# Patient Record
Sex: MALE | Race: WHITE | NOT HISPANIC OR LATINO | Employment: OTHER | URBAN - METROPOLITAN AREA
[De-identification: names, ages, dates, MRNs, and addresses within clinical notes are randomized per-mention and may not be internally consistent; named-entity substitution may affect disease eponyms.]

---

## 2017-01-15 ENCOUNTER — HOSPITAL ENCOUNTER (EMERGENCY)
Facility: MEDICAL CENTER | Age: 52
End: 2017-01-15
Payer: MEDICARE

## 2017-01-15 VITALS
OXYGEN SATURATION: 93 % | RESPIRATION RATE: 18 BRPM | TEMPERATURE: 98.8 F | DIASTOLIC BLOOD PRESSURE: 70 MMHG | HEIGHT: 73 IN | SYSTOLIC BLOOD PRESSURE: 101 MMHG | HEART RATE: 114 BPM

## 2017-01-15 PROCEDURE — 302449 STATCHG TRIAGE ONLY (STATISTIC)

## 2017-01-16 NOTE — ED NOTES
Pt amb to [facility] and states he tripped and fell landing on his R lateral chest wall and now has rib pain. Pt states he can't wait in the lobby so he will check out and see his regular doctor tomorrow. Pt signs AMA paperwork.

## 2017-03-03 ENCOUNTER — HOSPITAL ENCOUNTER (EMERGENCY)
Facility: MEDICAL CENTER | Age: 52
End: 2017-03-03
Attending: EMERGENCY MEDICINE
Payer: MEDICARE

## 2017-03-03 VITALS
RESPIRATION RATE: 16 BRPM | TEMPERATURE: 98.8 F | SYSTOLIC BLOOD PRESSURE: 145 MMHG | WEIGHT: 227 LBS | HEART RATE: 113 BPM | DIASTOLIC BLOOD PRESSURE: 97 MMHG | OXYGEN SATURATION: 97 % | HEIGHT: 74 IN | BODY MASS INDEX: 29.13 KG/M2

## 2017-03-03 DIAGNOSIS — F10.10 ALCOHOL ABUSE: ICD-10-CM

## 2017-03-03 LAB
ALBUMIN SERPL BCP-MCNC: 4.1 G/DL (ref 3.2–4.9)
ALBUMIN/GLOB SERPL: 1.1 G/DL
ALP SERPL-CCNC: 85 U/L (ref 30–99)
ALT SERPL-CCNC: 115 U/L (ref 2–50)
AMPHET UR QL SCN: NEGATIVE
ANION GAP SERPL CALC-SCNC: 11 MMOL/L (ref 0–11.9)
AST SERPL-CCNC: 81 U/L (ref 12–45)
BARBITURATES UR QL SCN: NEGATIVE
BASOPHILS # BLD AUTO: 0.4 % (ref 0–1.8)
BASOPHILS # BLD: 0.03 K/UL (ref 0–0.12)
BENZODIAZ UR QL SCN: NEGATIVE
BILIRUB SERPL-MCNC: 0.5 MG/DL (ref 0.1–1.5)
BUN SERPL-MCNC: 15 MG/DL (ref 8–22)
BZE UR QL SCN: NEGATIVE
CALCIUM SERPL-MCNC: 8.7 MG/DL (ref 8.5–10.5)
CANNABINOIDS UR QL SCN: POSITIVE
CHLORIDE SERPL-SCNC: 107 MMOL/L (ref 96–112)
CO2 SERPL-SCNC: 23 MMOL/L (ref 20–33)
CREAT SERPL-MCNC: 0.87 MG/DL (ref 0.5–1.4)
EKG IMPRESSION: NORMAL
EOSINOPHIL # BLD AUTO: 0.16 K/UL (ref 0–0.51)
EOSINOPHIL NFR BLD: 2.4 % (ref 0–6.9)
ERYTHROCYTE [DISTWIDTH] IN BLOOD BY AUTOMATED COUNT: 50.3 FL (ref 35.9–50)
GFR SERPL CREATININE-BSD FRML MDRD: >60 ML/MIN/1.73 M 2
GLOBULIN SER CALC-MCNC: 3.7 G/DL (ref 1.9–3.5)
GLUCOSE SERPL-MCNC: 95 MG/DL (ref 65–99)
HCT VFR BLD AUTO: 45.3 % (ref 42–52)
HGB BLD-MCNC: 15.7 G/DL (ref 14–18)
IMM GRANULOCYTES # BLD AUTO: 0.01 K/UL (ref 0–0.11)
IMM GRANULOCYTES NFR BLD AUTO: 0.1 % (ref 0–0.9)
LYMPHOCYTES # BLD AUTO: 3.27 K/UL (ref 1–4.8)
LYMPHOCYTES NFR BLD: 48.7 % (ref 22–41)
MCH RBC QN AUTO: 34.2 PG (ref 27–33)
MCHC RBC AUTO-ENTMCNC: 34.7 G/DL (ref 33.7–35.3)
MCV RBC AUTO: 98.7 FL (ref 81.4–97.8)
MDMA UR QL SCN: NEGATIVE
METHADONE UR QL SCN: NEGATIVE
MONOCYTES # BLD AUTO: 0.52 K/UL (ref 0–0.85)
MONOCYTES NFR BLD AUTO: 7.7 % (ref 0–13.4)
NEUTROPHILS # BLD AUTO: 2.72 K/UL (ref 1.82–7.42)
NEUTROPHILS NFR BLD: 40.7 % (ref 44–72)
NRBC # BLD AUTO: 0 K/UL
NRBC BLD AUTO-RTO: 0 /100 WBC
OPIATES UR QL SCN: NEGATIVE
OXYCODONE UR QL SCN: NEGATIVE
PCP UR QL SCN: NEGATIVE
PLATELET # BLD AUTO: 159 K/UL (ref 164–446)
PMV BLD AUTO: 9.4 FL (ref 9–12.9)
POC BREATHALIZER: 0.1 PERCENT (ref 0–0.01)
POTASSIUM SERPL-SCNC: 4.1 MMOL/L (ref 3.6–5.5)
PROPOXYPH UR QL SCN: NEGATIVE
PROT SERPL-MCNC: 7.8 G/DL (ref 6–8.2)
RBC # BLD AUTO: 4.59 M/UL (ref 4.7–6.1)
SODIUM SERPL-SCNC: 141 MMOL/L (ref 135–145)
WBC # BLD AUTO: 6.7 K/UL (ref 4.8–10.8)

## 2017-03-03 PROCEDURE — 93005 ELECTROCARDIOGRAM TRACING: CPT

## 2017-03-03 PROCEDURE — 80053 COMPREHEN METABOLIC PANEL: CPT

## 2017-03-03 PROCEDURE — 93005 ELECTROCARDIOGRAM TRACING: CPT | Performed by: EMERGENCY MEDICINE

## 2017-03-03 PROCEDURE — 36415 COLL VENOUS BLD VENIPUNCTURE: CPT

## 2017-03-03 PROCEDURE — 96365 THER/PROPH/DIAG IV INF INIT: CPT

## 2017-03-03 PROCEDURE — 700101 HCHG RX REV CODE 250: Performed by: EMERGENCY MEDICINE

## 2017-03-03 PROCEDURE — 99285 EMERGENCY DEPT VISIT HI MDM: CPT

## 2017-03-03 PROCEDURE — 80307 DRUG TEST PRSMV CHEM ANLYZR: CPT

## 2017-03-03 PROCEDURE — 302970 POC BREATHALIZER: Performed by: EMERGENCY MEDICINE

## 2017-03-03 PROCEDURE — 85025 COMPLETE CBC W/AUTO DIFF WBC: CPT

## 2017-03-03 RX ORDER — LORAZEPAM 2 MG/ML
1 INJECTION INTRAMUSCULAR ONCE
Status: DISCONTINUED | OUTPATIENT
Start: 2017-03-03 | End: 2017-03-03 | Stop reason: HOSPADM

## 2017-03-03 RX ORDER — DIPHENHYDRAMINE HYDROCHLORIDE 50 MG/ML
12.5 INJECTION INTRAMUSCULAR; INTRAVENOUS ONCE
Status: DISCONTINUED | OUTPATIENT
Start: 2017-03-03 | End: 2017-03-03 | Stop reason: HOSPADM

## 2017-03-03 RX ORDER — HALOPERIDOL 5 MG/ML
5 INJECTION INTRAMUSCULAR ONCE
Status: DISCONTINUED | OUTPATIENT
Start: 2017-03-03 | End: 2017-03-03 | Stop reason: HOSPADM

## 2017-03-03 RX ADMIN — THIAMINE HYDROCHLORIDE 1000 ML: 100 INJECTION, SOLUTION INTRAMUSCULAR; INTRAVENOUS at 13:41

## 2017-03-03 ASSESSMENT — LIFESTYLE VARIABLES
ON A TYPICAL DAY WHEN YOU DRINK ALCOHOL HOW MANY DRINKS DO YOU HAVE: 0
TOTAL SCORE: 4
HOW MANY TIMES IN THE PAST YEAR HAVE YOU HAD 5 OR MORE DRINKS IN A DAY: 0
SUBSTANCE_ABUSE: 1
EVER HAD A DRINK FIRST THING IN THE MORNING TO STEADY YOUR NERVES TO GET RID OF A HANGOVER: YES
CONSUMPTION TOTAL: POSITIVE
TOTAL SCORE: 4
DOES PATIENT WANT TO STOP DRINKING: CANNOT ASSESS
HAVE YOU EVER FELT YOU SHOULD CUT DOWN ON YOUR DRINKING: YES
EVER FELT BAD OR GUILTY ABOUT YOUR DRINKING: YES
TOTAL SCORE: 4
HAVE PEOPLE ANNOYED YOU BY CRITICIZING YOUR DRINKING: YES
DO YOU DRINK ALCOHOL: YES
AVERAGE NUMBER OF DAYS PER WEEK YOU HAVE A DRINK CONTAINING ALCOHOL: 0

## 2017-03-03 ASSESSMENT — ENCOUNTER SYMPTOMS
NAUSEA: 1
VOMITING: 0
COUGH: 0
BLURRED VISION: 0
LOSS OF CONSCIOUSNESS: 0
NERVOUS/ANXIOUS: 1
HEADACHES: 0
NECK PAIN: 0
DOUBLE VISION: 0
FALLS: 1

## 2017-03-03 ASSESSMENT — PAIN SCALES - WONG BAKER: WONGBAKER_NUMERICALRESPONSE: DOESN'T HURT AT ALL

## 2017-03-03 ASSESSMENT — PAIN SCALES - GENERAL
PAINLEVEL_OUTOF10: 10
PAINLEVEL_OUTOF10: 0

## 2017-03-03 NOTE — DISCHARGE INSTRUCTIONS
Alcohol Abuse and Nutrition  Alcohol abuse is any pattern of alcohol consumption that harms your health, relationships, or work. Alcohol abuse can affect how your body breaks down and absorbs nutrients from food by causing your liver to work abnormally. Additionally, many people who abuse alcohol do not eat enough carbohydrates, protein, fat, vitamins, and minerals. This can cause poor nutrition (malnutrition) and a lack of nutrients (nutrient deficiencies), which can lead to further complications.  Nutrients that are commonly lacking (deficient) among people who abuse alcohol include:  · Vitamins.  ¨ Vitamin A. This is stored in your liver. It is important for your vision, metabolism, and ability to fight off infections (immunity).  ¨ B vitamins. These include vitamins such as folate, thiamin, and niacin. These are important in new cell growth and maintenance.  ¨ Vitamin C. This plays an important role in iron absorption, wound healing, and immunity.  ¨ Vitamin D. This is produced by your liver, but you can also get vitamin D from food. Vitamin D is necessary for your body to absorb and use calcium.  · Minerals.  ¨ Calcium. This is important for your bones and your heart and blood vessel (cardiovascular) function.  ¨ Iron. This is important for blood, muscle, and nervous system functioning.  ¨ Magnesium. This plays an important role in muscle and nerve function, and it helps to control blood sugar and blood pressure.  ¨ Zinc. This is important for the normal function of your nervous system and digestive system (gastrointestinal tract).  Nutrition is an essential component of therapy for alcohol abuse. Your health care provider or dietitian will work with you to design a plan that can help restore nutrients to your body and prevent potential complications.  WHAT IS MY PLAN?  Your dietitian may develop a specific diet plan that is based on your condition and any other complications you may have. A diet plan will  commonly include:  · A balanced diet.  ¨ Grains: 6-8 oz per day.  ¨ Vegetables: 2-3 cups per day.  ¨ Fruits: 1-2 cups per day.  ¨ Meat and other protein: 5-6 oz per day.  ¨ Dairy: 2-3 cups per day.  · Vitamin and mineral supplements.  WHAT DO I NEED TO KNOW ABOUT ALCOHOL AND NUTRITION?  · Consume foods that are high in antioxidants, such as grapes, berries, nuts, green tea, and dark green and orange vegetables. This can help to counteract some of the stress that is placed on your liver by consuming alcohol.  · Avoid food and drinks that are high in fat and sugar. Foods such as sugared soft drinks, salty snack foods, and candy contain empty calories. This means that they lack important nutrients such as protein, fiber, and vitamins.  · Eat frequent meals and snacks. Try to eat 5-6 small meals each day.  · Eat a variety of fresh fruits and vegetables each day. This will help you get plenty of water, fiber, and vitamins in your diet.  · Drink plenty of water and other clear fluids. Try to drink at least 48-64 oz (1.5-2 L) of water per day.  · If you are a vegetarian, eat a variety of protein-rich foods. Pair whole grains with plant-based proteins at meals and snacks to obtain the greatest nutrient benefit from your food. For example, eat rice with beans, put peanut butter on whole-grain toast, or eat oatmeal with sunflower seeds.  · Soak beans and whole grains overnight before cooking. This can help your body to absorb the nutrients more easily.  · Include foods fortified with vitamins and minerals in your diet. Commonly fortified foods include milk, orange juice, cereal, and bread.  · If you are malnourished, your dietitian may recommend a high-protein, high-calorie diet. This may include:  ¨ 2,000-3,000 calories (kilocalories) per day.  ¨  grams of protein per day.  · Your health care provider may recommend a complete nutritional supplement beverage. This can help to restore calories, protein, and vitamins to  your body. Depending on your condition, you may be advised to consume this instead of or in addition to meals.  · Limit your intake of caffeine. Replace drinks like coffee and black tea with decaffeinated coffee and herbal tea.  · Eat a variety of foods that are high in omega fatty acids. These include fish, nuts and seeds, and soybeans. These foods may help your liver to recover and may also stabilize your mood.  · Certain medicines may cause changes in your appetite, taste, and weight. Work with your health care provider and dietitian to make any adjustments to your medicines and diet plan.  · Include other healthy lifestyle choices in your daily routine.  ¨ Be physically active.  ¨ Get enough sleep.  ¨ Spend time doing activities that you enjoy.  · If you are unable to take in enough food and calories by mouth, your health care provider may recommend a feeding tube. This is a tube that passes through your nose and throat, directly into your stomach. Nutritional supplement beverages can be given to you through the feeding tube to help you get the nutrients you need.  · Take vitamin or mineral supplements as recommended by your health care provider.  WHAT FOODS CAN I EAT?  Grains  Enriched pasta. Enriched rice. Fortified whole-grain bread. Fortified whole-grain cereal. Barley. Brown rice. Quinoa. Millet.  Vegetables  All fresh, frozen, and canned vegetables. Spinach. Kale. Artichoke. Carrots. Winter squash and pumpkin. Sweet potatoes. Broccoli. Cabbage. Cucumbers. Tomatoes. Sweet peppers. Green beans. Peas. Corn.  Fruits  All fresh and frozen fruits. Berries. Grapes. [place]. Papaya. Guava. Cherries. Apples. Bananas. Peaches. Plums. Pineapple. Watermelon. Cantaloupe. Oranges. Avocado.  Meats and Other Protein Sources  Beef liver. Lean beef. Pork. Fresh and canned chicken. Fresh fish. Oysters. Sardines. Canned tuna. Shrimp. Eggs with yolks. Nuts and seeds. Peanut butter. Beans and lentils. Soybeans.  Tofu.  Dairy  Whole, low-fat, and nonfat milk. Whole, low-fat, and nonfat yogurt. Cottage cheese. Sour cream. Hard and soft cheeses.  Beverages  Water. Herbal tea. Decaffeinated coffee. Decaffeinated green tea. 100% fruit juice. 100% vegetable juice. Instant breakfast shakes.  Condiments  Ketchup. Mayonnaise. Mustard. Salad dressing. Barbecue sauce.  Sweets and Desserts  Sugar-free ice cream. Sugar-free pudding. Sugar-free gelatin.  Fats and Oils  Butter. Vegetable oil, flaxseed oil, olive oil, and walnut oil.  Other  Complete nutrition shakes. Protein bars. Sugar-free gum.  The items listed above may not be a complete list of recommended foods or beverages. Contact your dietitian for more options.  WHAT FOODS ARE NOT RECOMMENDED?  Grains  Sugar-sweetened breakfast cereals. Flavored instant oatmeal. Fried breads.  Vegetables  Breaded or deep-fried vegetables.  Fruits  Dried fruit with added sugar. Candied fruit. Canned fruit in syrup.  Meats and Other Protein Sources  Breaded or deep-fried meats.  Dairy  Flavored milks. Fried cheese curds or fried cheese sticks.  Beverages  Alcohol. Sugar-sweetened soft drinks. Sugar-sweetened tea. Caffeinated coffee and tea.  Condiments  Sugar. Honey. Agave nectar. Molasses.  Sweets and Desserts  Chocolate. Cake. Cookies. Candy.  Other  Potato chips. Pretzels. Salted nuts. Candied nuts.  The items listed above may not be a complete list of foods and beverages to avoid. Contact your dietitian for more information.     This information is not intended to replace advice given to you by your health care provider. Make sure you discuss any questions you have with your health care provider.     Document Released: 10/12/2006 Document Revised: 01/08/2016 Document Reviewed: 07/21/2015  [employer] Interactive Patient Education ©2016 [email or web address]

## 2017-03-03 NOTE — ED NOTES
Pt is ambulatory, leaving with [facility]. Pt vital signs WNL. Discharge instructions explained. Patient verbalized understanding.

## 2017-03-03 NOTE — ED AVS SNAPSHOT
[employer] Access Code: [email or web address]  Expires: 3/13/2017  9:46 AM    [employer]  A secure, online tool to manage your health information     [email or web address]’s [employer]® is a secure, online tool that connects you to your personalized health information from the privacy of your home -- day or night - making it very easy for you to manage your healthcare. Once the activation process is completed, you can even access your medical information using the [employer] avinash, which is available for free in the Apple Avinash store or Google Play store.     [employer] provides the following levels of access (as shown below):   My Chart Features   [facility] Primary Care Doctor [facility]  Specialists [facility]  Urgent  Care Non-[facility]  Primary Care  Doctor   Email your healthcare team securely and privately 24/7 X X X X   Manage appointments: schedule your next appointment; view details of past/upcoming appointments X      Request prescription refills. X      View recent personal medical records, including lab and immunizations X X X X   View health record, including health history, allergies, medications X X X X   Read reports about your outpatient visits, procedures, consult and ER notes X X X X   See your discharge summary, which is a recap of your hospital and/or ER visit that includes your diagnosis, lab results, and care plan. X X       How to register for [employer]:  1. Go to  [email or web address].  2. Click on the Sign Up Now box, which takes you to the New Member Sign Up page. You will need to provide the following information:  a. Enter your [employer] Access Code exactly as it appears at the top of this page. (You will not need to use this code after you’ve completed the sign-up process. If you do not sign up before the expiration date, you must request a new code.)   b. Enter your date of birth.   c. Enter your home email address.   d. Click Submit, and follow the next screen’s instructions.  3. Create a [employer] ID. This will be your [employer]  login ID and cannot be changed, so think of one that is secure and easy to remember.  4. Create a [employer] password. You can change your password at any time.  5. Enter your Password Reset Question and Answer. This can be used at a later time if you forget your password.   6. Enter your e-mail address. This allows you to receive e-mail notifications when new information is available in [employer].  7. Click Sign Up. You can now view your health information.    For assistance activating your [employer] account, call [phone]

## 2017-03-03 NOTE — ED PROVIDER NOTES
ED Provider Note    Scribed for Michael Pierce M.D. by Mark Dela Cruz. 3/3/2017, 12:38 PM.    Primary care provider: Pcp Pt States None  Means of arrival: [facility]  History obtained from: Patient  History limited by: None    CHIEF COMPLAINT  Chief Complaint   Patient presents with   • Chest Pain   • Fall     HPI  Chace Gong is a 52 y.o. male who presents to the Emergency Department complaining of sudden onset and severe left sided chest pain, onset prior to arrival. Patient reports his pain began and this caused him to fall and hit his head. He states that his pain lasted for a minute but has resolved. His pain is described as a burning sensation The patient admits to drinking excessively for 7 days straight for the first time in 15 years. His last drink was 22 hours prior to arrival in the ED. He notes that he began drinking because his daughter and grand children passed away. Patient notes that he would like help and resources to stop drinking.     REVIEW OF SYSTEMS  Review of Systems   HENT: Negative for congestion.         Positive head trauma   Eyes: Negative for blurred vision and double vision.   Respiratory: Negative for cough.    Cardiovascular: Positive for chest pain.   Gastrointestinal: Positive for nausea. Negative for vomiting.   Musculoskeletal: Positive for falls. Negative for neck pain.   Neurological: Negative for loss of consciousness and headaches.   Psychiatric/Behavioral: Positive for substance abuse (alcohol). The patient is nervous/anxious.    All other systems reviewed and are negative.  C.     PAST MEDICAL HISTORY   has a past medical history of Low back pain; Arthralgia (6/24/2009); Dyslipidemia (6/24/2009); GERD (gastroesophageal reflux disease) (6/24/2009); Bronchitis (6/24/2009); Achilles tendinitis (6/24/2009); Hypertension; HTN (hypertension) (6/24/2009); Psychiatric disorder; and Hepatitis C.    SURGICAL HISTORY   has past surgical history that includes neurolysis  "(1/21/2009); other orthopedic surgery (2001); carpal tunnel release (1/21/2009); hardware removal ortho (1/21/2009); synovectomy (1/21/2009); arthrotomy (2011); arthroscopy, knee (2013); elbow arthrotomy (5/17/2013); and loose body removal (5/17/2013).    SOCIAL HISTORY  Social History   Substance Use Topics   • Smoking status: Former Smoker -- 0.25 packs/day for 30 years     Types: Cigarettes     Start date: 08/22/2015     Quit date: 11/14/2015   • Smokeless tobacco: Never Used      Comment: initiated cessation 12 Aug 2015, occasional cigar   • Alcohol Use: 0.0 oz/week     0 Standard drinks or equivalent per week      Comment: vodka       History   Drug Use   • Yes   • Special: Inhaled, Marijuana     Comment: has med marijuana card; smokes 1 joint every morning, speed     FAMILY HISTORY  Family History   Problem Relation Age of Onset   • Cancer Mother      lung / throat / breast   • Hypertension Mother    • Hypertension Father    • Hyperlipidemia Father    • Stroke Father    • Alcohol/Drug Father    • Diabetes Sister    • Hypertension Sister    • Alcohol/Drug Sister    • Cancer Brother    • Hypertension Brother    • Alcohol/Drug Brother    • Heart Disease Neg Hx    • Stroke Brother    • Alcohol/Drug Brother    • Cancer Sister      lung     CURRENT MEDICATIONS  Home Medications     Reviewed by Rehana Childers R.N. (Registered Nurse) on 03/03/17 at 1229  Med List Status: Unable to Obtain    Medication Last Dose Status    oxycodone-acetaminophen (PERCOCET) 5-325 MG Tab  Active              ALLERGIES  Allergies   Allergen Reactions   • Penicillins Unspecified     Pt states that his mom told him he is allergic to penicillins.   • Fentanyl Palpitations     Pt stated he gets an arrhythmia from this.   • Hydrocodone Unspecified     Pt states \"I'm not sure\".   • Other Misc Rash     Tape  Paper tape ok   • Sulfa Drugs      \"my mom told me I was allergic to it when i was a kid\"     PHYSICAL EXAM  VITAL SIGNS: /97 mmHg " " Pulse 104  Temp(Src) 37.1 °C (98.8 °F)  Resp 15  Ht 1.88 m (6' 2\")  Wt 102.967 kg (227 lb)  BMI 29.13 kg/m2  Constitutional:  Mild acute distress, slightly agitated but cooperative.   HENT: Slightly dry mucous membranes  Eyes: Slight conjunctivitis, EOMI, pupils are equal and reactive.   Neck:  trachea is midline, no palpable thyroid  Lymphatic:  No cervical lymphadenopathy  Cardiovascular:  Regular rate and rhythm, no murmurs  Thorax & Lungs:  Normal breath sounds, no rhonchi  Abdomen:  Soft, Non-tender  Skin:. Warm, dry, no erythema, no rash  Back:  Non-tender, no CVA tenderness  Extremities:   no edema  Vascular:  symmetric radial pulse  Neurologic: Hyperactive, strength intact, sensation intact. Alert and oriented.   Psychiatric: Mad affect, slightly agitated but cooperative.     LABS  Labs Reviewed   CBC WITH DIFFERENTIAL - Abnormal; Notable for the following:     RBC 4.59 (*)     MCV 98.7 (*)     MCH 34.2 (*)     RDW 50.3 (*)     Platelet Count 159 (*)     Neutrophils-Polys 40.70 (*)     Lymphocytes 48.70 (*)     All other components within normal limits   COMP METABOLIC PANEL - Abnormal; Notable for the following:     AST(SGOT) 81 (*)     ALT(SGPT) 115 (*)     Globulin 3.7 (*)     All other components within normal limits   POC BREATHALIZER - Abnormal; Notable for the following:     POC Breathalizer 0.097 (*)     All other components within normal limits   URINE DRUG SCREEN   ESTIMATED GFR     All labs reviewed by me.    EKG Interpretation  Interpreted by me  Normal Sinus Rhythm. Rate 87  Normal QTc  Normal QRS  Normal Axis  No old EKG for comparison.     COURSE & MEDICAL DECISION MAKING  Pertinent Labs & Imaging studies reviewed. (See chart for details)    12:38 PM - Patient seen and examined at bedside. Patient will be treated with Ativan, Haldol, Benadryl, and ER detox IV. Ordered CBC, CMP, and EKG to evaluate his symptoms. The differential diagnoses include but are not limited to: alcohol " withdrawal, acute chelsea. I discussed with the patient my plan to get him to [facility]. Initially he declines but later happily agrees.     12:59 PM - I discussed the case with Joel from [facility]. He is aware of the patient and agrees with my plan to send him to [facility]. Prior to transfer, the patient will be interviewed by [facility].     2:04 PM Recheck with the patient. The patient is still agreeable to go to [facility]. [facility] presents to the bedside to interview the patient.     2:15 PM Patient will be discharged to [facility].       The patient will return for new or worsening symptoms and is stable at the time of discharge.    DISPOSITION:  Patient will be discharged to [facility] in stable condition.    FINAL IMPRESSION  1. Alcohol abuse       Mark DA SILVA (Scribe), am scribing for, and in the presence of, Michael Pierce M.D..    Electronically signed by: Mark Dela Cruz (Jonas), 3/3/2017    Michael DA SILVA M.D. personally performed the services described in this documentation, as scribed by Mark Dela Cruz in my presence, and it is both accurate and complete.    The note accurately reflects work and decisions made by me.  Michael Pierce  3/3/2017  7:02 PM

## 2017-03-03 NOTE — ED AVS SNAPSHOT
3/3/2017          Chace Gong  [address] [place] [address]    Dear Chace:    [facility] wants to ensure your discharge home is safe and you or your loved ones have had all your questions answered regarding your care after you leave the hospital.    You may receive a telephone call within two days of your discharge.  This call is to make certain you understand your discharge instructions as well as ensure we provided you with the best care possible during your stay with us.     The call will only last approximately 3-5 minutes and will be done by a nurse.    Once again, we want to ensure your discharge home is safe and that you have a clear understanding of any next steps in your care.  If you have any questions or concerns, please do not hesitate to contact us, we are here for you.  Thank you for choosing [facility] for your healthcare needs.    Sincerely,    Fahad Dyson    [facility]

## 2017-03-03 NOTE — ED NOTES
"Patient brought in by [facility]. Pt states he experienced chest pain radiating down L arm that caused a ground level fall. Patient's recall of events inconsistent with EMS and RN. Per EMS patient fell and then stated he experienced chest pain afterward. Patient nauseous and stated \"I have been drinking\".  "

## 2017-05-02 ENCOUNTER — APPOINTMENT (OUTPATIENT)
Dept: RADIOLOGY | Facility: MEDICAL CENTER | Age: 52
End: 2017-05-02
Attending: EMERGENCY MEDICINE
Payer: COMMERCIAL

## 2017-05-02 ENCOUNTER — HOSPITAL ENCOUNTER (EMERGENCY)
Facility: MEDICAL CENTER | Age: 52
End: 2017-05-02
Attending: EMERGENCY MEDICINE
Payer: COMMERCIAL

## 2017-05-02 VITALS
DIASTOLIC BLOOD PRESSURE: 74 MMHG | BODY MASS INDEX: 29.71 KG/M2 | HEART RATE: 89 BPM | OXYGEN SATURATION: 94 % | RESPIRATION RATE: 19 BRPM | SYSTOLIC BLOOD PRESSURE: 122 MMHG | HEIGHT: 74 IN | WEIGHT: 231.48 LBS | TEMPERATURE: 96.8 F

## 2017-05-02 DIAGNOSIS — S61.412A STAB WOUND OF LEFT HAND, INITIAL ENCOUNTER: ICD-10-CM

## 2017-05-02 PROCEDURE — 700102 HCHG RX REV CODE 250 W/ 637 OVERRIDE(OP): Performed by: EMERGENCY MEDICINE

## 2017-05-02 PROCEDURE — A9270 NON-COVERED ITEM OR SERVICE: HCPCS | Performed by: EMERGENCY MEDICINE

## 2017-05-02 PROCEDURE — 96374 THER/PROPH/DIAG INJ IV PUSH: CPT

## 2017-05-02 PROCEDURE — 700111 HCHG RX REV CODE 636 W/ 250 OVERRIDE (IP): Performed by: EMERGENCY MEDICINE

## 2017-05-02 PROCEDURE — 99285 EMERGENCY DEPT VISIT HI MDM: CPT

## 2017-05-02 PROCEDURE — 96375 TX/PRO/DX INJ NEW DRUG ADDON: CPT

## 2017-05-02 PROCEDURE — 303485 HCHG DRESSING MEDIUM

## 2017-05-02 PROCEDURE — 73130 X-RAY EXAM OF HAND: CPT | Mod: LT

## 2017-05-02 RX ORDER — ONDANSETRON 2 MG/ML
4 INJECTION INTRAMUSCULAR; INTRAVENOUS EVERY 4 HOURS PRN
Status: DISCONTINUED | OUTPATIENT
Start: 2017-05-02 | End: 2017-05-02 | Stop reason: HOSPADM

## 2017-05-02 RX ORDER — OXYCODONE AND ACETAMINOPHEN 10; 325 MG/1; MG/1
1 TABLET ORAL ONCE
Status: COMPLETED | OUTPATIENT
Start: 2017-05-02 | End: 2017-05-02

## 2017-05-02 RX ORDER — CEPHALEXIN 500 MG/1
500 CAPSULE ORAL 4 TIMES DAILY
Qty: 28 CAP | Refills: 0 | Status: SHIPPED | OUTPATIENT
Start: 2017-05-02 | End: 2017-05-09

## 2017-05-02 RX ORDER — OXYCODONE HYDROCHLORIDE AND ACETAMINOPHEN 5; 325 MG/1; MG/1
1-2 TABLET ORAL EVERY 4 HOURS PRN
Qty: 20 TAB | Refills: 0 | Status: SHIPPED | OUTPATIENT
Start: 2017-05-02 | End: 2017-11-21

## 2017-05-02 RX ADMIN — OXYCODONE HYDROCHLORIDE AND ACETAMINOPHEN 1 TABLET: 10; 325 TABLET ORAL at 11:22

## 2017-05-02 RX ADMIN — ONDANSETRON 4 MG: 2 INJECTION INTRAMUSCULAR; INTRAVENOUS at 08:30

## 2017-05-02 RX ADMIN — HYDROMORPHONE HYDROCHLORIDE 1 MG: 1 INJECTION, SOLUTION INTRAMUSCULAR; INTRAVENOUS; SUBCUTANEOUS at 08:30

## 2017-05-02 ASSESSMENT — PAIN SCALES - GENERAL: PAINLEVEL_OUTOF10: 10

## 2017-05-02 ASSESSMENT — ENCOUNTER SYMPTOMS
SHORTNESS OF BREATH: 0
FEVER: 0
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
## 2017-05-02 NOTE — DISCHARGE INSTRUCTIONS
You need to follow-up with the hand surgeon, Dr. Kemp.   Call today to schedule a follow-up appointment.    Puncture Wound  A puncture wound is an injury that extends through all layers of the skin and into the tissue beneath the skin (subcutaneous tissue). Puncture wounds become infected easily because germs often enter the body and go beneath the skin during the injury. Having a deep wound with a small entrance point makes it difficult for your caregiver to adequately clean the wound. This is especially true if you have stepped on a nail and it has passed through a dirty shoe or other situations where the wound is obviously contaminated.  CAUSES   Many puncture wounds involve glass, nails, splinters, fish hooks, or other objects that enter the skin (foreign bodies). A puncture wound may also be caused by a human bite or animal bite.  DIAGNOSIS   A puncture wound is usually diagnosed by your history and a physical exam. You may need to have an X-ray or an ultrasound to check for any foreign bodies still in the wound.  TREATMENT   · Your caregiver will clean the wound as thoroughly as possible. Depending on the location of the wound, a bandage (dressing) may be applied.  · Your caregiver might prescribe antibiotic medicines.  · You may need a follow-up visit to check on your wound. Follow all instructions as directed by your caregiver.  HOME CARE INSTRUCTIONS   · Change your dressing once per day, or as directed by your caregiver. If the dressing sticks, it may be removed by soaking the area in water.  · If your caregiver has given you follow-up instructions, it is very important that you return for a follow-up appointment. Not following up as directed could result in a chronic or permanent injury, pain, and disability.  · Only take over-the-counter or prescription medicines for pain, discomfort, or fever as directed by your caregiver.  · If you are given antibiotics, take them as directed. Finish them even if  you start to feel better.  You may need a tetanus shot if:  · You cannot remember when you had your last tetanus shot.  · You have never had a tetanus shot.  If you got a tetanus shot, your arm may swell, get red, and feel warm to the touch. This is common and not a problem. If you need a tetanus shot and you choose not to have one, there is a rare chance of getting tetanus. Sickness from tetanus can be serious.  You may need a rabies shot if an animal bite caused your puncture wound.  SEEK MEDICAL CARE IF:   · You have redness, swelling, or increasing pain in the wound.  · You have red streaks going away from the wound.  · You notice a bad smell coming from the wound or dressing.  · You have yellowish-white fluid (pus) coming from the wound.  · You are treated with an antibiotic for infection, but the infection is not getting better.  · You notice something in the wound, such as rubber from your shoe, cloth, or another object.  · You have a fever.  · You have severe pain.  · You have difficulty breathing.  · You feel dizzy or faint.  · You cannot stop vomiting.  · You lose feeling, develop numbness, or cannot move a limb below the wound.  · Your symptoms worsen.  MAKE SURE YOU:  · Understand these instructions.  · Will watch your condition.  · Will get help right away if you are not doing well or get worse.     This information is not intended to replace advice given to you by your health care provider. Make sure you discuss any questions you have with your health care provider.     Document Released: 09/27/2006 Document Revised: 03/11/2013 Document Reviewed: 02/10/2016  [employer] Interactive Patient Education ©2016 [employer]
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
## 2017-05-02 NOTE — ED AVS SNAPSHOT
5/2/2017    Chace Gong  [address] [place] [address]    Dear Chace:    [facility] wants to ensure your discharge home is safe and you or your loved ones have had all of your questions answered regarding your care after you leave the hospital.    Below is a list of resources and contact information should you have any questions regarding your hospital stay, follow-up instructions, or active medical symptoms.    Questions or Concerns Regarding… Contact   Medical Questions Related to Your Discharge  (7 days a week, 8am-5pm) Contact a Nurse Care Coordinator   [phone]   Medical Questions Not Related to Your Discharge  (24 hours a day / 7 days a week)  Contact the Nurse Health Line   [phone]    Medications or Discharge Instructions Refer to your discharge packet   or contact your [facility] Primary Care Provider   [phone]   Follow-up Appointment(s) Schedule your appointment via [employer]   or contact Scheduling [phone]   Billing Review your statement via [employer]  or contact Billing [phone]   Medical Records Review your records via [employer]   or contact Medical Records [phone]     You may receive a telephone call within two days of discharge. This call is to make certain you understand your discharge instructions and have the opportunity to have any questions answered. You can also easily access your medical information, test results and upcoming appointments via the [employer] free online health management tool. You can learn more and sign up at [employer]. For assistance setting up your [employer] account, please call [phone].    Once again, we want to ensure your discharge home is safe and that you have a clear understanding of any next steps in your care. If you have any questions or concerns, please do not hesitate to contact us, we are here for you. Thank you for choosing [facility] for your healthcare needs.    Sincerely,    Your [facility] Healthcare Team

## 2017-05-02 NOTE — ED AVS SNAPSHOT
T[employer] Access Code: [email or web address]  Expires: 5/10/2017  1:03 PM    Your email address is not on file at [facility].  Email Addresses are required for you to sign up for [employer], please contact [phone] to verify your personal information and to provide your email address prior to attempting to register for [employer].    Chace Gong  [address] [place] [address]    [employer]  A secure, online tool to manage your health information     [facility]’s [employer]® is a secure, online tool that connects you to your personalized health information from the privacy of your home -- day or night - making it very easy for you to manage your healthcare. Once the activation process is completed, you can even access your medical information using the [employer] avinash, which is available for free in the Apple Avinash store or Google Play store.     To learn more about [employer], visit [email or web address]/[email or web address]    There are two levels of access available (as shown below):   My Chart Features  [facility] Primary Care Doctor [facility]  Specialists [facility]  Urgent  Care Non-[facility] Primary Care Doctor   Email your healthcare team securely and privately 24/7 X X X    Manage appointments: schedule your next appointment; view details of past/upcoming appointments X      Request prescription refills. X      View recent personal medical records, including lab and immunizations X X X X   View health record, including health history, allergies, medications X X X X   Read reports about your outpatient visits, procedures, consult and ER notes X X X X   See your discharge summary, which is a recap of your hospital and/or ER visit that includes your diagnosis, lab results, and care plan X X  X     How to register for [employer]:  Once your e-mail address has been verified, follow the following steps to sign up for [employer].     1. Go to  [email or web address]  2. Click on the Sign Up Now box, which takes you to the New Member Sign Up page. You will  need to provide the following information:  a. Enter your [employer] Access Code exactly as it appears at the top of this page. (You will not need to use this code after you’ve completed the sign-up process. If you do not sign up before the expiration date, you must request a new code.)   b. Enter your date of birth.   c. Enter your home email address.   d. Click Submit, and follow the next screen’s instructions.  3. Create a [employer] ID. This will be your [employer] login ID and cannot be changed, so think of one that is secure and easy to remember.  4. Create a [employer] password. You can change your password at any time.  5. Enter your Password Reset Question and Answer. This can be used at a later time if you forget your password.   6. Enter your e-mail address. This allows you to receive e-mail notifications when new information is available in [employer].  7. Click Sign Up. You can now view your health information.    For assistance activating your [employer] account, call [phone]

## 2017-05-02 NOTE — ED NOTES
Wound cleaned and wrapped, wound care discussed  Pt calling niece for a ride  Reviewed all discharge instructions with patient, Reviewed all prescriptions, Pt denies questions. Pt escorted to lobby.  Pt instructed to take all abx as prescribed and not to drink ETOH or drive while taking narcotics
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
## 2017-06-09 ENCOUNTER — APPOINTMENT (OUTPATIENT)
Dept: RADIOLOGY | Facility: IMAGING CENTER | Age: 52
End: 2017-06-09
Attending: FAMILY MEDICINE
Payer: MEDICARE

## 2017-06-09 ENCOUNTER — OFFICE VISIT (OUTPATIENT)
Dept: URGENT CARE | Facility: CLINIC | Age: 52
End: 2017-06-09
Payer: MEDICARE

## 2017-06-09 VITALS
WEIGHT: 231 LBS | TEMPERATURE: 98.7 F | HEART RATE: 89 BPM | DIASTOLIC BLOOD PRESSURE: 76 MMHG | RESPIRATION RATE: 16 BRPM | HEIGHT: 74 IN | BODY MASS INDEX: 29.65 KG/M2 | OXYGEN SATURATION: 99 % | SYSTOLIC BLOOD PRESSURE: 168 MMHG

## 2017-06-09 DIAGNOSIS — L03.119 CELLULITIS OF LOWER EXTREMITY, UNSPECIFIED LATERALITY: ICD-10-CM

## 2017-06-09 DIAGNOSIS — M79.671 BILATERAL FOOT PAIN: ICD-10-CM

## 2017-06-09 DIAGNOSIS — F19.10 POLYSUBSTANCE ABUSE (HCC): ICD-10-CM

## 2017-06-09 DIAGNOSIS — M79.672 BILATERAL FOOT PAIN: ICD-10-CM

## 2017-06-09 LAB
AMP AMPHETAMINE: NORMAL
BAR BARBITURATES: NORMAL
BZO BENZODIAZEPINES: NORMAL
COC COCAINE: NORMAL
INT CON NEG: NORMAL
INT CON POS: NORMAL
MET METHAMPHETAMINES: NORMAL
MTD METHADONE: NORMAL
OPI OPIATES: NORMAL
PCP PHENCYCLIDINE: NORMAL
POC URINE DRUG SCREEN OCDRS: NORMAL
TCA TRICYCLIC ANTIDEPRESSANT: NORMAL
THC: NORMAL

## 2017-06-09 PROCEDURE — 99214 OFFICE O/P EST MOD 30 MIN: CPT | Performed by: FAMILY MEDICINE

## 2017-06-09 PROCEDURE — 73630 X-RAY EXAM OF FOOT: CPT | Mod: TC,RT | Performed by: FAMILY MEDICINE

## 2017-06-09 PROCEDURE — 80305 DRUG TEST PRSMV DIR OPT OBS: CPT | Mod: GZ | Performed by: FAMILY MEDICINE

## 2017-06-09 PROCEDURE — 73630 X-RAY EXAM OF FOOT: CPT | Mod: TC,LT | Performed by: FAMILY MEDICINE

## 2017-06-09 RX ORDER — CLINDAMYCIN HYDROCHLORIDE 300 MG/1
300 CAPSULE ORAL 3 TIMES DAILY
Qty: 15 CAP | Refills: 0 | Status: SHIPPED | OUTPATIENT
Start: 2017-06-09 | End: 2017-06-14
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
## 2017-06-09 NOTE — MR AVS SNAPSHOT
"        Chace Gong   2017 12:15 PM   Office Visit   MRN: [id number]    Department:  [facility]   Dept Phone:  [phone]    Description:  Male : 1965   Provider:  Ramesh Stanton M.D.           Reason for Visit     Foot Pain bi lateral foot pain [place]      Allergies as of 2017     Allergen Noted Reactions    Penicillins 2015   Unspecified    Pt states that his mom told him he is allergic to penicillins.    Fentanyl 2017   Palpitations    Pt stated he gets an arrhythmia from this.    Hydrocodone 2014   Unspecified    Pt states \"I'm not sure\".    Other Misc 2014   Rash    Tape  Paper tape ok    Sulfa Drugs 2009       \"my mom told me I was allergic to it when i was a kid\"      You were diagnosed with     Bilateral foot pain   [597236]       Cellulitis of lower extremity, unspecified laterality   [5993571]         Vital Signs     Blood Pressure Pulse Temperature Respirations Height Weight    168/76 mmHg 89 37.1 °C (98.7 °F) 16 1.88 m (6' 2.02\") 104.781 kg (231 lb)    Body Mass Index Oxygen Saturation Smoking Status             29.65 kg/m2 99% Former Smoker         Basic Information     Date Of Birth Sex Race Ethnicity Preferred Language    1965 Male White Non- English      Problem List              ICD-10-CM Priority Class Noted - Resolved    Essential hypertension I10   2009 - Present    Arthralgia M25.50   2009 - Present    Dyslipidemia E78.5   2009 - Present    GERD (gastroesophageal reflux disease) K21.9   2009 - Present    Achilles tendinitis M76.60   2009 - Present    BPH (benign prostatic hypertrophy) N40.0   2009 - Present    Alcohol abuse F10.10   10/16/2009 - Present    Psychiatric disorder F99   2015 - Present    Chronic left shoulder pain M25.512, G89.29   2015 - Present    History of smoking 30 or more pack years Z87.891   2015 - Present    Primary " osteoarthritis of right knee M17.11   9/22/2015 - Present    Hepatitis, viral B19.9   9/22/2015 - Present    Opiate dependence, continuous (CMS-HCC) F11.20   1/28/2016 - Present    Long term prescription opiate use Z79.891   1/28/2016 - Present    Alcohol withdrawal (CMS-HCC) F10.239 High  9/7/2016 - Present    Elevated LFTs R94.5   9/7/2016 - Present    Knee pain M25.569   9/10/2016 - Present    Hep C w/o coma, chronic (CMS-HCC) B18.2   9/10/2016 - Present      Health Maintenance        Date Due Completion Dates    COLONOSCOPY 1/14/2016 ---    IMM DTaP/Tdap/Td Vaccine (2 - Td) 5/24/2024 5/24/2014            Results     POCT URINE INSTANT 10 PANEL      Component    AMPHETAMINE    pos    BARBITURATES    BENZODIAZIPINES    COCAINE    METHAMPHETAMINES    pos    OPIATES    pos    TRICYCLIC ANTIDEPRESSANT    PHENCYCLIDINE    POC THC    pos    METHADONE    Urine Drug Screen    Internal Control Positive    Valid    Internal Control Negative    Valid                        Current Immunizations     Hep A/HEP B Combined Vaccine (TwinRix) 2/20/2009    Influenza Vaccine Pediatric 12/9/2008    Tdap Vaccine 5/24/2014      Below and/or attached are the medications your provider expects you to take. Review all of your home medications and newly ordered medications with your provider and/or pharmacist. Follow medication instructions as directed by your provider and/or pharmacist. Please keep your medication list with you and share with your provider. Update the information when medications are discontinued, doses are changed, or new medications (including over-the-counter products) are added; and carry medication information at all times in the event of emergency situations     Allergies:  PENICILLINS - Unspecified     FENTANYL - Palpitations     HYDROCODONE - Unspecified     OTHER MISC - Rash     SULFA DRUGS - (reactions not documented)               Medications  Valid as of: June 09, 2017 -  1:58 PM    Generic Name Brand Name  Tablet Size Instructions for use    Clindamycin HCl (Cap) CLEOCIN 300 MG Take 1 Cap by mouth 3 times a day for 5 days.        Oxycodone-Acetaminophen (Tab) PERCOCET 5-325 MG Take 1-2 Tabs by mouth every 6 hours as needed.        Oxycodone-Acetaminophen (Tab) PERCOCET 5-325 MG Take 1-2 Tabs by mouth every four hours as needed.        .                 Medicines prescribed today were sent to:     [facility] [address] [place] [address] [address] [facility] [id number] [facility] [place] [address]    Phone: [phone] Fax: [phone]    Open 24 Hours?: No      Medication refill instructions:       If your prescription bottle indicates you have medication refills left, it is not necessary to call your provider’s office. Please contact your pharmacy and they will refill your medication.    If your prescription bottle indicates you do not have any refills left, you may request refills at any time through one of the following ways: The online WHOOP system (except Urgent Care), by calling your provider’s office, or by asking your pharmacy to contact your provider’s office with a refill request. Medication refills are processed only during regular business hours and may not be available until the next business day. Your provider may request additional information or to have a follow-up visit with you prior to refilling your medication.   *Please Note: Medication refills are assigned a new Rx number when refilled electronically. Your pharmacy may indicate that no refills were authorized even though a new prescription for the same medication is available at the pharmacy. Please request the medicine by name with the pharmacy before contacting your provider for a refill.        Your To Do List     Future Labs/Procedures Complete By Expires    DX-FOOT-COMPLETE 3+ LEFT  As directed 6/9/2018    DX-FOOT-COMPLETE 3+ RIGHT  As directed 6/9/2018         WHOOP Status: Patient Declined

## 2017-06-10 ASSESSMENT — ENCOUNTER SYMPTOMS
HEADACHES: 0
SHORTNESS OF BREATH: 0
FEVER: 0

## 2017-11-21 ENCOUNTER — HOSPITAL ENCOUNTER (OUTPATIENT)
Facility: MEDICAL CENTER | Age: 52
End: 2017-11-23
Attending: EMERGENCY MEDICINE | Admitting: INTERNAL MEDICINE
Payer: MEDICARE

## 2017-11-21 ENCOUNTER — APPOINTMENT (OUTPATIENT)
Dept: RADIOLOGY | Facility: MEDICAL CENTER | Age: 52
End: 2017-11-21
Attending: EMERGENCY MEDICINE
Payer: MEDICARE

## 2017-11-21 ENCOUNTER — RESOLUTE PROFESSIONAL BILLING HOSPITAL PROF FEE (OUTPATIENT)
Dept: HOSPITALIST | Facility: MEDICAL CENTER | Age: 52
End: 2017-11-21
Payer: MEDICARE

## 2017-11-21 DIAGNOSIS — R55 SYNCOPE, UNSPECIFIED SYNCOPE TYPE: ICD-10-CM

## 2017-11-21 DIAGNOSIS — S00.03XA CONTUSION OF SCALP, INITIAL ENCOUNTER: ICD-10-CM

## 2017-11-21 DIAGNOSIS — M25.521 ELBOW PAIN, RIGHT: ICD-10-CM

## 2017-11-21 PROBLEM — R74.01 TRANSAMINITIS: Status: ACTIVE | Noted: 2017-11-21

## 2017-11-21 PROBLEM — I10 HTN (HYPERTENSION): Status: ACTIVE | Noted: 2017-11-21

## 2017-11-21 PROBLEM — G89.29 CHRONIC PAIN: Status: ACTIVE | Noted: 2017-11-21

## 2017-11-21 PROBLEM — S00.93XA HEAD CONTUSION: Status: ACTIVE | Noted: 2017-11-21

## 2017-11-21 LAB
ALBUMIN SERPL BCP-MCNC: 3.7 G/DL (ref 3.2–4.9)
ALBUMIN/GLOB SERPL: 1.1 G/DL
ALP SERPL-CCNC: 62 U/L (ref 30–99)
ALT SERPL-CCNC: 220 U/L (ref 2–50)
ANION GAP SERPL CALC-SCNC: 11 MMOL/L (ref 0–11.9)
APPEARANCE UR: CLEAR
APTT PPP: 29 SEC (ref 24.7–36)
AST SERPL-CCNC: 90 U/L (ref 12–45)
BASOPHILS # BLD AUTO: 0.6 % (ref 0–1.8)
BASOPHILS # BLD: 0.03 K/UL (ref 0–0.12)
BILIRUB SERPL-MCNC: 0.3 MG/DL (ref 0.1–1.5)
BILIRUB UR QL STRIP.AUTO: NEGATIVE
BNP SERPL-MCNC: 8 PG/ML (ref 0–100)
BUN SERPL-MCNC: 21 MG/DL (ref 8–22)
CALCIUM SERPL-MCNC: 9.4 MG/DL (ref 8.5–10.5)
CHLORIDE SERPL-SCNC: 105 MMOL/L (ref 96–112)
CK SERPL-CCNC: 159 U/L (ref 0–154)
CO2 SERPL-SCNC: 22 MMOL/L (ref 20–33)
COLOR UR: YELLOW
CORTIS SERPL-MCNC: 2.6 UG/DL (ref 0–23)
CREAT SERPL-MCNC: 1.07 MG/DL (ref 0.5–1.4)
DEPRECATED D DIMER PPP IA-ACNC: 454 NG/ML(D-DU)
EKG IMPRESSION: NORMAL
EOSINOPHIL # BLD AUTO: 0.17 K/UL (ref 0–0.51)
EOSINOPHIL NFR BLD: 3.1 % (ref 0–6.9)
ERYTHROCYTE [DISTWIDTH] IN BLOOD BY AUTOMATED COUNT: 47.7 FL (ref 35.9–50)
FERRITIN SERPL-MCNC: 373.1 NG/ML (ref 22–322)
GFR SERPL CREATININE-BSD FRML MDRD: >60 ML/MIN/1.73 M 2
GLOBULIN SER CALC-MCNC: 3.3 G/DL (ref 1.9–3.5)
GLUCOSE SERPL-MCNC: 91 MG/DL (ref 65–99)
GLUCOSE UR STRIP.AUTO-MCNC: NEGATIVE MG/DL
HAV IGM SERPL QL IA: NEGATIVE
HBV CORE IGM SER QL: NEGATIVE
HBV SURFACE AG SER QL: NEGATIVE
HCT VFR BLD AUTO: 42.8 % (ref 42–52)
HCV AB SER QL: REACTIVE
HGB BLD-MCNC: 14.4 G/DL (ref 14–18)
IMM GRANULOCYTES # BLD AUTO: 0.02 K/UL (ref 0–0.11)
IMM GRANULOCYTES NFR BLD AUTO: 0.4 % (ref 0–0.9)
INR PPP: 1.12 (ref 0.87–1.13)
KETONES UR STRIP.AUTO-MCNC: NEGATIVE MG/DL
LEUKOCYTE ESTERASE UR QL STRIP.AUTO: NEGATIVE
LIPASE SERPL-CCNC: 29 U/L (ref 11–82)
LYMPHOCYTES # BLD AUTO: 2.39 K/UL (ref 1–4.8)
LYMPHOCYTES NFR BLD: 44.3 % (ref 22–41)
MCH RBC QN AUTO: 33.1 PG (ref 27–33)
MCHC RBC AUTO-ENTMCNC: 33.6 G/DL (ref 33.7–35.3)
MCV RBC AUTO: 98.4 FL (ref 81.4–97.8)
MICRO URNS: NORMAL
MONOCYTES # BLD AUTO: 0.45 K/UL (ref 0–0.85)
MONOCYTES NFR BLD AUTO: 8.3 % (ref 0–13.4)
NEUTROPHILS # BLD AUTO: 2.34 K/UL (ref 1.82–7.42)
NEUTROPHILS NFR BLD: 43.3 % (ref 44–72)
NITRITE UR QL STRIP.AUTO: NEGATIVE
NRBC # BLD AUTO: 0 K/UL
NRBC BLD AUTO-RTO: 0 /100 WBC
PH UR STRIP.AUTO: 7 [PH]
PLATELET # BLD AUTO: 129 K/UL (ref 164–446)
PMV BLD AUTO: 9.8 FL (ref 9–12.9)
POTASSIUM SERPL-SCNC: 4.2 MMOL/L (ref 3.6–5.5)
PROT SERPL-MCNC: 7 G/DL (ref 6–8.2)
PROT UR QL STRIP: NEGATIVE MG/DL
PROTHROMBIN TIME: 14.1 SEC (ref 12–14.6)
RBC # BLD AUTO: 4.35 M/UL (ref 4.7–6.1)
RBC UR QL AUTO: NEGATIVE
SODIUM SERPL-SCNC: 138 MMOL/L (ref 135–145)
SP GR UR STRIP.AUTO: 1.02
T4 FREE SERPL-MCNC: 0.55 NG/DL (ref 0.53–1.43)
TROPONIN I SERPL-MCNC: <0.01 NG/ML (ref 0–0.04)
TROPONIN I SERPL-MCNC: <0.01 NG/ML (ref 0–0.04)
TSH SERPL DL<=0.005 MIU/L-ACNC: 3.71 UIU/ML (ref 0.3–3.7)
UROBILINOGEN UR STRIP.AUTO-MCNC: 0.2 MG/DL
WBC # BLD AUTO: 5.4 K/UL (ref 4.8–10.8)

## 2017-11-21 PROCEDURE — 87522 HEPATITIS C REVRS TRNSCRPJ: CPT

## 2017-11-21 PROCEDURE — 99285 EMERGENCY DEPT VISIT HI MDM: CPT

## 2017-11-21 PROCEDURE — 84439 ASSAY OF FREE THYROXINE: CPT

## 2017-11-21 PROCEDURE — 72125 CT NECK SPINE W/O DYE: CPT

## 2017-11-21 PROCEDURE — 85730 THROMBOPLASTIN TIME PARTIAL: CPT

## 2017-11-21 PROCEDURE — 82550 ASSAY OF CK (CPK): CPT

## 2017-11-21 PROCEDURE — 80074 ACUTE HEPATITIS PANEL: CPT

## 2017-11-21 PROCEDURE — 82533 TOTAL CORTISOL: CPT

## 2017-11-21 PROCEDURE — 99220 PR INITIAL OBSERVATION CARE,LEVL III: CPT | Performed by: INTERNAL MEDICINE

## 2017-11-21 PROCEDURE — 82728 ASSAY OF FERRITIN: CPT

## 2017-11-21 PROCEDURE — 81003 URINALYSIS AUTO W/O SCOPE: CPT

## 2017-11-21 PROCEDURE — 700102 HCHG RX REV CODE 250 W/ 637 OVERRIDE(OP): Performed by: EMERGENCY MEDICINE

## 2017-11-21 PROCEDURE — 700105 HCHG RX REV CODE 258: Performed by: INTERNAL MEDICINE

## 2017-11-21 PROCEDURE — 85379 FIBRIN DEGRADATION QUANT: CPT

## 2017-11-21 PROCEDURE — A9270 NON-COVERED ITEM OR SERVICE: HCPCS | Performed by: EMERGENCY MEDICINE

## 2017-11-21 PROCEDURE — 85610 PROTHROMBIN TIME: CPT

## 2017-11-21 PROCEDURE — 85025 COMPLETE CBC W/AUTO DIFF WBC: CPT

## 2017-11-21 PROCEDURE — A9270 NON-COVERED ITEM OR SERVICE: HCPCS | Performed by: INTERNAL MEDICINE

## 2017-11-21 PROCEDURE — C1729 CATH, DRAINAGE: HCPCS | Performed by: SURGERY

## 2017-11-21 PROCEDURE — 83690 ASSAY OF LIPASE: CPT

## 2017-11-21 PROCEDURE — 83880 ASSAY OF NATRIURETIC PEPTIDE: CPT

## 2017-11-21 PROCEDURE — 73080 X-RAY EXAM OF ELBOW: CPT | Mod: RT

## 2017-11-21 PROCEDURE — 700102 HCHG RX REV CODE 250 W/ 637 OVERRIDE(OP): Performed by: INTERNAL MEDICINE

## 2017-11-21 PROCEDURE — 84443 ASSAY THYROID STIM HORMONE: CPT

## 2017-11-21 PROCEDURE — G0378 HOSPITAL OBSERVATION PER HR: HCPCS

## 2017-11-21 PROCEDURE — 700111 HCHG RX REV CODE 636 W/ 250 OVERRIDE (IP): Performed by: INTERNAL MEDICINE

## 2017-11-21 PROCEDURE — 70450 CT HEAD/BRAIN W/O DYE: CPT

## 2017-11-21 PROCEDURE — 93005 ELECTROCARDIOGRAM TRACING: CPT | Performed by: EMERGENCY MEDICINE

## 2017-11-21 PROCEDURE — 84484 ASSAY OF TROPONIN QUANT: CPT

## 2017-11-21 PROCEDURE — 71010 DX-CHEST-PORTABLE (1 VIEW): CPT

## 2017-11-21 PROCEDURE — 80053 COMPREHEN METABOLIC PANEL: CPT

## 2017-11-21 RX ORDER — POLYETHYLENE GLYCOL 3350 17 G/17G
1 POWDER, FOR SOLUTION ORAL
Status: DISCONTINUED | OUTPATIENT
Start: 2017-11-21 | End: 2017-11-23 | Stop reason: HOSPADM

## 2017-11-21 RX ORDER — LISINOPRIL 20 MG/1
20 TABLET ORAL EVERY EVENING
COMMUNITY

## 2017-11-21 RX ORDER — ACETAMINOPHEN 325 MG/1
650 TABLET ORAL EVERY 6 HOURS PRN
Status: DISCONTINUED | OUTPATIENT
Start: 2017-11-21 | End: 2017-11-23 | Stop reason: HOSPADM

## 2017-11-21 RX ORDER — BENZOCAINE/MENTHOL 6 MG-10 MG
LOZENGE MUCOUS MEMBRANE 2 TIMES DAILY
Status: DISCONTINUED | OUTPATIENT
Start: 2017-11-21 | End: 2017-11-23 | Stop reason: HOSPADM

## 2017-11-21 RX ORDER — TRAZODONE HYDROCHLORIDE 100 MG/1
200 TABLET ORAL NIGHTLY
COMMUNITY
End: 2018-11-17

## 2017-11-21 RX ORDER — BISACODYL 10 MG
10 SUPPOSITORY, RECTAL RECTAL
Status: DISCONTINUED | OUTPATIENT
Start: 2017-11-21 | End: 2017-11-23 | Stop reason: HOSPADM

## 2017-11-21 RX ORDER — LISINOPRIL 20 MG/1
20 TABLET ORAL EVERY EVENING
Status: DISCONTINUED | OUTPATIENT
Start: 2017-11-21 | End: 2017-11-23 | Stop reason: HOSPADM

## 2017-11-21 RX ORDER — LABETALOL HYDROCHLORIDE 5 MG/ML
10 INJECTION, SOLUTION INTRAVENOUS EVERY 4 HOURS PRN
Status: DISCONTINUED | OUTPATIENT
Start: 2017-11-21 | End: 2017-11-23 | Stop reason: HOSPADM

## 2017-11-21 RX ORDER — SODIUM CHLORIDE 9 MG/ML
INJECTION, SOLUTION INTRAVENOUS CONTINUOUS
Status: DISCONTINUED | OUTPATIENT
Start: 2017-11-21 | End: 2017-11-23 | Stop reason: HOSPADM

## 2017-11-21 RX ORDER — PALIPERIDONE 6 MG/1
6 TABLET, EXTENDED RELEASE ORAL EVERY MORNING
COMMUNITY
End: 2018-10-16

## 2017-11-21 RX ORDER — IBUPROFEN 200 MG
400 TABLET ORAL EVERY 6 HOURS PRN
Status: DISCONTINUED | OUTPATIENT
Start: 2017-11-21 | End: 2017-11-21

## 2017-11-21 RX ORDER — AMOXICILLIN 250 MG
2 CAPSULE ORAL 2 TIMES DAILY
Status: DISCONTINUED | OUTPATIENT
Start: 2017-11-21 | End: 2017-11-23 | Stop reason: HOSPADM

## 2017-11-21 RX ORDER — ONDANSETRON 4 MG/1
4 TABLET, ORALLY DISINTEGRATING ORAL EVERY 4 HOURS PRN
Status: DISCONTINUED | OUTPATIENT
Start: 2017-11-21 | End: 2017-11-23 | Stop reason: HOSPADM

## 2017-11-21 RX ORDER — BENZOCAINE/MENTHOL 6 MG-10 MG
LOZENGE MUCOUS MEMBRANE 2 TIMES DAILY
COMMUNITY
End: 2018-10-16

## 2017-11-21 RX ORDER — HEPARIN SODIUM 5000 [USP'U]/ML
5000 INJECTION, SOLUTION INTRAVENOUS; SUBCUTANEOUS EVERY 8 HOURS
Status: DISCONTINUED | OUTPATIENT
Start: 2017-11-21 | End: 2017-11-23 | Stop reason: HOSPADM

## 2017-11-21 RX ORDER — TRAZODONE HYDROCHLORIDE 50 MG/1
250 TABLET ORAL NIGHTLY
Status: DISCONTINUED | OUTPATIENT
Start: 2017-11-21 | End: 2017-11-23 | Stop reason: HOSPADM

## 2017-11-21 RX ORDER — KETOROLAC TROMETHAMINE 30 MG/ML
30 INJECTION, SOLUTION INTRAMUSCULAR; INTRAVENOUS EVERY 6 HOURS PRN
Status: DISCONTINUED | OUTPATIENT
Start: 2017-11-21 | End: 2017-11-23 | Stop reason: HOSPADM

## 2017-11-21 RX ORDER — PROMETHAZINE HYDROCHLORIDE 25 MG/1
12.5-25 TABLET ORAL EVERY 4 HOURS PRN
Status: DISCONTINUED | OUTPATIENT
Start: 2017-11-21 | End: 2017-11-23 | Stop reason: HOSPADM

## 2017-11-21 RX ORDER — PROMETHAZINE HYDROCHLORIDE 25 MG/1
12.5-25 SUPPOSITORY RECTAL EVERY 4 HOURS PRN
Status: DISCONTINUED | OUTPATIENT
Start: 2017-11-21 | End: 2017-11-23 | Stop reason: HOSPADM

## 2017-11-21 RX ORDER — PALIPERIDONE 6 MG/1
6 TABLET, EXTENDED RELEASE ORAL EVERY MORNING
Status: DISCONTINUED | OUTPATIENT
Start: 2017-11-21 | End: 2017-11-21

## 2017-11-21 RX ORDER — IBUPROFEN 600 MG/1
600 TABLET ORAL ONCE
Status: COMPLETED | OUTPATIENT
Start: 2017-11-21 | End: 2017-11-21

## 2017-11-21 RX ORDER — ONDANSETRON 2 MG/ML
4 INJECTION INTRAMUSCULAR; INTRAVENOUS EVERY 4 HOURS PRN
Status: DISCONTINUED | OUTPATIENT
Start: 2017-11-21 | End: 2017-11-23 | Stop reason: HOSPADM

## 2017-11-21 RX ADMIN — LISINOPRIL 20 MG: 20 TABLET ORAL at 21:04

## 2017-11-21 RX ADMIN — IBUPROFEN 600 MG: 600 TABLET, FILM COATED ORAL at 17:54

## 2017-11-21 RX ADMIN — TRAZODONE HYDROCHLORIDE 250 MG: 50 TABLET ORAL at 21:04

## 2017-11-21 RX ADMIN — SODIUM CHLORIDE: 9 INJECTION, SOLUTION INTRAVENOUS at 21:05

## 2017-11-21 RX ADMIN — HEPARIN SODIUM 5000 UNITS: 5000 INJECTION, SOLUTION INTRAVENOUS; SUBCUTANEOUS at 21:04

## 2017-11-21 ASSESSMENT — PATIENT HEALTH QUESTIONNAIRE - PHQ9
SUM OF ALL RESPONSES TO PHQ QUESTIONS 1-9: 0
2. FEELING DOWN, DEPRESSED, IRRITABLE, OR HOPELESS: NOT AT ALL
SUM OF ALL RESPONSES TO PHQ9 QUESTIONS 1 AND 2: 0
1. LITTLE INTEREST OR PLEASURE IN DOING THINGS: NOT AT ALL

## 2017-11-21 ASSESSMENT — COPD QUESTIONNAIRES
HAVE YOU SMOKED AT LEAST 100 CIGARETTES IN YOUR ENTIRE LIFE: YES
COPD SCREENING SCORE: 3
DURING THE PAST 4 WEEKS HOW MUCH DID YOU FEEL SHORT OF BREATH: NONE/LITTLE OF THE TIME
DO YOU EVER COUGH UP ANY MUCUS OR PHLEGM?: NO/ONLY WITH OCCASIONAL COLDS OR INFECTIONS

## 2017-11-21 ASSESSMENT — COGNITIVE AND FUNCTIONAL STATUS - GENERAL
SUGGESTED CMS G CODE MODIFIER MOBILITY: CH
MOBILITY SCORE: 24
SUGGESTED CMS G CODE MODIFIER DAILY ACTIVITY: CH
DAILY ACTIVITIY SCORE: 24

## 2017-11-21 ASSESSMENT — PAIN SCALES - GENERAL
PAINLEVEL_OUTOF10: 10
PAINLEVEL_OUTOF10: 4
PAINLEVEL_OUTOF10: 9

## 2017-11-21 ASSESSMENT — LIFESTYLE VARIABLES
EVER_SMOKED: YES
ALCOHOL_USE: NO
EVER_SMOKED: YES

## 2017-11-21 NOTE — ED NOTES
Presents to the ED with PD for medical eval. Endorses falling in the shower, +loc. 2 hematomas to the back of the head. Denies Cp, SOB. A&OX4.

## 2017-11-21 NOTE — ED PROVIDER NOTES
ED Provider Note    Scribed for Wade Lundberg M.D. by Natividad Brown. 11/21/2017  3:44 PM    Primary Care Provider: Pcp Pt States None  Means of arrival: Police  History limited by: None    CHIEF COMPLAINT  Chief Complaint   Patient presents with   • Fall       HPI  Chace Gong is a 52 y.o. male who presents to the ED from [facility] accompanied by Police complaining of severe head pain that began after he passed out in the shower just piror to arrival in the ED. His headache is located to the back of his head that radiates behind his eyes. His headache is exacerbated with laying down or bending over.  Patient also complains of right elbow pain. He remembers washing in the shower and when he bent over and lost consciousness. Patient states he felt normal before the event occurred. He denies any shortness of breath, chest pain, or vomiting.  Patient is up to date on his tetanus shot.     REVIEW OF SYSTEMS    CONSTITUTIONAL:  Denies fever, chills, weight gain/loss, or weakness.  EYES:  Denies photophobia or discharge.   ENT:  Denies sore throat, nose, or ear pain.  CARDIOVASCULAR:  Denies chest pain, palpitations, or swelling.  RESPIRATORY:  Denies cough, shortness of breath, difficulty breathing.  GI:  Denies abdominal pain, nausea, vomiting, or diarrhea.  MUSCULOSKELETAL: Positive for right elbow pain Denies weakness, joint swelling, or back pain.  SKIN:  No rash or bruising.At the exception of a contusion and abrasion ×2 on the top of his head.  NEUROLOGIC:  Positive for headache and loss of consciousness, Denies focal weakness, or numbness.  C.  .    PAST MEDICAL HISTORY  Past Medical History:   Diagnosis Date   • Achilles tendinitis 6/24/2009   • Arthralgia 6/24/2009   • Bronchitis 6/24/2009   • Dyslipidemia 6/24/2009   • GERD (gastroesophageal reflux disease) 6/24/2009   • Hepatitis C    • HTN (hypertension) 6/24/2009   • Hypertension    • Low back pain    • Psychiatric disorder     anxiety        FAMILY HISTORY  Family History   Problem Relation Age of Onset   • Cancer Mother      lung / throat / breast   • Hypertension Mother    • Hypertension Father    • Hyperlipidemia Father    • Stroke Father    • Alcohol/Drug Father    • Diabetes Sister    • Hypertension Sister    • Alcohol/Drug Sister    • Cancer Brother    • Hypertension Brother    • Alcohol/Drug Brother    • Heart Disease Neg Hx    • Stroke Brother    • Alcohol/Drug Brother    • Cancer Sister      lung       SOCIAL HISTORY   reports that he quit smoking about 2 years ago. His smoking use included Cigarettes. He started smoking about 2 years ago. He has a 7.50 pack-year smoking history. He has never used smokeless tobacco. He reports that he drinks alcohol. He reports that he uses drugs, including Inhaled and Marijuana.    SURGICAL HISTORY  Past Surgical History:   Procedure Laterality Date   • ELBOW ARTHROTOMY  5/17/2013    Performed by Jonathan Maurice M.D. at [facility]   • LOOSE BODY REMOVAL  5/17/2013    Performed by Jonathan Maurice M.D. at [facility]   • ARTHROSCOPY, KNEE  2013    right    • ARTHROTOMY  2011    right shoulder   • NEUROLYSIS  1/21/2009    Performed by JONATHAN MAURICE at [facility]   • CARPAL TUNNEL RELEASE  1/21/2009    Performed by JONATHAN MAURICE at [facility]   • HARDWARE REMOVAL ORTHO  1/21/2009    Performed by JONATHAN MAURICE at [facility]   • SYNOVECTOMY  1/21/2009    Performed by JONAHTAN MAURICE at [facility]   • OTHER ORTHOPEDIC SURGERY  2001    lt wrist surg       CURRENT MEDICATIONS  No current facility-administered medications on file prior to encounter.      Current Outpatient Prescriptions on File Prior to Encounter   Medication Sig Dispense Refill   • oxycodone-acetaminophen (PERCOCET) 5-325 MG Tab Take 1-2 Tabs by mouth every four hours as needed. 20 Tab 0   • oxycodone-acetaminophen (PERCOCET) 5-325 MG  "Tab Take 1-2 Tabs by mouth every 6 hours as needed. 16 Tab 0       ALLERGIES  Allergies   Allergen Reactions   • Penicillins Unspecified     Pt states that his mom told him he is allergic to penicillins.   • Fentanyl Palpitations     Pt stated he gets an arrhythmia from this.   • Hydrocodone Unspecified     Pt states \"I'm not sure\".   • Other Misc Rash     Tape  Paper tape ok   • Sulfa Drugs      \"my mom told me I was allergic to it when i was a kid\"       PHYSICAL EXAM  VITAL SIGNS: /87   Pulse 80   Resp 18   Ht 1.88 m (6' 2\")   Wt 112.5 kg (248 lb)   BMI 31.84 kg/m²      Constitutional: Patient is awake and alert. No acute respiratory distress. Well developed, Well nourished, Non-toxic appearance.  HENT: Normocephalic, Tenderness and abrasion to occipital region, knot and abrasion to right occipital area. Bilateral external ears normal, Oropharynx pink moist with no exudates, Nose patent.  Eyes: PERRLA, EOMI, Sclera and conjunctiva clear, No discharge.   Neck:  Supple no nuchal rigidity, no thyromegaly or mass. Tender posteriorly. No step offs.    Lymphatic: No supraclavicular  lymph nodes.   Cardiovascular: Heart is regular rate and rhythm no murmur, rub or thrill.   Thorax & Lungs: Chest is symmetrical, with good breath sounds. No wheezing or crackles. No respiratory distress, No chest tenderness.   Abdomen: Soft, No tenderness no hepatosplenomegaly there is no guarding or rebound, No masses, No pulsatile masses. Bowel sounds are present.  Skin: Warm, Dry, no petechia, purpura, or rash.   Back: Non tender with palpation, No CVA tenderness.   Extremities: No edema. Non tender.   Musculoskeletal: Good range of motion to wrists, elbows, shoulders, hips, knees, and ankles. Pulses 2+ radially and femorally. No gross deformities noted.   Neurologic: Alert & oriented to person, time, and place.  Strength is 5 over 5 and symmetric in bilateral upper and lower extremities.  Sensory is intact to light touch to " face, arms, and legs.  DTRs are symmetrical in biceps brachioradialis, patella and Achilles.   Psychiatric: Normal affect    EKG  16:07- 13 Lead EKG interpreted by me shows normal sinus rhythm at 73.  . Axis QRS 52, No ST elevations. No ischemia. No change from EKG on 03/03/2017.    LABS  Results for orders placed or performed during the hospital encounter of 11/21/17   CBC WITH DIFFERENTIAL   Result Value Ref Range    WBC 5.4 4.8 - 10.8 K/uL    RBC 4.35 (L) 4.70 - 6.10 M/uL    Hemoglobin 14.4 14.0 - 18.0 g/dL    Hematocrit 42.8 42.0 - 52.0 %    MCV 98.4 (H) 81.4 - 97.8 fL    MCH 33.1 (H) 27.0 - 33.0 pg    MCHC 33.6 (L) 33.7 - 35.3 g/dL    RDW 47.7 35.9 - 50.0 fL    Platelet Count 129 (L) 164 - 446 K/uL    MPV 9.8 9.0 - 12.9 fL    Neutrophils-Polys 43.30 (L) 44.00 - 72.00 %    Lymphocytes 44.30 (H) 22.00 - 41.00 %    Monocytes 8.30 0.00 - 13.40 %    Eosinophils 3.10 0.00 - 6.90 %    Basophils 0.60 0.00 - 1.80 %    Immature Granulocytes 0.40 0.00 - 0.90 %    Nucleated RBC 0.00 /100 WBC    Neutrophils (Absolute) 2.34 1.82 - 7.42 K/uL    Lymphs (Absolute) 2.39 1.00 - 4.80 K/uL    Monos (Absolute) 0.45 0.00 - 0.85 K/uL    Eos (Absolute) 0.17 0.00 - 0.51 K/uL    Baso (Absolute) 0.03 0.00 - 0.12 K/uL    Immature Granulocytes (abs) 0.02 0.00 - 0.11 K/uL    NRBC (Absolute) 0.00 K/uL   COMP METABOLIC PANEL   Result Value Ref Range    Sodium 138 135 - 145 mmol/L    Potassium 4.2 3.6 - 5.5 mmol/L    Chloride 105 96 - 112 mmol/L    Co2 22 20 - 33 mmol/L    Anion Gap 11.0 0.0 - 11.9    Glucose 91 65 - 99 mg/dL    Bun 21 8 - 22 mg/dL    Creatinine 1.07 0.50 - 1.40 mg/dL    Calcium 9.4 8.5 - 10.5 mg/dL    AST(SGOT) 90 (H) 12 - 45 U/L    ALT(SGPT) 220 (H) 2 - 50 U/L    Alkaline Phosphatase 62 30 - 99 U/L    Total Bilirubin 0.3 0.1 - 1.5 mg/dL    Albumin 3.7 3.2 - 4.9 g/dL    Total Protein 7.0 6.0 - 8.2 g/dL    Globulin 3.3 1.9 - 3.5 g/dL    A-G Ratio 1.1 g/dL   LIPASE   Result Value Ref Range    Lipase 29 11 - 82 U/L    PROTHROMBIN TIME   Result Value Ref Range    PT 14.1 12.0 - 14.6 sec    INR 1.12 0.87 - 1.13   APTT   Result Value Ref Range    APTT 29.0 24.7 - 36.0 sec   TROPONIN   Result Value Ref Range    Troponin I <0.01 0.00 - 0.04 ng/mL   BTYPE NATRIURETIC PEPTIDE   Result Value Ref Range    B Natriuretic Peptide 8 0 - 100 pg/mL   URINALYSIS   Result Value Ref Range    Color Yellow     Character Clear     Specific Gravity 1.017 <1.035    Ph 7.0 5.0 - 8.0    Glucose Negative Negative mg/dL    Ketones Negative Negative mg/dL    Protein Negative Negative mg/dL    Bilirubin Negative Negative    Urobilinogen, Urine 0.2 Negative    Nitrite Negative Negative    Leukocyte Esterase Negative Negative    Occult Blood Negative Negative    Micro Urine Req see below    ESTIMATED GFR   Result Value Ref Range    GFR If African American >60 >60 mL/min/1.73 m 2    GFR If Non African American >60 >60 mL/min/1.73 m 2                        All labs reviewed by me.    RADIOLOGY/PROCEDURES  DX-CHEST-PORTABLE (1 VIEW)    (Results Pending)   CT-HEAD W/O    (Results Pending)   CT-CSPINE WITHOUT PLUS RECONS    (Results Pending)     CT-CSPINE WITHOUT PLUS RECONS   Final Result      1.  No acute fracture is identified.      2.  Multilevel degenerative disc disease and facet arthropathy      CT-HEAD W/O   Final Result      No acute intracranial findings.         DX-ELBOW-COMPLETE 3+ RIGHT   Final Result      Small joint effusion. Occult fracture not excluded.      Advanced right elbow osteoarthritis.      Soft tissue swelling posteriorly and medially.      DX-CHEST-PORTABLE (1 VIEW)   Final Result      No acute cardiopulmonary process is seen.      CAROTID DUPLEX ([facility] Only)    (Results Pending)   ECHOCARDIOGRAM COMP W/O CONT    (Results Pending)   US-LIVER AND BILIARY TREE    (Results Pending)       The radiologist's interpretations of all radiological studies have been reviewed by me.     COURSE & MEDICAL DECISION  MAKING  Pertinent Labs & Imaging studies reviewed. (See chart for details)    Differential diagnoses include but are not limited to cardiac dysrhythmia, stroke, orthostatic hypotension, slip and fall with concussion, amnesia.    3:44 PM - Patient seen and examined at bedside. Ordered EKG, CBC with differential, CMP, Lipase, Prothombin Time, APTT, Troponin, BNP, Urinalysis, DX-chest, CT-Head W/O, and CT-Cspine without.      4:59 PM Paged Hospitalist.     5:00 PM Paged Orthopedics.     5:22 PM - I discussed the patient's case and the above findings with [facility] Hospitalist who agrees to admit the patient.    5:31 PM Recheck: Patient re-evaluated at beside. Discussed patient's condition and treatment plan. Patient's lab and radiology results discussed. The patient understood and is in agreement for admission.     5:55 PM - I discussed the patient's case and the above findings with Dr. Teixeira (Orthopedics) who agrees to consult.     6:19 PM Dr. Teixeira (Orthopedics) is at bedside.         Decision Making  Patient who was found after he had a syncopal episode in the shower at [facility]. He does not have any memory of any chest pains or shortness of breath prior to taking a shower and then simply finding himself on the ground in the shower. Complains of headache and neck pain and right elbow pain now. X-rays showed no obvious fractures of his neck. He has some arthritic changes and an effusion to his right elbow. I've had the orthopedic surgeons come to see him for consultation. Also discussed the case with the [facility]'s and they would be in the hospital for observation overnight. Again the cause of his syncopal episode is unclear but certainly I believe with his current living conditions that it would be best to watch him as an observation overnight.    DISPOSITION:  Patient will be admitted to [facility] in guarded condition.      FINAL IMPRESSION  1. Syncope, unspecified syncope type    2. Contusion of  scalp, initial encounter        PLAN  1.Admission [facility] telemetry  2. Continue workup and evaluation of his syncopal episode       INatividad (Scribe), am scribing for, and in the presence of, Wade Lundberg M.D..    Electronically signed by: Natividad Brown (Scribe), 11/21/2017    Wade DA SILVA M.D. personally performed the services described in this documentation, as scribed by Natividad Brown in my presence, and it is both accurate and complete.    The note accurately reflects work and decisions made by me.  Wade Lundberg  11/21/2017  7:50 PM

## 2017-11-22 ENCOUNTER — APPOINTMENT (OUTPATIENT)
Dept: RADIOLOGY | Facility: MEDICAL CENTER | Age: 52
End: 2017-11-22
Attending: INTERNAL MEDICINE
Payer: MEDICARE

## 2017-11-22 PROBLEM — M25.521 RIGHT ELBOW PAIN: Status: ACTIVE | Noted: 2017-11-22

## 2017-11-22 LAB
ALBUMIN SERPL BCP-MCNC: 3.4 G/DL (ref 3.2–4.9)
ALBUMIN/GLOB SERPL: 1.1 G/DL
ALP SERPL-CCNC: 55 U/L (ref 30–99)
ALT SERPL-CCNC: 196 U/L (ref 2–50)
ANION GAP SERPL CALC-SCNC: 6 MMOL/L (ref 0–11.9)
AST SERPL-CCNC: 78 U/L (ref 12–45)
BASOPHILS # BLD AUTO: 0.4 % (ref 0–1.8)
BASOPHILS # BLD: 0.02 K/UL (ref 0–0.12)
BILIRUB SERPL-MCNC: 0.6 MG/DL (ref 0.1–1.5)
BUN SERPL-MCNC: 20 MG/DL (ref 8–22)
CALCIUM SERPL-MCNC: 9.3 MG/DL (ref 8.5–10.5)
CHLORIDE SERPL-SCNC: 104 MMOL/L (ref 96–112)
CO2 SERPL-SCNC: 27 MMOL/L (ref 20–33)
CREAT SERPL-MCNC: 1.02 MG/DL (ref 0.5–1.4)
EOSINOPHIL # BLD AUTO: 0.2 K/UL (ref 0–0.51)
EOSINOPHIL NFR BLD: 3.9 % (ref 0–6.9)
ERYTHROCYTE [DISTWIDTH] IN BLOOD BY AUTOMATED COUNT: 47.8 FL (ref 35.9–50)
GFR SERPL CREATININE-BSD FRML MDRD: >60 ML/MIN/1.73 M 2
GLOBULIN SER CALC-MCNC: 3 G/DL (ref 1.9–3.5)
GLUCOSE SERPL-MCNC: 126 MG/DL (ref 65–99)
HCT VFR BLD AUTO: 41.7 % (ref 42–52)
HGB BLD-MCNC: 14.1 G/DL (ref 14–18)
IMM GRANULOCYTES # BLD AUTO: 0.01 K/UL (ref 0–0.11)
IMM GRANULOCYTES NFR BLD AUTO: 0.2 % (ref 0–0.9)
LV EJECT FRACT  99904: 60
LV EJECT FRACT MOD 2C 99903: 62.64
LV EJECT FRACT MOD 4C 99902: 59.6
LV EJECT FRACT MOD BP 99901: 59.92
LYMPHOCYTES # BLD AUTO: 2.7 K/UL (ref 1–4.8)
LYMPHOCYTES NFR BLD: 52.2 % (ref 22–41)
MCH RBC QN AUTO: 33.3 PG (ref 27–33)
MCHC RBC AUTO-ENTMCNC: 33.8 G/DL (ref 33.7–35.3)
MCV RBC AUTO: 98.3 FL (ref 81.4–97.8)
MONOCYTES # BLD AUTO: 0.33 K/UL (ref 0–0.85)
MONOCYTES NFR BLD AUTO: 6.4 % (ref 0–13.4)
NEUTROPHILS # BLD AUTO: 1.91 K/UL (ref 1.82–7.42)
NEUTROPHILS NFR BLD: 36.9 % (ref 44–72)
NRBC # BLD AUTO: 0 K/UL
NRBC BLD AUTO-RTO: 0 /100 WBC
PLATELET # BLD AUTO: 108 K/UL (ref 164–446)
PMV BLD AUTO: 9.9 FL (ref 9–12.9)
POTASSIUM SERPL-SCNC: 3.7 MMOL/L (ref 3.6–5.5)
PROT SERPL-MCNC: 6.4 G/DL (ref 6–8.2)
RBC # BLD AUTO: 4.24 M/UL (ref 4.7–6.1)
SODIUM SERPL-SCNC: 137 MMOL/L (ref 135–145)
TROPONIN I SERPL-MCNC: <0.01 NG/ML (ref 0–0.04)
TROPONIN I SERPL-MCNC: <0.01 NG/ML (ref 0–0.04)
WBC # BLD AUTO: 5.2 K/UL (ref 4.8–10.8)

## 2017-11-22 PROCEDURE — 96375 TX/PRO/DX INJ NEW DRUG ADDON: CPT

## 2017-11-22 PROCEDURE — 99225 PR SUBSEQUENT OBSERVATION CARE,LEVEL II: CPT | Performed by: INTERNAL MEDICINE

## 2017-11-22 PROCEDURE — 700102 HCHG RX REV CODE 250 W/ 637 OVERRIDE(OP): Performed by: INTERNAL MEDICINE

## 2017-11-22 PROCEDURE — G0378 HOSPITAL OBSERVATION PER HR: HCPCS

## 2017-11-22 PROCEDURE — 76705 ECHO EXAM OF ABDOMEN: CPT

## 2017-11-22 PROCEDURE — 93306 TTE W/DOPPLER COMPLETE: CPT

## 2017-11-22 PROCEDURE — 700111 HCHG RX REV CODE 636 W/ 250 OVERRIDE (IP): Performed by: INTERNAL MEDICINE

## 2017-11-22 PROCEDURE — 80053 COMPREHEN METABOLIC PANEL: CPT

## 2017-11-22 PROCEDURE — 700117 HCHG RX CONTRAST REV CODE 255: Performed by: INTERNAL MEDICINE

## 2017-11-22 PROCEDURE — 71275 CT ANGIOGRAPHY CHEST: CPT

## 2017-11-22 PROCEDURE — 84484 ASSAY OF TROPONIN QUANT: CPT | Mod: 91

## 2017-11-22 PROCEDURE — A9270 NON-COVERED ITEM OR SERVICE: HCPCS | Performed by: INTERNAL MEDICINE

## 2017-11-22 PROCEDURE — 700105 HCHG RX REV CODE 258: Performed by: INTERNAL MEDICINE

## 2017-11-22 PROCEDURE — 96376 TX/PRO/DX INJ SAME DRUG ADON: CPT

## 2017-11-22 PROCEDURE — 93306 TTE W/DOPPLER COMPLETE: CPT | Mod: 26 | Performed by: INTERNAL MEDICINE

## 2017-11-22 PROCEDURE — 93880 EXTRACRANIAL BILAT STUDY: CPT

## 2017-11-22 PROCEDURE — 36415 COLL VENOUS BLD VENIPUNCTURE: CPT

## 2017-11-22 PROCEDURE — 85025 COMPLETE CBC W/AUTO DIFF WBC: CPT

## 2017-11-22 PROCEDURE — 96374 THER/PROPH/DIAG INJ IV PUSH: CPT

## 2017-11-22 RX ORDER — OXYCODONE HYDROCHLORIDE 5 MG/1
5 TABLET ORAL ONCE
Status: COMPLETED | OUTPATIENT
Start: 2017-11-22 | End: 2017-11-22

## 2017-11-22 RX ORDER — BUTALBITAL, ACETAMINOPHEN AND CAFFEINE 50; 325; 40 MG/1; MG/1; MG/1
1 TABLET ORAL EVERY 6 HOURS PRN
Status: DISCONTINUED | OUTPATIENT
Start: 2017-11-22 | End: 2017-11-23 | Stop reason: HOSPADM

## 2017-11-22 RX ADMIN — SODIUM CHLORIDE: 9 INJECTION, SOLUTION INTRAVENOUS at 07:34

## 2017-11-22 RX ADMIN — BUTALBITAL, ACETAMINOPHEN, AND CAFFEINE 1 TABLET: 50; 325; 40 TABLET ORAL at 19:14

## 2017-11-22 RX ADMIN — KETOROLAC TROMETHAMINE 30 MG: 30 INJECTION, SOLUTION INTRAMUSCULAR at 10:15

## 2017-11-22 RX ADMIN — KETOROLAC TROMETHAMINE 30 MG: 30 INJECTION, SOLUTION INTRAMUSCULAR at 19:14

## 2017-11-22 RX ADMIN — LISINOPRIL 20 MG: 20 TABLET ORAL at 20:24

## 2017-11-22 RX ADMIN — BUTALBITAL, ACETAMINOPHEN, AND CAFFEINE 1 TABLET: 50; 325; 40 TABLET ORAL at 10:15

## 2017-11-22 RX ADMIN — HEPARIN SODIUM 5000 UNITS: 5000 INJECTION, SOLUTION INTRAVENOUS; SUBCUTANEOUS at 20:24

## 2017-11-22 RX ADMIN — OXYCODONE HYDROCHLORIDE 5 MG: 5 TABLET ORAL at 15:32

## 2017-11-22 RX ADMIN — HEPARIN SODIUM 5000 UNITS: 5000 INJECTION, SOLUTION INTRAVENOUS; SUBCUTANEOUS at 15:31

## 2017-11-22 RX ADMIN — KETOROLAC TROMETHAMINE 30 MG: 30 INJECTION, SOLUTION INTRAMUSCULAR at 04:15

## 2017-11-22 RX ADMIN — HEPARIN SODIUM 5000 UNITS: 5000 INJECTION, SOLUTION INTRAVENOUS; SUBCUTANEOUS at 05:35

## 2017-11-22 RX ADMIN — IOHEXOL 140 ML: 350 INJECTION, SOLUTION INTRAVENOUS at 17:17

## 2017-11-22 RX ADMIN — TRAZODONE HYDROCHLORIDE 250 MG: 50 TABLET ORAL at 20:24

## 2017-11-22 RX ADMIN — SODIUM CHLORIDE: 9 INJECTION, SOLUTION INTRAVENOUS at 21:14

## 2017-11-22 ASSESSMENT — PAIN SCALES - GENERAL
PAINLEVEL_OUTOF10: 2
PAINLEVEL_OUTOF10: 5
PAINLEVEL_OUTOF10: 5
PAINLEVEL_OUTOF10: 10
PAINLEVEL_OUTOF10: 6
PAINLEVEL_OUTOF10: 0
PAINLEVEL_OUTOF10: 9
PAINLEVEL_OUTOF10: 5
PAINLEVEL_OUTOF10: 10

## 2017-11-22 ASSESSMENT — ENCOUNTER SYMPTOMS
CHILLS: 0
FEVER: 0
PALPITATIONS: 0
BLURRED VISION: 0
DOUBLE VISION: 0
HEADACHES: 1

## 2017-11-22 NOTE — CONSULTS
11/21/2017    Leyla Short is a 52 y.o. male who was brought in from [facility] after being found down in the shower.  He remembers washing, then bend over and lost conciousness.  I've been consulted for right elbow pain.  He states he shattered it many years ago and had surgery, and has had off and on pain since.  It's a bit worse currently, but he can move it with minimal discomfort.  He also has a longstanding history of numbness and tingling off and on in the 4th and 5th digits of that hand.    Past Medical History:   Diagnosis Date   • Achilles tendinitis 6/24/2009   • Arthralgia 6/24/2009   • Bronchitis 6/24/2009   • Dyslipidemia 6/24/2009   • GERD (gastroesophageal reflux disease) 6/24/2009   • Hepatitis C    • HTN (hypertension) 6/24/2009   • Hypertension    • Low back pain    • Psychiatric disorder     anxiety       Past Surgical History:   Procedure Laterality Date   • ELBOW ARTHROTOMY  5/17/2013    Performed by Jonathan Maurice M.D. at [facility]   • LOOSE BODY REMOVAL  5/17/2013    Performed by Jonathan Maurice M.D. at [facility]   • ARTHROSCOPY, KNEE  2013    right    • ARTHROTOMY  2011    right shoulder   • NEUROLYSIS  1/21/2009    Performed by JONATHAN MAURICE at [facility]   • CARPAL TUNNEL RELEASE  1/21/2009    Performed by JONATHAN MAURICE at [facility]   • HARDWARE REMOVAL ORTHO  1/21/2009    Performed by JONATHAN MAURICE at [facility]   • SYNOVECTOMY  1/21/2009    Performed by JONATHAN MAURICE at [facility]   • OTHER ORTHOPEDIC SURGERY  2001    lt wrist surg       Medications  No current facility-administered medications on file prior to encounter.      No current outpatient prescriptions on file prior to encounter.       Allergies  Penicillins; Fentanyl; Hydrocodone; Other misc; and Sulfa drugs    ROS  + headache, otherwise wnl    Family History   Problem Relation Age of Onset   •  "Cancer Mother      lung / throat / breast   • Hypertension Mother    • Hypertension Father    • Hyperlipidemia Father    • Stroke Father    • Alcohol/Drug Father    • Diabetes Sister    • Hypertension Sister    • Alcohol/Drug Sister    • Cancer Brother    • Hypertension Brother    • Alcohol/Drug Brother    • Stroke Brother    • Alcohol/Drug Brother    • Cancer Sister      lung   • Heart Disease Neg Hx        Social History     Social History   • Marital status:      Spouse name: N/A   • Number of children: N/A   • Years of education: N/A     Social History Main Topics   • Smoking status: Former Smoker     Packs/day: 0.25     Years: 30.00     Types: Cigarettes     Start date: 8/22/2015     Quit date: 11/14/2015   • Smokeless tobacco: Never Used      Comment: initiated cessation 12 Aug 2015, occasional cigar   • Alcohol use 0.0 oz/week      Comment: vodka    • Drug use:      Types: Inhaled, Marijuana      Comment: has med marijuana card; smokes 1 joint every morning, speed   • Sexual activity: Yes     Partners: Male     Other Topics Concern   • Not on file     Social History Narrative   • No narrative on file       Physical Exam  Vitals  Blood pressure 117/87, pulse 78, resp. rate (!) 28, height 1.88 m (6' 2\"), weight 112.5 kg (248 lb), SpO2 96 %.  General: Well Developed, Well Nourished, laying supine   HEENT: Normocephalic, bump on the back of the head  Eyes: Anicteric   Neck: Supple, nontender, no masses  Lungs: Non labored breathing  Heart: No cyanosis.  Extremities are warm and well perfused.   Abdomen: Soft, NT, ND  Pelvis: Stable to AP and Lateral Compression  Skin: Intact,  no open wounds other than a scrape over the olecranon  Extremities: Generalized tenderness about the right elbow, no palpable crepitance.  ROM 7-110 and intact pro/sup with minimal discomfort  Neuro: SILT throughout rad/uln/median.  Fires hand intrinsics, WF/WE/Bi/Tri  Vascular: 2+ radial pulse, Capillary refill <2 " seconds    Radiographs:  CT-CSPINE WITHOUT PLUS RECONS   Final Result      1.  No acute fracture is identified.      2.  Multilevel degenerative disc disease and facet arthropathy      CT-HEAD W/O   Final Result      No acute intracranial findings.         DX-ELBOW-COMPLETE 3+ RIGHT   Final Result      Small joint effusion. Occult fracture not excluded.      Advanced right elbow osteoarthritis.      Soft tissue swelling posteriorly and medially.      DX-CHEST-PORTABLE (1 VIEW)   Final Result      No acute cardiopulmonary process is seen.      CAROTID DUPLEX ([facility] Only)    (Results Pending)   ECHOCARDIOGRAM COMP W/O CONT    (Results Pending)   US-LIVER AND BILIARY TREE    (Results Pending)       Laboratory Values  Recent Labs      11/21/17   1600   WBC  5.4   RBC  4.35*   HEMOGLOBIN  14.4   HEMATOCRIT  42.8   MCV  98.4*   MCH  33.1*   MCHC  33.6*   RDW  47.7   PLATELETCT  129*   MPV  9.8     Recent Labs      11/21/17   1600  11/21/17   1755   SODIUM  138   --    POTASSIUM  4.2   --    CHLORIDE  105   --    CO2  22   --    GLUCOSE  91   --    BUN  21   --    CPKTOTAL   --   159*     Recent Labs      11/21/17   1600   APTT  29.0   INR  1.12         Impression:    R elbow pain following fall  Severe post traumatic right elbow pain  Cubital tunnel symptoms, chronic      -Activity and mobility as tolerated as I do not see any fractures and think this is just a symptomatic arthritic elbow following a fall.  Can f/u with an elbow surgeon at [facility] if symptoms persist for more than 2 weeks or worsen.

## 2017-11-22 NOTE — PROGRESS NOTES
Assumed care at 2030, bedside report received from day shift RN. Pt. Is SR on the monitor. Initial assessment completed, orders reviewed, call light within reach, and hourly rounding in place. Officer at beside. POC addressed with patient, no additional questions at this time.

## 2017-11-22 NOTE — H&P
CHIEF COMPLAINT:  Fall in the shower, loss of consciousness.    HISTORY OF PRESENT ILLNESS:  This is a 52-year-old male with hypertension and   chronic pain, who is an inmate at [facility].    He was doing well until this morning at around 8:00 a.m.  The patient was   taking a warm shower (using an auto shower at the facility, which runs 1.5   minutes at a time per push), and washing his head when he suddenly passed out   and fell.  The patient could not recall the circumstances of the fall or   syncope, but denied any preceding chest pain, shortness of breath, nausea,   vomiting, abdominal pain, or palpitations.  He denied any fevers, chills,   diarrhea or dysuria, or any focal weakness or numbness surrounding the fall.    He woke up on the floor with the shower still running, but regained full   consciousness shortly after.  He was then brought to the ED.    EMERGENCY DEPARTMENT COURSE:  The patient was initially evaluated in the   emergency department, was maintaining good hemodynamics, saturating well on   room air, and was afebrile.  Initial blood workup showed unimpressive CBC and   BMP.  AST was elevated at 19, ALT of 220 with normal alkaline phosphatase and   total bilirubin.  Lipase was normal.  Initial troponin was negative with   initial BNP of 8.  Urinalysis was clean.  Head CT was obtained, which did not   show any acute intracranial pathology or hemorrhage.  CT of the C-spine did   not show any fracture.  Right elbow x-ray was obtained, which showed soft   tissue swelling in the posterior and medial aspect, with small joint effusion.    Chest x-ray (my read) did not show any infiltrates or consolidation.    Patient was given ibuprofen for pain.  He was subsequently admitted to the   hospitalist service.    REVIEW OF SYSTEMS  A complete review of system was done. All other systems were negative.    PMH/PSH/FMH: I personally reviewed all ancillary histories as noted.    PAST  MEDICAL HISTORY:  Past Medical History:   Diagnosis Date   • Arthralgia 6/24/2009   • Dyslipidemia 6/24/2009   • GERD (gastroesophageal reflux disease) 6/24/2009   • Bronchitis 6/24/2009   • Achilles tendinitis 6/24/2009   • HTN (hypertension) 6/24/2009   • Hepatitis C    • Hypertension    • Low back pain    • Psychiatric disorder     anxiety       PAST SURGICAL HISTORY:  Past Surgical History:   Procedure Laterality Date   • ELBOW ARTHROTOMY  5/17/2013    Performed by Jonathan Maurice M.D. at [facility]   • LOOSE BODY REMOVAL  5/17/2013    Performed by Jonathan Maurice M.D. at [facility]   • ARTHROSCOPY, KNEE  2013    right    • ARTHROTOMY  2011    right shoulder   • NEUROLYSIS  1/21/2009    Performed by JONATHAN MAURICE at [facility]   • CARPAL TUNNEL RELEASE  1/21/2009    Performed by JONATHAN MAURICE at [facility]   • HARDWARE REMOVAL ORTHO  1/21/2009    Performed by JONATHAN MAURICE at [facility]   • SYNOVECTOMY  1/21/2009    Performed by JONATHAN MAURICE at [facility]   • OTHER ORTHOPEDIC SURGERY  2001    lt wrist surg       PERSONAL/SOCIAL HISTORY:  Social History   Substance Use Topics   • Smoking status: Former Smoker     Packs/day: 0.25     Years: 30.00     Types: Cigarettes     Start date: 8/22/2015     Quit date: 11/14/2015   • Smokeless tobacco: Never Used      Comment: initiated cessation 12 Aug 2015, occasional cigar   • Alcohol use 0.0 oz/week      Comment: vodka        FAMILY MEDICAL HISTORY:  Family History   Problem Relation Age of Onset   • Cancer Mother      lung / throat / breast   • Hypertension Mother    • Hypertension Father    • Hyperlipidemia Father    • Stroke Father    • Alcohol/Drug Father    • Diabetes Sister    • Hypertension Sister    • Alcohol/Drug Sister    • Cancer Brother    • Hypertension Brother    • Alcohol/Drug Brother    • Stroke Brother    • Alcohol/Drug Brother    • Cancer  Sister      lung   • Heart Disease Neg Hx        ALLERGIES:  Penicillins; Fentanyl; Hydrocodone; Other misc; and Sulfa drugs    HOME MEDICATIONS:  Prior to Admission medications    Medication Sig Start Date End Date Taking? Authorizing Provider   lisinopril (PRINIVIL) 20 MG Tab Take 20 mg by mouth every evening.   Yes Physician Outpatient   trazodone (DESYREL) 100 MG Tab Take 250 mg by mouth every evening. Pt takes a 150 mg and a 100 mg tab   Yes Physician Outpatient   paliperidone (INVEGA) 6 MG ER tablet Take 6 mg by mouth every morning.   Yes Physician Outpatient   hydrocortisone 1 % Cream Apply  to affected area(s) 2 times a day. Face, Chest   Yes Physician Outpatient           PHYSICAL EXAMINATION:  VITAL SIGNS:  Blood pressure 109/82, heart rate 75, respiratory rate 16, and   oxygen saturation 93% on room air.  CONSTITUTIONAL: (-) diaphoresis, (-) distress  HENT:  Head, hematoma on the posterior aspect of occipital area, with minimal   tenderness.  No open wounds or lacerations.  EYES: PERRLA, pink conjuctivae, (-) icteric sclerae  NECK: (-) cervical lymphadenopathy, (-) neck rigidity  CARDIOVASCULAR: Distinct heart sounds, RRR, (-) murmurs, rubs, gallops, (-) LE edema  RESPIRATORY: Equal chest expansion, clear breath sounds, (-) crackles/wheezes/rales/rhonchi  GASTROINTESTINAL: normoactive bowel sounds, soft, (-) tenderness, (-) masses, (-) guarding/rebound  MUSCULOSKELETAL:  Minimal tenderness on the right elbow, with small laceration   with a Band-Aid in place.  No limitation of range of motion on all four extremities.  SKIN: (-) erythema, warmth, rashes, ulcers, open wounds  PSYCHIATRIC: mood, affect, and thought content WNL, behavior age appropriate  NEUROLOGIC: Non-focal, moves all 4 extremities, sensory grossly intact      PERTINENT DIAGNOSTIC RESULTS:  Reviewed, and as mentioned above. Please refer to ED course.      ASSESSMENT:  1.  Syncope versus mechanical fall.  2.  Head contusion.  3.   Transaminitis.  4.  Chronic hypertension.  5.  Chronic pain syndrome.    PLAN:  --- I will admit him to the CDU/telemetry unit.  He is appropriate for   observation level of care.  --- Unclear mechanism of the syncope versus a mechanical fall while in the   shower.  I will work him up for syncope with echocardiogram and carotid   ultrasound.  I will send for D-dimer and if high, we will obtain a CT   angiogram of the chest to rule out PE.  I will also send for serial troponins.    We will monitor him on telemetry to rule out any arrhythmias.  We will do   neuro checks every 4 hours.  I will start him on gentle IV fluids with normal   saline at 100 mL per hour.  I will ask the nursing staff to check an   orthostatic blood pressure, and repeat that in the morning after adequate   rehydration.  --- I will continue him on p.r.n. Tylenol and ibuprofen, and IV Toradol for pain.  --- I will workup his elevated liver function test with viral hepatitis panel,   CPK, TSH, ferritin, and cortisol levels.  I will check a liver ultrasound to   evaluate the parenchyma.  We will trend his liver function tests.  --- I will resume his home-dose lisinopril.  I will put him on p.r.n. IV   labetalol for significant hypertension.  --- I will resume his home dose trazodone and Invega.    DVT prophylaxis-heparin subcutaneously.  GI-not indicated.  Code status-full code.       ____________________________________     Daniel Corea M.D.    KATIUSKA / JAKI    DD:  11/21/2017 17:52:12  DT:  11/21/2017 18:35:52    D#:  [id number]  Job#:  [id number]

## 2017-11-22 NOTE — CARE PLAN
Problem: Safety  Goal: Will remain free from injury  Patient educated on importance of using the call light if in need of assistance. Patient verbalizes understanding. Bed locked in lowest position, non skid socks on. 1:1 sitter from facility at bedside.    Problem: Knowledge Deficit  Goal: Knowledge of disease process/condition, treatment plan, diagnostic tests, and medications will improve  Patient updated on POC. All questions answered at this time.

## 2017-11-22 NOTE — RESPIRATORY CARE
COPD EDUCATION by COPD CLINICAL EDUCATOR  11/22/2017 at 6:32 AM by Phyllis Vogel     Patient reviewed by COPD education team. Patient does not qualify for COPD program.

## 2017-11-22 NOTE — CARE PLAN
Problem: Safety  Goal: Will remain free from falls    Intervention: Assess risk factors for falls  Fall precautions in place. Bed in lowest position. Non-skid socks in place. Personal possessions within reach. Mobility sign on door. Call light within reach. Pt educated regarding fall prevention and states understanding.  at bedside        Problem: Knowledge Deficit  Goal: Knowledge of disease process/condition, treatment plan, diagnostic tests, and medications will improve  Pt educated regarding plan of care and medications. All questions answered.

## 2017-11-22 NOTE — ASSESSMENT & PLAN NOTE
Seen by ortho   No fracture Activity and mobility as tolerated  Can follow as outpt if pain continues

## 2017-11-22 NOTE — ED NOTES
Presents to the ED with c/o frontal headache s/p fall in the shower. +loc. 2 hematomas to the back of the head. Abrasion and pain to the right elbow. C.O. at the bedside. A&OX4.

## 2017-11-22 NOTE — ED NOTES
The Medication Reconciliation process has been completed by interviewing the patient and med list at bedside from [facility]    Allergies have been reviewed

## 2017-11-22 NOTE — PROGRESS NOTES
[facility] Hospitalist Progress Note    Date of Service: 2017    Chief Complaint  52 y.o. Male who is an inmate at [facility] admitted 2017 with syncopal episode and wrist pain after fall     Interval Problem Update  Pt seen and examined no acute events overnight, having some HA, D-dimer slightly elevated, will check a CTPE     Consultants/Specialty  none    Disposition  tbd        Review of Systems   Constitutional: Negative for chills and fever.   Eyes: Negative for blurred vision and double vision.   Cardiovascular: Negative for chest pain and palpitations.   Genitourinary: Negative for dysuria and urgency.   Neurological: Positive for headaches.      Physical Exam  Laboratory/Imaging   Hemodynamics  Temp (24hrs), Av.7 °C (98.1 °F), Min:36.3 °C (97.4 °F), Max:37.1 °C (98.7 °F)   Temperature: 37.1 °C (98.7 °F)  Pulse  Av.8  Min: 65  Max: 89    Blood Pressure: 119/79, NIBP: 114/84      Respiratory      Respiration: (!) 24, Pulse Oximetry: 95 %, O2 Daily Delivery Respiratory : Room Air with O2 Available     Work Of Breathing / Effort: Mild  RUL Breath Sounds: Clear, RML Breath Sounds: Clear, RLL Breath Sounds: Diminished, CASEY Breath Sounds: Clear, LLL Breath Sounds: Diminished    Fluids  No intake or output data in the 24 hours ending 17 1502    Nutrition  Orders Placed This Encounter   Procedures   • Diet Order     Standing Status:   Standing     Number of Occurrences:   1     Order Specific Question:   Diet:     Answer:   Regular [1]     Physical Exam   Constitutional: He is oriented to person, place, and time. He appears well-developed and well-nourished.   HENT:   Head: Normocephalic and atraumatic.   Eyes: Conjunctivae are normal. No scleral icterus.   Neck: Neck supple. No JVD present.   Cardiovascular: Normal heart sounds.  Exam reveals no gallop.    No murmur heard.  Pulmonary/Chest: He has no wheezes. He has no rales.   Abdominal: Soft. Bowel sounds are  normal. He exhibits no distension. There is no tenderness.   Musculoskeletal: Normal range of motion. He exhibits no edema.   Neurological: He is alert and oriented to person, place, and time. No cranial nerve deficit.   Skin: Skin is warm and dry. No erythema.   Nursing note and vitals reviewed.      Recent Labs      11/21/17   1600  11/22/17   0003   WBC  5.4  5.2   RBC  4.35*  4.24*   HEMOGLOBIN  14.4  14.1   HEMATOCRIT  42.8  41.7*   MCV  98.4*  98.3*   MCH  33.1*  33.3*   MCHC  33.6*  33.8   RDW  47.7  47.8   PLATELETCT  129*  108*   MPV  9.8  9.9     Recent Labs      11/21/17   1600  11/22/17   0003   SODIUM  138  137   POTASSIUM  4.2  3.7   CHLORIDE  105  104   CO2  22  27   GLUCOSE  91  126*   BUN  21  20   CREATININE  1.07  1.02   CALCIUM  9.4  9.3     Recent Labs      11/21/17   1600   APTT  29.0   INR  1.12     Recent Labs      11/21/17   1600   BNPBTYPENAT  8              Assessment/Plan     Right elbow pain   Assessment & Plan    Seen by ortho   No fracture Activity and mobility as tolerated  Can follow as outpt if pain continues         Head contusion   Assessment & Plan    CT head negative  Monitor          Chronic pain   Assessment & Plan    Cont on toradol and Fioricet         Transaminitis   Assessment & Plan    Trending down  US liver showing steatosis         HTN (hypertension)   Assessment & Plan    Cont lisinopril   BP at goal monitor         Syncope   Assessment & Plan    Echo negative,Ct head neg  US carotid pending,  Also had elevated D-dimer so will check a CTPE   Monitor on tele            Reviewed items::  Labs reviewed, Medications reviewed and Radiology images reviewed  Hutchinson catheter::  No Hutchinson  DVT prophylaxis - mechanical:  SCDs

## 2017-11-22 NOTE — PROGRESS NOTES
Shilpa Perez Fall Risk Assessment:     Last Known Fall: Within the last month  Mobility: No limitations  Medications: Cardiovascular or central nervous system meds  Mental Status/LOC/Awareness: Awake, alert, and oriented to date, place, and person  Toileting Needs: Use of assistive device (Bedside commode, bedpan, urinal)  Volume/Electrolyte Status: No problems  Communication/Sensory: Visual (Glasses)/hearing deficit  Behavior: Appropriate behavior  Shilpa Perez Fall Risk Total: 9  Fall Risk Level: LOW RISK    Universal Fall Precautions:  bed in low position and locked, wheelchairs and assistive devices out of sight, siderails up x 2, use non-slip footwear, adequate lighting, clutter free and spill free environment, educate on level of risk, educate to call for assistance    Fall Risk Level Interventions:   TRIAL (TELE 8, NEURO, MED POPEYE 5) Low Fall Risk Interventions  Place yellow fall risk ID band on patient: completed  Provide patient/family education based on risk assessment: completed  Educate patient/family to call staff for assistance when getting out of bed: completed  Place fall precaution signage outside patient door: completed      Patient Specific Interventions:     Medication: review medications with patient and family and limit combination of prn medications  Mental Status/LOC/Awareness: reinforce falls education, check on patient hourly, provide activity and consider safety sitter  Toileting: monitor intake and output/use of appropriate interventions  Volume/Electrolyte Status: ensure patient remains hydrated, monitor abnormal lab values and ensure IV fluids are appropriate  Communication/Sensory: update plan of care on whiteboard  Behavioral: administer medication as ordered and instruct/reinforce fall program rationale  Mobility: dangle prior to standing, ensure bed is locked and in lowest position and instruct patient to exit bed on their strongest side

## 2017-11-22 NOTE — ASSESSMENT & PLAN NOTE
Echo negative,Ct head neg  US carotid pending,  Also had elevated D-dimer so will check a CTPE   Monitor on tele

## 2017-11-22 NOTE — PROGRESS NOTES
Received report from nightshift RN, assumed care of patient. Patient is A&O x 4, no bed alarm indicated. Patient educated on importance of calling if in need of assistance. Verbalizes understanding. Patient declines pain at this time. Patient with shackles in place, 1:1 sitter from facility at bedside. Patient updated on plan of care, voices no concerns at this time. Will continue to monitor for safety and comfort.

## 2017-11-22 NOTE — PROGRESS NOTES
Pt belongings are as follows:    Pants and shirt in bag and in care coordination. Glasses with pt.

## 2017-11-23 VITALS
WEIGHT: 256.84 LBS | SYSTOLIC BLOOD PRESSURE: 132 MMHG | HEART RATE: 82 BPM | BODY MASS INDEX: 32.96 KG/M2 | RESPIRATION RATE: 16 BRPM | OXYGEN SATURATION: 91 % | HEIGHT: 74 IN | DIASTOLIC BLOOD PRESSURE: 89 MMHG | TEMPERATURE: 98.6 F

## 2017-11-23 LAB
ANION GAP SERPL CALC-SCNC: 7 MMOL/L (ref 0–11.9)
BUN SERPL-MCNC: 13 MG/DL (ref 8–22)
CALCIUM SERPL-MCNC: 8.9 MG/DL (ref 8.5–10.5)
CHLORIDE SERPL-SCNC: 106 MMOL/L (ref 96–112)
CO2 SERPL-SCNC: 25 MMOL/L (ref 20–33)
CREAT SERPL-MCNC: 1.06 MG/DL (ref 0.5–1.4)
ERYTHROCYTE [DISTWIDTH] IN BLOOD BY AUTOMATED COUNT: 47.6 FL (ref 35.9–50)
GFR SERPL CREATININE-BSD FRML MDRD: >60 ML/MIN/1.73 M 2
GLUCOSE SERPL-MCNC: 78 MG/DL (ref 65–99)
HCT VFR BLD AUTO: 40.1 % (ref 42–52)
HGB BLD-MCNC: 13.7 G/DL (ref 14–18)
MCH RBC QN AUTO: 33.3 PG (ref 27–33)
MCHC RBC AUTO-ENTMCNC: 34.2 G/DL (ref 33.7–35.3)
MCV RBC AUTO: 97.6 FL (ref 81.4–97.8)
PLATELET # BLD AUTO: 106 K/UL (ref 164–446)
PMV BLD AUTO: 9.8 FL (ref 9–12.9)
POTASSIUM SERPL-SCNC: 4.4 MMOL/L (ref 3.6–5.5)
RBC # BLD AUTO: 4.11 M/UL (ref 4.7–6.1)
SODIUM SERPL-SCNC: 138 MMOL/L (ref 135–145)
WBC # BLD AUTO: 5.3 K/UL (ref 4.8–10.8)

## 2017-11-23 PROCEDURE — 700111 HCHG RX REV CODE 636 W/ 250 OVERRIDE (IP): Performed by: INTERNAL MEDICINE

## 2017-11-23 PROCEDURE — 99217 PR OBSERVATION CARE DISCHARGE: CPT | Performed by: INTERNAL MEDICINE

## 2017-11-23 PROCEDURE — 96376 TX/PRO/DX INJ SAME DRUG ADON: CPT

## 2017-11-23 PROCEDURE — 80048 BASIC METABOLIC PNL TOTAL CA: CPT

## 2017-11-23 PROCEDURE — 700105 HCHG RX REV CODE 258: Performed by: INTERNAL MEDICINE

## 2017-11-23 PROCEDURE — G0378 HOSPITAL OBSERVATION PER HR: HCPCS

## 2017-11-23 PROCEDURE — 36415 COLL VENOUS BLD VENIPUNCTURE: CPT

## 2017-11-23 PROCEDURE — 85027 COMPLETE CBC AUTOMATED: CPT

## 2017-11-23 RX ADMIN — SODIUM CHLORIDE: 9 INJECTION, SOLUTION INTRAVENOUS at 08:43

## 2017-11-23 RX ADMIN — KETOROLAC TROMETHAMINE 30 MG: 30 INJECTION, SOLUTION INTRAMUSCULAR at 08:43

## 2017-11-23 RX ADMIN — HEPARIN SODIUM 5000 UNITS: 5000 INJECTION, SOLUTION INTRAVENOUS; SUBCUTANEOUS at 05:14

## 2017-11-23 ASSESSMENT — PAIN SCALES - GENERAL
PAINLEVEL_OUTOF10: 2
PAINLEVEL_OUTOF10: 5
PAINLEVEL_OUTOF10: 0
PAINLEVEL_OUTOF10: 0

## 2017-11-23 NOTE — CARE PLAN
Problem: Safety  Goal: Will remain free from injury  Outcome: PROGRESSING AS EXPECTED  Fall precautions in place. Bed in lowest position. Non-skid socks in place.  Mobility sign on door. Bed-alarm on. Call light within reach. Pt educated regarding fall prevention and states understanding. Guard at bedside, shackles on legs in place

## 2017-11-23 NOTE — PROGRESS NOTES
Assumed care of patient, bedside report received from [facility]. Updated on POC, call light within reach and fall precautions in place. Bed locked and in lowest position. Patient instructed to call for assistance before getting out of bed. All questions answered, no other needs at this time.

## 2017-11-23 NOTE — DISCHARGE INSTRUCTIONS
Discharge Instructions    Discharged to home by medical transportation with escort. Discharged via wheelchair, hospital escort: Yes.  Special equipment needed: Not Applicable    Be sure to schedule a follow-up appointment with your primary care doctor or any specialists as instructed.     Discharge Plan:   Diet Plan: Discussed  Activity Level: Discussed  Confirmed Follow up Appointment: Patient to Call and Schedule Appointment  Confirmed Symptoms Management: Discussed  Medication Reconciliation Updated: Yes  Influenza Vaccine Indication: Not indicated: Previously immunized this influenza season and > 8 years of age    I understand that a diet low in cholesterol, fat, and sodium is recommended for good health. Unless I have been given specific instructions below for another diet, I accept this instruction as my diet prescription.   Other diet: low sodium, low cholesterol    Special Instructions: None    · Is patient discharged on Warfarin / Coumadin?   No     · Is patient Post Blood Transfusion?  No    Depression / Suicide Risk    As you are discharged from this [facility] facility, it is important to learn how to keep safe from harming yourself.    Recognize the warning signs:  · Abrupt changes in personality, positive or negative- including increase in energy   · Giving away possessions  · Change in eating patterns- significant weight changes-  positive or negative  · Change in sleeping patterns- unable to sleep or sleeping all the time   · Unwillingness or inability to communicate  · Depression  · Unusual sadness, discouragement and loneliness  · Talk of wanting to die  · Neglect of personal appearance   · Rebelliousness- reckless behavior  · Withdrawal from people/activities they love  · Confusion- inability to concentrate     If you or a loved one observes any of these behaviors or has concerns about self-harm, here's what you can do:  · Talk about it- your feelings and reasons for harming yourself  · Remove  any means that you might use to hurt yourself (examples: pills, rope, extension cords, firearm)  · Get professional help from the community (Mental Health, Substance Abuse, psychological counseling)  · Do not be alone:Call your Safe Contact- someone whom you trust who will be there for you.  · Call your local CRISIS HOTLINE 261-0494 or 968-118-6912  · Call your local Children's Mobile Crisis Response Team [place] (871) 061-7474 or wwwEfficient Frontier  · Call the toll free National Suicide Prevention Hotlines   · National Suicide Prevention Lifeline 426-265-CBNN (2288)  · National Hope Line Network 800-SUICIDE (505-0710)        Syncope  Syncope means a person passes out (faints). The person usually wakes up in less than 5 minutes. It is important to seek medical care for syncope.  HOME CARE  · Have someone stay with you until you feel normal.  · Do not drive, use machines, or play sports until your doctor says it is okay.  · Keep all doctor visits as told.  · Lie down when you feel like you might pass out. Take deep breaths. Wait until you feel normal before standing up.  · Drink enough fluids to keep your pee (urine) clear or pale yellow.  · If you take blood pressure or heart medicine, get up slowly. Take several minutes to sit and then stand.  GET HELP RIGHT AWAY IF:   · You have a severe headache.  · You have pain in the chest, belly (abdomen), or back.  · You are bleeding from the mouth or butt (rectum).  · You have black or tarry poop (stool).  · You have an irregular or very fast heartbeat.  · You have pain with breathing.  · You keep passing out, or you have shaking (seizures) when you pass out.  · You pass out when sitting or lying down.  · You feel confused.  · You have trouble walking.  · You have severe weakness.  · You have vision problems.  If you fainted, call for help (826 in [place] Do not drive yourself to the hospital.     This information is not intended to replace advice given to you by  your health care provider. Make sure you discuss any questions you have with your health care provider.     Document Released: 06/05/2009 Document Revised: 05/03/2016 Document Reviewed: 02/15/2013  Elsevier Interactive Patient Education ©2016 [employer]

## 2017-11-23 NOTE — DISCHARGE SUMMARY
CHIEF COMPLAINT ON ADMISSION  Chief Complaint   Patient presents with   • Fall       CODE STATUS  Full Code    HPI & HOSPITAL COURSE  This is a 52 y.o. male who was transferred here from [facility] after he  Had a syncopal episode while taking a shower. Pt was admitted for further work. A ct head was done and was negative for any acute issue. He also had had an echo and US carotid done which were also negative, since his d-dimer was slightly elevated an CTPE was done and was negative for PE. Pt has been hemodynamically stable, he has not had any syncopal episode here in the hospital, and no events on tele monitor. It might have been a vasovagal episode causing his syncope.  Regarding his elevated LFT US liver showed steatosis of the liver. Trended down.   Regarding his right elbow pain, was seen by ortho but there is no fracture, and recommended to follow up as outpt.   Pt will be discharged back to [facility] today.         DISCHARGE PROBLEM LIST  Active Problems:    Syncope POA: Unknown    HTN (hypertension) POA: Unknown    Transaminitis POA: Unknown    Chronic pain POA: Unknown    Head contusion POA: Unknown    Right elbow pain POA: Unknown  Resolved Problems:    * No resolved hospital problems. *      FOLLOW UP  No future appointments.  No follow-up provider specified.    MEDICATIONS ON DISCHARGE   Han, Leyla   Home Medication Instructions FARHAD:[id number]    Printed on:11/23/17 1153   Medication Information                      hydrocortisone 1 % Cream  Apply  to affected area(s) 2 times a day. Face, Chest             lisinopril (PRINIVIL) 20 MG Tab  Take 20 mg by mouth every evening.             paliperidone (INVEGA) 6 MG ER tablet  Take 6 mg by mouth every morning.             trazodone (DESYREL) 100 MG Tab  Take 250 mg by mouth every evening. Pt takes a 150 mg and a 100 mg tab                 DIET  Orders Placed This Encounter   Procedures   • Diet  Order     Standing Status:   Standing     Number of Occurrences:   1     Order Specific Question:   Diet:     Answer:   Regular [1]       ACTIVITY  As tolerated.        CONSULTATIONS  Ortho: Dr. Teixeira      PROCEDURES  none    LABORATORY  Lab Results   Component Value Date/Time    SODIUM 138 11/23/2017 02:54 AM    POTASSIUM 4.4 11/23/2017 02:54 AM    CHLORIDE 106 11/23/2017 02:54 AM    CO2 25 11/23/2017 02:54 AM    GLUCOSE 78 11/23/2017 02:54 AM    BUN 13 11/23/2017 02:54 AM    CREATININE 1.06 11/23/2017 02:54 AM    CREATININE 1.0 01/21/2009 06:41 AM        Lab Results   Component Value Date/Time    WBC 5.3 11/23/2017 02:54 AM    HEMOGLOBIN 13.7 (L) 11/23/2017 02:54 AM    HEMATOCRIT 40.1 (L) 11/23/2017 02:54 AM    PLATELETCT 106 (L) 11/23/2017 02:54 AM        Total time of the discharge process exceeds 38 minutes

## 2017-11-23 NOTE — PROGRESS NOTES
Patient discharged to [facility]. Patient AOX 4.  IV and tele box removed, discharge instructions provided to patient and reviewed with them. All questions answered, patient provided copy of discharge instructions and instructed on when to F/U with MD. Patient wheeled off unit. Patient discharged into the care of guards from [facility].

## 2017-11-23 NOTE — CARE PLAN
Problem: Safety  Goal: Will remain free from injury  Outcome: PROGRESSING AS EXPECTED      Problem: Bowel/Gastric:  Goal: Normal bowel function is maintained or improved  Outcome: PROGRESSING AS EXPECTED      Problem: Knowledge Deficit  Goal: Knowledge of disease process/condition, treatment plan, diagnostic tests, and medications will improve  Outcome: PROGRESSING AS EXPECTED      Problem: Pain Management  Goal: Pain level will decrease to patient's comfort goal  Outcome: PROGRESSING AS EXPECTED

## 2017-11-23 NOTE — DISCHARGE PLANNING
COBRA completed.  PT to DC back to [facility]. Chart copied. [facility] staff to provide safe transport.

## 2017-11-23 NOTE — PROGRESS NOTES
Bedside report received from Elvie CONLEY. Patient's chart and MAR reviewed. 12 hour chart check complete. Pt is awake in bed. Pt is AOx4. Patient was updated on plan of care for the day. Questions answered and concerns addressed.  Pt denies any additional needs at this time. White board updated. Shackles in place and guard at bedside. Personal belongings in care coordination

## 2017-11-23 NOTE — PROGRESS NOTES
Shilpa Perez Fall Risk Assessment:     Last Known Fall: Within the last month  Mobility: No limitations  Medications: Cardiovascular or central nervous system meds  Mental Status/LOC/Awareness: Awake, alert, and oriented to date, place, and person  Toileting Needs: Use of assistive device (Bedside commode, bedpan, urinal)  Volume/Electrolyte Status: Use of IV fluids/tube feeds  Communication/Sensory: Visual (Glasses)/hearing deficit  Behavior: Appropriate behavior  Shilpa Perez Fall Risk Total: 11  Fall Risk Level: MODERATE RISK    Universal Fall Precautions:  call light/belongings in reach, bed in low position and locked, siderails up x 2, use non-slip footwear, adequate lighting, clutter free and spill free environment, educate on level of risk, educate to call for assistance    Fall Risk Level Interventions:   TRIAL (TELE 8, NEURO, MED POPEYE 5) Low Fall Risk Interventions  Place yellow fall risk ID band on patient: verified  Provide patient/family education based on risk assessment: completed  Educate patient/family to call staff for assistance when getting out of bed: completed  Place fall precaution signage outside patient door: verified      Patient Specific Interventions:     Medication: review medications with patient and family and limit combination of prn medications  Mental Status/LOC/Awareness: check on patient hourly and reinforce the use of call light  Toileting: provide frquent toileting  Volume/Electrolyte Status: ensure patient remains hydrated, monitor abnormal lab values and ensure IV fluids are appropriate  Communication/Sensory: update plan of care on whiteboard  Behavioral: not applicable  Mobility: ensure bed is locked and in lowest position

## 2017-11-24 LAB
HCV RNA SERPL NAA+PROBE-ACNC: ABNORMAL IU/ML
HCV RNA SERPL NAA+PROBE-LOG IU: 6.5 LOG IU
HCV RNA SERPL QL NAA+PROBE: DETECTED
PATHOLOGY STUDY: ABNORMAL

## 2018-09-21 ENCOUNTER — OFFICE VISIT (OUTPATIENT)
Dept: URGENT CARE | Facility: CLINIC | Age: 53
End: 2018-09-21
Payer: MEDICARE

## 2018-09-21 VITALS
TEMPERATURE: 98.1 F | DIASTOLIC BLOOD PRESSURE: 90 MMHG | RESPIRATION RATE: 18 BRPM | OXYGEN SATURATION: 93 % | SYSTOLIC BLOOD PRESSURE: 148 MMHG | HEART RATE: 90 BPM

## 2018-09-21 DIAGNOSIS — L02.91 ABSCESS OF MULTIPLE SITES: ICD-10-CM

## 2018-09-21 PROCEDURE — 99214 OFFICE O/P EST MOD 30 MIN: CPT | Performed by: PHYSICIAN ASSISTANT

## 2018-09-21 RX ORDER — CLINDAMYCIN HYDROCHLORIDE 300 MG/1
300 CAPSULE ORAL 3 TIMES DAILY
Qty: 21 CAP | Refills: 0 | Status: SHIPPED | OUTPATIENT
Start: 2018-09-21 | End: 2018-09-28

## 2018-09-21 ASSESSMENT — ENCOUNTER SYMPTOMS
MYALGIAS: 0
HEADACHES: 0
CHILLS: 0
NAUSEA: 0
FEVER: 0
COUGH: 0
SHORTNESS OF BREATH: 0
VOMITING: 0
PALPITATIONS: 0

## 2018-09-21 NOTE — PROGRESS NOTES
Subjective:      Chace Gong is a 53 y.o. male who presents with Wound Infection (pt states he had a bite on hin (L)  elbow and states it got infected and states he got the same thing on his penis)            Patient is here with complaints of multiple infections. 1 abscess on his left elbow and one on his penis. He states he noticed both bumps several days ago and symptoms have gradually worsened. He has no pain around his penis area but complains of pain and swelling in the left elbow. He denies fever or chills. He tried to forde both abscesses with a needle. He states he had difficulty sleeping last night because of his elbow pain.      Past Medical History:   Diagnosis Date   • Achilles tendinitis 6/24/2009   • Arthralgia 6/24/2009   • Bronchitis 6/24/2009   • Dyslipidemia 6/24/2009   • GERD (gastroesophageal reflux disease) 6/24/2009   • Hepatitis C    • HTN (hypertension) 6/24/2009   • Hypertension    • Low back pain    • Psychiatric disorder     anxiety       Past Surgical History:   Procedure Laterality Date   • ELBOW ARTHROTOMY  5/17/2013    Performed by Jonathan Michael M.D. at [facility]   • LOOSE BODY REMOVAL  5/17/2013    Performed by Jonathan Michael M.D. at [facility]   • ARTHROSCOPY, KNEE  2013    right    • ARTHROTOMY  2011    right shoulder   • NEUROLYSIS  1/21/2009    Performed by JONATHAN MICHAEL at [facility]   • CARPAL TUNNEL RELEASE  1/21/2009    Performed by JONATHAN MICHAEL at [facility]   • HARDWARE REMOVAL ORTHO  1/21/2009    Performed by JONATHAN MICHAEL at [facility]   • SYNOVECTOMY  1/21/2009    Performed by JONATHAN MICHAEL at [facility]   • OTHER ORTHOPEDIC SURGERY  2001    lt wrist surg       Family History   Problem Relation Age of Onset   • Cancer Mother         lung / throat / breast   • Hypertension Mother    • Hypertension Father    • Hyperlipidemia Father    • Stroke Father   "  • Alcohol/Drug Father    • Diabetes Sister    • Hypertension Sister    • Alcohol/Drug Sister    • Cancer Brother    • Hypertension Brother    • Alcohol/Drug Brother    • Stroke Brother    • Alcohol/Drug Brother    • Cancer Sister         lung   • Heart Disease Neg Hx        Allergies   Allergen Reactions   • Penicillins Unspecified     Pt states that his mom told him he is allergic to penicillins.   • Fentanyl Palpitations     Pt stated he gets an arrhythmia from this.   • Hydrocodone Unspecified     Pt states \"I'm not sure\".   • Other Misc Rash     Tape  Paper tape ok   • Sulfa Drugs      \"my mom told me I was allergic to it when i was a kid\"       Medications, Allergies, and current problem list reviewed today in Epic    Review of Systems   Constitutional: Negative for chills, fever and malaise/fatigue.   Respiratory: Negative for cough and shortness of breath.    Cardiovascular: Negative for chest pain, palpitations and leg swelling.   Gastrointestinal: Negative for nausea and vomiting.   Musculoskeletal: Negative for joint pain and myalgias.   Skin:        Abscess on elbow, abscess on penis   Neurological: Negative for headaches.     All other systems reviewed and are negative.        Objective:     /90 (BP Location: Right arm, Patient Position: Sitting, BP Cuff Size: Large adult)   Pulse 90   Temp 36.7 °C (98.1 °F) (Temporal)   Resp 18   SpO2 93%      Physical Exam   Constitutional: He is oriented to person, place, and time. He appears well-developed and well-nourished. No distress.   HENT:   Head: Normocephalic and atraumatic.   Eyes: Conjunctivae are normal.   Pulmonary/Chest: Effort normal. No respiratory distress.   Neurological: He is alert and oriented to person, place, and time. No cranial nerve deficit.   Skin:        Psychiatric: He has a normal mood and affect. His behavior is normal. Judgment and thought content normal.               Assessment/Plan:     1. Abscess of multiple sites    - " clindamycin (CLEOCIN) 300 MG Cap; Take 1 Cap by mouth 3 times a day for 7 days.  Dispense: 21 Cap; Refill: 0  - encouraged probiotic use with medication   - wound care instructions discussed.     Differential diagnoses, Supportive care, and indications for immediate follow-up discussed with patient.   Instructed to return to clinic or nearest emergency department for any change in condition, further concerns, or worsening of symptoms.    The patient demonstrated a good understanding and agreed with the treatment plan.    Vanessa Escobar P.A.-C.

## 2018-10-16 ENCOUNTER — HOSPITAL ENCOUNTER (INPATIENT)
Facility: MEDICAL CENTER | Age: 53
LOS: 1 days | DRG: 894 | End: 2018-10-17
Attending: EMERGENCY MEDICINE | Admitting: HOSPITALIST
Payer: MEDICARE

## 2018-10-16 DIAGNOSIS — K70.10 ALCOHOLIC HEPATITIS, UNSPECIFIED WHETHER ASCITES PRESENT: ICD-10-CM

## 2018-10-16 DIAGNOSIS — F10.939 ALCOHOL WITHDRAWAL SYNDROME WITH COMPLICATION (HCC): ICD-10-CM

## 2018-10-16 PROBLEM — D69.6 THROMBOCYTOPENIA (HCC): Status: ACTIVE | Noted: 2018-10-16

## 2018-10-16 PROBLEM — Z72.0 TOBACCO ABUSE: Status: ACTIVE | Noted: 2018-10-16

## 2018-10-16 LAB
ALBUMIN SERPL BCP-MCNC: 4.2 G/DL (ref 3.2–4.9)
ALBUMIN/GLOB SERPL: 1.2 G/DL
ALP SERPL-CCNC: 75 U/L (ref 30–99)
ALT SERPL-CCNC: 185 U/L (ref 2–50)
AMMONIA PLAS-SCNC: 33 UMOL/L (ref 11–45)
ANION GAP SERPL CALC-SCNC: 8 MMOL/L (ref 0–11.9)
APTT PPP: 28.2 SEC (ref 24.7–36)
AST SERPL-CCNC: 102 U/L (ref 12–45)
BASOPHILS # BLD AUTO: 0.4 % (ref 0–1.8)
BASOPHILS # BLD: 0.02 K/UL (ref 0–0.12)
BILIRUB SERPL-MCNC: 0.4 MG/DL (ref 0.1–1.5)
BUN SERPL-MCNC: 10 MG/DL (ref 8–22)
CALCIUM SERPL-MCNC: 9.2 MG/DL (ref 8.5–10.5)
CHLORIDE SERPL-SCNC: 105 MMOL/L (ref 96–112)
CO2 SERPL-SCNC: 24 MMOL/L (ref 20–33)
CREAT SERPL-MCNC: 0.85 MG/DL (ref 0.5–1.4)
EOSINOPHIL # BLD AUTO: 0.18 K/UL (ref 0–0.51)
EOSINOPHIL NFR BLD: 3.3 % (ref 0–6.9)
ERYTHROCYTE [DISTWIDTH] IN BLOOD BY AUTOMATED COUNT: 50.5 FL (ref 35.9–50)
ETHANOL BLD-MCNC: 0.03 G/DL
GLOBULIN SER CALC-MCNC: 3.4 G/DL (ref 1.9–3.5)
GLUCOSE SERPL-MCNC: 102 MG/DL (ref 65–99)
HCT VFR BLD AUTO: 45.6 % (ref 42–52)
HGB BLD-MCNC: 15.9 G/DL (ref 14–18)
IMM GRANULOCYTES # BLD AUTO: 0.03 K/UL (ref 0–0.11)
IMM GRANULOCYTES NFR BLD AUTO: 0.6 % (ref 0–0.9)
INR PPP: 1.1 (ref 0.87–1.13)
LIPASE SERPL-CCNC: 66 U/L (ref 11–82)
LYMPHOCYTES # BLD AUTO: 2.32 K/UL (ref 1–4.8)
LYMPHOCYTES NFR BLD: 42.6 % (ref 22–41)
MCH RBC QN AUTO: 34.8 PG (ref 27–33)
MCHC RBC AUTO-ENTMCNC: 34.9 G/DL (ref 33.7–35.3)
MCV RBC AUTO: 99.8 FL (ref 81.4–97.8)
MONOCYTES # BLD AUTO: 0.5 K/UL (ref 0–0.85)
MONOCYTES NFR BLD AUTO: 9.2 % (ref 0–13.4)
NEUTROPHILS # BLD AUTO: 2.4 K/UL (ref 1.82–7.42)
NEUTROPHILS NFR BLD: 43.9 % (ref 44–72)
NRBC # BLD AUTO: 0 K/UL
NRBC BLD-RTO: 0 /100 WBC
PHOSPHATE SERPL-MCNC: 4 MG/DL (ref 2.5–4.5)
PLATELET # BLD AUTO: 154 K/UL (ref 164–446)
PMV BLD AUTO: 9.7 FL (ref 9–12.9)
POTASSIUM SERPL-SCNC: 4.6 MMOL/L (ref 3.6–5.5)
PROT SERPL-MCNC: 7.6 G/DL (ref 6–8.2)
PROTHROMBIN TIME: 14.4 SEC (ref 12–14.6)
RBC # BLD AUTO: 4.57 M/UL (ref 4.7–6.1)
SODIUM SERPL-SCNC: 137 MMOL/L (ref 135–145)
WBC # BLD AUTO: 5.5 K/UL (ref 4.8–10.8)

## 2018-10-16 PROCEDURE — 84100 ASSAY OF PHOSPHORUS: CPT

## 2018-10-16 PROCEDURE — 82140 ASSAY OF AMMONIA: CPT

## 2018-10-16 PROCEDURE — 83690 ASSAY OF LIPASE: CPT

## 2018-10-16 PROCEDURE — 99285 EMERGENCY DEPT VISIT HI MDM: CPT

## 2018-10-16 PROCEDURE — HZ2ZZZZ DETOXIFICATION SERVICES FOR SUBSTANCE ABUSE TREATMENT: ICD-10-PCS | Performed by: EMERGENCY MEDICINE

## 2018-10-16 PROCEDURE — A9270 NON-COVERED ITEM OR SERVICE: HCPCS | Performed by: EMERGENCY MEDICINE

## 2018-10-16 PROCEDURE — 80307 DRUG TEST PRSMV CHEM ANLYZR: CPT

## 2018-10-16 PROCEDURE — 700102 HCHG RX REV CODE 250 W/ 637 OVERRIDE(OP): Performed by: EMERGENCY MEDICINE

## 2018-10-16 PROCEDURE — 80053 COMPREHEN METABOLIC PANEL: CPT

## 2018-10-16 PROCEDURE — 700111 HCHG RX REV CODE 636 W/ 250 OVERRIDE (IP): Performed by: EMERGENCY MEDICINE

## 2018-10-16 PROCEDURE — 700102 HCHG RX REV CODE 250 W/ 637 OVERRIDE(OP): Performed by: HOSPITALIST

## 2018-10-16 PROCEDURE — 96375 TX/PRO/DX INJ NEW DRUG ADDON: CPT

## 2018-10-16 PROCEDURE — 770020 HCHG ROOM/CARE - TELE (206)

## 2018-10-16 PROCEDURE — 700101 HCHG RX REV CODE 250: Performed by: EMERGENCY MEDICINE

## 2018-10-16 PROCEDURE — 85730 THROMBOPLASTIN TIME PARTIAL: CPT

## 2018-10-16 PROCEDURE — A9270 NON-COVERED ITEM OR SERVICE: HCPCS | Performed by: HOSPITALIST

## 2018-10-16 PROCEDURE — 85610 PROTHROMBIN TIME: CPT

## 2018-10-16 PROCEDURE — 96374 THER/PROPH/DIAG INJ IV PUSH: CPT

## 2018-10-16 PROCEDURE — 99406 BEHAV CHNG SMOKING 3-10 MIN: CPT | Performed by: HOSPITALIST

## 2018-10-16 PROCEDURE — 36415 COLL VENOUS BLD VENIPUNCTURE: CPT

## 2018-10-16 PROCEDURE — 99223 1ST HOSP IP/OBS HIGH 75: CPT | Mod: 25 | Performed by: HOSPITALIST

## 2018-10-16 PROCEDURE — 85025 COMPLETE CBC W/AUTO DIFF WBC: CPT

## 2018-10-16 RX ORDER — LORAZEPAM 2 MG/ML
1 INJECTION INTRAMUSCULAR ONCE
Status: COMPLETED | OUTPATIENT
Start: 2018-10-16 | End: 2018-10-16

## 2018-10-16 RX ORDER — LORAZEPAM 2 MG/ML
1 INJECTION INTRAMUSCULAR
Status: DISCONTINUED | OUTPATIENT
Start: 2018-10-16 | End: 2018-10-17 | Stop reason: HOSPADM

## 2018-10-16 RX ORDER — NICOTINE 21 MG/24HR
14 PATCH, TRANSDERMAL 24 HOURS TRANSDERMAL
Status: DISCONTINUED | OUTPATIENT
Start: 2018-10-17 | End: 2018-10-17 | Stop reason: HOSPADM

## 2018-10-16 RX ORDER — LORAZEPAM 1 MG/1
3 TABLET ORAL
Status: DISCONTINUED | OUTPATIENT
Start: 2018-10-16 | End: 2018-10-17 | Stop reason: HOSPADM

## 2018-10-16 RX ORDER — POLYETHYLENE GLYCOL 3350 17 G/17G
1 POWDER, FOR SOLUTION ORAL
Status: DISCONTINUED | OUTPATIENT
Start: 2018-10-16 | End: 2018-10-17 | Stop reason: HOSPADM

## 2018-10-16 RX ORDER — LORAZEPAM 1 MG/1
2 TABLET ORAL
Status: DISCONTINUED | OUTPATIENT
Start: 2018-10-16 | End: 2018-10-17 | Stop reason: HOSPADM

## 2018-10-16 RX ORDER — LORAZEPAM 1 MG/1
0.5 TABLET ORAL EVERY 4 HOURS PRN
Status: DISCONTINUED | OUTPATIENT
Start: 2018-10-16 | End: 2018-10-17 | Stop reason: HOSPADM

## 2018-10-16 RX ORDER — LORAZEPAM 2 MG/ML
1 INJECTION INTRAMUSCULAR ONCE
Status: DISCONTINUED | OUTPATIENT
Start: 2018-10-16 | End: 2018-10-17 | Stop reason: HOSPADM

## 2018-10-16 RX ORDER — FOLIC ACID 1 MG/1
1 TABLET ORAL DAILY
Status: DISCONTINUED | OUTPATIENT
Start: 2018-10-17 | End: 2018-10-17 | Stop reason: HOSPADM

## 2018-10-16 RX ORDER — ONDANSETRON 2 MG/ML
4 INJECTION INTRAMUSCULAR; INTRAVENOUS EVERY 4 HOURS PRN
Status: DISCONTINUED | OUTPATIENT
Start: 2018-10-16 | End: 2018-10-17 | Stop reason: HOSPADM

## 2018-10-16 RX ORDER — LORAZEPAM 2 MG/ML
0.5 INJECTION INTRAMUSCULAR EVERY 4 HOURS PRN
Status: DISCONTINUED | OUTPATIENT
Start: 2018-10-16 | End: 2018-10-17 | Stop reason: HOSPADM

## 2018-10-16 RX ORDER — LORAZEPAM 1 MG/1
1 TABLET ORAL EVERY 4 HOURS PRN
Status: DISCONTINUED | OUTPATIENT
Start: 2018-10-16 | End: 2018-10-17 | Stop reason: HOSPADM

## 2018-10-16 RX ORDER — LORAZEPAM 2 MG/ML
2 INJECTION INTRAMUSCULAR
Status: DISCONTINUED | OUTPATIENT
Start: 2018-10-16 | End: 2018-10-17 | Stop reason: HOSPADM

## 2018-10-16 RX ORDER — BISACODYL 10 MG
10 SUPPOSITORY, RECTAL RECTAL
Status: DISCONTINUED | OUTPATIENT
Start: 2018-10-16 | End: 2018-10-17 | Stop reason: HOSPADM

## 2018-10-16 RX ORDER — AMOXICILLIN 250 MG
2 CAPSULE ORAL 2 TIMES DAILY
Status: DISCONTINUED | OUTPATIENT
Start: 2018-10-16 | End: 2018-10-17 | Stop reason: HOSPADM

## 2018-10-16 RX ORDER — PROMETHAZINE HYDROCHLORIDE 25 MG/1
12.5-25 TABLET ORAL EVERY 4 HOURS PRN
Status: DISCONTINUED | OUTPATIENT
Start: 2018-10-16 | End: 2018-10-17 | Stop reason: HOSPADM

## 2018-10-16 RX ORDER — ONDANSETRON 2 MG/ML
4 INJECTION INTRAMUSCULAR; INTRAVENOUS ONCE
Status: COMPLETED | OUTPATIENT
Start: 2018-10-16 | End: 2018-10-16

## 2018-10-16 RX ORDER — LORAZEPAM 2 MG/ML
1.5 INJECTION INTRAMUSCULAR
Status: DISCONTINUED | OUTPATIENT
Start: 2018-10-16 | End: 2018-10-17 | Stop reason: HOSPADM

## 2018-10-16 RX ORDER — CHLORDIAZEPOXIDE HYDROCHLORIDE 25 MG/1
25 CAPSULE, GELATIN COATED ORAL ONCE
Status: COMPLETED | OUTPATIENT
Start: 2018-10-16 | End: 2018-10-16

## 2018-10-16 RX ORDER — THIAMINE MONONITRATE (VIT B1) 100 MG
100 TABLET ORAL DAILY
Status: DISCONTINUED | OUTPATIENT
Start: 2018-10-17 | End: 2018-10-17 | Stop reason: HOSPADM

## 2018-10-16 RX ORDER — LORAZEPAM 1 MG/1
4 TABLET ORAL
Status: DISCONTINUED | OUTPATIENT
Start: 2018-10-16 | End: 2018-10-17 | Stop reason: HOSPADM

## 2018-10-16 RX ORDER — PROMETHAZINE HYDROCHLORIDE 25 MG/1
12.5-25 SUPPOSITORY RECTAL EVERY 4 HOURS PRN
Status: DISCONTINUED | OUTPATIENT
Start: 2018-10-16 | End: 2018-10-17 | Stop reason: HOSPADM

## 2018-10-16 RX ORDER — ONDANSETRON 4 MG/1
4 TABLET, ORALLY DISINTEGRATING ORAL EVERY 4 HOURS PRN
Status: DISCONTINUED | OUTPATIENT
Start: 2018-10-16 | End: 2018-10-17 | Stop reason: HOSPADM

## 2018-10-16 RX ORDER — CLONIDINE HYDROCHLORIDE 0.1 MG/1
0.1 TABLET ORAL EVERY 6 HOURS PRN
Status: DISCONTINUED | OUTPATIENT
Start: 2018-10-16 | End: 2018-10-17 | Stop reason: HOSPADM

## 2018-10-16 RX ORDER — ACETAMINOPHEN 325 MG/1
650 TABLET ORAL EVERY 6 HOURS PRN
Status: DISCONTINUED | OUTPATIENT
Start: 2018-10-16 | End: 2018-10-17 | Stop reason: HOSPADM

## 2018-10-16 RX ADMIN — CHLORDIAZEPOXIDE HYDROCHLORIDE 25 MG: 25 CAPSULE ORAL at 16:42

## 2018-10-16 RX ADMIN — LORAZEPAM 1 MG: 1 TABLET ORAL at 20:18

## 2018-10-16 RX ADMIN — THIAMINE HYDROCHLORIDE: 100 INJECTION, SOLUTION INTRAMUSCULAR; INTRAVENOUS at 20:40

## 2018-10-16 RX ADMIN — ONDANSETRON 4 MG: 2 INJECTION INTRAMUSCULAR; INTRAVENOUS at 16:44

## 2018-10-16 RX ADMIN — LORAZEPAM 1 MG: 2 INJECTION INTRAMUSCULAR; INTRAVENOUS at 16:44

## 2018-10-16 ASSESSMENT — COGNITIVE AND FUNCTIONAL STATUS - GENERAL
WALKING IN HOSPITAL ROOM: A LITTLE
STANDING UP FROM CHAIR USING ARMS: A LITTLE
DRESSING REGULAR LOWER BODY CLOTHING: A LITTLE
MOBILITY SCORE: 21
PERSONAL GROOMING: A LITTLE
DAILY ACTIVITIY SCORE: 21
SUGGESTED CMS G CODE MODIFIER MOBILITY: CJ
CLIMB 3 TO 5 STEPS WITH RAILING: A LITTLE
SUGGESTED CMS G CODE MODIFIER DAILY ACTIVITY: CJ
EATING MEALS: A LITTLE

## 2018-10-16 ASSESSMENT — LIFESTYLE VARIABLES
HAVE PEOPLE ANNOYED YOU BY CRITICIZING YOUR DRINKING: YES
NAUSEA AND VOMITING: *
PAROXYSMAL SWEATS: NO SWEAT VISIBLE
ON A TYPICAL DAY WHEN YOU DRINK ALCOHOL HOW MANY DRINKS DO YOU HAVE: 8
AGITATION: NORMAL ACTIVITY
ANXIETY: MILDLY ANXIOUS
TOTAL SCORE: 4
ORIENTATION AND CLOUDING OF SENSORIUM: ORIENTED AND CAN DO SERIAL ADDITIONS
ORIENTATION AND CLOUDING OF SENSORIUM: ORIENTED AND CAN DO SERIAL ADDITIONS
ANXIETY: *
HEADACHE, FULLNESS IN HEAD: NOT PRESENT
CONSUMPTION TOTAL: POSITIVE
HOW MANY TIMES IN THE PAST YEAR HAVE YOU HAD 5 OR MORE DRINKS IN A DAY: 365
HAVE PEOPLE ANNOYED YOU BY CRITICIZING YOUR DRINKING: YES
TREMOR: TREMOR NOT VISIBLE BUT CAN BE FELT, FINGERTIP TO FINGERTIP
TREMOR: NO TREMOR
EVER HAD A DRINK FIRST THING IN THE MORNING TO STEADY YOUR NERVES TO GET RID OF A HANGOVER: YES
TOTAL SCORE: 0
TOTAL SCORE: 3
DOES PATIENT WANT TO STOP DRINKING: YES
TOTAL SCORE: 10
HAVE YOU EVER FELT YOU SHOULD CUT DOWN ON YOUR DRINKING: YES
TOTAL SCORE: 4
AGITATION: NORMAL ACTIVITY
AUDITORY DISTURBANCES: NOT PRESENT
VISUAL DISTURBANCES: NOT PRESENT
ALCOHOL_USE: YES
HEADACHE, FULLNESS IN HEAD: MODERATE
EVER FELT BAD OR GUILTY ABOUT YOUR DRINKING: YES
CONSUMPTION TOTAL: INCOMPLETE
HAVE YOU EVER FELT YOU SHOULD CUT DOWN ON YOUR DRINKING: YES
AUDITORY DISTURBANCES: NOT PRESENT
ALCOHOL_USE: YES
AUDITORY DISTURBANCES: NOT PRESENT
ANXIETY: NO ANXIETY (AT EASE)
TOTAL SCORE: 4
PAROXYSMAL SWEATS: NO SWEAT VISIBLE
EVER FELT BAD OR GUILTY ABOUT YOUR DRINKING: YES
VISUAL DISTURBANCES: NOT PRESENT
AVERAGE NUMBER OF DAYS PER WEEK YOU HAVE A DRINK CONTAINING ALCOHOL: 7
TOTAL SCORE: 4
SUBSTANCE_ABUSE: 1
EVER HAD A DRINK FIRST THING IN THE MORNING TO STEADY YOUR NERVES TO GET RID OF A HANGOVER: YES
PAROXYSMAL SWEATS: NO SWEAT VISIBLE
TOTAL SCORE: 4
TOTAL SCORE: 4
VISUAL DISTURBANCES: NOT PRESENT
DOES PATIENT WANT TO TALK TO SOMEONE ABOUT QUITTING: NO
TREMOR: TREMOR NOT VISIBLE BUT CAN BE FELT, FINGERTIP TO FINGERTIP
DOES PATIENT WANT TO STOP DRINKING: YES
NAUSEA AND VOMITING: NO NAUSEA AND NO VOMITING
HEADACHE, FULLNESS IN HEAD: NOT PRESENT
NAUSEA AND VOMITING: MILD NAUSEA WITH NO VOMITING
AGITATION: NORMAL ACTIVITY
ORIENTATION AND CLOUDING OF SENSORIUM: ORIENTED AND CAN DO SERIAL ADDITIONS

## 2018-10-16 ASSESSMENT — ENCOUNTER SYMPTOMS
HEARTBURN: 0
BACK PAIN: 0
COUGH: 1
FALLS: 0
BLOOD IN STOOL: 0
HALLUCINATIONS: 1
SPUTUM PRODUCTION: 1
SHORTNESS OF BREATH: 0
DIARRHEA: 0
SORE THROAT: 0
LOSS OF CONSCIOUSNESS: 0
DEPRESSION: 0
ABDOMINAL PAIN: 1
PALPITATIONS: 1
DOUBLE VISION: 0
CHILLS: 1
DIZZINESS: 1
FEVER: 0
BLURRED VISION: 0
HEADACHES: 0
NAUSEA: 0
VOMITING: 0

## 2018-10-16 ASSESSMENT — PATIENT HEALTH QUESTIONNAIRE - PHQ9
1. LITTLE INTEREST OR PLEASURE IN DOING THINGS: NOT AT ALL
SUM OF ALL RESPONSES TO PHQ9 QUESTIONS 1 AND 2: 0
2. FEELING DOWN, DEPRESSED, IRRITABLE, OR HOPELESS: NOT AT ALL

## 2018-10-16 ASSESSMENT — PAIN SCALES - GENERAL
PAINLEVEL_OUTOF10: 6
PAINLEVEL_OUTOF10: 10

## 2018-10-16 NOTE — ED PROVIDER NOTES
ED Provider Note    Scribed for Karyna Mendez M.D. by Vanessa Mulligan. 10/16/2018  3:49 PM    Primary care provider: Pcp Pt States None  Means of arrival: ambulance  History obtained from: patient  History limited by: none    CHIEF COMPLAINT  Chief Complaint   Patient presents with   • Detox   • Chills   • ETOH Withdrawal     Binge drinking x4 days   • N/V     multiple episodes x2 days       HPI  Chace Gong is a 53 y.o. male who presents to the Emergency Department for evaluation of alcohol withdrawal symptoms including nausea, vomiting, abdominal pain from the vomiting, chills, tremors and diaphoresis that have been present for the last few hours. Patient reports that he began binge drinking 4 days ago, reporting an initial day of heavy vodka consumption, and subsequent days of beer consumption to help treat hangover symptoms. He states that he used to be a chronic drinker, however, has been sober for the last several years up until this current binge began. Last withdrawal ended up with comatose state in the hospital for several days. Patient smokes marijuana daily, and 1-2 cigarettes daily as well. Patient has no abdominal surgery history. No complaints of chest pain, shortness of breath or suicidal ideation.  Patient's last drink was one beer this morning around 7AM.     REVIEW OF SYSTEMS  CARDIAC: no chest pain  PULMONARY: no dyspnea  GI: positive vomiting, abdominal pain   Neuro: positive tremors  Musculoskeletal: no swelling, deformity, pain, or joint swelling  Endocrine: positive chills  SKIN: positive diaphoresis    See history of present illness. All other systems are negative. C.    PAST MEDICAL HISTORY   has a past medical history of Achilles tendinitis (6/24/2009); Arthralgia (6/24/2009); Bronchitis (6/24/2009); Dyslipidemia (6/24/2009); GERD (gastroesophageal reflux disease) (6/24/2009); Hepatitis C; HTN (hypertension) (6/24/2009); Hypertension; Low back pain; and Psychiatric disorder.    SURGICAL  "HISTORY   has a past surgical history that includes neurolysis (1/21/2009); other orthopedic surgery (2001); carpal tunnel release (1/21/2009); hardware removal ortho (1/21/2009); synovectomy (1/21/2009); arthrotomy (2011); arthroscopy, knee (2013); elbow arthrotomy (5/17/2013); and loose body removal (5/17/2013).    SOCIAL HISTORY  Social History   Substance Use Topics   • Smoking status: Current Some Day Smoker     Packs/day: 0.25     Years: 30.00     Types: Cigarettes     Start date: 8/22/2015     Last attempt to quit: 11/14/2015   • Smokeless tobacco: Never Used      Comment: initiated cessation 12 Aug 2015, occasional cigar   • Alcohol use 0.0 oz/week      Comment: vodka /beer      History   Drug Use   • Types: Inhaled, Marijuana     Comment: has med marijuana card; smokes 1 joint every morning, speed       FAMILY HISTORY  Family History   Problem Relation Age of Onset   • Cancer Mother         lung / throat / breast   • Hypertension Mother    • Hypertension Father    • Hyperlipidemia Father    • Stroke Father    • Alcohol/Drug Father    • Diabetes Sister    • Hypertension Sister    • Alcohol/Drug Sister    • Cancer Brother    • Hypertension Brother    • Alcohol/Drug Brother    • Stroke Brother    • Alcohol/Drug Brother    • Cancer Sister         lung   • Heart Disease Neg Hx        CURRENT MEDICATIONS  Home Medications     Reviewed by Maria A Nassar (Pharmacy Tech) on 10/16/18 at 1919  Med List Status: Complete   Medication Last Dose Status   lisinopril (PRINIVIL) 20 MG Tab 10/10/2018 Active   trazodone (DESYREL) 100 MG Tab 10/10/2018 Active                ALLERGIES  Allergies   Allergen Reactions   • Penicillins Unspecified     Pt states that his mom told him he is allergic to penicillins.   • Hydrocodone Unspecified     Pt states \"I'm not sure\".   • Sulfa Drugs      \"my mom told me I was allergic to it when i was a kid\"       PHYSICAL EXAM  VITAL SIGNS: /100   Pulse (!) 105   Temp 37.3 °C (99.1 " "°F) (Temporal)   Resp 20   Ht 1.88 m (6' 2\")   Wt 109.3 kg (241 lb)   SpO2 93%   BMI 30.94 kg/m²     Constitutional: Well developed, Well nourished, mild distress, tearful  HEENT: Normocephalic, Atraumatic,  external ears normal, pharynx pink,  Mucous  Membranes dry, No rhinorrhea or mucosal edema  Eyes: PERRL, EOMI, Conjunctiva normal, No discharge.   Neck: Normal range of motion, No tenderness, Supple, No stridor.   Lymphatic: No lymphadenopathy    Cardiovascular: Regular Rate and Rhythm, No murmurs,  rubs, or gallops.   Thorax & Lungs: Lungs clear to auscultation bilaterally, No respiratory distress, No wheezes, rhales or rhonchi, No chest wall tenderness.   Abdomen: Bowel sounds normal, Soft, non tender to palpation, non distended,  No pulsatile masses., no rebound guarding or peritoneal signs.   Skin: Warm, Dry, No erythema, No rash,    Neurologic: Alert & oriented x 3, tremulous, Normal sensory function, No focal deficits noted.  Normal Cranial Nerves.  Extremities: Equal, intact distal pulses, No cyanosis, clubbing or edema,  No tenderness.   Musculoskeletal: Good range of motion in all major joints. No tenderness to palpation or major deformities noted.   Psychiatric: Negative SI    DIAGNOSTIC STUDIES / PROCEDURES    LABS  Results for orders placed or performed during the hospital encounter of 10/16/18   CBC WITH DIFFERENTIAL   Result Value Ref Range    WBC 5.5 4.8 - 10.8 K/uL    RBC 4.57 (L) 4.70 - 6.10 M/uL    Hemoglobin 15.9 14.0 - 18.0 g/dL    Hematocrit 45.6 42.0 - 52.0 %    MCV 99.8 (H) 81.4 - 97.8 fL    MCH 34.8 (H) 27.0 - 33.0 pg    MCHC 34.9 33.7 - 35.3 g/dL    RDW 50.5 (H) 35.9 - 50.0 fL    Platelet Count 154 (L) 164 - 446 K/uL    MPV 9.7 9.0 - 12.9 fL    Neutrophils-Polys 43.90 (L) 44.00 - 72.00 %    Lymphocytes 42.60 (H) 22.00 - 41.00 %    Monocytes 9.20 0.00 - 13.40 %    Eosinophils 3.30 0.00 - 6.90 %    Basophils 0.40 0.00 - 1.80 %    Immature Granulocytes 0.60 0.00 - 0.90 %    Nucleated " RBC 0.00 /100 WBC    Neutrophils (Absolute) 2.40 1.82 - 7.42 K/uL    Lymphs (Absolute) 2.32 1.00 - 4.80 K/uL    Monos (Absolute) 0.50 0.00 - 0.85 K/uL    Eos (Absolute) 0.18 0.00 - 0.51 K/uL    Baso (Absolute) 0.02 0.00 - 0.12 K/uL    Immature Granulocytes (abs) 0.03 0.00 - 0.11 K/uL    NRBC (Absolute) 0.00 K/uL   COMP METABOLIC PANEL   Result Value Ref Range    Sodium 137 135 - 145 mmol/L    Potassium 4.6 3.6 - 5.5 mmol/L    Chloride 105 96 - 112 mmol/L    Co2 24 20 - 33 mmol/L    Anion Gap 8.0 0.0 - 11.9    Glucose 102 (H) 65 - 99 mg/dL    Bun 10 8 - 22 mg/dL    Creatinine 0.85 0.50 - 1.40 mg/dL    Calcium 9.2 8.5 - 10.5 mg/dL    AST(SGOT) 102 (H) 12 - 45 U/L    ALT(SGPT) 185 (H) 2 - 50 U/L    Alkaline Phosphatase 75 30 - 99 U/L    Total Bilirubin 0.4 0.1 - 1.5 mg/dL    Albumin 4.2 3.2 - 4.9 g/dL    Total Protein 7.6 6.0 - 8.2 g/dL    Globulin 3.4 1.9 - 3.5 g/dL    A-G Ratio 1.2 g/dL   LIPASE   Result Value Ref Range    Lipase 66 11 - 82 U/L   PROTHROMBIN TIME (INR)   Result Value Ref Range    PT 14.4 12.0 - 14.6 sec    INR 1.10 0.87 - 1.13   APTT   Result Value Ref Range    APTT 28.2 24.7 - 36.0 sec   DIAGNOSTIC ALCOHOL   Result Value Ref Range    Diagnostic Alcohol 0.03 (H) 0.00 g/dL   AMMONIA   Result Value Ref Range    Ammonia 33 11 - 45 umol/L   ESTIMATED GFR   Result Value Ref Range    GFR If African American >60 >60 mL/min/1.73 m 2    GFR If Non African American >60 >60 mL/min/1.73 m 2       All labs reviewed by me.    COURSE & MEDICAL DECISION MAKING  Nursing notes, VS, PMSFHx reviewed in chart.    3:49 PM Patient seen and examined at bedside. He presents in mild distress, somewhat tachycardic for evaluation of alcohol withdrawal symptoms including nausea, vomiting, chills, diaphoresis, and tremors. Patient will be treated with 1mg Ativan IV, 25mg Librium capsule, 4mg IV Zofran and ER detox bag. Intravenous fluids administered for acute vomiting and dehydration as well as detox. Ordered APTT, diagnostic  alcohol, CBC, CMP, ammonia, PTT INR,  to evaluate his symptoms. I informed the patient that I would treat his symptoms here in the ED and rehydrate him, evaluating for acute processes with lab work as well. He understands and agrees with treatment plan.    Lab work shows no evidence of infection, pancreatitis. Liver enzymes are elevated, indicating inflammation.     5:33 PM I re-evaluated patient at bedside who reports no improvement to symptoms at this time. He is still hypertensive and tachycardic. I explained that his work up does show some liver inflammation consistent with heavy alcohol use, explaining that if he was feeling unimproved, we would admit him for further evaluation and treatment. Patient understands and agrees with treatment plan.    5:50 PM Spoke with Dr. Zeng, Hospitalist, about the patient's condition. Agrees to admit the patient.    HYDRATION: Based on the patient's presentation of Acute Vomiting, Dehydration and Inability to take oral fluids the patient was given IV fluids. IV Hydration was used becasue oral hydration failed due to vomiting and risk of aspiration. Upon recheck following hydration, the patient was unchanged.    DISPOSITION:  Patient will be admitted to Dr. Zeng, Hospitalist in guarded condition.    FINAL IMPRESSION  1. Alcohol withdrawal syndrome with complication (HCC)    2. Alcoholic hepatitis, unspecified whether ascites present          I, Vanessa Mulligan (Scribe), am scribing for, and in the presence of, Karyna Mendez M.D..    Electronically signed by: Vanessa Mulligan (Scribe), 10/16/2018    IKaryna M.D. personally performed the services described in this documentation, as scribed by Vanessa Mulligan in my presence, and it is both accurate and complete.    The note accurately reflects work and decisions made by me.  Karyna Mendez  10/16/2018  9:16 PM

## 2018-10-16 NOTE — ED TRIAGE NOTES
"Chace Gong  53 y.o.  Chief Complaint   Patient presents with   • Detox   • Chills   • ETOH Withdrawal     Binge drinking x4 days   • N/V     multiple episodes x2 days     Patient bib EMS; PTA zofran 4 mg SL administered with minimal relief.    Patient reports recent diagnosis of Hep C, and plans to get treatment soon; reports binge drinking x4 days, including vodka and beer and \"need to get sober for treatment.  I feel like I'm dying.\"  Patient reprots a history of ETOH withdrawal seizures.      Explained wait time and triage process to pt. Pt placed back out in lobby, told to notify ED tech or triage RN of any changes, verbalized understanding.        "

## 2018-10-17 VITALS
SYSTOLIC BLOOD PRESSURE: 137 MMHG | DIASTOLIC BLOOD PRESSURE: 93 MMHG | HEIGHT: 74 IN | HEART RATE: 89 BPM | TEMPERATURE: 100.4 F | WEIGHT: 250.66 LBS | OXYGEN SATURATION: 93 % | BODY MASS INDEX: 32.17 KG/M2 | RESPIRATION RATE: 20 BRPM

## 2018-10-17 LAB
ANION GAP SERPL CALC-SCNC: 5 MMOL/L (ref 0–11.9)
BUN SERPL-MCNC: 9 MG/DL (ref 8–22)
CALCIUM SERPL-MCNC: 8.4 MG/DL (ref 8.5–10.5)
CHLORIDE SERPL-SCNC: 107 MMOL/L (ref 96–112)
CO2 SERPL-SCNC: 25 MMOL/L (ref 20–33)
CREAT SERPL-MCNC: 0.79 MG/DL (ref 0.5–1.4)
EKG IMPRESSION: NORMAL
ERYTHROCYTE [DISTWIDTH] IN BLOOD BY AUTOMATED COUNT: 50 FL (ref 35.9–50)
GLUCOSE SERPL-MCNC: 79 MG/DL (ref 65–99)
HCT VFR BLD AUTO: 46.2 % (ref 42–52)
HGB BLD-MCNC: 15.8 G/DL (ref 14–18)
MAGNESIUM SERPL-MCNC: 2.4 MG/DL (ref 1.5–2.5)
MCH RBC QN AUTO: 34.2 PG (ref 27–33)
MCHC RBC AUTO-ENTMCNC: 34.2 G/DL (ref 33.7–35.3)
MCV RBC AUTO: 100 FL (ref 81.4–97.8)
PHOSPHATE SERPL-MCNC: 3.4 MG/DL (ref 2.5–4.5)
PLATELET # BLD AUTO: 133 K/UL (ref 164–446)
PMV BLD AUTO: 9.6 FL (ref 9–12.9)
POTASSIUM SERPL-SCNC: 3.8 MMOL/L (ref 3.6–5.5)
RBC # BLD AUTO: 4.62 M/UL (ref 4.7–6.1)
SODIUM SERPL-SCNC: 137 MMOL/L (ref 135–145)
WBC # BLD AUTO: 6 K/UL (ref 4.8–10.8)

## 2018-10-17 PROCEDURE — 700102 HCHG RX REV CODE 250 W/ 637 OVERRIDE(OP): Performed by: HOSPITALIST

## 2018-10-17 PROCEDURE — 700111 HCHG RX REV CODE 636 W/ 250 OVERRIDE (IP): Performed by: HOSPITALIST

## 2018-10-17 PROCEDURE — A9270 NON-COVERED ITEM OR SERVICE: HCPCS | Performed by: HOSPITALIST

## 2018-10-17 PROCEDURE — 93010 ELECTROCARDIOGRAM REPORT: CPT | Performed by: INTERNAL MEDICINE

## 2018-10-17 PROCEDURE — 85027 COMPLETE CBC AUTOMATED: CPT

## 2018-10-17 PROCEDURE — 99239 HOSP IP/OBS DSCHRG MGMT >30: CPT | Mod: 25 | Performed by: INTERNAL MEDICINE

## 2018-10-17 PROCEDURE — 36415 COLL VENOUS BLD VENIPUNCTURE: CPT

## 2018-10-17 PROCEDURE — 84100 ASSAY OF PHOSPHORUS: CPT

## 2018-10-17 PROCEDURE — 83735 ASSAY OF MAGNESIUM: CPT

## 2018-10-17 PROCEDURE — 93005 ELECTROCARDIOGRAM TRACING: CPT | Performed by: HOSPITALIST

## 2018-10-17 PROCEDURE — 80048 BASIC METABOLIC PNL TOTAL CA: CPT

## 2018-10-17 PROCEDURE — 99407 BEHAV CHNG SMOKING > 10 MIN: CPT | Performed by: INTERNAL MEDICINE

## 2018-10-17 RX ORDER — LISINOPRIL 20 MG/1
20 TABLET ORAL EVERY EVENING
Status: DISCONTINUED | OUTPATIENT
Start: 2018-10-17 | End: 2018-10-17 | Stop reason: HOSPADM

## 2018-10-17 RX ADMIN — LORAZEPAM 1 MG: 1 TABLET ORAL at 04:45

## 2018-10-17 RX ADMIN — THERA TABS 1 TABLET: TAB at 04:44

## 2018-10-17 RX ADMIN — ENOXAPARIN SODIUM 40 MG: 40 INJECTION, SOLUTION INTRAVENOUS; SUBCUTANEOUS at 04:46

## 2018-10-17 RX ADMIN — LORAZEPAM 0.5 MG: 1 TABLET ORAL at 12:34

## 2018-10-17 RX ADMIN — FOLIC ACID 1 MG: 1 TABLET ORAL at 04:45

## 2018-10-17 RX ADMIN — THIAMINE HCL TAB 100 MG 100 MG: 100 TAB at 04:44

## 2018-10-17 RX ADMIN — NICOTINE 14 MG: 14 PATCH, EXTENDED RELEASE TRANSDERMAL at 04:44

## 2018-10-17 ASSESSMENT — LIFESTYLE VARIABLES
NAUSEA AND VOMITING: NO NAUSEA AND NO VOMITING
VISUAL DISTURBANCES: NOT PRESENT
HEADACHE, FULLNESS IN HEAD: VERY MILD
ANXIETY: MILDLY ANXIOUS
PAROXYSMAL SWEATS: BARELY PERCEPTIBLE SWEATING. PALMS MOIST
PAROXYSMAL SWEATS: BARELY PERCEPTIBLE SWEATING. PALMS MOIST
VISUAL DISTURBANCES: NOT PRESENT
AUDITORY DISTURBANCES: NOT PRESENT
TOTAL SCORE: 5
AGITATION: NORMAL ACTIVITY
ORIENTATION AND CLOUDING OF SENSORIUM: ORIENTED AND CAN DO SERIAL ADDITIONS
ANXIETY: MILDLY ANXIOUS
TREMOR: NO TREMOR
HEADACHE, FULLNESS IN HEAD: MODERATELY SEVERE
AUDITORY DISTURBANCES: NOT PRESENT
AGITATION: NORMAL ACTIVITY
ORIENTATION AND CLOUDING OF SENSORIUM: ORIENTED AND CAN DO SERIAL ADDITIONS
VISUAL DISTURBANCES: NOT PRESENT
ORIENTATION AND CLOUDING OF SENSORIUM: ORIENTED AND CAN DO SERIAL ADDITIONS
NAUSEA AND VOMITING: MILD NAUSEA WITH NO VOMITING
AUDITORY DISTURBANCES: NOT PRESENT
NAUSEA AND VOMITING: NO NAUSEA AND NO VOMITING
TOTAL SCORE: 3
AGITATION: SOMEWHAT MORE THAN NORMAL ACTIVITY
TREMOR: TREMOR NOT VISIBLE BUT CAN BE FELT, FINGERTIP TO FINGERTIP
TOTAL SCORE: 4
ORIENTATION AND CLOUDING OF SENSORIUM: ORIENTED AND CAN DO SERIAL ADDITIONS
TREMOR: *
ANXIETY: NO ANXIETY (AT EASE)
PAROXYSMAL SWEATS: BARELY PERCEPTIBLE SWEATING. PALMS MOIST
TOTAL SCORE: 9
NAUSEA AND VOMITING: *
HEADACHE, FULLNESS IN HEAD: MILD
AUDITORY DISTURBANCES: NOT PRESENT
AGITATION: NORMAL ACTIVITY
PAROXYSMAL SWEATS: NO SWEAT VISIBLE
ANXIETY: MILDLY ANXIOUS
VISUAL DISTURBANCES: NOT PRESENT
TREMOR: NO TREMOR
HEADACHE, FULLNESS IN HEAD: NOT PRESENT

## 2018-10-17 ASSESSMENT — PAIN SCALES - GENERAL
PAINLEVEL_OUTOF10: 5
PAINLEVEL_OUTOF10: 0
PAINLEVEL_OUTOF10: 2

## 2018-10-17 NOTE — PROGRESS NOTES
Chace Mayeshomer patient has chosen to leave the hospital against medical advice. The attending physician has not discharged the patient. Patient is not a risk to himself or others. I have discussed with the patient the following:  Physician has not determined patient is ready for discharge, Risks and consequences of leaving the hospital too soon and Benefit of continued hospitalization.  Attending physician has been notified.

## 2018-10-17 NOTE — DISCHARGE SUMMARY
Discharge Summary    CHIEF COMPLAINT ON ADMISSION  Chief Complaint   Patient presents with   • Detox   • Chills   • ETOH Withdrawal     Binge drinking x4 days   • N/V     multiple episodes x2 days       Reason for Admission  Detox     Admission Date  10/16/2018    CODE STATUS  Full Code    HPI & HOSPITAL COURSE  This is a 53 y.o. male here with complaint of symptoms of alcohol withdrawal, trauma nausea vomiting chills.  Patient was then treated with alcohol withdrawal protocol.  Patient was also treated with multivitamin and IV fluid rehydration.  Patient still has some symptoms including dizziness and tremors however patient wants to go home.  Educated patient with concern of alcohol withdrawal symptoms including seizure activity if patient returned home, patient insists on leaving the hospital despite prolonged education by me.  Patient is competent to make medical decision, and at this moment patient leave the hospital does not put patient on intermittently major risk for life.  Patient signed and leave the hospital AGAINST MEDICAL ADVICE.       Therefore, he is discharged in fair and stable condition against medcial advice.    Patient stayed in the hospital less than 2 midnights because patient left the hospital AGAINST MEDICAL ADVICE.    Discharge Date  10/17/2018    FOLLOW UP ITEMS POST DISCHARGE  none    DISCHARGE DIAGNOSES      Alcohol withdrawal (HCC) POA: Yes    Hep C w/o coma, chronic (HCC) POA: Yes    Tobacco abuse POA: Unknown    Thrombocytopenia (HCC) POA: Unknown    FOLLOW UP  No future appointments.  No follow-up provider specified.    MEDICATIONS ON DISCHARGE     Medication List      ASK your doctor about these medications      Instructions   lisinopril 20 MG Tabs  Commonly known as:  PRINIVIL   Take 20 mg by mouth every evening.  Dose:  20 mg     traZODone 100 MG Tabs  Commonly known as:  DESYREL   Take 200 mg by mouth every evening.  Dose:  200 mg            Allergies  Allergies   Allergen  "Reactions   • Penicillins Unspecified     Pt states that his mom told him he is allergic to penicillins.   • Hydrocodone Unspecified     Pt states \"I'm not sure\".   • Sulfa Drugs      \"my mom told me I was allergic to it when i was a kid\"       DIET  Orders Placed This Encounter   Procedures   • Diet Order Regular     Standing Status:   Standing     Number of Occurrences:   1     Order Specific Question:   Diet:     Answer:   Regular [1]       ACTIVITY  As tolerated.  Weight bearing as tolerated    CONSULTATIONS  none    PROCEDURES  None    No orders to display       PE:  Gen: AAOx3, NAD  Eyes: PELLA  Neck: no JVD, no lymphadenopathy  Cardia: RRR, no mrg  Lungs: CTAB, no rales, rhonci or wheezing  Abd: NABS, soft, non extended, no mass  EXT: No C/C/E, peripheral pulse 2+ b/l  Neuro: CN II-XII intact, non focal, reflex 2+ symmetrical patient still have trauma and dizziness  Skin: Intact, no lesion, warm  Psych: Appropriate.      LABORATORY  Lab Results   Component Value Date    SODIUM 137 10/17/2018    POTASSIUM 3.8 10/17/2018    CHLORIDE 107 10/17/2018    CO2 25 10/17/2018    GLUCOSE 79 10/17/2018    BUN 9 10/17/2018    CREATININE 0.79 10/17/2018    CREATININE 1.0 01/21/2009        Lab Results   Component Value Date    WBC 6.0 10/17/2018    HEMOGLOBIN 15.8 10/17/2018    HEMATOCRIT 46.2 10/17/2018    PLATELETCT 133 (L) 10/17/2018        Total time of the discharge process exceeds 36 minutes excluding smoking consultation.    Spent 11 minutes on tobacco cessation counseling including nicotine patches, gum, and dangers of smoking. Also discussed options of nicotine patch, medical treatment with wellbutrin and chantix. Discussed the risks of smoking including increased risk of heart disease, stroke, cancer and COPD. Discussed the benefits of quitting smoking. Nicotine replacement protocol provided to the patient.    The pt indicated that will quit.     94773 (smoking and tobacco cessation counseling visit; intensive, " greater than 10 minutes).

## 2018-10-17 NOTE — H&P
Hospital Medicine History & Physical Note    Date of Service  10/16/2018    Primary Care Physician  Mohsen Tamasaby, M.D.    Consultants  none    Code Status  Full code    Chief Complaint  hands are shaking    History of Presenting Illness  53 y.o. male who presented 10/16/2018 with alcohol withdrawal. He has been drinking for the last 4 days. Binged 4 days ago and following 3 days only a couple beers. He has been trembling since this morning. He is to be treated for his hep c soon and wanted to get drunk one last time.  He is also noted having some hallucinations today.    Review of Systems  Review of Systems   Constitutional: Positive for chills. Negative for fever.   HENT: Negative for congestion, hearing loss and sore throat.    Eyes: Negative for blurred vision and double vision.   Respiratory: Positive for cough and sputum production. Negative for shortness of breath.    Cardiovascular: Positive for palpitations. Negative for chest pain.   Gastrointestinal: Positive for abdominal pain. Negative for blood in stool, diarrhea, heartburn, melena, nausea and vomiting.   Genitourinary: Negative for dysuria and urgency.   Musculoskeletal: Negative for back pain and falls.   Skin: Negative for itching and rash.   Neurological: Positive for dizziness. Negative for loss of consciousness and headaches.   Psychiatric/Behavioral: Positive for hallucinations and substance abuse. Negative for depression and suicidal ideas.       Past Medical History   has a past medical history of Achilles tendinitis (6/24/2009); Arthralgia (6/24/2009); Bronchitis (6/24/2009); Dyslipidemia (6/24/2009); GERD (gastroesophageal reflux disease) (6/24/2009); Hepatitis C; HTN (hypertension) (6/24/2009); Hypertension; Low back pain; and Psychiatric disorder.    Surgical History   has a past surgical history that includes neurolysis (1/21/2009); other orthopedic surgery (2001); carpal tunnel release (1/21/2009); hardware removal ortho (1/21/2009);  "synovectomy (1/21/2009); arthrotomy (2011); arthroscopy, knee (2013); elbow arthrotomy (5/17/2013); and loose body removal (5/17/2013).     Family History  family history includes Alcohol/Drug in his brother, brother, father, and sister; Cancer in his brother, mother, and sister; Diabetes in his sister; Hyperlipidemia in his father; Hypertension in his brother, father, mother, and sister; Stroke in his brother and father.     Social History   reports that he has been smoking Cigarettes.  He started smoking about 3 years ago. He has a 7.50 pack-year smoking history. He has never used smokeless tobacco. He reports that he drinks alcohol. He reports that he uses drugs, including Inhaled and Marijuana.    Allergies  Allergies   Allergen Reactions   • Penicillins Unspecified     Pt states that his mom told him he is allergic to penicillins.   • Hydrocodone Unspecified     Pt states \"I'm not sure\".   • Sulfa Drugs      \"my mom told me I was allergic to it when i was a kid\"       Medications  Prior to Admission Medications   Prescriptions Last Dose Informant Patient Reported? Taking?   lisinopril (PRINIVIL) 20 MG Tab 11/13/2017 at 2000 Patient Yes No   Sig: Take 20 mg by mouth every evening.   trazodone (DESYREL) 100 MG Tab 10/30/2017 at 2000 Patient Yes No   Sig: Take 250 mg by mouth every evening. Pt takes a 150 mg and a 100 mg tab      Facility-Administered Medications: None       Physical Exam  Temp:  [36.1 °C (97 °F)-37.3 °C (99.1 °F)] 36.1 °C (97 °F)  Pulse:  [] 99  Resp:  [18-20] 18  BP: (132-150)/(100-101) 132/101    Physical Exam   Constitutional: He is oriented to person, place, and time. He appears well-developed and well-nourished.   HENT:   Head: Normocephalic and atraumatic.   Eyes: Pupils are equal, round, and reactive to light. EOM are normal.   Neck: Normal range of motion. Neck supple.   Cardiovascular: Normal rate, regular rhythm and normal heart sounds.    No murmur heard.  Pulmonary/Chest: Effort " normal and breath sounds normal. No respiratory distress. He has no wheezes.   Abdominal: Soft. Bowel sounds are normal. He exhibits no distension. There is no tenderness.   Musculoskeletal: Normal range of motion. He exhibits no edema.   Neurological: He is alert and oriented to person, place, and time.   Skin: Skin is warm and dry.   Nursing note and vitals reviewed.      Laboratory:  Recent Labs      10/16/18   1600   WBC  5.5   RBC  4.57*   HEMOGLOBIN  15.9   HEMATOCRIT  45.6   MCV  99.8*   MCH  34.8*   MCHC  34.9   RDW  50.5*   PLATELETCT  154*   MPV  9.7     Recent Labs      10/16/18   1600   SODIUM  137   POTASSIUM  4.6   CHLORIDE  105   CO2  24   GLUCOSE  102*   BUN  10   CREATININE  0.85   CALCIUM  9.2     Recent Labs      10/16/18   1600   ALTSGPT  185*   ASTSGOT  102*   ALKPHOSPHAT  75   TBILIRUBIN  0.4   LIPASE  66   GLUCOSE  102*     Recent Labs      10/16/18   1600   APTT  28.2   INR  1.10             No results for input(s): TROPONINI in the last 72 hours.    Urinalysis:    No results found     Imaging:  No orders to display         Assessment/Plan:  I anticipate this patient will require at least two midnights for appropriate medical management, necessitating inpatient admission.    * Alcohol withdrawal (HCC)- (present on admission)   Assessment & Plan    Started on CIWA protocol  Multivitamins, thiamine, folate  Encouraged importance of alcohol cessation        Thrombocytopenia (HCC)   Assessment & Plan    Likely due to alcohol abuse  Continue to monitor        Tobacco abuse   Assessment & Plan    Spent approx 5 mins on Tobacco cessation education . Discussed options of nicotine patch, medical treatment with wellbutrin and chantix. Discussed the benefits of quitting smoking and risks of continued smoking including cardiovascular disease, cancer and COPD.           Hep C w/o coma, chronic (HCC)- (present on admission)   Assessment & Plan    Is supposed to be started on treatment in the near future             VTE prophylaxis: lovenox

## 2018-10-17 NOTE — PROGRESS NOTES
Bedside report received from previous nurse regarding prior 12 hours.  Pt resting comfortably.  No signs of pain or distress.  Bed locked in lowest position.

## 2018-10-17 NOTE — RESPIRATORY CARE
COPD EDUCATION by COPD CLINICAL EDUCATOR  10/17/2018 at 7:07 AM by Priya Jones     Patient reviewed by COPD education team. Patient does not qualify for COPD program.

## 2018-10-17 NOTE — PROGRESS NOTES
Patient brought up to floor from ED, patient placed on monitor, monitor room notified. Complaints of headache, will medicate per CIWA. Call light within reach, no other needs at this time.

## 2018-10-17 NOTE — PROGRESS NOTES
2 RN skin check with Jaclyn RN:    Patient's sacrum, elbows, and behind ears are all non-red and blanching. Patient's heels and feet are heavily calloused, but non-red and blanching. Patient declined to take off pants for skin check. No further need for skin breakdown interventions at this time.

## 2018-10-17 NOTE — ASSESSMENT & PLAN NOTE
Spent approx 5 mins on Tobacco cessation education . Discussed options of nicotine patch, medical treatment with wellbutrin and chantix. Discussed the benefits of quitting smoking and risks of continued smoking including cardiovascular disease, cancer and COPD.

## 2018-10-17 NOTE — ASSESSMENT & PLAN NOTE
Started on CIWA protocol  Multivitamins, thiamine, folate  Encouraged importance of alcohol cessation

## 2018-10-17 NOTE — ED NOTES
Med rec updated and complete  Allergies reviewed.  Pt reports he has not taken his medications in almost a  Week.  No antibiotic use in last 30 days.

## 2018-11-06 ENCOUNTER — APPOINTMENT (OUTPATIENT)
Dept: RADIOLOGY | Facility: MEDICAL CENTER | Age: 53
End: 2018-11-06
Attending: EMERGENCY MEDICINE
Payer: MEDICARE

## 2018-11-06 ENCOUNTER — HOSPITAL ENCOUNTER (EMERGENCY)
Facility: MEDICAL CENTER | Age: 53
End: 2018-11-06
Attending: EMERGENCY MEDICINE
Payer: MEDICARE

## 2018-11-06 VITALS
RESPIRATION RATE: 22 BRPM | SYSTOLIC BLOOD PRESSURE: 148 MMHG | HEART RATE: 86 BPM | HEIGHT: 74 IN | DIASTOLIC BLOOD PRESSURE: 95 MMHG | OXYGEN SATURATION: 92 % | WEIGHT: 222.66 LBS | BODY MASS INDEX: 28.58 KG/M2 | TEMPERATURE: 96.8 F

## 2018-11-06 DIAGNOSIS — R31.9 HEMATURIA, UNSPECIFIED TYPE: ICD-10-CM

## 2018-11-06 DIAGNOSIS — N23 RENAL COLIC: ICD-10-CM

## 2018-11-06 DIAGNOSIS — N13.30 HYDRONEPHROSIS, UNSPECIFIED HYDRONEPHROSIS TYPE: ICD-10-CM

## 2018-11-06 LAB
AMPHET UR QL SCN: POSITIVE
ANION GAP SERPL CALC-SCNC: 9 MMOL/L (ref 0–11.9)
APPEARANCE UR: CLEAR
BACTERIA #/AREA URNS HPF: NEGATIVE /HPF
BARBITURATES UR QL SCN: NEGATIVE
BASOPHILS # BLD AUTO: 0.3 % (ref 0–1.8)
BASOPHILS # BLD: 0.02 K/UL (ref 0–0.12)
BENZODIAZ UR QL SCN: NEGATIVE
BILIRUB UR QL STRIP.AUTO: NEGATIVE
BUN SERPL-MCNC: 11 MG/DL (ref 8–22)
BZE UR QL SCN: NEGATIVE
CALCIUM SERPL-MCNC: 9.4 MG/DL (ref 8.5–10.5)
CANNABINOIDS UR QL SCN: POSITIVE
CHLORIDE SERPL-SCNC: 102 MMOL/L (ref 96–112)
CO2 SERPL-SCNC: 24 MMOL/L (ref 20–33)
COLOR UR: YELLOW
CREAT SERPL-MCNC: 0.92 MG/DL (ref 0.5–1.4)
EOSINOPHIL # BLD AUTO: 0.2 K/UL (ref 0–0.51)
EOSINOPHIL NFR BLD: 2.6 % (ref 0–6.9)
EPI CELLS #/AREA URNS HPF: NEGATIVE /HPF
ERYTHROCYTE [DISTWIDTH] IN BLOOD BY AUTOMATED COUNT: 48.9 FL (ref 35.9–50)
ETHANOL BLD-MCNC: 0 G/DL
GLUCOSE SERPL-MCNC: 102 MG/DL (ref 65–99)
GLUCOSE UR STRIP.AUTO-MCNC: NEGATIVE MG/DL
HCT VFR BLD AUTO: 46 % (ref 42–52)
HGB BLD-MCNC: 16 G/DL (ref 14–18)
HYALINE CASTS #/AREA URNS LPF: ABNORMAL /LPF
IMM GRANULOCYTES # BLD AUTO: 0.03 K/UL (ref 0–0.11)
IMM GRANULOCYTES NFR BLD AUTO: 0.4 % (ref 0–0.9)
KETONES UR STRIP.AUTO-MCNC: ABNORMAL MG/DL
LEUKOCYTE ESTERASE UR QL STRIP.AUTO: ABNORMAL
LYMPHOCYTES # BLD AUTO: 2.47 K/UL (ref 1–4.8)
LYMPHOCYTES NFR BLD: 32.2 % (ref 22–41)
MCH RBC QN AUTO: 34.9 PG (ref 27–33)
MCHC RBC AUTO-ENTMCNC: 34.8 G/DL (ref 33.7–35.3)
MCV RBC AUTO: 100.2 FL (ref 81.4–97.8)
METHADONE UR QL SCN: NEGATIVE
MICRO URNS: ABNORMAL
MONOCYTES # BLD AUTO: 0.72 K/UL (ref 0–0.85)
MONOCYTES NFR BLD AUTO: 9.4 % (ref 0–13.4)
NEUTROPHILS # BLD AUTO: 4.23 K/UL (ref 1.82–7.42)
NEUTROPHILS NFR BLD: 55.1 % (ref 44–72)
NITRITE UR QL STRIP.AUTO: NEGATIVE
NRBC # BLD AUTO: 0 K/UL
NRBC BLD-RTO: 0 /100 WBC
OPIATES UR QL SCN: NEGATIVE
OXYCODONE UR QL SCN: NEGATIVE
PCP UR QL SCN: NEGATIVE
PH UR STRIP.AUTO: 5.5 [PH]
PLATELET # BLD AUTO: 172 K/UL (ref 164–446)
PMV BLD AUTO: 10.1 FL (ref 9–12.9)
POTASSIUM SERPL-SCNC: 4 MMOL/L (ref 3.6–5.5)
PROPOXYPH UR QL SCN: NEGATIVE
PROT UR QL STRIP: NEGATIVE MG/DL
RBC # BLD AUTO: 4.59 M/UL (ref 4.7–6.1)
RBC # URNS HPF: >150 /HPF
RBC UR QL AUTO: ABNORMAL
SODIUM SERPL-SCNC: 135 MMOL/L (ref 135–145)
SP GR UR STRIP.AUTO: 1.02
UROBILINOGEN UR STRIP.AUTO-MCNC: 1 MG/DL
WBC # BLD AUTO: 7.7 K/UL (ref 4.8–10.8)
WBC #/AREA URNS HPF: ABNORMAL /HPF

## 2018-11-06 PROCEDURE — 76775 US EXAM ABDO BACK WALL LIM: CPT

## 2018-11-06 PROCEDURE — 80048 BASIC METABOLIC PNL TOTAL CA: CPT

## 2018-11-06 PROCEDURE — 99284 EMERGENCY DEPT VISIT MOD MDM: CPT

## 2018-11-06 PROCEDURE — 81001 URINALYSIS AUTO W/SCOPE: CPT | Mod: XU

## 2018-11-06 PROCEDURE — 80307 DRUG TEST PRSMV CHEM ANLYZR: CPT

## 2018-11-06 PROCEDURE — 85025 COMPLETE CBC W/AUTO DIFF WBC: CPT

## 2018-11-06 PROCEDURE — 36415 COLL VENOUS BLD VENIPUNCTURE: CPT

## 2018-11-06 RX ORDER — TAMSULOSIN HYDROCHLORIDE 0.4 MG/1
0.4 CAPSULE ORAL DAILY
Qty: 7 CAP | Refills: 0 | Status: SHIPPED | OUTPATIENT
Start: 2018-11-06

## 2018-11-06 RX ORDER — NAPROXEN 500 MG/1
500 TABLET ORAL 2 TIMES DAILY WITH MEALS
Qty: 14 TAB | Refills: 0 | Status: SHIPPED | OUTPATIENT
Start: 2018-11-06 | End: 2018-11-13

## 2018-11-06 RX ORDER — KETOROLAC TROMETHAMINE 30 MG/ML
10 INJECTION, SOLUTION INTRAMUSCULAR; INTRAVENOUS ONCE
Status: DISCONTINUED | OUTPATIENT
Start: 2018-11-06 | End: 2018-11-06 | Stop reason: HOSPADM

## 2018-11-06 ASSESSMENT — PAIN SCALES - GENERAL: PAINLEVEL_OUTOF10: 0

## 2018-11-06 NOTE — ED TRIAGE NOTES
"Pt in by EMS c/o lower abdominal pain radiating into lower back. Pt states that he awoke at 0300 with the pain. Pt states when he peed he saw blood in urine. Hx of kidney stones and states \"feels like that again\". Pt denies current pain on arrival. EMS gave 100mcg of Fentanyl IN prior to arrival. Pt states prior to being medicated it felt like a \"softball size pressure in my back\". Denies n/v. Pt placed on cardiac monitor and continuous spO2 call light in reach.  "

## 2018-11-06 NOTE — ED PROVIDER NOTES
CHIEF COMPLAINT  Chief Complaint   Patient presents with   • Abdominal Pain       HPI (1,4)  Chace Gong is a 53 y.o. male with a history of recurrent nephrolithiasis, alcohol abuse, opiate dependence, who presents with pain and abdominal pain since 3 am today.     Location: bilateral flank, middle lower back, middle lower abdomen.  Quality: sharp.  Severity: 10/10.  Duration: 4 hours.  Timing: constant. Context: at home, history of kidney stones.  Modifying factor: fentanyl improved pain.  Associated symptoms: dark urine, dribbling stream without history of BPH.    Patient states he has had 27 kidney stones, never collected for analysis despite filter use, never seen Urology or had cystoscopy or stenting. Has previously received his care in [place].      REVIEW OF SYSTEMS(2/10)  Pertinent positives include: non-bloody emesis, abdominal fullness.  Pertinent negatives include: fever, chills, diarrhea, hematuria, groin pain.   All other systems are negative.     PAST MEDICAL HISTORY(PFS1,2)  Past Medical History:   Diagnosis Date   • Achilles tendinitis 6/24/2009   • Arthralgia 6/24/2009   • Bronchitis 6/24/2009   • Dyslipidemia 6/24/2009   • GERD (gastroesophageal reflux disease) 6/24/2009   • Hepatitis C    • HTN (hypertension) 6/24/2009   • Hypertension    • Low back pain    • Psychiatric disorder     anxiety       FAMILY HISTORY  Family History   Problem Relation Age of Onset   • Cancer Mother         lung / throat / breast   • Hypertension Mother    • Hypertension Father    • Hyperlipidemia Father    • Stroke Father    • Alcohol/Drug Father    • Diabetes Sister    • Hypertension Sister    • Alcohol/Drug Sister    • Cancer Brother    • Hypertension Brother    • Alcohol/Drug Brother    • Stroke Brother    • Alcohol/Drug Brother    • Cancer Sister         lung   • Heart Disease Neg Hx        SOCIAL HISTORY  Social History   Substance Use Topics   • Smoking status: Current Some Day Smoker     Packs/day: 0.25      "Years: 30.00     Types: Cigarettes     Start date: 8/22/2015     Last attempt to quit: 11/14/2015   • Smokeless tobacco: Never Used      Comment: initiated cessation 12 Aug 2015, occasional cigar   • Alcohol use 0.0 oz/week      Comment: vodka /beer     History   Drug Use   • Types: Inhaled, Marijuana     Comment: has med marijuana card; smokes 1 joint every morning, speed       SURGICAL HISTORY  Past Surgical History:   Procedure Laterality Date   • ELBOW ARTHROTOMY  5/17/2013    Performed by Jonathan Maurice M.D. at [facility]   • LOOSE BODY REMOVAL  5/17/2013    Performed by Jonathan Maurice M.D. at [facility]   • ARTHROSCOPY, KNEE  2013    right    • ARTHROTOMY  2011    right shoulder   • NEUROLYSIS  1/21/2009    Performed by JONATHAN MAURICE at [facility]   • CARPAL TUNNEL RELEASE  1/21/2009    Performed by JONATHAN MAURICE at [facility]   • HARDWARE REMOVAL ORTHO  1/21/2009    Performed by JONATHAN MAURICE at [facility]   • SYNOVECTOMY  1/21/2009    Performed by JONATHAN MAURICE at [facility]   • OTHER ORTHOPEDIC SURGERY  2001    lt wrist surg       CURRENT MEDICATIONS  Home Medications    **Home medications have not yet been reviewed for this encounter**         ALLERGIES  Allergies   Allergen Reactions   • Penicillins Unspecified     Pt states that his mom told him he is allergic to penicillins.   • Hydrocodone Unspecified     Pt states \"I'm not sure\".   • Sulfa Drugs      \"my mom told me I was allergic to it when i was a kid\"       PHYSICAL EXAM (2,8)  VITAL SIGNS: /95   Pulse 86   Temp 36 °C (96.8 °F)   Resp (!) 22   Ht 1.88 m (6' 2\")   Wt 101 kg (222 lb 10.6 oz)   SpO2 92%   BMI 28.59 kg/m²  Reviewed and normotensive, tachycardic, afebrile, normal respiratory rate.  Constitutional: no acute distress, laying in bed, appears comfortable.  HENT: atraumatic, normal speech.  Eyes: EOMI, vision " grossly intact.  Cardiovascular: tachycardic, regular rhythm, no M/R/G.  Respiratory: CTAB.  Gastrointestinal: S/NT/ND, +BS.  Skin: No rashes/lesions on exposed skin    Genitourinary:  Deferred.  Neurologic: AOx4, CN grossly intact.  Psychiatric: Normal mood and affect.    DIFFERENTIAL DIAGNOSIS:  Renal stone, ureteral stone, UTI, pyelonephritis, obstructive uropathy, aortic dissection, AAA, musculoskeletal pain.    EKG  NA    RADIOLOGY/PROCEDURES  US-RENAL   Final Result         1. Mild left hydronephrosis. No right hydronephrosis.   2. Nonobstructing right renal stone.      US-ABDOMINAL AORTA W/O DOPPLER   Final Result      No abdominal aortic aneurysm.             LABORATORY: Reviewed as below.  Results for orders placed or performed during the hospital encounter of 11/06/18   CBC WITH DIFFERENTIAL   Result Value Ref Range    WBC 7.7 4.8 - 10.8 K/uL    RBC 4.59 (L) 4.70 - 6.10 M/uL    Hemoglobin 16.0 14.0 - 18.0 g/dL    Hematocrit 46.0 42.0 - 52.0 %    .2 (H) 81.4 - 97.8 fL    MCH 34.9 (H) 27.0 - 33.0 pg    MCHC 34.8 33.7 - 35.3 g/dL    RDW 48.9 35.9 - 50.0 fL    Platelet Count 172 164 - 446 K/uL    MPV 10.1 9.0 - 12.9 fL    Neutrophils-Polys 55.10 44.00 - 72.00 %    Lymphocytes 32.20 22.00 - 41.00 %    Monocytes 9.40 0.00 - 13.40 %    Eosinophils 2.60 0.00 - 6.90 %    Basophils 0.30 0.00 - 1.80 %    Immature Granulocytes 0.40 0.00 - 0.90 %    Nucleated RBC 0.00 /100 WBC    Neutrophils (Absolute) 4.23 1.82 - 7.42 K/uL    Lymphs (Absolute) 2.47 1.00 - 4.80 K/uL    Monos (Absolute) 0.72 0.00 - 0.85 K/uL    Eos (Absolute) 0.20 0.00 - 0.51 K/uL    Baso (Absolute) 0.02 0.00 - 0.12 K/uL    Immature Granulocytes (abs) 0.03 0.00 - 0.11 K/uL    NRBC (Absolute) 0.00 K/uL   BASIC METABOLIC PANEL   Result Value Ref Range    Sodium 135 135 - 145 mmol/L    Potassium 4.0 3.6 - 5.5 mmol/L    Chloride 102 96 - 112 mmol/L    Co2 24 20 - 33 mmol/L    Glucose 102 (H) 65 - 99 mg/dL    Bun 11 8 - 22 mg/dL    Creatinine 0.92  0.50 - 1.40 mg/dL    Calcium 9.4 8.5 - 10.5 mg/dL    Anion Gap 9.0 0.0 - 11.9   URINALYSIS,CULTURE IF INDICATED   Result Value Ref Range    Color Yellow     Character Clear     Specific Gravity 1.016 <1.035    Ph 5.5 5.0 - 8.0    Glucose Negative Negative mg/dL    Ketones Trace (A) Negative mg/dL    Protein Negative Negative mg/dL    Bilirubin Negative Negative    Urobilinogen, Urine 1.0 Negative    Nitrite Negative Negative    Leukocyte Esterase Small (A) Negative    Occult Blood Large (A) Negative    Micro Urine Req Microscopic    URINE DRUG SCREEN   Result Value Ref Range    Amphetamines Urine Positive (A) Negative    Barbiturates Negative Negative    Benzodiazepines Negative Negative    Cocaine Metabolite Negative Negative    Methadone Negative Negative    Opiates Negative Negative    Oxycodone Negative Negative    Phencyclidine -Pcp Negative Negative    Propoxyphene Negative Negative    Cannabinoid Metab Positive (A) Negative   DIAGNOSTIC ALCOHOL   Result Value Ref Range    Diagnostic Alcohol 0.00 0.00 g/dL   ESTIMATED GFR   Result Value Ref Range    GFR If African American >60 >60 mL/min/1.73 m 2    GFR If Non African American >60 >60 mL/min/1.73 m 2   URINE MICROSCOPIC (W/UA)   Result Value Ref Range    WBC 0-2 (A) /hpf    RBC >150 (A) /hpf    Bacteria Negative None /hpf    Epithelial Cells Negative /hpf    Hyaline Cast 0-2 /lpf       INTERVENTIONS:  Medications - No data to display.  Response: pain significantly improved.    COURSE & MEDICAL DECISION MAKING  Patient examined after receiving fentanyl and Toradol, in minimal pain. Patient is not a good historian, states he thinks he has recurrent kidney stone(s) with bilateral flank pain which would be atypical. Non-toxic appearing, no obvious systemic signs/symptoms of infection. Tachycardic but normotensive.      7:15 AM  CBC showing no leukocytosis, normal H/H with macrocytosis, normal platelets.    7:30 AM  Ordered US renal/AAA, UA, UDS.     8:14 AM  BMP  with mild glucose elevation otherwise all values WNL. BAL 0. US AAA negative. mild left hydronephrosis, 4mm nonobstructing right renal stone.    8:26 AM  Urine studies still pending, advised to straight cath if patient unable to provide adequate sample, patient agreed.    8:51 AM  UA showing large amount of blood and >150 RBC with negative nitrite, small LE, 0-2 WBC, no bacteria.      Patient likely passed left-sided nephrolith based on mild left hydronephrosis without current stone and blood on UA without signs of infection. Small right sided stone can be managed conservatively/medically, needs to establish with Urology outpatient.     PLAN:  Discharge with Flomax and strainer to collect stone, schedule Urology follow up.    CONDITION: Stable.    FINAL IMPRESSION  1. Hydronephrosis, unspecified hydronephrosis type    2. Hematuria, unspecified type    3. Renal colic          Electronically signed by: Sahil Bryant, 11/6/2018 7:30 AM    I independently evaluated the patient and repeated the important components of the history and physical. I discussed the management with the resident. I have reviewed and agree with the pertinent clinical information above including history, exam, study findings, and recommendations.  I discussed the patient's findings with him at the bedside, and he reported feeling much better.  We discussed pain management at home, and the importance of straining his urine, and the fact that urology follow-up can be helpful with preventing future stones.  He was not aware of this, which is part of why he has not seen urology in the past, he says.  He is now more optimistic about the utility of urology follow-up, and agrees to call to schedule an appointment.  We discussed return precautions as well.    Electronically signed by: Mialn Sanchez, 11/6/2018 2:05 PM

## 2018-11-07 ENCOUNTER — PATIENT OUTREACH (OUTPATIENT)
Dept: HEALTH INFORMATION MANAGEMENT | Facility: OTHER | Age: 53
End: 2018-11-07

## 2018-11-07 NOTE — LETTER
Chace Gong  [address] [place] [address]    November 7, 2018      Dear Chace Gong,    [facility] wants to ensure your discharge home is safe and you or your loved ones have had all of your questions answered regarding your care after you leave the hospital.    Our discharge team was unsuccessful in our attempts to contact you telephonically and we wanted to be sure that you had a list of resources and contact information should you have any questions regarding your hospital stay, follow-up instructions, or active medical symptoms.    Questions or Concerns Regarding… Contact   Medical Questions Related to Your Discharge  (7 days a week, 8am-5pm) Contact a Nurse Care Coordinator   [phone]   Medical Questions Not Related to Your Discharge  (24 hours a day / 7 days a week)  Contact the Nurse Health Line   [phone]    Medications or Discharge Instructions Refer to your discharge packet   or contact your -[phone]   Follow-up Appointment(s) Schedule your appointment via [facility]   or contact Scheduling [phone]   Billing Review your statement via [facility]  or contact Billing [phone]   Medical Records Review your records via [facility]   or contact Medical Records [phone]     You can also easily access your medical information, test results and upcoming appointments via the [facility] free online health management tool. You can learn more and sign up at [place]. For assistance setting up your [facility] account, please call [phone].    Once again, we want to ensure your discharge home is safe and that you have a clear understanding of any next steps in your care. If you have any questions or concerns, please do not hesitate to contact us, we are here for you. Thank you for choosing [facility] for your healthcare needs.    Sincerely,      Your [facility] Healthcare Team

## 2018-11-17 ENCOUNTER — HOSPITAL ENCOUNTER (EMERGENCY)
Facility: MEDICAL CENTER | Age: 53
End: 2018-11-17
Attending: EMERGENCY MEDICINE
Payer: MEDICARE

## 2018-11-17 ENCOUNTER — APPOINTMENT (OUTPATIENT)
Dept: RADIOLOGY | Facility: MEDICAL CENTER | Age: 53
End: 2018-11-17
Attending: EMERGENCY MEDICINE
Payer: MEDICARE

## 2018-11-17 VITALS
TEMPERATURE: 97.3 F | DIASTOLIC BLOOD PRESSURE: 101 MMHG | RESPIRATION RATE: 20 BRPM | HEIGHT: 74 IN | WEIGHT: 236.55 LBS | OXYGEN SATURATION: 93 % | BODY MASS INDEX: 30.36 KG/M2 | HEART RATE: 95 BPM | SYSTOLIC BLOOD PRESSURE: 139 MMHG

## 2018-11-17 DIAGNOSIS — T14.8XXA BITE: ICD-10-CM

## 2018-11-17 DIAGNOSIS — S43.402A SPRAIN OF LEFT SHOULDER, UNSPECIFIED SHOULDER SPRAIN TYPE, INITIAL ENCOUNTER: ICD-10-CM

## 2018-11-17 DIAGNOSIS — S62.91XA CLOSED FRACTURE OF RIGHT HAND, INITIAL ENCOUNTER: ICD-10-CM

## 2018-11-17 PROCEDURE — A9270 NON-COVERED ITEM OR SERVICE: HCPCS | Performed by: EMERGENCY MEDICINE

## 2018-11-17 PROCEDURE — 700102 HCHG RX REV CODE 250 W/ 637 OVERRIDE(OP): Performed by: EMERGENCY MEDICINE

## 2018-11-17 PROCEDURE — 73130 X-RAY EXAM OF HAND: CPT | Mod: RT

## 2018-11-17 PROCEDURE — 29125 APPL SHORT ARM SPLINT STATIC: CPT

## 2018-11-17 PROCEDURE — 73030 X-RAY EXAM OF SHOULDER: CPT | Mod: LT

## 2018-11-17 PROCEDURE — 99284 EMERGENCY DEPT VISIT MOD MDM: CPT

## 2018-11-17 PROCEDURE — 302874 HCHG BANDAGE ACE 2 OR 3""

## 2018-11-17 RX ORDER — OXYCODONE HYDROCHLORIDE AND ACETAMINOPHEN 5; 325 MG/1; MG/1
1-2 TABLET ORAL EVERY 4 HOURS PRN
Qty: 20 TAB | Refills: 0 | Status: SHIPPED | OUTPATIENT
Start: 2018-11-17 | End: 2018-11-20

## 2018-11-17 RX ORDER — OXYCODONE AND ACETAMINOPHEN 10; 325 MG/1; MG/1
1 TABLET ORAL ONCE
Status: COMPLETED | OUTPATIENT
Start: 2018-11-17 | End: 2018-11-17

## 2018-11-17 RX ORDER — IBUPROFEN 600 MG/1
600 TABLET ORAL ONCE
Status: COMPLETED | OUTPATIENT
Start: 2018-11-17 | End: 2018-11-17

## 2018-11-17 RX ORDER — DOXYCYCLINE 100 MG/1
100 CAPSULE ORAL 2 TIMES DAILY
Qty: 10 CAP | Refills: 0 | Status: SHIPPED | OUTPATIENT
Start: 2018-11-17 | End: 2018-11-22

## 2018-11-17 RX ORDER — METRONIDAZOLE 500 MG/1
500 TABLET ORAL EVERY 8 HOURS
Qty: 15 TAB | Refills: 0 | Status: SHIPPED | OUTPATIENT
Start: 2018-11-17 | End: 2018-11-22

## 2018-11-17 RX ADMIN — IBUPROFEN 600 MG: 600 TABLET, FILM COATED ORAL at 08:45

## 2018-11-17 RX ADMIN — OXYCODONE HYDROCHLORIDE AND ACETAMINOPHEN 1 TABLET: 10; 325 TABLET ORAL at 08:45

## 2018-11-17 ASSESSMENT — LIFESTYLE VARIABLES
CONSUMPTION TOTAL: INCOMPLETE
DO YOU DRINK ALCOHOL: YES
EVER HAD A DRINK FIRST THING IN THE MORNING TO STEADY YOUR NERVES TO GET RID OF A HANGOVER: YES
TOTAL SCORE: 4
HAVE YOU EVER FELT YOU SHOULD CUT DOWN ON YOUR DRINKING: YES
EVER FELT BAD OR GUILTY ABOUT YOUR DRINKING: YES
TOTAL SCORE: 4
TOTAL SCORE: 4
HAVE PEOPLE ANNOYED YOU BY CRITICIZING YOUR DRINKING: YES

## 2018-11-17 ASSESSMENT — PAIN SCALES - GENERAL: PAINLEVEL_OUTOF10: 10
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
## 2018-11-17 NOTE — ED NOTES
Discharge instructions reviewed, no questions at this time.    Discussed home medications, prescriptions reviewed and given to patient. Narcotics use discussed and consent signed.    Family present to provide transportation.     Belongings returned to patient.    Patient ambulatory off unit.

## 2018-11-17 NOTE — DISCHARGE INSTRUCTIONS
Hand Fracture  Your caregiver has diagnosed you with a fractured (broken) bone in your hand. If the bones are in good position and the hand is properly immobilized and rested, these injuries will usually heal in 3 to 6 weeks. A cast, splint, or bulky bandage is usually applied to keep the fracture site from moving. Do not remove the splint or cast until your caregiver approves. If the fracture is unstable or the bones are not aligned properly, surgery may be needed.  Keep your hand raised (elevated) above the level of your heart as much as possible for the next 2 to 3 days until the swelling and pain are better. Apply ice packs for 15-20 minutes every 3 to 4 hours to help control the pain and swelling.  See your caregiver or an orthopedic specialist as directed for follow-up care to make sure the fracture is beginning to heal properly.  SEEK IMMEDIATE MEDICAL CARE IF:   · You notice your fingers are cold, numb, crooked, or the pain of your injury is severe.  · You are not improving or seem to be getting worse.  · You have questions or concerns.  Document Released: 01/25/2006 Document Revised: 03/11/2013 Document Reviewed: 06/15/2010  [employer]® Patient Information ©2014 [employer].

## 2018-11-18 ENCOUNTER — PATIENT OUTREACH (OUTPATIENT)
Dept: HEALTH INFORMATION MANAGEMENT | Facility: OTHER | Age: 53
End: 2018-11-18

## 2018-11-18 NOTE — LETTER
Chace Gnog  [address] [place] [address]    November 18, 2018      Dear Chace Gong,    [facility] wants to ensure your discharge home is safe and you or your loved ones have had all of your questions answered regarding your care after you leave the hospital.    Our discharge team was unsuccessful in our attempts to contact you telephonically and we wanted to be sure that you had a list of resources and contact information should you have any questions regarding your hospital stay, follow-up instructions, or active medical symptoms.    Questions or Concerns Regarding… Contact   Medical Questions Related to Your Discharge  (7 days a week, 8am-5pm) Contact a Nurse Care Coordinator   [phone]   Medical Questions Not Related to Your Discharge  (24 hours a day / 7 days a week)  Contact the Nurse Health Line   [phone]    Medications or Discharge Instructions Refer to your discharge packet   or contact your -[phone]   Follow-up Appointment(s) Schedule your appointment via [employer]   or contact Scheduling [phone]   Billing Review your statement via [employer]  or contact Billing [phone]   Medical Records Review your records via [employer]   or contact Medical Records [phone]     You can also easily access your medical information, test results and upcoming appointments via the [employer] free online health management tool. You can learn more and sign up at [employer]. For assistance setting up your [employer] account, please call [phone].    Once again, we want to ensure your discharge home is safe and that you have a clear understanding of any next steps in your care. If you have any questions or concerns, please do not hesitate to contact us, we are here for you. Thank you for choosing [facility] for your healthcare needs.    Sincerely,      Your [facility] Healthcare Team

## 2018-11-19 NOTE — PROGRESS NOTES
"Placed discharge outreach phone call to pt s/p ER discharge 11/17/18.  Male answered and stated \"you have the wrong number, this is Colten.\"  Phone number removed from demographics, discharge outreach letter mailed to address listed in Epic record.  "

## 2018-12-12 ENCOUNTER — APPOINTMENT (OUTPATIENT)
Dept: RADIOLOGY | Facility: MEDICAL CENTER | Age: 53
DRG: 894 | End: 2018-12-12
Attending: EMERGENCY MEDICINE
Payer: MEDICARE

## 2018-12-12 ENCOUNTER — HOSPITAL ENCOUNTER (INPATIENT)
Facility: MEDICAL CENTER | Age: 53
LOS: 1 days | DRG: 894 | End: 2018-12-13
Attending: EMERGENCY MEDICINE | Admitting: FAMILY MEDICINE
Payer: MEDICARE

## 2018-12-12 ENCOUNTER — APPOINTMENT (OUTPATIENT)
Dept: RADIOLOGY | Facility: MEDICAL CENTER | Age: 53
DRG: 894 | End: 2018-12-12
Attending: FAMILY MEDICINE
Payer: MEDICARE

## 2018-12-12 DIAGNOSIS — K70.10 ALCOHOLIC HEPATITIS WITHOUT ASCITES: ICD-10-CM

## 2018-12-12 DIAGNOSIS — F10.939 ALCOHOL WITHDRAWAL SYNDROME WITH COMPLICATION (HCC): ICD-10-CM

## 2018-12-12 DIAGNOSIS — F10.10 ALCOHOL ABUSE: ICD-10-CM

## 2018-12-12 PROBLEM — S62.356A CLOSED NONDISPLACED FRACTURE OF SHAFT OF FIFTH METACARPAL BONE OF RIGHT HAND: Status: ACTIVE | Noted: 2018-12-12

## 2018-12-12 PROBLEM — E87.20 LACTIC ACIDOSIS: Status: ACTIVE | Noted: 2018-12-12

## 2018-12-12 LAB
ALBUMIN SERPL BCP-MCNC: 4.2 G/DL (ref 3.2–4.9)
ALBUMIN/GLOB SERPL: 1.2 G/DL
ALP SERPL-CCNC: 72 U/L (ref 30–99)
ALT SERPL-CCNC: 141 U/L (ref 2–50)
ANION GAP SERPL CALC-SCNC: 14 MMOL/L (ref 0–11.9)
APPEARANCE UR: CLEAR
AST SERPL-CCNC: 105 U/L (ref 12–45)
BASOPHILS # BLD AUTO: 0.8 % (ref 0–1.8)
BASOPHILS # BLD: 0.05 K/UL (ref 0–0.12)
BILIRUB SERPL-MCNC: 0.5 MG/DL (ref 0.1–1.5)
BILIRUB UR QL STRIP.AUTO: NEGATIVE
BUN SERPL-MCNC: 7 MG/DL (ref 8–22)
CALCIUM SERPL-MCNC: 9 MG/DL (ref 8.5–10.5)
CHLORIDE SERPL-SCNC: 105 MMOL/L (ref 96–112)
CO2 SERPL-SCNC: 23 MMOL/L (ref 20–33)
COLOR UR: YELLOW
CREAT SERPL-MCNC: 0.79 MG/DL (ref 0.5–1.4)
EKG IMPRESSION: NORMAL
EOSINOPHIL # BLD AUTO: 0.11 K/UL (ref 0–0.51)
EOSINOPHIL NFR BLD: 1.8 % (ref 0–6.9)
ERYTHROCYTE [DISTWIDTH] IN BLOOD BY AUTOMATED COUNT: 48.1 FL (ref 35.9–50)
ETHANOL BLD-MCNC: 0.05 G/DL
FLUAV RNA SPEC QL NAA+PROBE: NEGATIVE
FLUBV RNA SPEC QL NAA+PROBE: NEGATIVE
GLOBULIN SER CALC-MCNC: 3.5 G/DL (ref 1.9–3.5)
GLUCOSE SERPL-MCNC: 101 MG/DL (ref 65–99)
GLUCOSE UR STRIP.AUTO-MCNC: 100 MG/DL
HCT VFR BLD AUTO: 49.8 % (ref 42–52)
HGB BLD-MCNC: 17 G/DL (ref 14–18)
IMM GRANULOCYTES # BLD AUTO: 0.02 K/UL (ref 0–0.11)
IMM GRANULOCYTES NFR BLD AUTO: 0.3 % (ref 0–0.9)
KETONES UR STRIP.AUTO-MCNC: NEGATIVE MG/DL
LACTATE BLD-SCNC: 3.1 MMOL/L (ref 0.5–2)
LACTATE BLD-SCNC: 3.6 MMOL/L (ref 0.5–2)
LEUKOCYTE ESTERASE UR QL STRIP.AUTO: NEGATIVE
LIPASE SERPL-CCNC: 62 U/L (ref 11–82)
LYMPHOCYTES # BLD AUTO: 1.98 K/UL (ref 1–4.8)
LYMPHOCYTES NFR BLD: 32.2 % (ref 22–41)
MCH RBC QN AUTO: 33.9 PG (ref 27–33)
MCHC RBC AUTO-ENTMCNC: 34.1 G/DL (ref 33.7–35.3)
MCV RBC AUTO: 99.4 FL (ref 81.4–97.8)
MICRO URNS: ABNORMAL
MONOCYTES # BLD AUTO: 0.41 K/UL (ref 0–0.85)
MONOCYTES NFR BLD AUTO: 6.7 % (ref 0–13.4)
NEUTROPHILS # BLD AUTO: 3.58 K/UL (ref 1.82–7.42)
NEUTROPHILS NFR BLD: 58.2 % (ref 44–72)
NITRITE UR QL STRIP.AUTO: NEGATIVE
NRBC # BLD AUTO: 0 K/UL
NRBC BLD-RTO: 0 /100 WBC
PH UR STRIP.AUTO: 6.5 [PH]
PLATELET # BLD AUTO: 168 K/UL (ref 164–446)
PMV BLD AUTO: 9.2 FL (ref 9–12.9)
POTASSIUM SERPL-SCNC: 3.6 MMOL/L (ref 3.6–5.5)
PROT SERPL-MCNC: 7.7 G/DL (ref 6–8.2)
PROT UR QL STRIP: NEGATIVE MG/DL
RBC # BLD AUTO: 5.01 M/UL (ref 4.7–6.1)
RBC UR QL AUTO: NEGATIVE
SODIUM SERPL-SCNC: 142 MMOL/L (ref 135–145)
SP GR UR STRIP.AUTO: 1.02
TSH SERPL DL<=0.005 MIU/L-ACNC: 2.75 UIU/ML (ref 0.38–5.33)
UROBILINOGEN UR STRIP.AUTO-MCNC: 0.2 MG/DL
WBC # BLD AUTO: 6.2 K/UL (ref 4.8–10.8)

## 2018-12-12 PROCEDURE — 80053 COMPREHEN METABOLIC PANEL: CPT

## 2018-12-12 PROCEDURE — 700102 HCHG RX REV CODE 250 W/ 637 OVERRIDE(OP): Performed by: FAMILY MEDICINE

## 2018-12-12 PROCEDURE — 93005 ELECTROCARDIOGRAM TRACING: CPT | Performed by: EMERGENCY MEDICINE

## 2018-12-12 PROCEDURE — 99223 1ST HOSP IP/OBS HIGH 75: CPT | Mod: AI | Performed by: FAMILY MEDICINE

## 2018-12-12 PROCEDURE — 71045 X-RAY EXAM CHEST 1 VIEW: CPT

## 2018-12-12 PROCEDURE — 700111 HCHG RX REV CODE 636 W/ 250 OVERRIDE (IP): Performed by: FAMILY MEDICINE

## 2018-12-12 PROCEDURE — 96366 THER/PROPH/DIAG IV INF ADDON: CPT

## 2018-12-12 PROCEDURE — 700101 HCHG RX REV CODE 250: Performed by: EMERGENCY MEDICINE

## 2018-12-12 PROCEDURE — 87086 URINE CULTURE/COLONY COUNT: CPT

## 2018-12-12 PROCEDURE — 87502 INFLUENZA DNA AMP PROBE: CPT

## 2018-12-12 PROCEDURE — A9270 NON-COVERED ITEM OR SERVICE: HCPCS | Performed by: FAMILY MEDICINE

## 2018-12-12 PROCEDURE — 83690 ASSAY OF LIPASE: CPT

## 2018-12-12 PROCEDURE — 96375 TX/PRO/DX INJ NEW DRUG ADDON: CPT

## 2018-12-12 PROCEDURE — 80307 DRUG TEST PRSMV CHEM ANLYZR: CPT

## 2018-12-12 PROCEDURE — 99285 EMERGENCY DEPT VISIT HI MDM: CPT

## 2018-12-12 PROCEDURE — 770020 HCHG ROOM/CARE - TELE (206)

## 2018-12-12 PROCEDURE — 700111 HCHG RX REV CODE 636 W/ 250 OVERRIDE (IP): Performed by: EMERGENCY MEDICINE

## 2018-12-12 PROCEDURE — 700105 HCHG RX REV CODE 258: Performed by: FAMILY MEDICINE

## 2018-12-12 PROCEDURE — 81003 URINALYSIS AUTO W/O SCOPE: CPT

## 2018-12-12 PROCEDURE — HZ2ZZZZ DETOXIFICATION SERVICES FOR SUBSTANCE ABUSE TREATMENT: ICD-10-PCS | Performed by: FAMILY MEDICINE

## 2018-12-12 PROCEDURE — 85025 COMPLETE CBC W/AUTO DIFF WBC: CPT

## 2018-12-12 PROCEDURE — 3E0234Z INTRODUCTION OF SERUM, TOXOID AND VACCINE INTO MUSCLE, PERCUTANEOUS APPROACH: ICD-10-PCS | Performed by: FAMILY MEDICINE

## 2018-12-12 PROCEDURE — 83605 ASSAY OF LACTIC ACID: CPT

## 2018-12-12 PROCEDURE — 90471 IMMUNIZATION ADMIN: CPT

## 2018-12-12 PROCEDURE — 87040 BLOOD CULTURE FOR BACTERIA: CPT

## 2018-12-12 PROCEDURE — A9270 NON-COVERED ITEM OR SERVICE: HCPCS | Performed by: EMERGENCY MEDICINE

## 2018-12-12 PROCEDURE — 73130 X-RAY EXAM OF HAND: CPT | Mod: RT

## 2018-12-12 PROCEDURE — 96365 THER/PROPH/DIAG IV INF INIT: CPT

## 2018-12-12 PROCEDURE — 84443 ASSAY THYROID STIM HORMONE: CPT

## 2018-12-12 PROCEDURE — 700102 HCHG RX REV CODE 250 W/ 637 OVERRIDE(OP): Performed by: EMERGENCY MEDICINE

## 2018-12-12 PROCEDURE — 96367 TX/PROPH/DG ADDL SEQ IV INF: CPT

## 2018-12-12 PROCEDURE — 304561 HCHG STAT O2

## 2018-12-12 PROCEDURE — 90732 PPSV23 VACC 2 YRS+ SUBQ/IM: CPT | Performed by: FAMILY MEDICINE

## 2018-12-12 PROCEDURE — 700105 HCHG RX REV CODE 258: Performed by: EMERGENCY MEDICINE

## 2018-12-12 RX ORDER — OXYCODONE HYDROCHLORIDE 5 MG/1
2.5 TABLET ORAL
Status: DISCONTINUED | OUTPATIENT
Start: 2018-12-12 | End: 2018-12-13 | Stop reason: HOSPADM

## 2018-12-12 RX ORDER — CLONIDINE HYDROCHLORIDE 0.1 MG/1
0.1 TABLET ORAL EVERY 6 HOURS PRN
Status: DISCONTINUED | OUTPATIENT
Start: 2018-12-12 | End: 2018-12-13 | Stop reason: HOSPADM

## 2018-12-12 RX ORDER — LORAZEPAM 1 MG/1
2 TABLET ORAL
Status: DISCONTINUED | OUTPATIENT
Start: 2018-12-12 | End: 2018-12-13 | Stop reason: HOSPADM

## 2018-12-12 RX ORDER — TAMSULOSIN HYDROCHLORIDE 0.4 MG/1
0.4 CAPSULE ORAL DAILY
Status: DISCONTINUED | OUTPATIENT
Start: 2018-12-12 | End: 2018-12-13 | Stop reason: HOSPADM

## 2018-12-12 RX ORDER — THIAMINE MONONITRATE (VIT B1) 100 MG
100 TABLET ORAL DAILY
Status: DISCONTINUED | OUTPATIENT
Start: 2018-12-13 | End: 2018-12-13 | Stop reason: HOSPADM

## 2018-12-12 RX ORDER — PROMETHAZINE HYDROCHLORIDE 25 MG/1
12.5-25 TABLET ORAL EVERY 4 HOURS PRN
Status: DISCONTINUED | OUTPATIENT
Start: 2018-12-12 | End: 2018-12-13 | Stop reason: HOSPADM

## 2018-12-12 RX ORDER — LISINOPRIL 20 MG/1
20 TABLET ORAL EVERY EVENING
Status: DISCONTINUED | OUTPATIENT
Start: 2018-12-12 | End: 2018-12-13 | Stop reason: HOSPADM

## 2018-12-12 RX ORDER — LORAZEPAM 2 MG/ML
0.5 INJECTION INTRAMUSCULAR EVERY 4 HOURS PRN
Status: DISCONTINUED | OUTPATIENT
Start: 2018-12-12 | End: 2018-12-13 | Stop reason: HOSPADM

## 2018-12-12 RX ORDER — SODIUM CHLORIDE 9 MG/ML
INJECTION, SOLUTION INTRAVENOUS CONTINUOUS
Status: DISCONTINUED | OUTPATIENT
Start: 2018-12-12 | End: 2018-12-13 | Stop reason: HOSPADM

## 2018-12-12 RX ORDER — LORAZEPAM 1 MG/1
1 TABLET ORAL EVERY 4 HOURS PRN
Status: DISCONTINUED | OUTPATIENT
Start: 2018-12-12 | End: 2018-12-13 | Stop reason: HOSPADM

## 2018-12-12 RX ORDER — OXYCODONE HYDROCHLORIDE 5 MG/1
5 TABLET ORAL
Status: DISCONTINUED | OUTPATIENT
Start: 2018-12-12 | End: 2018-12-13 | Stop reason: HOSPADM

## 2018-12-12 RX ORDER — AZITHROMYCIN 500 MG/1
500 INJECTION, POWDER, LYOPHILIZED, FOR SOLUTION INTRAVENOUS ONCE
Status: DISCONTINUED | OUTPATIENT
Start: 2018-12-12 | End: 2018-12-12

## 2018-12-12 RX ORDER — LORAZEPAM 1 MG/1
4 TABLET ORAL
Status: DISCONTINUED | OUTPATIENT
Start: 2018-12-12 | End: 2018-12-13 | Stop reason: HOSPADM

## 2018-12-12 RX ORDER — ONDANSETRON 2 MG/ML
4 INJECTION INTRAMUSCULAR; INTRAVENOUS EVERY 4 HOURS PRN
Status: DISCONTINUED | OUTPATIENT
Start: 2018-12-12 | End: 2018-12-13 | Stop reason: HOSPADM

## 2018-12-12 RX ORDER — NICOTINE 21 MG/24HR
21 PATCH, TRANSDERMAL 24 HOURS TRANSDERMAL
Status: DISCONTINUED | OUTPATIENT
Start: 2018-12-12 | End: 2018-12-13 | Stop reason: HOSPADM

## 2018-12-12 RX ORDER — LORAZEPAM 2 MG/ML
1.5 INJECTION INTRAMUSCULAR
Status: DISCONTINUED | OUTPATIENT
Start: 2018-12-12 | End: 2018-12-13 | Stop reason: HOSPADM

## 2018-12-12 RX ORDER — ACETAMINOPHEN 325 MG/1
650 TABLET ORAL EVERY 6 HOURS PRN
Status: DISCONTINUED | OUTPATIENT
Start: 2018-12-12 | End: 2018-12-13 | Stop reason: HOSPADM

## 2018-12-12 RX ORDER — BISACODYL 10 MG
10 SUPPOSITORY, RECTAL RECTAL
Status: DISCONTINUED | OUTPATIENT
Start: 2018-12-12 | End: 2018-12-13 | Stop reason: HOSPADM

## 2018-12-12 RX ORDER — ONDANSETRON 2 MG/ML
4 INJECTION INTRAMUSCULAR; INTRAVENOUS ONCE
Status: COMPLETED | OUTPATIENT
Start: 2018-12-12 | End: 2018-12-12

## 2018-12-12 RX ORDER — AZITHROMYCIN 250 MG/1
250-500 TABLET, FILM COATED ORAL DAILY
Status: ON HOLD | COMMUNITY
Start: 2018-12-07 | End: 2020-04-01

## 2018-12-12 RX ORDER — LORAZEPAM 2 MG/ML
1 INJECTION INTRAMUSCULAR
Status: DISCONTINUED | OUTPATIENT
Start: 2018-12-12 | End: 2018-12-13 | Stop reason: HOSPADM

## 2018-12-12 RX ORDER — LORAZEPAM 0.5 MG/1
0.5 TABLET ORAL EVERY 4 HOURS PRN
Status: DISCONTINUED | OUTPATIENT
Start: 2018-12-12 | End: 2018-12-13 | Stop reason: HOSPADM

## 2018-12-12 RX ORDER — SODIUM CHLORIDE 9 MG/ML
1000 INJECTION, SOLUTION INTRAVENOUS ONCE
Status: COMPLETED | OUTPATIENT
Start: 2018-12-12 | End: 2018-12-12

## 2018-12-12 RX ORDER — LORAZEPAM 2 MG/ML
2 INJECTION INTRAMUSCULAR
Status: DISCONTINUED | OUTPATIENT
Start: 2018-12-12 | End: 2018-12-13 | Stop reason: HOSPADM

## 2018-12-12 RX ORDER — PROMETHAZINE HYDROCHLORIDE 25 MG/1
12.5-25 SUPPOSITORY RECTAL EVERY 4 HOURS PRN
Status: DISCONTINUED | OUTPATIENT
Start: 2018-12-12 | End: 2018-12-13 | Stop reason: HOSPADM

## 2018-12-12 RX ORDER — MORPHINE SULFATE 10 MG/ML
2 INJECTION, SOLUTION INTRAMUSCULAR; INTRAVENOUS
Status: DISCONTINUED | OUTPATIENT
Start: 2018-12-12 | End: 2018-12-13 | Stop reason: HOSPADM

## 2018-12-12 RX ORDER — AMOXICILLIN 250 MG
2 CAPSULE ORAL 2 TIMES DAILY
Status: DISCONTINUED | OUTPATIENT
Start: 2018-12-12 | End: 2018-12-13 | Stop reason: HOSPADM

## 2018-12-12 RX ORDER — ONDANSETRON 4 MG/1
4 TABLET, ORALLY DISINTEGRATING ORAL EVERY 4 HOURS PRN
Status: DISCONTINUED | OUTPATIENT
Start: 2018-12-12 | End: 2018-12-13 | Stop reason: HOSPADM

## 2018-12-12 RX ORDER — FOLIC ACID 1 MG/1
1 TABLET ORAL DAILY
Status: DISCONTINUED | OUTPATIENT
Start: 2018-12-13 | End: 2018-12-13 | Stop reason: HOSPADM

## 2018-12-12 RX ORDER — LORAZEPAM 1 MG/1
3 TABLET ORAL
Status: DISCONTINUED | OUTPATIENT
Start: 2018-12-12 | End: 2018-12-13 | Stop reason: HOSPADM

## 2018-12-12 RX ORDER — ENALAPRILAT 1.25 MG/ML
1.25 INJECTION INTRAVENOUS EVERY 6 HOURS PRN
Status: DISCONTINUED | OUTPATIENT
Start: 2018-12-12 | End: 2018-12-13 | Stop reason: HOSPADM

## 2018-12-12 RX ORDER — POLYETHYLENE GLYCOL 3350 17 G/17G
1 POWDER, FOR SOLUTION ORAL
Status: DISCONTINUED | OUTPATIENT
Start: 2018-12-12 | End: 2018-12-13 | Stop reason: HOSPADM

## 2018-12-12 RX ADMIN — SODIUM CHLORIDE: 9 INJECTION, SOLUTION INTRAVENOUS at 12:55

## 2018-12-12 RX ADMIN — PNEUMOCOCCAL VACCINE POLYVALENT 25 MCG
25; 25; 25; 25; 25; 25; 25; 25; 25; 25; 25; 25; 25; 25; 25; 25; 25; 25; 25; 25; 25; 25; 25 INJECTION, SOLUTION INTRAMUSCULAR; SUBCUTANEOUS at 21:19

## 2018-12-12 RX ADMIN — SODIUM CHLORIDE: 9 INJECTION, SOLUTION INTRAVENOUS at 21:13

## 2018-12-12 RX ADMIN — OXYCODONE HYDROCHLORIDE 5 MG: 5 TABLET ORAL at 21:12

## 2018-12-12 RX ADMIN — AMPICILLIN SODIUM AND SULBACTAM SODIUM 3 G: 2; 1 INJECTION, POWDER, FOR SOLUTION INTRAMUSCULAR; INTRAVENOUS at 07:22

## 2018-12-12 RX ADMIN — NICOTINE 21 MG: 21 PATCH, EXTENDED RELEASE TRANSDERMAL at 12:55

## 2018-12-12 RX ADMIN — LORAZEPAM 2 MG: 2 INJECTION INTRAMUSCULAR; INTRAVENOUS at 06:14

## 2018-12-12 RX ADMIN — THIAMINE HYDROCHLORIDE: 100 INJECTION, SOLUTION INTRAMUSCULAR; INTRAVENOUS at 08:23

## 2018-12-12 RX ADMIN — ONDANSETRON HYDROCHLORIDE 4 MG: 2 INJECTION, SOLUTION INTRAMUSCULAR; INTRAVENOUS at 06:30

## 2018-12-12 RX ADMIN — SODIUM CHLORIDE 1000 ML: 9 INJECTION, SOLUTION INTRAVENOUS at 05:45

## 2018-12-12 RX ADMIN — LORAZEPAM 3 MG: 1 TABLET ORAL at 08:41

## 2018-12-12 RX ADMIN — OXYCODONE HYDROCHLORIDE 2.5 MG: 5 TABLET ORAL at 17:11

## 2018-12-12 RX ADMIN — LORAZEPAM 1.5 MG: 2 INJECTION INTRAMUSCULAR at 22:10

## 2018-12-12 RX ADMIN — LORAZEPAM 1 MG: 1 TABLET ORAL at 17:11

## 2018-12-12 RX ADMIN — TAMSULOSIN HYDROCHLORIDE 0.4 MG: 0.4 CAPSULE ORAL at 12:54

## 2018-12-12 RX ADMIN — LISINOPRIL 20 MG: 20 TABLET ORAL at 17:11

## 2018-12-12 RX ADMIN — LORAZEPAM 1.5 MG: 2 INJECTION INTRAMUSCULAR at 21:13

## 2018-12-12 RX ADMIN — LORAZEPAM 2 MG: 1 TABLET ORAL at 05:22

## 2018-12-12 RX ADMIN — LORAZEPAM 1 MG: 1 TABLET ORAL at 23:10

## 2018-12-12 RX ADMIN — LORAZEPAM 1 MG: 1 TABLET ORAL at 13:10

## 2018-12-12 ASSESSMENT — LIFESTYLE VARIABLES
AGITATION: SOMEWHAT MORE THAN NORMAL ACTIVITY
AUDITORY DISTURBANCES: NOT PRESENT
NAUSEA AND VOMITING: NO NAUSEA AND NO VOMITING
TOTAL SCORE: MILD ITCHING, PINS AND NEEDLES SENSATION, BURNING OR NUMBNESS
ORIENTATION AND CLOUDING OF SENSORIUM: ORIENTED AND CAN DO SERIAL ADDITIONS
TREMOR: MODERATE TREMOR WITH ARMS EXTENDED
TOTAL SCORE: MILD ITCHING, PINS AND NEEDLES SENSATION, BURNING OR NUMBNESS
ANXIETY: NO ANXIETY (AT EASE)
PAROXYSMAL SWEATS: BEADS OF SWEAT OBVIOUS ON FOREHEAD
ANXIETY: NO ANXIETY (AT EASE)
AUDITORY DISTURBANCES: NOT PRESENT
TREMOR: MODERATE TREMOR WITH ARMS EXTENDED
AUDITORY DISTURBANCES: NOT PRESENT
PAROXYSMAL SWEATS: *
TOTAL SCORE: VERY MILD ITCHING, PINS AND NEEDLES SENSATION, BURNING OR NUMBNESS
AGITATION: NORMAL ACTIVITY
VISUAL DISTURBANCES: NOT PRESENT
TOTAL SCORE: 9
VISUAL DISTURBANCES: NOT PRESENT
HEADACHE, FULLNESS IN HEAD: MILD
PAROXYSMAL SWEATS: BEADS OF SWEAT OBVIOUS ON FOREHEAD
ORIENTATION AND CLOUDING OF SENSORIUM: ORIENTED AND CAN DO SERIAL ADDITIONS
ANXIETY: MILDLY ANXIOUS
HEADACHE, FULLNESS IN HEAD: MILD
NAUSEA AND VOMITING: NO NAUSEA AND NO VOMITING
AUDITORY DISTURBANCES: NOT PRESENT
TOTAL SCORE: VERY MILD ITCHING, PINS AND NEEDLES SENSATION, BURNING OR NUMBNESS
ORIENTATION AND CLOUDING OF SENSORIUM: ORIENTED AND CAN DO SERIAL ADDITIONS
ANXIETY: *
AUDITORY DISTURBANCES: NOT PRESENT
HEADACHE, FULLNESS IN HEAD: MODERATE
HEADACHE, FULLNESS IN HEAD: MILD
AUDITORY DISTURBANCES: NOT PRESENT
VISUAL DISTURBANCES: VERY MILD SENSITIVITY
AGITATION: NORMAL ACTIVITY
ORIENTATION AND CLOUDING OF SENSORIUM: ORIENTED AND CAN DO SERIAL ADDITIONS
TOTAL SCORE: 20
AGITATION: NORMAL ACTIVITY
AGITATION: *
AGITATION: *
VISUAL DISTURBANCES: NOT PRESENT
ORIENTATION AND CLOUDING OF SENSORIUM: ORIENTED AND CAN DO SERIAL ADDITIONS
PAROXYSMAL SWEATS: BEADS OF SWEAT OBVIOUS ON FOREHEAD
AUDITORY DISTURBANCES: NOT PRESENT
AGITATION: NORMAL ACTIVITY
TREMOR: TREMOR NOT VISIBLE BUT CAN BE FELT, FINGERTIP TO FINGERTIP
PAROXYSMAL SWEATS: *
DO YOU DRINK ALCOHOL: YES
PAROXYSMAL SWEATS: NO SWEAT VISIBLE
HEADACHE, FULLNESS IN HEAD: MILD
TREMOR: *
TOTAL SCORE: 11
TOTAL SCORE: 16
HEADACHE, FULLNESS IN HEAD: VERY MILD
TREMOR: TREMOR NOT VISIBLE BUT CAN BE FELT, FINGERTIP TO FINGERTIP
ANXIETY: MODERATELY ANXIOUS OR GUARDED, SO ANXIETY IS INFERRED
TREMOR: *
NAUSEA AND VOMITING: NO NAUSEA AND NO VOMITING
AUDITORY DISTURBANCES: NOT PRESENT
TOTAL SCORE: 1
VISUAL DISTURBANCES: MILD SENSITIVITY
NAUSEA AND VOMITING: MILD NAUSEA WITH NO VOMITING
TOTAL SCORE: 17
HEADACHE, FULLNESS IN HEAD: MODERATELY SEVERE
ORIENTATION AND CLOUDING OF SENSORIUM: ORIENTED AND CAN DO SERIAL ADDITIONS
NAUSEA AND VOMITING: NO NAUSEA AND NO VOMITING
AGITATION: SOMEWHAT MORE THAN NORMAL ACTIVITY
EVER_SMOKED: YES
NAUSEA AND VOMITING: NO NAUSEA AND NO VOMITING
ORIENTATION AND CLOUDING OF SENSORIUM: ORIENTED AND CAN DO SERIAL ADDITIONS
TOTAL SCORE: 9
HEADACHE, FULLNESS IN HEAD: NOT PRESENT
NAUSEA AND VOMITING: MILD NAUSEA WITH NO VOMITING
HEADACHE, FULLNESS IN HEAD: MODERATELY SEVERE
PAROXYSMAL SWEATS: *
PAROXYSMAL SWEATS: *
AGITATION: SOMEWHAT MORE THAN NORMAL ACTIVITY
PAROXYSMAL SWEATS: BEADS OF SWEAT OBVIOUS ON FOREHEAD
NAUSEA AND VOMITING: INTERMITTENT NAUSEA WITH DRY HEAVES
TOTAL SCORE: 10
ANXIETY: NO ANXIETY (AT EASE)
ANXIETY: MILDLY ANXIOUS
VISUAL DISTURBANCES: MILD SENSITIVITY
TOTAL SCORE: 18
TACTILE DISTURBANCES: MILD ITCHING, PINS AND NEEDLES SENSATION, BURNING OR NUMBNESS
TREMOR: TREMOR NOT VISIBLE BUT CAN BE FELT, FINGERTIP TO FINGERTIP
AUDITORY DISTURBANCES: NOT PRESENT
DO YOU DRINK ALCOHOL: YES
ANXIETY: MODERATELY ANXIOUS OR GUARDED, SO ANXIETY IS INFERRED
VISUAL DISTURBANCES: NOT PRESENT
ANXIETY: *
ORIENTATION AND CLOUDING OF SENSORIUM: ORIENTED AND CAN DO SERIAL ADDITIONS
ORIENTATION AND CLOUDING OF SENSORIUM: ORIENTED AND CAN DO SERIAL ADDITIONS
VISUAL DISTURBANCES: VERY MILD SENSITIVITY
TACTILE DISTURBANCES: VERY MILD ITCHING, PINS AND NEEDLES SENSATION, BURNING OR NUMBNESS
VISUAL DISTURBANCES: NOT PRESENT
TREMOR: *
TREMOR: TREMOR NOT VISIBLE BUT CAN BE FELT, FINGERTIP TO FINGERTIP
NAUSEA AND VOMITING: INTERMITTENT NAUSEA WITH DRY HEAVES

## 2018-12-12 ASSESSMENT — PAIN SCALES - GENERAL
PAINLEVEL_OUTOF10: 3
PAINLEVEL_OUTOF10: 10
PAINLEVEL_OUTOF10: 0
PAINLEVEL_OUTOF10: 6
PAINLEVEL_OUTOF10: 10
PAINLEVEL_OUTOF10: 10

## 2018-12-12 ASSESSMENT — COGNITIVE AND FUNCTIONAL STATUS - GENERAL
DRESSING REGULAR LOWER BODY CLOTHING: A LITTLE
STANDING UP FROM CHAIR USING ARMS: A LITTLE
CLIMB 3 TO 5 STEPS WITH RAILING: A LITTLE
SUGGESTED CMS G CODE MODIFIER MOBILITY: CK
WALKING IN HOSPITAL ROOM: A LITTLE
DAILY ACTIVITIY SCORE: 22
TOILETING: A LITTLE
MOBILITY SCORE: 19
MOVING TO AND FROM BED TO CHAIR: A LITTLE
MOVING FROM LYING ON BACK TO SITTING ON SIDE OF FLAT BED: A LITTLE
SUGGESTED CMS G CODE MODIFIER DAILY ACTIVITY: CJ

## 2018-12-12 ASSESSMENT — ENCOUNTER SYMPTOMS
DIARRHEA: 0
NAUSEA: 1
SHORTNESS OF BREATH: 0
NECK PAIN: 0
NERVOUS/ANXIOUS: 1
BLURRED VISION: 0
ABDOMINAL PAIN: 0
VOMITING: 1
CHILLS: 0
SEIZURES: 0
BACK PAIN: 0
DIZZINESS: 0
HEADACHES: 1
WEAKNESS: 1
SORE THROAT: 0
HEARTBURN: 0
FEVER: 0
COUGH: 0
WHEEZING: 0
TREMORS: 1

## 2018-12-12 ASSESSMENT — PAIN SCALES - WONG BAKER: WONGBAKER_NUMERICALRESPONSE: DOESN'T HURT AT ALL

## 2018-12-12 NOTE — ED NOTES
Pt has increased anxiety, c/o visual hallucinations, remains diaphoretic..    2 bouts of vomit, dry heaving.   erp aware verbal order given for Zofran.     Pt medicated with Zofran abd IV ativan.  Fluids running, placed on 2lnc

## 2018-12-12 NOTE — ED PROVIDER NOTES
ED Provider Note    Scribed for Gerald Yeung M.D. by Breezy Weeks. 12/12/2018  5:10 AM    Primary care provider: Mohsen Tamasaby, M.D.  Means of arrival: EMS  History obtained from: patient  History limited by: none    CHIEF COMPLAINT  Chief Complaint   Patient presents with   • ETOH Withdrawal       HPI  Chace Gong is a 53 y.o. male with a history of alcohol abuse who presents to the Emergency Department for evaluation of alcohol withdrawal and complaining of nausea, vomiting starting last night. He was treated with Zofran en route to the ED with incomplete relief to his symptoms. Patient reports associated sputum production, chills, subjective fever. He states that he was binge using alcohol for the last few days. Patient's last alcoholic beverage was 12 hours ago. He reports a recent history of pneumonia for which he is currently on a course of antibiotics. Patient denies shortness of breath, seizures, chest pain, diarrhea.     REVIEW OF SYSTEMS  Pertinent positives include nausea, vomiting, diarrhea, sputum production, chills, subjective fever..   Pertinent negatives include no shortness of breath, seizures, chest pain.    All other systems reviewed and negative. See HPI for further details.     PAST MEDICAL HISTORY   has a past medical history of Achilles tendinitis (6/24/2009); Arthralgia (6/24/2009); Bronchitis (6/24/2009); Dyslipidemia (6/24/2009); GERD (gastroesophageal reflux disease) (6/24/2009); Hepatitis C; HTN (hypertension) (6/24/2009); Hypertension; Low back pain; and Psychiatric disorder.    SURGICAL HISTORY   has a past surgical history that includes neurolysis (1/21/2009); other orthopedic surgery (2001); carpal tunnel release (1/21/2009); hardware removal ortho (1/21/2009); synovectomy (1/21/2009); arthrotomy (2011); arthroscopy, knee (2013); elbow arthrotomy (5/17/2013); and loose body removal (5/17/2013).    SOCIAL HISTORY  Social History   Substance Use Topics   • Smoking status:  "Current Some Day Smoker     Packs/day: 0.25     Years: 30.00     Types: Cigarettes     Start date: 8/22/2015     Last attempt to quit: 11/14/2015   • Smokeless tobacco: Never Used      Comment: initiated cessation 12 Aug 2015, occasional cigar   • Alcohol use 0.0 oz/week      Comment: vodka /beer      History   Drug Use   • Types: Inhaled, Marijuana     Comment: has med marijuana card; smokes 1 joint every morning, speed       FAMILY HISTORY  Family History   Problem Relation Age of Onset   • Cancer Mother         lung / throat / breast   • Hypertension Mother    • Hypertension Father    • Hyperlipidemia Father    • Stroke Father    • Alcohol/Drug Father    • Diabetes Sister    • Hypertension Sister    • Alcohol/Drug Sister    • Cancer Brother    • Hypertension Brother    • Alcohol/Drug Brother    • Stroke Brother    • Alcohol/Drug Brother    • Cancer Sister         lung   • Heart Disease Neg Hx        CURRENT MEDICATIONS  Current Outpatient Prescriptions:   •  tamsulosin (FLOMAX) 0.4 MG capsule, Take 1 Cap by mouth every day., Disp: 7 Cap, Rfl: 0  •  lisinopril (PRINIVIL) 20 MG Tab, Take 20 mg by mouth every evening., Disp: , Rfl:     ALLERGIES  Allergies   Allergen Reactions   • Penicillins Unspecified     Pt states that his mom told him he is allergic to penicillins.   • Hydrocodone Unspecified     Pt states \"I'm not sure\".   • Sulfa Drugs      \"my mom told me I was allergic to it when i was a kid\"       PHYSICAL EXAM  VITAL SIGNS: /107   Pulse (!) 101   Temp 36.9 °C (98.5 °F) (Tympanic)   Resp 18   Ht 1.727 m (5' 8\")   Wt 104.3 kg (230 lb)   BMI 34.97 kg/m²     Nursing note and vitals reviewed.  Constitutional: Well-developed and well-nourished. Moderate distress. Smells of alcohol.  HENT: Head is normocephalic and atraumatic. Oropharynx is clear and dry without exudate or erythema. Dry mucous membranes  Eyes: Pupils are equal, round, and reactive to light. Conjunctiva are normal.   Cardiovascular: " Tachycardia Normal regular rhythm. No murmur heard. Normal radial pulses.  Pulmonary/Chest: Breath sounds normal. No wheezes or rales. Scattered rhonchi.   Abdominal: Soft and non-tender. No distention    Musculoskeletal: Extremities exhibit normal range of motion without edema or tenderness.   Neurological: Awake, alert and oriented to person, place, and time. No focal deficits noted. Slightly tremulous.   Skin: Skin is warm and dry. No rash.   Psychiatric: Normal mood and affect. Appropriate for clinical situation.    DIAGNOSTIC STUDIES / PROCEDURES    EKG Interpretation  Interpreted by me as below.     LABS  Results for orders placed or performed during the hospital encounter of 12/12/18   Blood Alcohol   Result Value Ref Range    Diagnostic Alcohol 0.05 (H) 0.00 g/dL   CBC WITH DIFFERENTIAL   Result Value Ref Range    WBC 6.2 4.8 - 10.8 K/uL    RBC 5.01 4.70 - 6.10 M/uL    Hemoglobin 17.0 14.0 - 18.0 g/dL    Hematocrit 49.8 42.0 - 52.0 %    MCV 99.4 (H) 81.4 - 97.8 fL    MCH 33.9 (H) 27.0 - 33.0 pg    MCHC 34.1 33.7 - 35.3 g/dL    RDW 48.1 35.9 - 50.0 fL    Platelet Count 168 164 - 446 K/uL    MPV 9.2 9.0 - 12.9 fL    Neutrophils-Polys 58.20 44.00 - 72.00 %    Lymphocytes 32.20 22.00 - 41.00 %    Monocytes 6.70 0.00 - 13.40 %    Eosinophils 1.80 0.00 - 6.90 %    Basophils 0.80 0.00 - 1.80 %    Immature Granulocytes 0.30 0.00 - 0.90 %    Nucleated RBC 0.00 /100 WBC    Neutrophils (Absolute) 3.58 1.82 - 7.42 K/uL    Lymphs (Absolute) 1.98 1.00 - 4.80 K/uL    Monos (Absolute) 0.41 0.00 - 0.85 K/uL    Eos (Absolute) 0.11 0.00 - 0.51 K/uL    Baso (Absolute) 0.05 0.00 - 0.12 K/uL    Immature Granulocytes (abs) 0.02 0.00 - 0.11 K/uL    NRBC (Absolute) 0.00 K/uL   COMP METABOLIC PANEL   Result Value Ref Range    Sodium 142 135 - 145 mmol/L    Potassium 3.6 3.6 - 5.5 mmol/L    Chloride 105 96 - 112 mmol/L    Co2 23 20 - 33 mmol/L    Anion Gap 14.0 (H) 0.0 - 11.9    Glucose 101 (H) 65 - 99 mg/dL    Bun 7 (L) 8 - 22 mg/dL     Creatinine 0.79 0.50 - 1.40 mg/dL    Calcium 9.0 8.5 - 10.5 mg/dL    AST(SGOT) 105 (H) 12 - 45 U/L    ALT(SGPT) 141 (H) 2 - 50 U/L    Alkaline Phosphatase 72 30 - 99 U/L    Total Bilirubin 0.5 0.1 - 1.5 mg/dL    Albumin 4.2 3.2 - 4.9 g/dL    Total Protein 7.7 6.0 - 8.2 g/dL    Globulin 3.5 1.9 - 3.5 g/dL    A-G Ratio 1.2 g/dL   LIPASE   Result Value Ref Range    Lipase 62 11 - 82 U/L   LACTIC ACID   Result Value Ref Range    Lactic Acid 3.1 (H) 0.5 - 2.0 mmol/L   LACTIC ACID   Result Value Ref Range    Lactic Acid 3.6 (H) 0.5 - 2.0 mmol/L   ESTIMATED GFR   Result Value Ref Range    GFR If African American >60 >60 mL/min/1.73 m 2    GFR If Non African American >60 >60 mL/min/1.73 m 2   TSH (Thyroid Stimulating Hormone)   Result Value Ref Range    TSH 2.750 0.380 - 5.330 uIU/mL   EKG: STAT   Result Value Ref Range    Report       [facility] Emergency Dept.    Test Date:  2018  Pt Name:    SIMON MITCHELL                Department: ER  MRN:        [id number]                      Room:       [facility]  Gender:     Male                         Technician: [id number]  :        1965                   Requested By:CHENCHO MELENDEZ  Order #:    [id number]                    Reading MD: RAFAEL WAY MD    Measurements  Intervals                                Axis  Rate:       78                           P:          40  NJ:         144                          QRS:        58  QRSD:       98                           T:          38  QT:         404  QTc:        461    Interpretive Statements  SINUS RHYTHM  EARLY PRECORDIAL R/S TRANSITION  Compared to ECG 10/17/2018 07:13:56  No significant changes    Electronically Signed On 2018 8:57:09 PST by RAFAEL WAY MD     All labs reviewed by me.    RADIOLOGY  DX-HAND 3+ RIGHT   Final Result      Healing intra-articular fifth metacarpal base fracture      DX-CHEST-PORTABLE (1 VIEW)   Final Result         1. No acute cardiopulmonary abnormalities are  identified.      The radiologist's interpretation of all radiological studies have been reviewed by me.    COURSE & MEDICAL DECISION MAKING  Nursing notes, VS, PMSFHx reviewed in chart.     5:10 AM - Patient seen and examined at bedside. Patient will be treated with ER detox 1000 ml, Ativan, NS 1000 ml , Zithromax 500 mg, Unasyn 3 g. Intravenous fluids were administered for tachycardia, vomiting, dehydration. Ordered DX chest, Lactic acid x2, CBC with differential, CMP, Urinalysis, Urine culture, Blood culture x2, Blood alcohol to evaluate his symptoms. The differential diagnoses include but are not limited to: sepsis, alcohol withdrawal, pneumonia, gastritis    7:01 AM - Patient reevaluated at bedside. He is currently sleeping and breathing well. Patient had a positive response to IV fluids with improved heart rate.     7:16 AM  - I discussed the patient's case and the above findings with Dr. Storm (hospitalist) who agrees to admit the patient.     Patient presents today with an alcohol withdrawal syndrome.  He appears dehydrated.  He is tremulous and tachycardic.  I feel that he requires admission to the hospital for further evaluation and treatment.    DISPOSITION:  Patient will be admitted to hospital in guarded condition.    FINAL IMPRESSION  1. Alcohol withdrawal syndrome with complication (HCC)    2. Alcohol abuse    3. Alcoholic hepatitis without ascites          IBreezy (Shaziaibmohan), am scribing for, and in the presence of, Gerald Yeung M.D..    Electronically signed by: Breezy Weeks (Shaziaibe), 12/12/2018    IGerald M.D. personally performed the services described in this documentation, as scribed by Breezy Weeks in my presence, and it is both accurate and complete. C    The note accurately reflects work and decisions made by me.  Gerald Yeung  12/12/2018  11:21 AM

## 2018-12-12 NOTE — H&P
Hospital Medicine History & Physical Note    Date of Service  12/12/2018    Primary Care Physician  Mohsen Tamasaby, M.D.    Consultants  None    Code Status  Full    Chief Complaint  Tremors    History of Presenting Illness  53 y.o. male who presented 12/12/2018 with known history of alcohol abuse, states he has been drinking heavily for the past month, he drinks a pint of vodka and at least 2 beers per day.  He woke up this morning with symptoms of withdrawal specifically tremors.  He had an episode of nausea vomiting this morning.  He denies any chest pain palpitations shortness of breath, dizziness or lightheadedness.  He has had several admissions for alcohol withdrawal with the past most recent one was 2 months ago.  States he stopped drinking for a while then but started back up again.  Also has history of a metacarpal fracture of the right fifth, he had a cast placed about a month ago, he removed it on his own after about a week.  States he is no longer hurts.    Review of Systems  Review of Systems   Constitutional: Positive for malaise/fatigue. Negative for chills and fever.   HENT: Negative for hearing loss and sore throat.    Eyes: Negative for blurred vision.   Respiratory: Negative for cough, shortness of breath and wheezing.    Cardiovascular: Negative for chest pain and leg swelling.   Gastrointestinal: Positive for nausea and vomiting. Negative for abdominal pain, diarrhea and heartburn.   Genitourinary: Negative for dysuria.   Musculoskeletal: Negative for back pain and neck pain.   Skin: Negative for rash.   Neurological: Positive for tremors, weakness and headaches. Negative for dizziness and seizures.   Psychiatric/Behavioral: The patient is nervous/anxious.        Past Medical History   has a past medical history of Achilles tendinitis (6/24/2009); Arthralgia (6/24/2009); Bronchitis (6/24/2009); Dyslipidemia (6/24/2009); GERD (gastroesophageal reflux disease) (6/24/2009); Hepatitis C; HTN  "(hypertension) (6/24/2009); Hypertension; Low back pain; and Psychiatric disorder.    Surgical History   has a past surgical history that includes neurolysis (1/21/2009); other orthopedic surgery (2001); carpal tunnel release (1/21/2009); hardware removal ortho (1/21/2009); synovectomy (1/21/2009); arthrotomy (2011); arthroscopy, knee (2013); elbow arthrotomy (5/17/2013); and loose body removal (5/17/2013).     Family History  family history includes Alcohol/Drug in his brother, brother, father, and sister; Cancer in his brother, mother, and sister; Diabetes in his sister; Hyperlipidemia in his father; Hypertension in his brother, father, mother, and sister; Stroke in his brother and father.     Social History   reports that he has been smoking Cigarettes.  He started smoking about 3 years ago. He has a 7.50 pack-year smoking history. He has never used smokeless tobacco. He reports that he drinks alcohol. He reports that he uses drugs, including Inhaled and Marijuana.    Allergies  Allergies   Allergen Reactions   • Penicillins Unspecified     Pt states that his mom told him he is allergic to penicillins.   • Hydrocodone Unspecified     Pt states \"I'm not sure\".   • Sulfa Drugs      \"my mom told me I was allergic to it when i was a kid\"       Medications  Prior to Admission Medications   Prescriptions Last Dose Informant Patient Reported? Taking?   azithromycin (ZITHROMAX) 250 MG Tab 12/11/2018 at Complete Patient Yes Yes   Sig: Take 250-500 mg by mouth every day. Take 2 tablets on day one, then take 1 tablet every day for 4 days  Completed ZPAK on 12/06   lisinopril (PRINIVIL) 20 MG Tab 12/11/2018 at AM Patient Yes No   Sig: Take 20 mg by mouth every evening.   tamsulosin (FLOMAX) 0.4 MG capsule 12/11/2018 at AM Patient No No   Sig: Take 1 Cap by mouth every day.      Facility-Administered Medications: None       Physical Exam  Temp:  [36.9 °C (98.5 °F)] 36.9 °C (98.5 °F)  Pulse:  [] 90  Resp:  [15-18] " 18  BP: (134-156)/() 134/94    Physical Exam   Constitutional: He is oriented to person, place, and time. He appears well-developed and well-nourished.   HENT:   Head: Normocephalic and atraumatic.   Eyes: Pupils are equal, round, and reactive to light. Conjunctivae are normal.   Neck: No tracheal deviation present. No thyromegaly present.   Cardiovascular: Normal rate and regular rhythm.    Pulmonary/Chest: Effort normal and breath sounds normal.   Abdominal: Soft. Bowel sounds are normal. He exhibits no distension. There is no tenderness.   Musculoskeletal: He exhibits no edema.   Lymphadenopathy:     He has no cervical adenopathy.   Neurological: He is alert and oriented to person, place, and time.   tremors   Skin: Skin is warm and dry.   Nursing note and vitals reviewed.      Laboratory:  Recent Labs      12/12/18   0552   WBC  6.2   RBC  5.01   HEMOGLOBIN  17.0   HEMATOCRIT  49.8   MCV  99.4*   MCH  33.9*   MCHC  34.1   RDW  48.1   PLATELETCT  168   MPV  9.2     Recent Labs      12/12/18   0552   SODIUM  142   POTASSIUM  3.6   CHLORIDE  105   CO2  23   GLUCOSE  101*   BUN  7*   CREATININE  0.79   CALCIUM  9.0     Recent Labs      12/12/18   0552   ALTSGPT  141*   ASTSGOT  105*   ALKPHOSPHAT  72   TBILIRUBIN  0.5   LIPASE  62   GLUCOSE  101*                 No results for input(s): TROPONINI in the last 72 hours.    Urinalysis:    No results found     Imaging:  DX-HAND 3+ RIGHT   Final Result      Healing intra-articular fifth metacarpal base fracture      DX-CHEST-PORTABLE (1 VIEW)   Final Result         1. No acute cardiopulmonary abnormalities are identified.            Assessment/Plan:  I anticipate this patient will require at least two midnights for appropriate medical management, necessitating inpatient admission.    * Alcohol withdrawal (HCC)- (present on admission)   Assessment & Plan    CIWA protocol     Lactic acidosis- (present on admission)   Assessment & Plan    IVF hydration  Serial  lactic     Closed nondisplaced fracture of shaft of fifth metacarpal bone of right hand- (present on admission)   Assessment & Plan    Check xray     Hep C w/o coma, chronic (HCC)- (present on admission)   Assessment & Plan    Follow up as outpatient     Essential hypertension- (present on admission)   Assessment & Plan    Lisinopril  Clonidine and IV Vasotec prn       Tobacco abuse- (present on admission)   Assessment & Plan    Nicotine replacement protocol     BPH (benign prostatic hypertrophy)- (present on admission)   Assessment & Plan    Flomax         VTE prophylaxis: Lovenox

## 2018-12-12 NOTE — PROGRESS NOTES
Patient transported to floor from ed. Patient A&Ox4. Plan of care discussed with patient. Patient oriented to floor and surroundings. Patient currently on 5L. VSS. Patient is not expressing any complaints of pain at this time. Tele box placed. Monitor room notified. 2 rn skin check performed. Patient educated to call for assistance before ambulating. Patient education regarding fall risk. Call light within reach. Fall precautions in place.

## 2018-12-12 NOTE — ED NOTES
Report received from Maximus CONLEY pt care assumed. Pt is resting appears calm vss IV antibiotics started.

## 2018-12-12 NOTE — PROGRESS NOTES
Med Rec complete per Pt at bedside  Allergies Reviewed    Pt completed ZPAK on 12/11    Pt completed 5 day course of FLAGYL and DOXYCYLINE on 11/21/18

## 2018-12-12 NOTE — CARE PLAN
Problem: Safety  Goal: Will remain free from falls    Intervention: Implement fall precautions   12/12/18 1556   OTHER   Environmental Precautions Treaded Slipper Socks on Patient;Personal Belongings, Wastebasket, Call Bell etc. in Easy Reach;Transferred to Stronger Side;Communication Sign for Patients & Families;Bed in Low Position;Report Given to Other Health Care Providers Regarding Fall Risk;Mobility Assessed & Appropriate Sign Placed   Bedrails Bedrails Closest to Bathroom Down   Chair/Bed Strip Alarm Yes - Alarm On   Bed Alarm Yes - Alarm On         Problem: Knowledge Deficit  Goal: Knowledge of disease process/condition, treatment plan, diagnostic tests, and medications will improve    Intervention: Assess knowledge level of disease process/condition, treatment plan, diagnostic tests, and medications  Rn will provided education to patient regarding disease process, treatment plan, diagnostic tests and medications pertinent to the patient's condition. RN will answer questions that the patient has related to their condition.

## 2018-12-13 VITALS
HEIGHT: 74 IN | HEART RATE: 92 BPM | OXYGEN SATURATION: 90 % | TEMPERATURE: 98.7 F | WEIGHT: 253.53 LBS | BODY MASS INDEX: 32.54 KG/M2 | SYSTOLIC BLOOD PRESSURE: 135 MMHG | DIASTOLIC BLOOD PRESSURE: 91 MMHG | RESPIRATION RATE: 22 BRPM

## 2018-12-13 LAB
ALBUMIN SERPL BCP-MCNC: 3.3 G/DL (ref 3.2–4.9)
ALBUMIN/GLOB SERPL: 1.2 G/DL
ALP SERPL-CCNC: 58 U/L (ref 30–99)
ALT SERPL-CCNC: 97 U/L (ref 2–50)
ANION GAP SERPL CALC-SCNC: 6 MMOL/L (ref 0–11.9)
AST SERPL-CCNC: 73 U/L (ref 12–45)
BASOPHILS # BLD AUTO: 0.5 % (ref 0–1.8)
BASOPHILS # BLD: 0.03 K/UL (ref 0–0.12)
BILIRUB SERPL-MCNC: 1.1 MG/DL (ref 0.1–1.5)
BUN SERPL-MCNC: 7 MG/DL (ref 8–22)
CALCIUM SERPL-MCNC: 8 MG/DL (ref 8.5–10.5)
CHLORIDE SERPL-SCNC: 107 MMOL/L (ref 96–112)
CO2 SERPL-SCNC: 25 MMOL/L (ref 20–33)
CREAT SERPL-MCNC: 0.65 MG/DL (ref 0.5–1.4)
EKG IMPRESSION: NORMAL
EOSINOPHIL # BLD AUTO: 0.13 K/UL (ref 0–0.51)
EOSINOPHIL NFR BLD: 2 % (ref 0–6.9)
ERYTHROCYTE [DISTWIDTH] IN BLOOD BY AUTOMATED COUNT: 47.1 FL (ref 35.9–50)
GLOBULIN SER CALC-MCNC: 2.8 G/DL (ref 1.9–3.5)
GLUCOSE SERPL-MCNC: 90 MG/DL (ref 65–99)
HCT VFR BLD AUTO: 42.4 % (ref 42–52)
HGB BLD-MCNC: 14.5 G/DL (ref 14–18)
IMM GRANULOCYTES # BLD AUTO: 0.01 K/UL (ref 0–0.11)
IMM GRANULOCYTES NFR BLD AUTO: 0.2 % (ref 0–0.9)
LACTATE BLD-SCNC: 0.9 MMOL/L (ref 0.5–2)
LYMPHOCYTES # BLD AUTO: 2.15 K/UL (ref 1–4.8)
LYMPHOCYTES NFR BLD: 32.8 % (ref 22–41)
MAGNESIUM SERPL-MCNC: 1.9 MG/DL (ref 1.5–2.5)
MCH RBC QN AUTO: 34 PG (ref 27–33)
MCHC RBC AUTO-ENTMCNC: 34.2 G/DL (ref 33.7–35.3)
MCV RBC AUTO: 99.3 FL (ref 81.4–97.8)
MONOCYTES # BLD AUTO: 0.34 K/UL (ref 0–0.85)
MONOCYTES NFR BLD AUTO: 5.2 % (ref 0–13.4)
NEUTROPHILS # BLD AUTO: 3.89 K/UL (ref 1.82–7.42)
NEUTROPHILS NFR BLD: 59.3 % (ref 44–72)
NRBC # BLD AUTO: 0.02 K/UL
NRBC BLD-RTO: 0.3 /100 WBC
PHOSPHATE SERPL-MCNC: 3 MG/DL (ref 2.5–4.5)
PLATELET # BLD AUTO: 128 K/UL (ref 164–446)
PMV BLD AUTO: 9.5 FL (ref 9–12.9)
POTASSIUM SERPL-SCNC: 3.8 MMOL/L (ref 3.6–5.5)
PROT SERPL-MCNC: 6.1 G/DL (ref 6–8.2)
RBC # BLD AUTO: 4.27 M/UL (ref 4.7–6.1)
SODIUM SERPL-SCNC: 138 MMOL/L (ref 135–145)
WBC # BLD AUTO: 6.6 K/UL (ref 4.8–10.8)

## 2018-12-13 PROCEDURE — 93005 ELECTROCARDIOGRAM TRACING: CPT | Performed by: EMERGENCY MEDICINE

## 2018-12-13 PROCEDURE — 700102 HCHG RX REV CODE 250 W/ 637 OVERRIDE(OP): Performed by: FAMILY MEDICINE

## 2018-12-13 PROCEDURE — 80053 COMPREHEN METABOLIC PANEL: CPT

## 2018-12-13 PROCEDURE — 36415 COLL VENOUS BLD VENIPUNCTURE: CPT

## 2018-12-13 PROCEDURE — 83735 ASSAY OF MAGNESIUM: CPT

## 2018-12-13 PROCEDURE — A9270 NON-COVERED ITEM OR SERVICE: HCPCS | Performed by: EMERGENCY MEDICINE

## 2018-12-13 PROCEDURE — 700105 HCHG RX REV CODE 258: Performed by: FAMILY MEDICINE

## 2018-12-13 PROCEDURE — 93010 ELECTROCARDIOGRAM REPORT: CPT | Mod: GZ | Performed by: INTERNAL MEDICINE

## 2018-12-13 PROCEDURE — 83605 ASSAY OF LACTIC ACID: CPT

## 2018-12-13 PROCEDURE — A9270 NON-COVERED ITEM OR SERVICE: HCPCS | Performed by: FAMILY MEDICINE

## 2018-12-13 PROCEDURE — 700111 HCHG RX REV CODE 636 W/ 250 OVERRIDE (IP): Performed by: FAMILY MEDICINE

## 2018-12-13 PROCEDURE — 700102 HCHG RX REV CODE 250 W/ 637 OVERRIDE(OP): Performed by: EMERGENCY MEDICINE

## 2018-12-13 PROCEDURE — 85025 COMPLETE CBC W/AUTO DIFF WBC: CPT

## 2018-12-13 PROCEDURE — 99239 HOSP IP/OBS DSCHRG MGMT >30: CPT | Performed by: INTERNAL MEDICINE

## 2018-12-13 PROCEDURE — 84100 ASSAY OF PHOSPHORUS: CPT

## 2018-12-13 RX ORDER — CHLORDIAZEPOXIDE HYDROCHLORIDE 25 MG/1
25 CAPSULE, GELATIN COATED ORAL EVERY 8 HOURS
Status: DISCONTINUED | OUTPATIENT
Start: 2018-12-13 | End: 2018-12-13 | Stop reason: HOSPADM

## 2018-12-13 RX ORDER — CHLORDIAZEPOXIDE HYDROCHLORIDE 25 MG/1
25 CAPSULE, GELATIN COATED ORAL EVERY 8 HOURS
Status: DISCONTINUED | OUTPATIENT
Start: 2018-12-13 | End: 2018-12-13

## 2018-12-13 RX ADMIN — SODIUM CHLORIDE: 9 INJECTION, SOLUTION INTRAVENOUS at 10:54

## 2018-12-13 RX ADMIN — LORAZEPAM 1 MG: 1 TABLET ORAL at 05:17

## 2018-12-13 RX ADMIN — OXYCODONE HYDROCHLORIDE 5 MG: 5 TABLET ORAL at 05:24

## 2018-12-13 RX ADMIN — SODIUM CHLORIDE: 9 INJECTION, SOLUTION INTRAVENOUS at 04:01

## 2018-12-13 RX ADMIN — LORAZEPAM 1.5 MG: 2 INJECTION INTRAMUSCULAR at 11:55

## 2018-12-13 RX ADMIN — LORAZEPAM 1.5 MG: 2 INJECTION INTRAMUSCULAR at 13:38

## 2018-12-13 RX ADMIN — LORAZEPAM 1.5 MG: 2 INJECTION INTRAMUSCULAR at 09:23

## 2018-12-13 RX ADMIN — LORAZEPAM 1.5 MG: 2 INJECTION INTRAMUSCULAR at 08:18

## 2018-12-13 RX ADMIN — NICOTINE 21 MG: 21 PATCH, EXTENDED RELEASE TRANSDERMAL at 05:17

## 2018-12-13 RX ADMIN — LORAZEPAM 2 MG: 1 TABLET ORAL at 02:52

## 2018-12-13 RX ADMIN — THERA TABS 1 TABLET: TAB at 05:18

## 2018-12-13 RX ADMIN — Medication 100 MG: at 05:18

## 2018-12-13 RX ADMIN — OXYCODONE HYDROCHLORIDE 5 MG: 5 TABLET ORAL at 10:54

## 2018-12-13 RX ADMIN — FOLIC ACID 1 MG: 1 TABLET ORAL at 05:18

## 2018-12-13 RX ADMIN — TAMSULOSIN HYDROCHLORIDE 0.4 MG: 0.4 CAPSULE ORAL at 05:18

## 2018-12-13 RX ADMIN — CHLORDIAZEPOXIDE HYDROCHLORIDE 25 MG: 25 CAPSULE ORAL at 12:01

## 2018-12-13 RX ADMIN — LORAZEPAM 1.5 MG: 2 INJECTION INTRAMUSCULAR at 10:49

## 2018-12-13 ASSESSMENT — LIFESTYLE VARIABLES
HOW MANY TIMES IN THE PAST YEAR HAVE YOU HAD 5 OR MORE DRINKS IN A DAY: 20
PAROXYSMAL SWEATS: *
TOTAL SCORE: 15
TREMOR: *
TREMOR: *
HAVE YOU EVER FELT YOU SHOULD CUT DOWN ON YOUR DRINKING: YES
HEADACHE, FULLNESS IN HEAD: MODERATE
AGITATION: *
TREMOR: *
ANXIETY: MODERATELY ANXIOUS OR GUARDED, SO ANXIETY IS INFERRED
HEADACHE, FULLNESS IN HEAD: MODERATE
AUDITORY DISTURBANCES: NOT PRESENT
TOTAL SCORE: MILD ITCHING, PINS AND NEEDLES SENSATION, BURNING OR NUMBNESS
VISUAL DISTURBANCES: MILD SENSITIVITY
NAUSEA AND VOMITING: NO NAUSEA AND NO VOMITING
AUDITORY DISTURBANCES: NOT PRESENT
AUDITORY DISTURBANCES: MILD HARSHNESS OR ABILITY TO FRIGHTEN
ORIENTATION AND CLOUDING OF SENSORIUM: ORIENTED AND CAN DO SERIAL ADDITIONS
ORIENTATION AND CLOUDING OF SENSORIUM: ORIENTED AND CAN DO SERIAL ADDITIONS
NAUSEA AND VOMITING: NO NAUSEA AND NO VOMITING
TOTAL SCORE: 15
ORIENTATION AND CLOUDING OF SENSORIUM: ORIENTED AND CAN DO SERIAL ADDITIONS
ON A TYPICAL DAY WHEN YOU DRINK ALCOHOL HOW MANY DRINKS DO YOU HAVE: 8
ORIENTATION AND CLOUDING OF SENSORIUM: ORIENTED AND CAN DO SERIAL ADDITIONS
ANXIETY: *
VISUAL DISTURBANCES: NOT PRESENT
HAVE PEOPLE ANNOYED YOU BY CRITICIZING YOUR DRINKING: YES
AUDITORY DISTURBANCES: MILD HARSHNESS OR ABILITY TO FRIGHTEN
PAROXYSMAL SWEATS: *
VISUAL DISTURBANCES: MODERATE SENSITIVITY
ANXIETY: MODERATELY ANXIOUS OR GUARDED, SO ANXIETY IS INFERRED
AGITATION: NORMAL ACTIVITY
TREMOR: *
AGITATION: NORMAL ACTIVITY
VISUAL DISTURBANCES: NOT PRESENT
VISUAL DISTURBANCES: MODERATE SENSITIVITY
AGITATION: *
TOTAL SCORE: 18
CONSUMPTION TOTAL: POSITIVE
AGITATION: *
NAUSEA AND VOMITING: NO NAUSEA AND NO VOMITING
ANXIETY: *
TOTAL SCORE: 2
AGITATION: *
PAROXYSMAL SWEATS: *
DOES PATIENT WANT TO STOP DRINKING: CANNOT ASSESS
TOTAL SCORE: 15
AVERAGE NUMBER OF DAYS PER WEEK YOU HAVE A DRINK CONTAINING ALCOHOL: 7
NAUSEA AND VOMITING: NO NAUSEA AND NO VOMITING
TREMOR: *
AUDITORY DISTURBANCES: MILD HARSHNESS OR ABILITY TO FRIGHTEN
ANXIETY: MODERATELY ANXIOUS OR GUARDED, SO ANXIETY IS INFERRED
PAROXYSMAL SWEATS: *
TOTAL SCORE: 2
TOTAL SCORE: 22
ANXIETY: MILDLY ANXIOUS
AGITATION: SOMEWHAT MORE THAN NORMAL ACTIVITY
AUDITORY DISTURBANCES: NOT PRESENT
NAUSEA AND VOMITING: MILD NAUSEA WITH NO VOMITING
HEADACHE, FULLNESS IN HEAD: MODERATE
HEADACHE, FULLNESS IN HEAD: MODERATELY SEVERE
TOTAL SCORE: 10
ORIENTATION AND CLOUDING OF SENSORIUM: ORIENTED AND CAN DO SERIAL ADDITIONS
NAUSEA AND VOMITING: NO NAUSEA AND NO VOMITING
TOTAL SCORE: 2
NAUSEA AND VOMITING: NO NAUSEA AND NO VOMITING
PAROXYSMAL SWEATS: *
VISUAL DISTURBANCES: NOT PRESENT
ALCOHOL_USE: YES
TREMOR: *
HEADACHE, FULLNESS IN HEAD: MODERATE
PAROXYSMAL SWEATS: *
ORIENTATION AND CLOUDING OF SENSORIUM: ORIENTED AND CAN DO SERIAL ADDITIONS
TOTAL SCORE: 13
HEADACHE, FULLNESS IN HEAD: MILD
HEADACHE, FULLNESS IN HEAD: MODERATE
EVER FELT BAD OR GUILTY ABOUT YOUR DRINKING: NO
TREMOR: *
ORIENTATION AND CLOUDING OF SENSORIUM: ORIENTED AND CAN DO SERIAL ADDITIONS
PAROXYSMAL SWEATS: *
EVER HAD A DRINK FIRST THING IN THE MORNING TO STEADY YOUR NERVES TO GET RID OF A HANGOVER: NO
ANXIETY: MODERATELY ANXIOUS OR GUARDED, SO ANXIETY IS INFERRED
VISUAL DISTURBANCES: NOT PRESENT
AUDITORY DISTURBANCES: NOT PRESENT

## 2018-12-13 ASSESSMENT — PAIN SCALES - GENERAL
PAINLEVEL_OUTOF10: 8
PAINLEVEL_OUTOF10: 8
PAINLEVEL_OUTOF10: 7

## 2018-12-13 NOTE — PROGRESS NOTES
2 RN skin check    Patient has dry cracked feet bilaterally. Otherwise patient's skin is diaphoretic, has generalized blanching redness, and intact.

## 2018-12-13 NOTE — PROGRESS NOTES
Patient continues to express want to leave. Bluntly discussed with patient risks on leaving and fact that RN has administered large doses of ativan. Discussed that it is not safe for patient to leave.     Hospitalist RN at bedside to assist with patients agitation and elopement risk. Hospitalist RN paged Hospitalist - per Hospitalist MD OK for patient to leave AMA if unwilling to stay.

## 2018-12-13 NOTE — PROGRESS NOTES
Chace Mayeshomer patient has chosen to leave the hospital against medical advice. The attending physician has not discharged the patient. Patient is not a risk to himself or others. I have discussed with the patient the following:  Physician has not determined patient is ready for discharge, Risks and consequences of leaving the hospital too soon and Benefit of continued hospitalization.      Discharge against medical advice form has been Signed.      Patient is not a minor and a referral to  was made.    Attending physician has been notified.

## 2018-12-13 NOTE — DISCHARGE PLANNING
Anticipated Discharge Disposition: TBD    Action: IDT rounds MD requested LSW to meet with pt to discuss his concerns with the care of his dogs while he is in the hospital.    LSW met with pt at bedside. Pt stated that he did not want any help and he's sure his dogs are being cared for. Bedside nurse confirmed that pt's friend Judy is caring for dogs.    Barriers to Discharge: TBD    Plan: LSW continue to assess for discharge needs.

## 2018-12-13 NOTE — RESPIRATORY CARE
COPD EDUCATION by COPD CLINICAL EDUCATOR  12/13/2018 at 6:12 AM by Comfort Armas     Patient reviewed by COPD education team. Patient does not qualify for COPD program.

## 2018-12-13 NOTE — PROGRESS NOTES
"Patient repeatedly stating that he \"needs to go home\" that he needs to \"care for his dogs.\" Called patients \"caretaker\" Judy - david is in chart. Discussed with Judy that everything is being taken care of at home, she is urging patient to stay and get care with us.     Patients CIWA score ranging from 15-22. Medicating with ativan per MAR.   "

## 2018-12-13 NOTE — PROGRESS NOTES
"Hospitalist at bedside during rounding - explained importance of patient staying in hospital. Explained risks if patient leaves including falls, withdrawal seizures, worsening withdrawal symptoms. Patient agreeable with RN and Hospitalist to \"try to stay today.\"    Continued to medicate with ativan per order based on CIWA scale.    PRN oxycodone given per patient request for pain 8/10 for headache and right hand that per patient \"was broken\" but he \"took the cast off.\"  "

## 2018-12-13 NOTE — CARE PLAN
Problem: Communication  Goal: The ability to communicate needs accurately and effectively will improve    Intervention: Educate patient and significant other/support system about the plan of care, procedures, treatments, medications and allow for questions  Listened to patients discussion of concerns related to disease process and treatment plan. Discussed with patient concerns, goals and management. Discussed importance of patient reporting new signs and symptoms related to disease process. Patient verbalized understanding and understands importance of communicating needs.  Supported and educated patient by addressing specific questions regarding diagnosis, risks, benefits of pain management treatment.                Problem: Safety  Goal: Will remain free from falls    Intervention: Assess risk factors for falls  Educated patient on use of call light, no slip socks on, bed lowest position. All needs attended to. Patient verbalized understanding. Bed alarm on. All fall precautions in place.

## 2018-12-13 NOTE — PROGRESS NOTES
Patient alert, oriented x4. Obvious withdrawal symptoms for which total of 7.5 mg IV ativan has been administered since 0800.     Patient read AMA (refusal to treat) form, explained what AMA means by this RN and Hospitalist RN. Signed form, form placed in chart.    Removed tele monitor and peripheral IV. Notified charge RN. Patient left AMA.

## 2018-12-13 NOTE — PROGRESS NOTES
Report received at bedside, assumed care. Pt is resting in bed, anxious with slight tremors. Next CIWA score at 2100. A&O x 4. Complaints of generalized pain 101/10 in head, neck, right wrist and back. Updated about plan of care. Tele box on. Chart reviewed. Bed in lowest position, treaded slipper sock on, and call light within reach.

## 2018-12-13 NOTE — PROGRESS NOTES
Patient is sleeping comfortably in bed, no signs of distress, even unlabored breathing, satting at 96% on 2 L, will continue to monitor.

## 2018-12-13 NOTE — PROGRESS NOTES
Bedside report received. Patient resting comfortably in bed, denies complaints at this time. CIWA to be done Q4 hours based on last assessment. Call light within reach. Bed alarm on.

## 2018-12-14 LAB
BACTERIA UR CULT: NORMAL
SIGNIFICANT IND 70042: NORMAL
SITE SITE: NORMAL
SOURCE SOURCE: NORMAL

## 2018-12-14 NOTE — DISCHARGE SUMMARY
AMA /discharge Summary    CHIEF COMPLAINT ON ADMISSION  Chief Complaint   Patient presents with   • ETOH Withdrawal       Reason for Admission  GR 36     Admission Date  12/12/2018    CODE STATUS  Full Code    HPI & HOSPITAL COURSE  This is a 53 y.o. male here with history of alcohol abuse with prior history of DTs.  He was admitted for alcohol intoxication with alcohol withdrawal symptoms and tremors.  During this admission he is noted to have significant withdrawal requiring frequent Ativan administration.  On my evaluation, patient is alert and oriented x4.  He is requesting discharge to home AGAINST MEDICAL ADVICE due to social issues.  Patient reports the need to take care of his dogs as well as currently going through a divorce.  Patient understands that he is very sick, and may develop seizures and may need intubation for airway protection as well as condition may lead to death.  Patient states that he had similar condition 9 years ago, and was in full-blown DTs requiring ICU hospitalization.  Patient expresses fear of worsening disease, however states that he is aware of recommendations for ongoing hospitalization.  He states that he would like to stop drinking.  However has occasional binges.  We have clearly discussed with patient his worsening symptoms as well as likelihood for development of delirium tremens/seizures.    We have also reached out to patient's caregiver, ex-wife Judy to assist with home aide support.  Patient at this time is stating that due to social issues, he is not able to stay for any further hospitalization.  He is verbalizing understanding of AGAINST MEDICAL ADVICE discharge.  On discharge she is alert and oriented x4.  He is noted to be obviously tremulous, however is not posing at a risk to himself or others at this point.  He is advised to return to the emergency room immediately for further medical care.           Therefore, he is discharged in guarded and stable condition  "    Discharge AGAINST MEDICAL ADVICE    Discharge Date  12/13/2018        DISCHARGE DIAGNOSES  Principal Problem:    Alcohol withdrawal (HCC) POA: Yes  Active Problems:    Essential hypertension POA: Yes      Overview: Uncertain date of diagnosis, last treated with medication while in [facility].             Unknown medication history.      Reports 100 pound weight loss and no need for antihypertensive medication       since.    Hep C w/o coma, chronic (HCC) POA: Yes    Closed nondisplaced fracture of shaft of fifth metacarpal bone of right hand POA: Yes    Lactic acidosis POA: Yes    BPH (benign prostatic hypertrophy) POA: Yes      Overview: ICD-10 transition      IMO Update    Tobacco abuse POA: Yes  Resolved Problems:    * No resolved hospital problems. *      FOLLOW UP  No future appointments.  No follow-up provider specified.    MEDICATIONS ON DISCHARGE     Medication List      ASK your doctor about these medications      Instructions   azithromycin 250 MG Tabs  Commonly known as:  ZITHROMAX   Take 250-500 mg by mouth every day. Take 2 tablets on day one, then take 1 tablet every day for 4 days Completed ZPAK on 12/06  Dose:  250-500 mg     lisinopril 20 MG Tabs  Commonly known as:  PRINIVIL   Take 20 mg by mouth every evening.  Dose:  20 mg     tamsulosin 0.4 MG capsule  Commonly known as:  FLOMAX   Take 1 Cap by mouth every day.  Dose:  0.4 mg            Allergies  Allergies   Allergen Reactions   • Penicillins Unspecified     Pt states that his mom told him he is allergic to penicillins.   • Hydrocodone Unspecified     Pt states \"I'm not sure\".   • Sulfa Drugs      \"my mom told me I was allergic to it when i was a kid\"       DIET  Orders Placed This Encounter   Procedures   • Diet Order Regular     Standing Status:   Standing     Number of Occurrences:   1     Order Specific Question:   Diet:     Answer:   Regular [1]           LABORATORY  Lab Results   Component Value Date    SODIUM 138 12/13/2018    POTASSIUM " 3.8 12/13/2018    CHLORIDE 107 12/13/2018    CO2 25 12/13/2018    GLUCOSE 90 12/13/2018    BUN 7 (L) 12/13/2018    CREATININE 0.65 12/13/2018    CREATININE 1.0 01/21/2009        Lab Results   Component Value Date    WBC 6.6 12/13/2018    HEMOGLOBIN 14.5 12/13/2018    HEMATOCRIT 42.4 12/13/2018    PLATELETCT 128 (L) 12/13/2018        Total time of the discharge process exceeds 95 minutes.  The total time spent face to face with this patient was 95 mins of which 60% of time was spent on counseling and coordinating care.  Specifically speaking w/ rn, team and pt at length re plan of care.  Extensively reviewed with the patient regarding risk factors and prior known disease.  Face-to-face time was spent, to coordinate care.  Patient has decided to leave AGAINST MEDICAL ADVICE.

## 2018-12-17 LAB
BACTERIA BLD CULT: NORMAL
BACTERIA BLD CULT: NORMAL
SIGNIFICANT IND 70042: NORMAL
SIGNIFICANT IND 70042: NORMAL
SITE SITE: NORMAL
SITE SITE: NORMAL
SOURCE SOURCE: NORMAL
SOURCE SOURCE: NORMAL

## 2019-01-05 ENCOUNTER — HOSPITAL ENCOUNTER (EMERGENCY)
Facility: MEDICAL CENTER | Age: 54
End: 2019-01-05
Attending: EMERGENCY MEDICINE
Payer: MEDICARE

## 2019-01-05 ENCOUNTER — APPOINTMENT (OUTPATIENT)
Dept: RADIOLOGY | Facility: MEDICAL CENTER | Age: 54
End: 2019-01-05
Attending: EMERGENCY MEDICINE
Payer: MEDICARE

## 2019-01-05 VITALS
BODY MASS INDEX: 29.31 KG/M2 | WEIGHT: 228.4 LBS | OXYGEN SATURATION: 93 % | RESPIRATION RATE: 16 BRPM | DIASTOLIC BLOOD PRESSURE: 80 MMHG | TEMPERATURE: 97.6 F | HEIGHT: 74 IN | SYSTOLIC BLOOD PRESSURE: 147 MMHG | HEART RATE: 60 BPM

## 2019-01-05 DIAGNOSIS — S00.83XA CONTUSION OF FACE, INITIAL ENCOUNTER: ICD-10-CM

## 2019-01-05 DIAGNOSIS — F10.10 ALCOHOL ABUSE: ICD-10-CM

## 2019-01-05 DIAGNOSIS — S02.2XXA CLOSED FRACTURE OF NASAL BONE, INITIAL ENCOUNTER: ICD-10-CM

## 2019-01-05 PROCEDURE — 70486 CT MAXILLOFACIAL W/O DYE: CPT

## 2019-01-05 PROCEDURE — 700102 HCHG RX REV CODE 250 W/ 637 OVERRIDE(OP): Performed by: EMERGENCY MEDICINE

## 2019-01-05 PROCEDURE — 70450 CT HEAD/BRAIN W/O DYE: CPT

## 2019-01-05 PROCEDURE — 99284 EMERGENCY DEPT VISIT MOD MDM: CPT

## 2019-01-05 RX ORDER — OXYMETAZOLINE HYDROCHLORIDE 0.05 G/100ML
2 SPRAY NASAL ONCE
Status: COMPLETED | OUTPATIENT
Start: 2019-01-05 | End: 2019-01-05

## 2019-01-05 RX ADMIN — OXYMETAZOLINE HYDROCHLORIDE 2 SPRAY: 5 SPRAY NASAL at 19:32

## 2019-01-05 ASSESSMENT — ENCOUNTER SYMPTOMS
NAUSEA: 0
DIZZINESS: 0
VOMITING: 0
ABDOMINAL PAIN: 0
BACK PAIN: 0
NECK PAIN: 0
DIARRHEA: 0
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
## 2019-01-06 ENCOUNTER — PATIENT OUTREACH (OUTPATIENT)
Dept: HEALTH INFORMATION MANAGEMENT | Facility: OTHER | Age: 54
End: 2019-01-06

## 2019-01-06 NOTE — DISCHARGE INSTRUCTIONS
For the next 10 days:    No use of straws; do not use straws when drinking  Sneeze through mouth only  Use afrin for 3 days

## 2019-01-06 NOTE — DISCHARGE PLANNING
Per Dr. Lawson's request, writer RN gave pt inp and outpt community CD resources for f/u care (Medicare insurance)

## 2019-01-06 NOTE — ED NOTES
Discharge orders received from ERP.  Provided education on discharge instructions and diagnosis.Pt demonstrated understanding of education. Pt verbalized understanding of need for follow up appointment.  Pt ambulatory off unit with all personal belongings.

## 2019-01-06 NOTE — LETTER
Chace Gong  [address] [place] [address]    January 6, 2019      Dear Chace Gong,    [facility] wants to ensure your discharge home is safe and you or your loved ones have had all of your questions answered regarding your care after you leave the hospital.    Our discharge team was unsuccessful in our attempts to contact you telephonically and we wanted to be sure that you had a list of resources and contact information should you have any questions regarding your hospital stay, follow-up instructions, or active medical symptoms.    Questions or Concerns Regarding… Contact   Medical Questions Related to Your Discharge  (7 days a week, 8am-5pm) Contact a Nurse Care Coordinator   [phone]   Medical Questions Not Related to Your Discharge  (24 hours a day / 7 days a week)  Contact the Nurse Health Line   [phone]    Medications or Discharge Instructions Refer to your discharge packet   or contact your -[phone]   Follow-up Appointment(s) Schedule your appointment via IP[employer]   or contact Scheduling [phone]   Billing Review your statement via IPTEGO  or contact Billing [phone]   Medical Records Review your records via IPTEGO   or contact Medical Records [phone]     You can also easily access your medical information, test results and upcoming appointments via the IPTEGO free online health management tool. You can learn more and sign up at [email or web address]. For assistance setting up your IPTE[employer] account, please call [phone].    Once again, we want to ensure your discharge home is safe and that you have a clear understanding of any next steps in your care. If you have any questions or concerns, please do not hesitate to contact us, we are here for you. Thank you for choosing [facility] for your healthcare needs.    Sincerely,      Your [facility] Healthcare Team

## 2019-01-06 NOTE — ED NOTES
Pt appears comfortable in room.  Pt is asleep in bed.  No visible signs or symptoms of distress noted at this time.  Equal rise and fall of chest noted- breaths non labored.   Will continue to monitor.
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
## 2019-01-07 NOTE — PROGRESS NOTES
Chart reviewed.  Pt was discharged from [facility] ER 1/5/19 and does not have a contact phone number listed in Epic record.  Discharge outreach letter mailed to pt.

## 2019-03-11 ENCOUNTER — HOSPITAL ENCOUNTER (EMERGENCY)
Facility: MEDICAL CENTER | Age: 54
End: 2019-03-11
Attending: EMERGENCY MEDICINE
Payer: MEDICARE

## 2019-03-11 ENCOUNTER — APPOINTMENT (OUTPATIENT)
Dept: RADIOLOGY | Facility: MEDICAL CENTER | Age: 54
End: 2019-03-11
Attending: EMERGENCY MEDICINE
Payer: MEDICARE

## 2019-03-11 VITALS
TEMPERATURE: 98.2 F | RESPIRATION RATE: 18 BRPM | OXYGEN SATURATION: 98 % | SYSTOLIC BLOOD PRESSURE: 132 MMHG | WEIGHT: 240.3 LBS | BODY MASS INDEX: 30.84 KG/M2 | HEART RATE: 75 BPM | HEIGHT: 74 IN | DIASTOLIC BLOOD PRESSURE: 76 MMHG

## 2019-03-11 DIAGNOSIS — J98.01 BRONCHOSPASM, ACUTE: ICD-10-CM

## 2019-03-11 DIAGNOSIS — F19.10 DRUG ABUSE (HCC): ICD-10-CM

## 2019-03-11 LAB
ALBUMIN SERPL BCP-MCNC: 4 G/DL (ref 3.2–4.9)
ALBUMIN/GLOB SERPL: 1.3 G/DL
ALP SERPL-CCNC: 81 U/L (ref 30–99)
ALT SERPL-CCNC: 190 U/L (ref 2–50)
ANION GAP SERPL CALC-SCNC: 8 MMOL/L (ref 0–11.9)
APTT PPP: 29.6 SEC (ref 24.7–36)
AST SERPL-CCNC: 119 U/L (ref 12–45)
BASOPHILS # BLD AUTO: 0.4 % (ref 0–1.8)
BASOPHILS # BLD: 0.03 K/UL (ref 0–0.12)
BILIRUB SERPL-MCNC: 0.3 MG/DL (ref 0.1–1.5)
BNP SERPL-MCNC: 16 PG/ML (ref 0–100)
BUN SERPL-MCNC: 12 MG/DL (ref 8–22)
CALCIUM SERPL-MCNC: 8.9 MG/DL (ref 8.5–10.5)
CHLORIDE SERPL-SCNC: 106 MMOL/L (ref 96–112)
CO2 SERPL-SCNC: 27 MMOL/L (ref 20–33)
CREAT SERPL-MCNC: 0.91 MG/DL (ref 0.5–1.4)
EKG IMPRESSION: NORMAL
EOSINOPHIL # BLD AUTO: 0.28 K/UL (ref 0–0.51)
EOSINOPHIL NFR BLD: 3.8 % (ref 0–6.9)
ERYTHROCYTE [DISTWIDTH] IN BLOOD BY AUTOMATED COUNT: 53.1 FL (ref 35.9–50)
GLOBULIN SER CALC-MCNC: 3.2 G/DL (ref 1.9–3.5)
GLUCOSE SERPL-MCNC: 94 MG/DL (ref 65–99)
HCT VFR BLD AUTO: 46.1 % (ref 42–52)
HGB BLD-MCNC: 15.4 G/DL (ref 14–18)
IMM GRANULOCYTES # BLD AUTO: 0.03 K/UL (ref 0–0.11)
IMM GRANULOCYTES NFR BLD AUTO: 0.4 % (ref 0–0.9)
INR PPP: 0.95 (ref 0.87–1.13)
LIPASE SERPL-CCNC: 79 U/L (ref 11–82)
LYMPHOCYTES # BLD AUTO: 3.17 K/UL (ref 1–4.8)
LYMPHOCYTES NFR BLD: 43.5 % (ref 22–41)
MCH RBC QN AUTO: 34.5 PG (ref 27–33)
MCHC RBC AUTO-ENTMCNC: 33.4 G/DL (ref 33.7–35.3)
MCV RBC AUTO: 103.4 FL (ref 81.4–97.8)
MONOCYTES # BLD AUTO: 0.47 K/UL (ref 0–0.85)
MONOCYTES NFR BLD AUTO: 6.4 % (ref 0–13.4)
NEUTROPHILS # BLD AUTO: 3.31 K/UL (ref 1.82–7.42)
NEUTROPHILS NFR BLD: 45.5 % (ref 44–72)
NRBC # BLD AUTO: 0 K/UL
NRBC BLD-RTO: 0 /100 WBC
PLATELET # BLD AUTO: 155 K/UL (ref 164–446)
PMV BLD AUTO: 10.2 FL (ref 9–12.9)
POTASSIUM SERPL-SCNC: 4.1 MMOL/L (ref 3.6–5.5)
PROT SERPL-MCNC: 7.2 G/DL (ref 6–8.2)
PROTHROMBIN TIME: 12.8 SEC (ref 12–14.6)
RBC # BLD AUTO: 4.46 M/UL (ref 4.7–6.1)
SODIUM SERPL-SCNC: 141 MMOL/L (ref 135–145)
TROPONIN I SERPL-MCNC: <0.01 NG/ML (ref 0–0.04)
WBC # BLD AUTO: 7.3 K/UL (ref 4.8–10.8)

## 2019-03-11 PROCEDURE — 93005 ELECTROCARDIOGRAM TRACING: CPT

## 2019-03-11 PROCEDURE — 71045 X-RAY EXAM CHEST 1 VIEW: CPT

## 2019-03-11 PROCEDURE — A9270 NON-COVERED ITEM OR SERVICE: HCPCS | Performed by: EMERGENCY MEDICINE

## 2019-03-11 PROCEDURE — 85025 COMPLETE CBC W/AUTO DIFF WBC: CPT

## 2019-03-11 PROCEDURE — 83690 ASSAY OF LIPASE: CPT

## 2019-03-11 PROCEDURE — 700102 HCHG RX REV CODE 250 W/ 637 OVERRIDE(OP): Performed by: EMERGENCY MEDICINE

## 2019-03-11 PROCEDURE — 700101 HCHG RX REV CODE 250: Performed by: EMERGENCY MEDICINE

## 2019-03-11 PROCEDURE — 99285 EMERGENCY DEPT VISIT HI MDM: CPT

## 2019-03-11 PROCEDURE — 85610 PROTHROMBIN TIME: CPT

## 2019-03-11 PROCEDURE — 93005 ELECTROCARDIOGRAM TRACING: CPT | Performed by: EMERGENCY MEDICINE

## 2019-03-11 PROCEDURE — 84484 ASSAY OF TROPONIN QUANT: CPT

## 2019-03-11 PROCEDURE — 700111 HCHG RX REV CODE 636 W/ 250 OVERRIDE (IP): Performed by: EMERGENCY MEDICINE

## 2019-03-11 PROCEDURE — 80053 COMPREHEN METABOLIC PANEL: CPT

## 2019-03-11 PROCEDURE — 85730 THROMBOPLASTIN TIME PARTIAL: CPT

## 2019-03-11 PROCEDURE — 96374 THER/PROPH/DIAG INJ IV PUSH: CPT

## 2019-03-11 PROCEDURE — 83880 ASSAY OF NATRIURETIC PEPTIDE: CPT

## 2019-03-11 RX ORDER — BENZONATATE 100 MG/1
100 CAPSULE ORAL ONCE
Status: COMPLETED | OUTPATIENT
Start: 2019-03-11 | End: 2019-03-11

## 2019-03-11 RX ORDER — BENZONATATE 100 MG/1
100 CAPSULE ORAL 3 TIMES DAILY PRN
Qty: 20 CAP | Refills: 0 | Status: SHIPPED | OUTPATIENT
Start: 2019-03-11

## 2019-03-11 RX ORDER — PREDNISONE 20 MG/1
20 TABLET ORAL 2 TIMES DAILY
Qty: 10 TAB | Refills: 0 | Status: SHIPPED | OUTPATIENT
Start: 2019-03-11 | End: 2019-03-16

## 2019-03-11 RX ORDER — IPRATROPIUM BROMIDE AND ALBUTEROL SULFATE 2.5; .5 MG/3ML; MG/3ML
3 SOLUTION RESPIRATORY (INHALATION)
Status: COMPLETED | OUTPATIENT
Start: 2019-03-11 | End: 2019-03-11

## 2019-03-11 RX ORDER — DEXAMETHASONE SODIUM PHOSPHATE 10 MG/ML
10 INJECTION, SOLUTION INTRAMUSCULAR; INTRAVENOUS ONCE
Status: COMPLETED | OUTPATIENT
Start: 2019-03-11 | End: 2019-03-11

## 2019-03-11 RX ORDER — ALBUTEROL SULFATE 90 UG/1
2 AEROSOL, METERED RESPIRATORY (INHALATION) EVERY 6 HOURS PRN
Qty: 8.5 G | Refills: 0 | Status: ON HOLD | OUTPATIENT
Start: 2019-03-11 | End: 2020-03-30

## 2019-03-11 RX ADMIN — BENZONATATE 100 MG: 100 CAPSULE ORAL at 17:45

## 2019-03-11 RX ADMIN — IPRATROPIUM BROMIDE AND ALBUTEROL SULFATE 3 ML: .5; 3 SOLUTION RESPIRATORY (INHALATION) at 17:55

## 2019-03-11 RX ADMIN — DEXAMETHASONE SODIUM PHOSPHATE 10 MG: 10 INJECTION, SOLUTION INTRAMUSCULAR; INTRAVENOUS at 17:45

## 2019-03-11 NOTE — ED TRIAGE NOTES
"Chief Complaint   Patient presents with   • Cough     Pt eating spicy pork rinds to triage. pt reports doing a \"whip it\" 2 days ago and feeling that he got frost bite from inhaling to hard. pt has had a cough since (nonproductive). expiratory wheezing noted / pt reports chest pain across front of chest.    • Shortness of Breath   • Near Syncopal   • Chest Pain     pain upon coughing only     Explained to pt triage process, made pt aware to tell this RN/staff of any changes/concerns, pt verbalized understanding of process and instructions given. Pt to ER lobby.    "

## 2019-03-12 NOTE — ED PROVIDER NOTES
"ED Provider Note    CHIEF COMPLAINT  Chief Complaint   Patient presents with   • Cough     Pt eating spicy pork rinds to triage. pt reports doing a \"whip it\" 2 days ago and feeling that he got frost bite from inhaling to hard. pt has had a cough since (nonproductive). expiratory wheezing noted / pt reports chest pain across front of chest.    • Shortness of Breath   • Near Syncopal   • Chest Pain     pain upon coughing only       HPI  Chace Gong is a 54 y.o. male who presents to the emergency department complaining of cough and difficulty breathing.  The patient says that he quit drinking alcohol and quit smoking marijuana 4 days ago but today he decided to try and get high by using \"with it\" the patient discharged the cartridge directly into his mouth about 30 hours ago and he found this very painful and thinks that he \"froze his lungs\" since doing this is been coughing excessively and feels short of breath.  The patient has previously had an albuterol inhaler at home but is apparently out of medication.  She denies any other precipitating events or exacerbating or alleviating factors.    REVIEW OF SYSTEMS the patient denies any other drug abuse or attempts at self-harm he does not wish to harm himself or others.  Chest pain has been constant since he inhaled the whip it    PAST MEDICAL HISTORY  Past Medical History:   Diagnosis Date   • Achilles tendinitis 6/24/2009   • Arthralgia 6/24/2009   • Bronchitis 6/24/2009   • Dyslipidemia 6/24/2009   • GERD (gastroesophageal reflux disease) 6/24/2009   • Hepatitis C    • HTN (hypertension) 6/24/2009   • Hypertension    • Low back pain    • Psychiatric disorder     anxiety       FAMILY HISTORY  Family History   Problem Relation Age of Onset   • Cancer Mother         lung / throat / breast   • Hypertension Mother    • Hypertension Father    • Hyperlipidemia Father    • Stroke Father    • Alcohol/Drug Father    • Diabetes Sister    • Hypertension Sister    • " Alcohol/Drug Sister    • Cancer Brother    • Hypertension Brother    • Alcohol/Drug Brother    • Stroke Brother    • Alcohol/Drug Brother    • Cancer Sister         lung   • Heart Disease Neg Hx        SOCIAL HISTORY  Social History     Social History   • Marital status:      Spouse name: N/A   • Number of children: N/A   • Years of education: N/A     Social History Main Topics   • Smoking status: Current Some Day Smoker     Packs/day: 0.25     Years: 30.00     Types: Cigarettes     Start date: 8/22/2015     Last attempt to quit: 11/14/2015   • Smokeless tobacco: Never Used      Comment: initiated cessation 12 Aug 2015, occasional cigar   • Alcohol use 0.0 oz/week      Comment: vodka /beer   • Drug use: Yes     Types: Inhaled, Marijuana      Comment: has med marijuana card; smokes 1 joint every morning, speed   • Sexual activity: Yes     Partners: Male     Other Topics Concern   • Not on file     Social History Narrative   • No narrative on file       SURGICAL HISTORY  Past Surgical History:   Procedure Laterality Date   • ELBOW ARTHROTOMY  5/17/2013    Performed by Jonathan Maurice M.D. at [facility]   • LOOSE BODY REMOVAL  5/17/2013    Performed by Jonathan Maurice M.D. at [facility]   • ARTHROSCOPY, KNEE  2013    right    • ARTHROTOMY  2011    right shoulder   • NEUROLYSIS  1/21/2009    Performed by JONATHAN MAURICE at [facility]   • CARPAL TUNNEL RELEASE  1/21/2009    Performed by JONATHAN MAURICE at [facility]   • HARDWARE REMOVAL ORTHO  1/21/2009    Performed by JONATHAN MAURICE at [facility]   • SYNOVECTOMY  1/21/2009    Performed by JONATHAN MAURICE at [facility]   • OTHER ORTHOPEDIC SURGERY  2001    lt wrist surg       CURRENT MEDICATIONS  Home Medications    **Home medications have not yet been reviewed for this encounter**         ALLERGIES  Allergies   Allergen Reactions   • Penicillins  "Unspecified     Pt states that his mom told him he is allergic to penicillins.   • Hydrocodone Unspecified     Pt states \"I'm not sure\".   • Sulfa Drugs      \"my mom told me I was allergic to it when i was a kid\"       PHYSICAL EXAM  VITAL SIGNS: /76   Pulse 75   Temp 36.8 °C (98.2 °F) (Temporal)   Resp 18   Ht 1.88 m (6' 2\")   Wt 109 kg (240 lb 4.8 oz)   SpO2 98%   BMI 30.85 kg/m²    Oxygen saturation is interpreted as adequate  Constitutional: Awake verbal talkative but he is frequently coughing  HENT: Mucous membranes are moist.  The inside aspect of the lower lip is somewhat erythematous and he may have sustained some injury there but it looks like there is good blood flow and the tissue appears viable  Eyes: No erythema or discharge  Neck: Trachea midline no JVD  Cardiovascular: Regular rate and rhythm  Lungs: Diffuse wheezes throughout both lung fields  Skin: Warm and dry  Musculoskeletal: No acute bony deformity no leg edema or calf tenderness  Neurologic: Awake verbal and moving all extremities    Laboratory  CBC shows a white blood cell count of 7.3 hemoglobin is adequate at 15.4 basic metabolic panel is unremarkable troponin and BNP are normal liver functions are slightly elevated with AST of 119 and ALT of 190 and the patient has had elevations noted on previous laboratory testing in this emergency department.  Lipase is normal at 79    EKG interpretation  Twelve-lead EKG shows sinus tachycardia 104 bpm there is a left axis deviation there is no ST elevation or depression or ectopy    Radiology  DX-CHEST-LIMITED (1 VIEW)   Final Result         No acute cardiac or pulmonary abnormality is identified.        MEDICAL DECISION MAKING and DISPOSITION  In the emergency department the patient was given albuterol and Atrovent by nebulizer as well as intravenous Decadron and oral Tessalon.  These measures have had very very good affect he is much more comfortable in fact he fell asleep.  When I woke " him up he is no longer coughing and is not in respiratory distress.  I reviewed all the above with the patient I have strongly advised him to avoid drug and alcohol use no matter what type of drug.  I have written him a refill prescription for albuterol HFA inhaler and a 5-day course of prednisone and also Tessalon if needed for cough.  If he has new or worsening symptoms she is to return here for recheck otherwise he is to call his doctor and arrange office recheck during the week    IMPRESSION  1.  Acute bronchospasm  2.  Drug abuse         Electronically signed by: Kenton Huggins, 3/11/2019 7:26 PM

## 2019-03-25 NOTE — DISCHARGE INSTRUCTIONS
Your symptoms seem to be from passing a kidney stone on the left side.  Please use the medications that we have prescribed.  They have been sent to your pharmacy.  Call now to schedule a follow-up visit with urology.  Use the urine strainer provided to strain your urine every time you urinate until you catch the stone.  Return to the emergency department for severe symptoms such as uncontrolled vomiting or uncontrolled pain.  
English

## 2019-11-16 ENCOUNTER — HOSPITAL ENCOUNTER (EMERGENCY)
Facility: MEDICAL CENTER | Age: 54
End: 2019-11-16
Attending: EMERGENCY MEDICINE
Payer: MEDICARE

## 2019-11-16 VITALS
BODY MASS INDEX: 31.91 KG/M2 | SYSTOLIC BLOOD PRESSURE: 128 MMHG | WEIGHT: 248.55 LBS | HEART RATE: 110 BPM | RESPIRATION RATE: 16 BRPM | DIASTOLIC BLOOD PRESSURE: 95 MMHG | TEMPERATURE: 98.4 F | OXYGEN SATURATION: 93 %

## 2019-11-16 DIAGNOSIS — F10.10 ALCOHOL ABUSE: ICD-10-CM

## 2019-11-16 DIAGNOSIS — F10.930 ALCOHOL WITHDRAWAL SYNDROME WITHOUT COMPLICATION (HCC): ICD-10-CM

## 2019-11-16 PROCEDURE — 700102 HCHG RX REV CODE 250 W/ 637 OVERRIDE(OP): Performed by: EMERGENCY MEDICINE

## 2019-11-16 PROCEDURE — A9270 NON-COVERED ITEM OR SERVICE: HCPCS | Performed by: EMERGENCY MEDICINE

## 2019-11-16 PROCEDURE — 99284 EMERGENCY DEPT VISIT MOD MDM: CPT

## 2019-11-16 RX ORDER — CHLORDIAZEPOXIDE HYDROCHLORIDE 25 MG/1
25 CAPSULE, GELATIN COATED ORAL ONCE
Status: COMPLETED | OUTPATIENT
Start: 2019-11-16 | End: 2019-11-16

## 2019-11-16 RX ADMIN — CHLORDIAZEPOXIDE HYDROCHLORIDE 25 MG: 25 CAPSULE ORAL at 12:15

## 2019-11-16 NOTE — ED NOTES
Patient leaves with steady gait. Dr. Jaime aware of tachycardia at discharge. Patient leaves with friend.

## 2019-11-16 NOTE — ED PROVIDER NOTES
ED Provider Note    Scribed for Kali Garcia M.D. by Linette Garcia. 2019, 11:34 AM.    Primary care provider: Mohsen Tamasaby, M.D.  Means of arrival: Walk in   History obtained from: Patient  History limited by: None    CHIEF COMPLAINT  Chief Complaint   Patient presents with   • Alcohol Intoxication   • Detox       HPI  Chace Gong is a 54 y.o. male who presents to the Emergency Department for evaluation of alcohol withdrawals onset yesterday. Patient states that he has not drunk any alcohol in a year, reporting that he was an alcoholic. He reports that yesterday he went out and drank alcohol. Patient states that he drank around a bottle of Vodka. His alcohol levels are 0.2. He notes that he wants help otherwise he will continue to drink. He has associated uncontrollable shaking. Patient has gone through AA in the past and had gone through several classes for his alcoholism. He reports that he is allergic to alcohol.     REVIEW OF SYSTEMS  See HPI above.     PAST MEDICAL HISTORY   has a past medical history of Achilles tendinitis (2009), Arthralgia (2009), Bronchitis (2009), Dyslipidemia (2009), GERD (gastroesophageal reflux disease) (2009), Hepatitis C, HTN (hypertension) (2009), Hypertension, Low back pain, and Psychiatric disorder.    SURGICAL HISTORY   has a past surgical history that includes neurolysis (2009); other orthopedic surgery (); carpal tunnel release (2009); hardware removal ortho (2009); synovectomy (2009); arthrotomy (); arthroscopy, knee (); elbow arthrotomy (2013); and loose body removal (2013).    SOCIAL HISTORY  Social History     Tobacco Use   • Smoking status: Current Some Day Smoker     Packs/day: 0.25     Years: 30.00     Pack years: 7.50     Types: Cigarettes     Start date: 2015     Last attempt to quit: 2015     Years since quittin.0   • Smokeless tobacco: Never Used   • Tobacco  "comment: initiated cessation 12 Aug 2015, occasional cigar   Substance Use Topics   • Alcohol use: Yes     Alcohol/week: 0.0 oz     Comment: vodka /beer   • Drug use: Yes     Types: Inhaled, Marijuana     Comment: has med marijuana card; smokes 1 joint every morning, speed      Social History     Substance and Sexual Activity   Drug Use Yes   • Types: Inhaled, Marijuana    Comment: has med marijuana card; smokes 1 joint every morning, speed       FAMILY HISTORY  Family History   Problem Relation Age of Onset   • Cancer Mother         lung / throat / breast   • Hypertension Mother    • Hypertension Father    • Hyperlipidemia Father    • Stroke Father    • Alcohol/Drug Father    • Diabetes Sister    • Hypertension Sister    • Alcohol/Drug Sister    • Cancer Brother    • Hypertension Brother    • Alcohol/Drug Brother    • Stroke Brother    • Alcohol/Drug Brother    • Cancer Sister         lung   • Heart Disease Neg Hx        CURRENT MEDICATIONS  No current facility-administered medications for this encounter.     Current Outpatient Medications:   •  albuterol 108 (90 Base) MCG/ACT Aero Soln inhalation aerosol, Inhale 2 Puffs by mouth every 6 hours as needed for Shortness of Breath., Disp: 8.5 g, Rfl: 0  •  benzonatate (TESSALON) 100 MG Cap, Take 1 Cap by mouth 3 times a day as needed for Cough., Disp: 20 Cap, Rfl: 0  •  azithromycin (ZITHROMAX) 250 MG Tab, Take 250-500 mg by mouth every day. Take 2 tablets on day one, then take 1 tablet every day for 4 days Completed ZPAK on 12/06, Disp: , Rfl:   •  tamsulosin (FLOMAX) 0.4 MG capsule, Take 1 Cap by mouth every day., Disp: 7 Cap, Rfl: 0  •  lisinopril (PRINIVIL) 20 MG Tab, Take 20 mg by mouth every evening., Disp: , Rfl:       ALLERGIES  Allergies   Allergen Reactions   • Penicillins Unspecified     Pt states that his mom told him he is allergic to penicillins.   • Hydrocodone Unspecified     Pt states \"I'm not sure\".   • Sulfa Drugs      \"my mom told me I was allergic " "to it when i was a kid\"       PHYSICAL EXAM  VITAL SIGNS: /97   Pulse (!) 109   Temp 36.9 °C (98.4 °F) (Temporal)   Resp 16   Wt 112.7 kg (248 lb 8.8 oz)   SpO2 92%   BMI 31.91 kg/m²     Constitutional: Well developed, Well nourished, No acute distress, Non-toxic appearance.   HENT: Normocephalic, Atraumatic, Bilateral external ears normal, oropharynx moist, No oral exudates, Nose normal.   Eyes:conjunctiva is normal, there are no signs of exudate.   Neck: Supple, no meningeal signs.  Lymphatic: No lymphadenopathy noted.   Cardiovascular: Regular rate and rhythm without murmurs gallops or rubs.   Thorax & Lungs: No respiratory distress. Breathing comfortably. Lungs are clear to auscultation bilaterally, there are no wheezes no rales. Chest wall is nontender.  Abdomen: Soft, nontender, nondistended. Bowel sounds are present.   Skin: Warm, Dry, No erythema,   Back: No tenderness, No CVA tenderness.  Musculoskeletal: Good range of motion in all major joints. No tenderness to palpation or major deformities noted. Intact distal pulses, no clubbing, no cyanosis, no edema,   Neurologic: Mild tremors that is otherwise appropriate. Alert & oriented x 3, Moving all extremities. No gross abnormalities.    Psychiatric: Affect normal, Judgment normal, Mood normal.       COURSE & MEDICAL DECISION MAKING  Pertinent Labs & Imaging studies reviewed. (See chart for details)    11:34 AM - Patient seen and examined at bedside. Patient will be treated with Librium 25 mg. The differential diagnoses include but are not limited to: alcoholism. Discussed the plan of care with the patient and informed him that he should go to an alcohol detox facility. Wife suggests going to [place]. Patient will be discharged home at this time. Patient verbalizes agreement.     Decision Making:  Patient presents emerged department for evaluation.  Patient has mild tremulousness.  At this point he is still moderately intoxicated 0.2 but he is " having signs of tremulousness and he states that he did not drink today at all.  I do feel the patient is a chronic alcoholic he agrees at this point I recommended form to follow-up with [facility] or outpatient detox facilities for further treatment and care I will give him a dose of Librium here.  The patient is to follow-up as above.     The patient will return for new or worsening symptoms and is stable at the time of discharge.    The patient is referred to a primary physician for blood pressure management, diabetic screening, and for all other preventative health concerns.      DISPOSITION:  Patient will be discharged home in stable condition.    FOLLOW UP:  Mohsen Tamasaby, M.D.  [address] [id number]  [phone]          [facility]  [phone]          FINAL IMPRESSION  1. Alcohol abuse    2. Alcohol withdrawal syndrome without complication (HCC)          Linette DA SILVA (Shaziaibe), am scribing for, and in the presence of, Kali Garcia M.D..    Electronically signed by: Linette Garcia (Shaziaibmohan), 11/16/2019    IKali M.D. personally performed the services described in this documentation, as scribed by Linette Garcia in my presence, and it is both accurate and complete. E.     The note accurately reflects work and decisions made by me.  Kali Garcia  11/16/2019  3:01 PM

## 2019-11-16 NOTE — ED TRIAGE NOTES
"55 y/o male ambulatory to triage stating he needs help quitting drinking, pt states \"I'm really sick\", pt states \"I am not a drinker, I don't know how to stop, but I don't drink alcohol\". Pt states he had a drink last yesterday. He states \"I know you guys aren't going to help me\".   "

## 2019-11-16 NOTE — ED NOTES
Patient arrives with alcohol intoxication. Patient state he hasn't had anything to drink in days and he believes he is being poisoned and that why he is drunk. Patient admits to doing marijuana but no other drugs. Patient smells of alcohol. POC breathalyzer >0.2. Patient is pleasant and cooperative. Slightly tachycardic but generally well appearing.

## 2020-03-29 ENCOUNTER — APPOINTMENT (OUTPATIENT)
Dept: RADIOLOGY | Facility: MEDICAL CENTER | Age: 55
DRG: 897 | End: 2020-03-29
Attending: EMERGENCY MEDICINE
Payer: MEDICARE

## 2020-03-29 ENCOUNTER — HOSPITAL ENCOUNTER (INPATIENT)
Facility: MEDICAL CENTER | Age: 55
LOS: 3 days | DRG: 897 | End: 2020-04-01
Attending: EMERGENCY MEDICINE | Admitting: HOSPITALIST
Payer: MEDICARE

## 2020-03-29 ENCOUNTER — APPOINTMENT (OUTPATIENT)
Dept: RADIOLOGY | Facility: MEDICAL CENTER | Age: 55
DRG: 897 | End: 2020-03-29
Payer: MEDICARE

## 2020-03-29 DIAGNOSIS — E87.20 METABOLIC ACIDOSIS: ICD-10-CM

## 2020-03-29 DIAGNOSIS — F22 DELUSION (HCC): ICD-10-CM

## 2020-03-29 DIAGNOSIS — F10.930 ALCOHOL WITHDRAWAL SYNDROME WITHOUT COMPLICATION (HCC): ICD-10-CM

## 2020-03-29 DIAGNOSIS — R50.9 FEVER, UNSPECIFIED FEVER CAUSE: ICD-10-CM

## 2020-03-29 PROBLEM — K70.10 ACUTE ALCOHOLIC HEPATITIS: Status: ACTIVE | Noted: 2020-03-29

## 2020-03-29 PROBLEM — E87.1 HYPONATREMIA: Status: ACTIVE | Noted: 2020-03-29

## 2020-03-29 LAB
ALBUMIN SERPL BCP-MCNC: 4.1 G/DL (ref 3.2–4.9)
ALBUMIN/GLOB SERPL: 1.3 G/DL
ALP SERPL-CCNC: 68 U/L (ref 30–99)
ALT SERPL-CCNC: 80 U/L (ref 2–50)
AMPHET UR QL SCN: POSITIVE
ANION GAP SERPL CALC-SCNC: 21 MMOL/L (ref 7–16)
APPEARANCE UR: CLEAR
AST SERPL-CCNC: 202 U/L (ref 12–45)
BACTERIA #/AREA URNS HPF: NEGATIVE /HPF
BARBITURATES UR QL SCN: NEGATIVE
BASOPHILS # BLD AUTO: 0.2 % (ref 0–1.8)
BASOPHILS # BLD: 0.02 K/UL (ref 0–0.12)
BENZODIAZ UR QL SCN: NEGATIVE
BILIRUB SERPL-MCNC: 2.3 MG/DL (ref 0.1–1.5)
BILIRUB UR QL STRIP.AUTO: NEGATIVE
BUN SERPL-MCNC: 36 MG/DL (ref 8–22)
BZE UR QL SCN: NEGATIVE
CALCIUM SERPL-MCNC: 8.3 MG/DL (ref 8.5–10.5)
CANNABINOIDS UR QL SCN: POSITIVE
CHLORIDE SERPL-SCNC: 86 MMOL/L (ref 96–112)
CO2 SERPL-SCNC: 18 MMOL/L (ref 20–33)
COLOR UR: YELLOW
CREAT SERPL-MCNC: 1.18 MG/DL (ref 0.5–1.4)
EOSINOPHIL # BLD AUTO: 0.05 K/UL (ref 0–0.51)
EOSINOPHIL NFR BLD: 0.5 % (ref 0–6.9)
EPI CELLS #/AREA URNS HPF: NEGATIVE /HPF
ERYTHROCYTE [DISTWIDTH] IN BLOOD BY AUTOMATED COUNT: 50.3 FL (ref 35.9–50)
ETHANOL BLD-MCNC: <10.1 MG/DL (ref 0–10.1)
FLUAV RNA SPEC QL NAA+PROBE: NEGATIVE
FLUBV RNA SPEC QL NAA+PROBE: NEGATIVE
GLOBULIN SER CALC-MCNC: 3.1 G/DL (ref 1.9–3.5)
GLUCOSE SERPL-MCNC: 82 MG/DL (ref 65–99)
GLUCOSE UR STRIP.AUTO-MCNC: NEGATIVE MG/DL
HCT VFR BLD AUTO: 40.8 % (ref 42–52)
HGB BLD-MCNC: 14.5 G/DL (ref 14–18)
HYALINE CASTS #/AREA URNS LPF: ABNORMAL /LPF
IMM GRANULOCYTES # BLD AUTO: 0.1 K/UL (ref 0–0.11)
IMM GRANULOCYTES NFR BLD AUTO: 1.1 % (ref 0–0.9)
KETONES UR STRIP.AUTO-MCNC: ABNORMAL MG/DL
LACTATE BLD-SCNC: 1.6 MMOL/L (ref 0.5–2)
LEUKOCYTE ESTERASE UR QL STRIP.AUTO: NEGATIVE
LYMPHOCYTES # BLD AUTO: 1.9 K/UL (ref 1–4.8)
LYMPHOCYTES NFR BLD: 20.3 % (ref 22–41)
MCH RBC QN AUTO: 35.4 PG (ref 27–33)
MCHC RBC AUTO-ENTMCNC: 35.5 G/DL (ref 33.7–35.3)
MCV RBC AUTO: 99.5 FL (ref 81.4–97.8)
METHADONE UR QL SCN: NEGATIVE
MICRO URNS: ABNORMAL
MONOCYTES # BLD AUTO: 1.12 K/UL (ref 0–0.85)
MONOCYTES NFR BLD AUTO: 12 % (ref 0–13.4)
NEUTROPHILS # BLD AUTO: 6.15 K/UL (ref 1.82–7.42)
NEUTROPHILS NFR BLD: 65.9 % (ref 44–72)
NITRITE UR QL STRIP.AUTO: NEGATIVE
NRBC # BLD AUTO: 0 K/UL
NRBC BLD-RTO: 0 /100 WBC
OPIATES UR QL SCN: NEGATIVE
OXYCODONE UR QL SCN: NEGATIVE
PCP UR QL SCN: NEGATIVE
PH UR STRIP.AUTO: 5.5 [PH] (ref 5–8)
PLATELET # BLD AUTO: 91 K/UL (ref 164–446)
PMV BLD AUTO: 10 FL (ref 9–12.9)
POTASSIUM SERPL-SCNC: 3.6 MMOL/L (ref 3.6–5.5)
PROPOXYPH UR QL SCN: NEGATIVE
PROT SERPL-MCNC: 7.2 G/DL (ref 6–8.2)
PROT UR QL STRIP: NEGATIVE MG/DL
RBC # BLD AUTO: 4.1 M/UL (ref 4.7–6.1)
RBC # URNS HPF: ABNORMAL /HPF
RBC UR QL AUTO: ABNORMAL
SODIUM SERPL-SCNC: 125 MMOL/L (ref 135–145)
SP GR UR STRIP.AUTO: 1.01
UROBILINOGEN UR STRIP.AUTO-MCNC: 1 MG/DL
WBC # BLD AUTO: 9.3 K/UL (ref 4.8–10.8)
WBC #/AREA URNS HPF: ABNORMAL /HPF

## 2020-03-29 PROCEDURE — 90791 PSYCH DIAGNOSTIC EVALUATION: CPT

## 2020-03-29 PROCEDURE — 80307 DRUG TEST PRSMV CHEM ANLYZR: CPT

## 2020-03-29 PROCEDURE — A9270 NON-COVERED ITEM OR SERVICE: HCPCS | Performed by: EMERGENCY MEDICINE

## 2020-03-29 PROCEDURE — 700102 HCHG RX REV CODE 250 W/ 637 OVERRIDE(OP): Performed by: EMERGENCY MEDICINE

## 2020-03-29 PROCEDURE — 71045 X-RAY EXAM CHEST 1 VIEW: CPT

## 2020-03-29 PROCEDURE — 700111 HCHG RX REV CODE 636 W/ 250 OVERRIDE (IP): Performed by: EMERGENCY MEDICINE

## 2020-03-29 PROCEDURE — 770001 HCHG ROOM/CARE - MED/SURG/GYN PRIV*

## 2020-03-29 PROCEDURE — 70360 X-RAY EXAM OF NECK: CPT

## 2020-03-29 PROCEDURE — 99223 1ST HOSP IP/OBS HIGH 75: CPT | Mod: AI | Performed by: HOSPITALIST

## 2020-03-29 PROCEDURE — 87502 INFLUENZA DNA AMP PROBE: CPT

## 2020-03-29 PROCEDURE — 87086 URINE CULTURE/COLONY COUNT: CPT

## 2020-03-29 PROCEDURE — 80053 COMPREHEN METABOLIC PANEL: CPT

## 2020-03-29 PROCEDURE — 99285 EMERGENCY DEPT VISIT HI MDM: CPT

## 2020-03-29 PROCEDURE — 96375 TX/PRO/DX INJ NEW DRUG ADDON: CPT

## 2020-03-29 PROCEDURE — 96374 THER/PROPH/DIAG INJ IV PUSH: CPT

## 2020-03-29 PROCEDURE — 85025 COMPLETE CBC W/AUTO DIFF WBC: CPT

## 2020-03-29 PROCEDURE — 700105 HCHG RX REV CODE 258: Performed by: EMERGENCY MEDICINE

## 2020-03-29 PROCEDURE — 81001 URINALYSIS AUTO W/SCOPE: CPT

## 2020-03-29 PROCEDURE — 700101 HCHG RX REV CODE 250: Performed by: HOSPITALIST

## 2020-03-29 PROCEDURE — HZ2ZZZZ DETOXIFICATION SERVICES FOR SUBSTANCE ABUSE TREATMENT: ICD-10-PCS | Performed by: HOSPITALIST

## 2020-03-29 PROCEDURE — 83605 ASSAY OF LACTIC ACID: CPT

## 2020-03-29 PROCEDURE — 87040 BLOOD CULTURE FOR BACTERIA: CPT

## 2020-03-29 RX ORDER — LORAZEPAM 2 MG/ML
1 INJECTION INTRAMUSCULAR
Status: DISCONTINUED | OUTPATIENT
Start: 2020-03-29 | End: 2020-04-01 | Stop reason: HOSPADM

## 2020-03-29 RX ORDER — SODIUM CHLORIDE, SODIUM LACTATE, POTASSIUM CHLORIDE, AND CALCIUM CHLORIDE .6; .31; .03; .02 G/100ML; G/100ML; G/100ML; G/100ML
30 INJECTION, SOLUTION INTRAVENOUS
Status: COMPLETED | OUTPATIENT
Start: 2020-03-29 | End: 2020-03-29

## 2020-03-29 RX ORDER — NICOTINE 21 MG/24HR
21 PATCH, TRANSDERMAL 24 HOURS TRANSDERMAL
Status: DISCONTINUED | OUTPATIENT
Start: 2020-03-30 | End: 2020-04-01 | Stop reason: HOSPADM

## 2020-03-29 RX ORDER — LORAZEPAM 1 MG/1
0.5 TABLET ORAL EVERY 4 HOURS PRN
Status: DISCONTINUED | OUTPATIENT
Start: 2020-03-29 | End: 2020-04-01 | Stop reason: HOSPADM

## 2020-03-29 RX ORDER — ACETAMINOPHEN 500 MG
1000 TABLET ORAL ONCE
Status: COMPLETED | OUTPATIENT
Start: 2020-03-29 | End: 2020-03-29

## 2020-03-29 RX ORDER — DIPHENHYDRAMINE HYDROCHLORIDE 50 MG/ML
25 INJECTION INTRAMUSCULAR; INTRAVENOUS ONCE
Status: COMPLETED | OUTPATIENT
Start: 2020-03-29 | End: 2020-03-29

## 2020-03-29 RX ORDER — SODIUM CHLORIDE 9 MG/ML
INJECTION, SOLUTION INTRAVENOUS CONTINUOUS
Status: DISCONTINUED | OUTPATIENT
Start: 2020-03-29 | End: 2020-03-31

## 2020-03-29 RX ORDER — BISACODYL 10 MG
10 SUPPOSITORY, RECTAL RECTAL
Status: DISCONTINUED | OUTPATIENT
Start: 2020-03-29 | End: 2020-04-01 | Stop reason: HOSPADM

## 2020-03-29 RX ORDER — TAMSULOSIN HYDROCHLORIDE 0.4 MG/1
0.4 CAPSULE ORAL DAILY
Status: DISCONTINUED | OUTPATIENT
Start: 2020-03-30 | End: 2020-03-30

## 2020-03-29 RX ORDER — LISINOPRIL 20 MG/1
20 TABLET ORAL EVERY EVENING
Status: DISCONTINUED | OUTPATIENT
Start: 2020-03-29 | End: 2020-03-30

## 2020-03-29 RX ORDER — AMOXICILLIN 250 MG
2 CAPSULE ORAL 2 TIMES DAILY
Status: DISCONTINUED | OUTPATIENT
Start: 2020-03-29 | End: 2020-04-01 | Stop reason: HOSPADM

## 2020-03-29 RX ORDER — GUAIFENESIN/DEXTROMETHORPHAN 100-10MG/5
10 SYRUP ORAL EVERY 6 HOURS PRN
Status: DISCONTINUED | OUTPATIENT
Start: 2020-03-29 | End: 2020-04-01 | Stop reason: HOSPADM

## 2020-03-29 RX ORDER — LORAZEPAM 2 MG/ML
0.5 INJECTION INTRAMUSCULAR EVERY 4 HOURS PRN
Status: DISCONTINUED | OUTPATIENT
Start: 2020-03-29 | End: 2020-04-01 | Stop reason: HOSPADM

## 2020-03-29 RX ORDER — FOLIC ACID 1 MG/1
1 TABLET ORAL DAILY
Status: DISCONTINUED | OUTPATIENT
Start: 2020-03-30 | End: 2020-04-01 | Stop reason: HOSPADM

## 2020-03-29 RX ORDER — VALPROIC ACID 250 MG/1
250 CAPSULE, LIQUID FILLED ORAL EVERY 8 HOURS
Status: DISCONTINUED | OUTPATIENT
Start: 2020-03-30 | End: 2020-03-30

## 2020-03-29 RX ORDER — LORAZEPAM 2 MG/ML
2 INJECTION INTRAMUSCULAR
Status: DISCONTINUED | OUTPATIENT
Start: 2020-03-29 | End: 2020-04-01 | Stop reason: HOSPADM

## 2020-03-29 RX ORDER — THIAMINE MONONITRATE (VIT B1) 100 MG
100 TABLET ORAL DAILY
Status: DISCONTINUED | OUTPATIENT
Start: 2020-03-30 | End: 2020-04-01 | Stop reason: HOSPADM

## 2020-03-29 RX ORDER — LORAZEPAM 1 MG/1
1 TABLET ORAL EVERY 4 HOURS PRN
Status: DISCONTINUED | OUTPATIENT
Start: 2020-03-29 | End: 2020-04-01 | Stop reason: HOSPADM

## 2020-03-29 RX ORDER — LORAZEPAM 2 MG/ML
1.5 INJECTION INTRAMUSCULAR
Status: DISCONTINUED | OUTPATIENT
Start: 2020-03-29 | End: 2020-04-01 | Stop reason: HOSPADM

## 2020-03-29 RX ORDER — GABAPENTIN 100 MG/1
100 CAPSULE ORAL 3 TIMES DAILY
Status: DISCONTINUED | OUTPATIENT
Start: 2020-03-30 | End: 2020-03-30

## 2020-03-29 RX ORDER — POLYETHYLENE GLYCOL 3350 17 G/17G
1 POWDER, FOR SOLUTION ORAL
Status: DISCONTINUED | OUTPATIENT
Start: 2020-03-29 | End: 2020-04-01 | Stop reason: HOSPADM

## 2020-03-29 RX ORDER — LORAZEPAM 2 MG/1
2 TABLET ORAL
Status: DISCONTINUED | OUTPATIENT
Start: 2020-03-29 | End: 2020-04-01 | Stop reason: HOSPADM

## 2020-03-29 RX ORDER — HALOPERIDOL 5 MG/ML
2.5 INJECTION INTRAMUSCULAR ONCE
Status: COMPLETED | OUTPATIENT
Start: 2020-03-29 | End: 2020-03-29

## 2020-03-29 RX ORDER — LORAZEPAM 2 MG/1
4 TABLET ORAL
Status: DISCONTINUED | OUTPATIENT
Start: 2020-03-29 | End: 2020-04-01 | Stop reason: HOSPADM

## 2020-03-29 RX ORDER — ACETAMINOPHEN 500 MG
1000 TABLET ORAL EVERY 6 HOURS PRN
Status: DISCONTINUED | OUTPATIENT
Start: 2020-03-29 | End: 2020-04-01 | Stop reason: HOSPADM

## 2020-03-29 RX ORDER — ALBUTEROL SULFATE 90 UG/1
2 AEROSOL, METERED RESPIRATORY (INHALATION) EVERY 6 HOURS PRN
Status: DISCONTINUED | OUTPATIENT
Start: 2020-03-29 | End: 2020-03-30

## 2020-03-29 RX ADMIN — ACETAMINOPHEN 1000 MG: 500 TABLET ORAL at 13:00

## 2020-03-29 RX ADMIN — DIPHENHYDRAMINE HYDROCHLORIDE 25 MG: 50 INJECTION, SOLUTION INTRAMUSCULAR; INTRAVENOUS at 13:00

## 2020-03-29 RX ADMIN — THIAMINE HYDROCHLORIDE: 100 INJECTION, SOLUTION INTRAMUSCULAR; INTRAVENOUS at 21:09

## 2020-03-29 RX ADMIN — HALOPERIDOL LACTATE 2.5 MG: 5 INJECTION, SOLUTION INTRAMUSCULAR at 13:00

## 2020-03-29 RX ADMIN — SODIUM CHLORIDE, POTASSIUM CHLORIDE, SODIUM LACTATE AND CALCIUM CHLORIDE 3375 ML: 600; 310; 30; 20 INJECTION, SOLUTION INTRAVENOUS at 13:03

## 2020-03-29 ASSESSMENT — LIFESTYLE VARIABLES
ORIENTATION AND CLOUDING OF SENSORIUM: CANNOT DO SERIAL ADDITIONS OR IS UNCERTAIN ABOUT DATE
HEADACHE, FULLNESS IN HEAD: NOT PRESENT
TOTAL SCORE: 1
VISUAL DISTURBANCES: NOT PRESENT
NAUSEA AND VOMITING: NO NAUSEA AND NO VOMITING
EVER_SMOKED: NEVER
TREMOR: NO TREMOR
AUDITORY DISTURBANCES: NOT PRESENT
PAROXYSMAL SWEATS: NO SWEAT VISIBLE
ANXIETY: NO ANXIETY (AT EASE)
AGITATION: NORMAL ACTIVITY

## 2020-03-29 ASSESSMENT — FIBROSIS 4 INDEX: FIB4 SCORE: 3.01

## 2020-03-29 NOTE — ED TRIAGE NOTES
"Chief Complaint   Patient presents with   • Food Stuck In Throat     x2 days, piece of \"dried hamburger\"   • ETOH Withdrawal     last drink 3days ago   • Cough     Pt bib ems with above complaints, pt denies recent travel or exposure to Covid-19 . Pt said that he always has cough due to his copd. He c/o food stuck down throat, he reports eating \"dried hamburger\" 2days ago. Pt unable to eat but drinks water.   Temp 38.6 oral, placed pt on droplet isolation , he is wearing mask.   fsbs 110    "

## 2020-03-29 NOTE — ED PROVIDER NOTES
"ED Provider  Scribed for Chaka Mcfadden D.O. by Mil Lomeli. 3/29/2020  12:30 PM    Means of arrival:Ambulance  History obtained from:Patient  History limited by: None    CHIEF COMPLAINT  Chief Complaint   Patient presents with   • Food Stuck In Throat     x2 days, piece of \"dried hamburger\"   • ETOH Withdrawal     last drink 3days ago   • Cough       HPI  Chace Gong is a 55 y.o. male who presents to the ED for a foreign body which the patient believes has been stuck in his throat for 2 days. Patient states he was eating a dry hamburger when a piece of it became lodged in his throat. Patient adds that he feels like his throat is closing when he swallows and has a chronic cough that is unchanged. Patient adds that he has drank no fluids in the last 24 hours and the last time he consumed alcohol was 3 days ago. Patient denies any shortness of breath, fever, nausea or vomiting.    REVIEW OF SYSTEMS  See HPI for further details. All other systems are negative.     PAST MEDICAL HISTORY   has a past medical history of Achilles tendinitis (2009), Arthralgia (2009), Bronchitis (2009), Dyslipidemia (2009), GERD (gastroesophageal reflux disease) (2009), Hepatitis C, HTN (hypertension) (2009), Hypertension, Low back pain, and Psychiatric disorder.    SOCIAL HISTORY  Social History     Tobacco Use   • Smoking status: Current Some Day Smoker     Packs/day: 0.25     Years: 30.00     Pack years: 7.50     Types: Cigarettes     Start date: 2015     Last attempt to quit: 2015     Years since quittin.3   • Smokeless tobacco: Never Used   • Tobacco comment: initiated cessation 12 Aug 2015, occasional cigar   Substance and Sexual Activity   • Alcohol use: Yes     Alcohol/week: 0.0 oz     Comment: vodka /beer   • Drug use: Yes     Types: Inhaled, Marijuana     Comment: has med marijuana card; smokes 1 joint every morning, speed   • Sexual activity: Yes     Partners: Male " "      SURGICAL HISTORY   has a past surgical history that includes neurolysis (1/21/2009); other orthopedic surgery (2001); carpal tunnel release (1/21/2009); hardware removal ortho (1/21/2009); synovectomy (1/21/2009); arthrotomy (2011); arthroscopy, knee (2013); elbow arthrotomy (5/17/2013); and loose body removal (5/17/2013).    CURRENT MEDICATIONS  Home Medications     Reviewed by Chloe Mena R.N. (Registered Nurse) on 03/29/20 at 1211  Med List Status: Unable to Obtain   Medication Last Dose Status   albuterol 108 (90 Base) MCG/ACT Aero Soln inhalation aerosol  Active   azithromycin (ZITHROMAX) 250 MG Tab  Active   benzonatate (TESSALON) 100 MG Cap  Active   lisinopril (PRINIVIL) 20 MG Tab  Active   tamsulosin (FLOMAX) 0.4 MG capsule  Active                ALLERGIES  Allergies   Allergen Reactions   • Penicillins Unspecified     Pt states that his mom told him he is allergic to penicillins.   • Hydrocodone Unspecified     Pt states \"I'm not sure\".   • Sulfa Drugs      \"my mom told me I was allergic to it when i was a kid\"       PHYSICAL EXAM  VITAL SIGNS: /61   Pulse (!) 114   Temp (!) 38.6 °C (101.5 °F) (Oral)   Resp 20   Ht 1.88 m (6' 2\")   Wt 112.5 kg (248 lb)   SpO2 96%   BMI 31.84 kg/m²    Constitutional: Alert in no apparent distress.  HENT: No signs of trauma, mucous membranes are moist  Eyes: Conjunctiva normal, Non-icteric.   Neck: Normal range of motion, No tenderness, Supple.  Lymphatic: No lymphadenopathy noted.   Cardiovascular: Tachycardic, regular rhythm, no murmurs.   Thorax & Lungs: Normal breath sounds, No respiratory distress, No wheezing, No chest tenderness.   Abdomen: Bowel sounds normal, Soft, No tenderness, No masses, No pulsatile masses. No peritoneal signs.  Skin: Warm, Dry, normal color.   Back: No bony tenderness, No CVA tenderness.   Extremities: No edema, No tenderness, No cyanosis  Musculoskeletal: Good range of motion in all major joints. No tenderness to " palpation or major deformities noted.   Neurologic: Alert and oriented x4, Normal motor function, Normal sensory function, No focal deficits noted.   Psychiatric: Patient is rambling, speaking about problems with dogs, Affect normal.      MEDICAL DECISION MAKING  This is a 55 y.o. male who presents with fever, tachycardia, and he feels he has a foreign body in his throat.  He says he ate a dry hamburger 2 days ago and is felt foreign body sensation.  X-ray of the neck shows no inflammation, no swelling no obvious foreign body.  He is not had trouble breathing and he is tolerating secretions well.  He did eat and drink while he was here.    His fever did improve with Tylenol, and his lab test shows a metabolic acidosis with a normal lactic acid.  Chest x-ray shows no pneumonia.    The patient is delusional, he believes that he has a place to go to where the please go to make him lie down to be attacked by dogs.  These also said some other things that are nonsensical.  He is refusing to be admitted initially due to his delusions.  I consulted the alert team and that this is been a delay in his care here.  I did place the patient on legal 2000 believe his delusions are preventing him from making rational decisions about potentially life-threatening disease.    I spoke with Dr. Self for evaluation she will be admitted for further evaluation and treatment    DIAGNOSTIC STUDIES / PROCEDURES    LABS  Results for orders placed or performed during the hospital encounter of 03/29/20   Lactic acid (lactate)   Result Value Ref Range    Lactic Acid 1.6 0.5 - 2.0 mmol/L   URINALYSIS   Result Value Ref Range    Color Yellow     Character Clear     Specific Gravity 1.008 <1.035    Ph 5.5 5.0 - 8.0    Glucose Negative Negative mg/dL    Ketones Trace (A) Negative mg/dL    Protein Negative Negative mg/dL    Bilirubin Negative Negative    Urobilinogen, Urine 1.0 Negative    Nitrite Negative Negative    Leukocyte Esterase Negative  Negative    Occult Blood Trace (A) Negative    Micro Urine Req Microscopic    URINE DRUG SCREEN   Result Value Ref Range    Amphetamines Urine Positive (A) Negative    Barbiturates Negative Negative    Benzodiazepines Negative Negative    Cocaine Metabolite Negative Negative    Methadone Negative Negative    Opiates Negative Negative    Oxycodone Negative Negative    Phencyclidine -Pcp Negative Negative    Propoxyphene Negative Negative    Cannabinoid Metab Positive (A) Negative   CBC WITH DIFFERENTIAL   Result Value Ref Range    WBC 9.3 4.8 - 10.8 K/uL    RBC 4.10 (L) 4.70 - 6.10 M/uL    Hemoglobin 14.5 14.0 - 18.0 g/dL    Hematocrit 40.8 (L) 42.0 - 52.0 %    MCV 99.5 (H) 81.4 - 97.8 fL    MCH 35.4 (H) 27.0 - 33.0 pg    MCHC 35.5 (H) 33.7 - 35.3 g/dL    RDW 50.3 (H) 35.9 - 50.0 fL    Platelet Count 91 (L) 164 - 446 K/uL    MPV 10.0 9.0 - 12.9 fL    Neutrophils-Polys 65.90 44.00 - 72.00 %    Lymphocytes 20.30 (L) 22.00 - 41.00 %    Monocytes 12.00 0.00 - 13.40 %    Eosinophils 0.50 0.00 - 6.90 %    Basophils 0.20 0.00 - 1.80 %    Immature Granulocytes 1.10 (H) 0.00 - 0.90 %    Nucleated RBC 0.00 /100 WBC    Neutrophils (Absolute) 6.15 1.82 - 7.42 K/uL    Lymphs (Absolute) 1.90 1.00 - 4.80 K/uL    Monos (Absolute) 1.12 (H) 0.00 - 0.85 K/uL    Eos (Absolute) 0.05 0.00 - 0.51 K/uL    Baso (Absolute) 0.02 0.00 - 0.12 K/uL    Immature Granulocytes (abs) 0.10 0.00 - 0.11 K/uL    NRBC (Absolute) 0.00 K/uL   Comp Metabolic Panel   Result Value Ref Range    Sodium 125 (L) 135 - 145 mmol/L    Potassium 3.6 3.6 - 5.5 mmol/L    Chloride 86 (L) 96 - 112 mmol/L    Co2 18 (L) 20 - 33 mmol/L    Anion Gap 21.0 (H) 7.0 - 16.0    Glucose 82 65 - 99 mg/dL    Bun 36 (H) 8 - 22 mg/dL    Creatinine 1.18 0.50 - 1.40 mg/dL    Calcium 8.3 (L) 8.5 - 10.5 mg/dL    AST(SGOT) 202 (H) 12 - 45 U/L    ALT(SGPT) 80 (H) 2 - 50 U/L    Alkaline Phosphatase 68 30 - 99 U/L    Total Bilirubin 2.3 (H) 0.1 - 1.5 mg/dL    Albumin 4.1 3.2 - 4.9 g/dL    Total  Protein 7.2 6.0 - 8.2 g/dL    Globulin 3.1 1.9 - 3.5 g/dL    A-G Ratio 1.3 g/dL   DIAGNOSTIC ALCOHOL   Result Value Ref Range    Diagnostic Alcohol <10.1 0.0 - 10.1 mg/dL   ESTIMATED GFR   Result Value Ref Range    GFR If African American >60 >60 mL/min/1.73 m 2    GFR If Non African American >60 >60 mL/min/1.73 m 2   URINE MICROSCOPIC (W/UA)   Result Value Ref Range    WBC 0-2 (A) /hpf    RBC 0-2 (A) /hpf    Bacteria Negative None /hpf    Epithelial Cells Negative /hpf    Hyaline Cast 0-2 /lpf       All labs reviewed by me.    RADIOLOGY  DX-NECK FOR SOFT TISSUE   Final Result      Unremarkable neck soft tissues. No radiopaque foreign body. Patent airway. Normal epiglottis.      DX-CHEST-PORTABLE (1 VIEW)   Final Result      No acute cardiopulmonary abnormality.      X-rays were independently visualized by me prior to read by radiology  The radiologist's interpretations of all radiological studies have been reviewed by me.        COURSE  Pertinent Labs & Imaging studies reviewed. (See chart for details)    12:30 PM - Patient seen and examined at bedside. Discussed plan of care. The patient will be medicated with Haldol injection 2.5 mg, Benadryl injection 25 mg, Tylenol tablet 1000 mg and Bolus. Ordered for DX-neck for soft tissue, DX-chest, lactic acid, urine drug screen, CBC with differential, CMP, diagnostic alcohol, estimated GFR, urinalysis, urine culture and blood culture x2 to evaluate his symptoms. Droplet precautions were in place for first examination of the patient and all encounters with the patient.    2:25 PM - Paged Hospitalist.    2:30 PM - I discussed the patient's case and the above findings with Dr. Gamboa (Hospitalist) who agreed to hospitalize the patient.    2:50 PM - Ordered urine microscopic to further evaluate the patient's symptoms.    3:45 PM - Spoke to Dr. Gamboa who informed me that the patient is refusing to stay due to a prior engagement where the patient believes he must be forced  into fetal position and be attack by dogs. I am concerned that the patient is not competent to sign out against medical advice. Alert team was called to evaluate the patient's competency.        The patient remains critically ill.  Critical care time = 30 minutes in directly providing and coordinating critical care and extensive data review.  No time overlap and excludes procedures.    HYDRATION: Based on the patient's presentation of Other tachycardia and fever the patient was given IV fluids. IV Hydration was used because oral hydration was not adequate alone. Upon recheck following hydration, the patient was improved.    DISPOSITION:  Patient will be hospitalized by Dr. Gamboa in guarded condition.      FINAL IMPRESSION  1. Alcohol withdrawal syndrome without complication (HCC)    2. Fever, unspecified fever cause    3. Metabolic acidosis    4. Delusion (HCC)         IMil (Jonas), am scribing for, and in the presence of, Chaka Mcfadden D.O..    Electronically signed by: Mil Lomeli (Jonas), 3/29/2020    IChaka D.O. personally performed the services described in this documentation, as scribed by Mil Lomeli in my presence, and it is both accurate and complete. C    The note accurately reflects work and decisions made by me.  Chaka Mcfadden D.O.  3/29/2020  7:41 PM

## 2020-03-30 PROBLEM — F15.10 METHAMPHETAMINE ABUSE (HCC): Status: ACTIVE | Noted: 2020-03-30

## 2020-03-30 LAB
ALBUMIN SERPL BCP-MCNC: 3.3 G/DL (ref 3.2–4.9)
ALBUMIN/GLOB SERPL: 1.2 G/DL
ALP SERPL-CCNC: 64 U/L (ref 30–99)
ALT SERPL-CCNC: 57 U/L (ref 2–50)
ANION GAP SERPL CALC-SCNC: 11 MMOL/L (ref 7–16)
AST SERPL-CCNC: 111 U/L (ref 12–45)
BASOPHILS # BLD AUTO: 0.2 % (ref 0–1.8)
BASOPHILS # BLD: 0.01 K/UL (ref 0–0.12)
BILIRUB SERPL-MCNC: 1.1 MG/DL (ref 0.1–1.5)
BUN SERPL-MCNC: 21 MG/DL (ref 8–22)
CALCIUM SERPL-MCNC: 8.2 MG/DL (ref 8.5–10.5)
CHLORIDE SERPL-SCNC: 103 MMOL/L (ref 96–112)
CO2 SERPL-SCNC: 25 MMOL/L (ref 20–33)
CREAT SERPL-MCNC: 0.79 MG/DL (ref 0.5–1.4)
EOSINOPHIL # BLD AUTO: 0.12 K/UL (ref 0–0.51)
EOSINOPHIL NFR BLD: 2.4 % (ref 0–6.9)
ERYTHROCYTE [DISTWIDTH] IN BLOOD BY AUTOMATED COUNT: 54.2 FL (ref 35.9–50)
GLOBULIN SER CALC-MCNC: 2.7 G/DL (ref 1.9–3.5)
GLUCOSE SERPL-MCNC: 86 MG/DL (ref 65–99)
HCT VFR BLD AUTO: 39 % (ref 42–52)
HGB BLD-MCNC: 13.4 G/DL (ref 14–18)
IMM GRANULOCYTES # BLD AUTO: 0.04 K/UL (ref 0–0.11)
IMM GRANULOCYTES NFR BLD AUTO: 0.8 % (ref 0–0.9)
LYMPHOCYTES # BLD AUTO: 1.48 K/UL (ref 1–4.8)
LYMPHOCYTES NFR BLD: 29.5 % (ref 22–41)
MAGNESIUM SERPL-MCNC: 2.1 MG/DL (ref 1.5–2.5)
MCH RBC QN AUTO: 35.4 PG (ref 27–33)
MCHC RBC AUTO-ENTMCNC: 34.4 G/DL (ref 33.7–35.3)
MCV RBC AUTO: 102.9 FL (ref 81.4–97.8)
MONOCYTES # BLD AUTO: 0.72 K/UL (ref 0–0.85)
MONOCYTES NFR BLD AUTO: 14.4 % (ref 0–13.4)
NEUTROPHILS # BLD AUTO: 2.64 K/UL (ref 1.82–7.42)
NEUTROPHILS NFR BLD: 52.7 % (ref 44–72)
NRBC # BLD AUTO: 0 K/UL
NRBC BLD-RTO: 0 /100 WBC
PHOSPHATE SERPL-MCNC: 2.6 MG/DL (ref 2.5–4.5)
PLATELET # BLD AUTO: 83 K/UL (ref 164–446)
PMV BLD AUTO: 9.7 FL (ref 9–12.9)
POTASSIUM SERPL-SCNC: 3.9 MMOL/L (ref 3.6–5.5)
PROT SERPL-MCNC: 6 G/DL (ref 6–8.2)
RBC # BLD AUTO: 3.79 M/UL (ref 4.7–6.1)
SODIUM SERPL-SCNC: 139 MMOL/L (ref 135–145)
WBC # BLD AUTO: 5 K/UL (ref 4.8–10.8)

## 2020-03-30 PROCEDURE — 83735 ASSAY OF MAGNESIUM: CPT

## 2020-03-30 PROCEDURE — A9270 NON-COVERED ITEM OR SERVICE: HCPCS | Performed by: HOSPITALIST

## 2020-03-30 PROCEDURE — 36415 COLL VENOUS BLD VENIPUNCTURE: CPT

## 2020-03-30 PROCEDURE — 700102 HCHG RX REV CODE 250 W/ 637 OVERRIDE(OP): Performed by: HOSPITALIST

## 2020-03-30 PROCEDURE — 99233 SBSQ HOSP IP/OBS HIGH 50: CPT | Mod: GC | Performed by: PSYCHIATRY & NEUROLOGY

## 2020-03-30 PROCEDURE — 85025 COMPLETE CBC W/AUTO DIFF WBC: CPT

## 2020-03-30 PROCEDURE — 700102 HCHG RX REV CODE 250 W/ 637 OVERRIDE(OP): Performed by: STUDENT IN AN ORGANIZED HEALTH CARE EDUCATION/TRAINING PROGRAM

## 2020-03-30 PROCEDURE — A9270 NON-COVERED ITEM OR SERVICE: HCPCS | Performed by: STUDENT IN AN ORGANIZED HEALTH CARE EDUCATION/TRAINING PROGRAM

## 2020-03-30 PROCEDURE — 700105 HCHG RX REV CODE 258: Performed by: HOSPITALIST

## 2020-03-30 PROCEDURE — 80053 COMPREHEN METABOLIC PANEL: CPT

## 2020-03-30 PROCEDURE — 99232 SBSQ HOSP IP/OBS MODERATE 35: CPT | Performed by: HOSPITALIST

## 2020-03-30 PROCEDURE — 84100 ASSAY OF PHOSPHORUS: CPT

## 2020-03-30 PROCEDURE — 770020 HCHG ROOM/CARE - TELE (206)

## 2020-03-30 RX ORDER — TAMSULOSIN HYDROCHLORIDE 0.4 MG/1
0.4 CAPSULE ORAL DAILY
Status: DISCONTINUED | OUTPATIENT
Start: 2020-03-30 | End: 2020-04-01 | Stop reason: HOSPADM

## 2020-03-30 RX ORDER — LISINOPRIL 20 MG/1
20 TABLET ORAL EVERY EVENING
Status: DISCONTINUED | OUTPATIENT
Start: 2020-03-30 | End: 2020-04-01 | Stop reason: HOSPADM

## 2020-03-30 RX ORDER — ARIPIPRAZOLE 10 MG/1
10 TABLET ORAL DAILY
Status: DISCONTINUED | OUTPATIENT
Start: 2020-03-30 | End: 2020-04-01 | Stop reason: HOSPADM

## 2020-03-30 RX ADMIN — GABAPENTIN 100 MG: 100 CAPSULE ORAL at 10:50

## 2020-03-30 RX ADMIN — VALPROIC ACID 250 MG: 250 CAPSULE, LIQUID FILLED ORAL at 05:00

## 2020-03-30 RX ADMIN — SENNOSIDES AND DOCUSATE SODIUM 2 TABLET: 8.6; 5 TABLET ORAL at 05:00

## 2020-03-30 RX ADMIN — LORAZEPAM 1 MG: 1 TABLET ORAL at 10:23

## 2020-03-30 RX ADMIN — LISINOPRIL 20 MG: 20 TABLET ORAL at 17:01

## 2020-03-30 RX ADMIN — ARIPIPRAZOLE 10 MG: 10 TABLET ORAL at 13:31

## 2020-03-30 RX ADMIN — FOLIC ACID 1 MG: 1 TABLET ORAL at 05:00

## 2020-03-30 RX ADMIN — LORAZEPAM 0.5 MG: 1 TABLET ORAL at 05:00

## 2020-03-30 RX ADMIN — SODIUM CHLORIDE: 9 INJECTION, SOLUTION INTRAVENOUS at 21:25

## 2020-03-30 RX ADMIN — THERA TABS 1 TABLET: TAB at 05:00

## 2020-03-30 RX ADMIN — SODIUM CHLORIDE: 9 INJECTION, SOLUTION INTRAVENOUS at 01:26

## 2020-03-30 RX ADMIN — GUAIFENESIN AND DEXTROMETHORPHAN 10 ML: 100; 10 SYRUP ORAL at 21:21

## 2020-03-30 RX ADMIN — GABAPENTIN 100 MG: 100 CAPSULE ORAL at 05:00

## 2020-03-30 RX ADMIN — TAMSULOSIN HYDROCHLORIDE 0.4 MG: 0.4 CAPSULE ORAL at 17:01

## 2020-03-30 RX ADMIN — SODIUM CHLORIDE: 9 INJECTION, SOLUTION INTRAVENOUS at 13:31

## 2020-03-30 RX ADMIN — GABAPENTIN 100 MG: 100 CAPSULE ORAL at 01:06

## 2020-03-30 RX ADMIN — THIAMINE HCL TAB 100 MG 100 MG: 100 TAB at 05:00

## 2020-03-30 RX ADMIN — VALPROIC ACID 250 MG: 250 CAPSULE, LIQUID FILLED ORAL at 01:06

## 2020-03-30 RX ADMIN — ACETAMINOPHEN 1000 MG: 500 TABLET ORAL at 21:21

## 2020-03-30 RX ADMIN — NICOTINE TRANSDERMAL SYSTEM 21 MG: 21 PATCH, EXTENDED RELEASE TRANSDERMAL at 05:00

## 2020-03-30 RX ADMIN — LORAZEPAM 2 MG: 2 TABLET ORAL at 01:06

## 2020-03-30 ASSESSMENT — LIFESTYLE VARIABLES
HEADACHE, FULLNESS IN HEAD: NOT PRESENT
AGITATION: *
SUBSTANCE_ABUSE: 1
ORIENTATION AND CLOUDING OF SENSORIUM: CANNOT DO SERIAL ADDITIONS OR IS UNCERTAIN ABOUT DATE
DOES PATIENT WANT TO TALK TO SOMEONE ABOUT QUITTING: NO
HEADACHE, FULLNESS IN HEAD: NOT PRESENT
TREMOR: NO TREMOR
PAROXYSMAL SWEATS: BARELY PERCEPTIBLE SWEATING. PALMS MOIST
TOTAL SCORE: 7
AGITATION: *
ORIENTATION AND CLOUDING OF SENSORIUM: CANNOT DO SERIAL ADDITIONS OR IS UNCERTAIN ABOUT DATE
AUDITORY DISTURBANCES: NOT PRESENT
VISUAL DISTURBANCES: NOT PRESENT
ON A TYPICAL DAY WHEN YOU DRINK ALCOHOL HOW MANY DRINKS DO YOU HAVE: 9
ANXIETY: *
ORIENTATION AND CLOUDING OF SENSORIUM: CANNOT DO SERIAL ADDITIONS OR IS UNCERTAIN ABOUT DATE
TOTAL SCORE: 4
ANXIETY: MILDLY ANXIOUS
ANXIETY: *
AGITATION: NORMAL ACTIVITY
NAUSEA AND VOMITING: MILD NAUSEA WITH NO VOMITING
HEADACHE, FULLNESS IN HEAD: MILD
HAVE YOU EVER FELT YOU SHOULD CUT DOWN ON YOUR DRINKING: YES
EVER HAD A DRINK FIRST THING IN THE MORNING TO STEADY YOUR NERVES TO GET RID OF A HANGOVER: YES
EVER FELT BAD OR GUILTY ABOUT YOUR DRINKING: YES
TOTAL SCORE: VERY MILD ITCHING, PINS AND NEEDLES SENSATION, BURNING OR NUMBNESS
NAUSEA AND VOMITING: NO NAUSEA AND NO VOMITING
NAUSEA AND VOMITING: NO NAUSEA AND NO VOMITING
HEADACHE, FULLNESS IN HEAD: NOT PRESENT
AGITATION: SOMEWHAT MORE THAN NORMAL ACTIVITY
PAROXYSMAL SWEATS: NO SWEAT VISIBLE
AUDITORY DISTURBANCES: NOT PRESENT
AVERAGE NUMBER OF DAYS PER WEEK YOU HAVE A DRINK CONTAINING ALCOHOL: 1
VISUAL DISTURBANCES: VERY MILD SENSITIVITY
TOTAL SCORE: 12
NAUSEA AND VOMITING: NO NAUSEA AND NO VOMITING
ANXIETY: NO ANXIETY (AT EASE)
AGITATION: NORMAL ACTIVITY
TREMOR: NO TREMOR
ALCOHOL_USE: YES
NAUSEA AND VOMITING: NO NAUSEA AND NO VOMITING
TREMOR: NO TREMOR
ANXIETY: NO ANXIETY (AT EASE)
AGITATION: SOMEWHAT MORE THAN NORMAL ACTIVITY
VISUAL DISTURBANCES: VERY MILD SENSITIVITY
AUDITORY DISTURBANCES: NOT PRESENT
VISUAL DISTURBANCES: VERY MILD SENSITIVITY
ANXIETY: MILDLY ANXIOUS
HEADACHE, FULLNESS IN HEAD: NOT PRESENT
TREMOR: NO TREMOR
HEADACHE, FULLNESS IN HEAD: NOT PRESENT
TREMOR: NO TREMOR
AUDITORY DISTURBANCES: NOT PRESENT
TOTAL SCORE: 1
NAUSEA AND VOMITING: MILD NAUSEA WITH NO VOMITING
AUDITORY DISTURBANCES: NOT PRESENT
TOTAL SCORE: 4
AUDITORY DISTURBANCES: NOT PRESENT
HAVE PEOPLE ANNOYED YOU BY CRITICIZING YOUR DRINKING: YES
PAROXYSMAL SWEATS: BARELY PERCEPTIBLE SWEATING. PALMS MOIST
TREMOR: *
HEADACHE, FULLNESS IN HEAD: NOT PRESENT
ANXIETY: NO ANXIETY (AT EASE)
CONSUMPTION TOTAL: POSITIVE
AUDITORY DISTURBANCES: NOT PRESENT
TOTAL SCORE: 9
PAROXYSMAL SWEATS: BARELY PERCEPTIBLE SWEATING. PALMS MOIST
ORIENTATION AND CLOUDING OF SENSORIUM: CANNOT DO SERIAL ADDITIONS OR IS UNCERTAIN ABOUT DATE
VISUAL DISTURBANCES: NOT PRESENT
PAROXYSMAL SWEATS: BARELY PERCEPTIBLE SWEATING. PALMS MOIST
TOTAL SCORE: 6
TOTAL SCORE: 3
NAUSEA AND VOMITING: NO NAUSEA AND NO VOMITING
TOTAL SCORE: VERY MILD ITCHING, PINS AND NEEDLES SENSATION, BURNING OR NUMBNESS
TOTAL SCORE: 4
TOTAL SCORE: 4
ORIENTATION AND CLOUDING OF SENSORIUM: CANNOT DO SERIAL ADDITIONS OR IS UNCERTAIN ABOUT DATE
PAROXYSMAL SWEATS: BARELY PERCEPTIBLE SWEATING. PALMS MOIST
ORIENTATION AND CLOUDING OF SENSORIUM: CANNOT DO SERIAL ADDITIONS OR IS UNCERTAIN ABOUT DATE
VISUAL DISTURBANCES: NOT PRESENT
VISUAL DISTURBANCES: VERY MILD SENSITIVITY
DOES PATIENT WANT TO STOP DRINKING: YES
PAROXYSMAL SWEATS: NO SWEAT VISIBLE
ORIENTATION AND CLOUDING OF SENSORIUM: CANNOT DO SERIAL ADDITIONS OR IS UNCERTAIN ABOUT DATE
TREMOR: *
AGITATION: SOMEWHAT MORE THAN NORMAL ACTIVITY
HOW MANY TIMES IN THE PAST YEAR HAVE YOU HAD 5 OR MORE DRINKS IN A DAY: 20

## 2020-03-30 ASSESSMENT — COGNITIVE AND FUNCTIONAL STATUS - GENERAL
CLIMB 3 TO 5 STEPS WITH RAILING: A LITTLE
MOBILITY SCORE: 22
SUGGESTED CMS G CODE MODIFIER MOBILITY: CJ
SUGGESTED CMS G CODE MODIFIER DAILY ACTIVITY: CH
DAILY ACTIVITIY SCORE: 24
WALKING IN HOSPITAL ROOM: A LITTLE

## 2020-03-30 ASSESSMENT — ENCOUNTER SYMPTOMS
COUGH: 0
BLURRED VISION: 0
HALLUCINATIONS: 1
FALLS: 0
DEPRESSION: 0
DIZZINESS: 0
PALPITATIONS: 0
LOSS OF CONSCIOUSNESS: 0
CHILLS: 0
NERVOUS/ANXIOUS: 0
NAUSEA: 1
FEVER: 1
DIZZINESS: 1
FOCAL WEAKNESS: 0
TREMORS: 1
FEVER: 0
HEADACHES: 0
VOMITING: 0
SHORTNESS OF BREATH: 0
FLANK PAIN: 0
SORE THROAT: 0
SPEECH CHANGE: 0
ABDOMINAL PAIN: 0

## 2020-03-30 ASSESSMENT — FIBROSIS 4 INDEX: FIB4 SCORE: 13.4

## 2020-03-30 ASSESSMENT — PATIENT HEALTH QUESTIONNAIRE - PHQ9
1. LITTLE INTEREST OR PLEASURE IN DOING THINGS: NOT AT ALL
2. FEELING DOWN, DEPRESSED, IRRITABLE, OR HOPELESS: NOT AT ALL
1. LITTLE INTEREST OR PLEASURE IN DOING THINGS: NOT AT ALL
SUM OF ALL RESPONSES TO PHQ9 QUESTIONS 1 AND 2: 0
SUM OF ALL RESPONSES TO PHQ9 QUESTIONS 1 AND 2: 0
2. FEELING DOWN, DEPRESSED, IRRITABLE, OR HOPELESS: NOT AT ALL

## 2020-03-30 ASSESSMENT — COPD QUESTIONNAIRES
DO YOU EVER COUGH UP ANY MUCUS OR PHLEGM?: NO/ONLY WITH OCCASIONAL COLDS OR INFECTIONS
COPD SCREENING SCORE: 3
HAVE YOU SMOKED AT LEAST 100 CIGARETTES IN YOUR ENTIRE LIFE: YES
DURING THE PAST 4 WEEKS HOW MUCH DID YOU FEEL SHORT OF BREATH: NONE/LITTLE OF THE TIME

## 2020-03-30 NOTE — CARE PLAN
Problem: Communication  Goal: The ability to communicate needs accurately and effectively will improve  Outcome: PROGRESSING AS EXPECTED     Problem: Safety  Goal: Will remain free from injury  Outcome: PROGRESSING AS EXPECTED   Bed in low, locked position, call light in within reach, hourly rounds. SR raised x3, fall risk indicators in place. Bed in view of RN station. Room free of clutter and trip hazards.  Adequate lighting. Pt educated to use call light for assistance.

## 2020-03-30 NOTE — ASSESSMENT & PLAN NOTE
Drinks vodka daily. Last CIWA score of 6  - tele monitoring  - d/c gabapentin and valproic acid; no indication  - CIWA protocol  - correct electrolytes as needed  - thiamine, folic acid, MVI  - continued counseling and education    Psychiatry evaluated, legal hold extended as he is unable to care for himself.

## 2020-03-30 NOTE — H&P
Hospital Medicine History & Physical Note    Date of Service  3/29/2020    Primary Care Physician  Mohsen Tamasaby, M.D.    Consultants  None    Code Status  full    Chief Complaint  Feeling of food caught in throat.    History of Presenting Illness  55 y.o. male who presented 3/29/2020 with a globus sensation in his throat. He stopped drinking vodka three days ago as well, he drinks a gallon of vodka daily. Has a history of withdrawal in the past and he thinks maybe a seizure but he is a very unreliable historian. Denies fever though he had a temp of 101 on arrival. His contacts and travel history are unknown. Due to withdrawal he has had no fluids for the past day. He feels tremulous and says he is seeing moving figures on the ceiling. Patient was going to leave AMA but was clearly delirious and confused therefore was placed on a legal hold in the ER as he is incapable of caring for himself at this time.    I discussed the presenting symptoms, physical examination, lab and radiological study results with the emergency department physician.      Review of Systems  Review of Systems   Unable to perform ROS: Acuity of condition       Past Medical History   has a past medical history of Achilles tendinitis (6/24/2009), Arthralgia (6/24/2009), Bronchitis (6/24/2009), Dyslipidemia (6/24/2009), GERD (gastroesophageal reflux disease) (6/24/2009), Hepatitis C, HTN (hypertension) (6/24/2009), Hypertension, Low back pain, and Psychiatric disorder.    Surgical History   has a past surgical history that includes neurolysis (1/21/2009); other orthopedic surgery (2001); carpal tunnel release (1/21/2009); hardware removal ortho (1/21/2009); synovectomy (1/21/2009); arthrotomy (2011); arthroscopy, knee (2013); elbow arthrotomy (5/17/2013); and loose body removal (5/17/2013).     Family History  family history includes Alcohol/Drug in his brother, brother, father, and sister; Cancer in his brother, mother, and sister; Diabetes in  "his sister; Hyperlipidemia in his father; Hypertension in his brother, father, mother, and sister; Stroke in his brother and father.     Social History   reports that he has been smoking cigarettes. He started smoking about 4 years ago. He has a 7.50 pack-year smoking history. He has never used smokeless tobacco. He reports current alcohol use. He reports current drug use. Drugs: Inhaled and Marijuana.    Allergies  Allergies   Allergen Reactions   • Penicillins Unspecified     Pt states that his mom told him he is allergic to penicillins.   • Hydrocodone Unspecified     Pt states \"I'm not sure\".   • Sulfa Drugs      \"my mom told me I was allergic to it when i was a kid\"       Medications  Prior to Admission Medications   Prescriptions Last Dose Informant Patient Reported? Taking?   albuterol 108 (90 Base) MCG/ACT Aero Soln inhalation aerosol   No No   Sig: Inhale 2 Puffs by mouth every 6 hours as needed for Shortness of Breath.   azithromycin (ZITHROMAX) 250 MG Tab  Patient Yes No   Sig: Take 250-500 mg by mouth every day. Take 2 tablets on day one, then take 1 tablet every day for 4 days  Completed ZPAK on 12/06   benzonatate (TESSALON) 100 MG Cap   No No   Sig: Take 1 Cap by mouth 3 times a day as needed for Cough.   lisinopril (PRINIVIL) 20 MG Tab  Patient Yes No   Sig: Take 20 mg by mouth every evening.   tamsulosin (FLOMAX) 0.4 MG capsule  Patient No No   Sig: Take 1 Cap by mouth every day.      Facility-Administered Medications: None       Physical Exam  Temp:  [37.1 °C (98.7 °F)-38.6 °C (101.5 °F)] 37.1 °C (98.7 °F)  Pulse:  [] 90  Resp:  [12-25] 20  BP: (103-114)/(61-79) 114/79  SpO2:  [92 %-98 %] 93 %    Physical Exam  Vitals signs and nursing note reviewed.   Constitutional:       General: He is not in acute distress.     Appearance: He is well-developed. He is ill-appearing. He is not diaphoretic.   HENT:      Right Ear: External ear normal.      Left Ear: External ear normal.      Nose: Nose " normal.      Mouth/Throat:      Pharynx: No oropharyngeal exudate.   Eyes:      General: No scleral icterus.        Right eye: No discharge.         Left eye: No discharge.      Conjunctiva/sclera: Conjunctivae normal.   Neck:      Vascular: No JVD.      Trachea: No tracheal deviation.   Cardiovascular:      Rate and Rhythm: Normal rate and regular rhythm.      Heart sounds: Normal heart sounds.   Pulmonary:      Effort: Pulmonary effort is normal. No respiratory distress.      Breath sounds: Normal breath sounds. No stridor. No wheezing, rhonchi or rales.   Chest:      Chest wall: No tenderness.   Abdominal:      General: Bowel sounds are normal. There is no distension.      Palpations: Abdomen is soft.      Tenderness: There is no abdominal tenderness.   Musculoskeletal:         General: No tenderness.   Skin:     General: Skin is warm and dry.      Coloration: Skin is not pale.   Neurological:      Mental Status: He is alert. He is disoriented and confused.      Cranial Nerves: No cranial nerve deficit.      Motor: Tremor present. No abnormal muscle tone.   Psychiatric:         Attention and Perception: He perceives visual hallucinations.         Mood and Affect: Affect is labile.         Behavior: Behavior is slowed.         Judgment: Judgment is impulsive.         Laboratory:  Recent Labs     03/29/20  1220   WBC 9.3   RBC 4.10*   HEMOGLOBIN 14.5   HEMATOCRIT 40.8*   MCV 99.5*   MCH 35.4*   MCHC 35.5*   RDW 50.3*   PLATELETCT 91*   MPV 10.0     Recent Labs     03/29/20  1220   SODIUM 125*   POTASSIUM 3.6   CHLORIDE 86*   CO2 18*   GLUCOSE 82   BUN 36*   CREATININE 1.18   CALCIUM 8.3*     Recent Labs     03/29/20  1220   ALTSGPT 80*   ASTSGOT 202*   ALKPHOSPHAT 68   TBILIRUBIN 2.3*   GLUCOSE 82         No results for input(s): NTPROBNP in the last 72 hours.      No results for input(s): TROPONINT in the last 72 hours.    Urinalysis:    Recent Labs     03/29/20  1450   SPECGRAVITY 1.008   GLUCOSEUR Negative    KETONES Trace*   NITRITE Negative   LEUKESTERAS Negative   WBCURINE 0-2*   RBCURINE 0-2*   BACTERIA Negative   EPITHELCELL Negative        Imaging:  DX-NECK FOR SOFT TISSUE   Final Result      Unremarkable neck soft tissues. No radiopaque foreign body. Patent airway. Normal epiglottis.      DX-CHEST-PORTABLE (1 VIEW)   Final Result      No acute cardiopulmonary abnormality.            Assessment/Plan:  I anticipate this patient will require at least two midnights for appropriate medical management, necessitating inpatient admission.    * Alcohol withdrawal (HCC)- (present on admission)  Assessment & Plan  Admit to inpatient for treatment of alcohol withdrawal.  Alcohol withdrawal protocol ordered.  Ativan IV and oral on sliding scale based on CIWA score.  Rally bag now, oral thiamine, folic acid and MVI daily starting tomorrow.  Check CMP, magnesium and phosphorus in am.  Start oral gabapentin 100mg po tid and valproic acid 250mg po every 8 hours.  Social work consult during admission to provide information regarding alcohol dependence treatment options.  HE IS ON A LEGAL HOLD DUE TO DELIRIUM, UNABLE TO CARE FOR SELF.      Acute alcoholic hepatitis- (present on admission)  Assessment & Plan  Mild, in addition to chronic hepatitis C. Bilirubin is usually not elevated.  Check protime  Does not meet criteria for use of steroids at this time.    Fever- (present on admission)  Assessment & Plan  Unknown origin.  Contacts of patient not known, he is not coughing at this time and chest xray normal but with current viral outbreak and fever of unknown origin viral evaluation is ordered for SARS-CoV and Influenza A/B.  Droplet/contact/eye protection isolation recommended until viral studies are completed.  No antibiotics at this time due to lack of source, tylenol prn for fever.    Hep C w/o coma, chronic (HCC)- (present on admission)  Assessment & Plan  Elevated liver enzymes are intermittent but more likely acute  alcoholic hepatitis at this time.    Essential hypertension- (present on admission)  Assessment & Plan  Continue lisinopril.    Hyponatremia- (present on admission)  Assessment & Plan  Due to dehydration, poor intake, fever, NS infusion ordered, monitor BMP.    Thrombocytopenia (HCC)- (present on admission)  Assessment & Plan  Lower than usual, most likely due to alcohol abuse causing bone marrow suppression  No pharmacologic DVT prophylaxis, stop ETOH and monitor.    Tobacco abuse- (present on admission)  Assessment & Plan  Per history.  Prn patch.    BPH (benign prostatic hypertrophy)- (present on admission)  Assessment & Plan  Continue tamsulosin.        VTE prophylaxis: scd
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
## 2020-03-30 NOTE — ASSESSMENT & PLAN NOTE
Lower than usual, most likely due to alcohol abuse causing bone marrow suppression.  - continue to monitor  - SCDs for pharmacologic prophylaxis
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
## 2020-03-30 NOTE — ASSESSMENT & PLAN NOTE
Mild, in addition to chronic hepatitis C. Bilirubin normalized. LFTs improved. Does not meet criteria for use of steroids at this time.

## 2020-03-30 NOTE — ASSESSMENT & PLAN NOTE
Elevated liver enzymes are intermittent but more likely acute alcoholic hepatitis at this time.  Follow-up for hepatitis as outpatient

## 2020-03-30 NOTE — PROGRESS NOTES
"Hospital Medicine Daily Progress Note    Date of Service  3/30/2020    Chief Complaint  55 y.o. male admitted 3/29/2020 with food caught in throat.    Hospital Course    56 yo M who presented with globus sensation in his throat. He was hallucinating, going through alcohol withdrawal and u tox was positive for meth.       Interval Problem Update  Feels \"okay\" today. Reports that his wife spoke with him over the phone and told him that he was hallucinating after having a significant amount of vodka and that someone gave him meth. Reports that the \"burger stuck in my throat is gone. Did you fix it?\"    Consultants/Specialty  Psychiatry     Code Status  Full    Disposition  Pending medical clearance. On a legal hold.     Review of Systems  Review of Systems   Constitutional: Positive for malaise/fatigue. Negative for chills and fever.   Respiratory: Negative for shortness of breath.    Cardiovascular: Negative for chest pain, palpitations and leg swelling.   Gastrointestinal: Positive for nausea. Negative for abdominal pain and vomiting.   Genitourinary: Negative for flank pain.   Musculoskeletal: Negative for falls.   Neurological: Positive for tremors. Negative for dizziness, speech change, focal weakness, loss of consciousness and headaches.   Psychiatric/Behavioral: Positive for hallucinations and substance abuse. The patient is not nervous/anxious.    All other systems reviewed and are negative.       Physical Exam  Temp:  [36.9 °C (98.4 °F)-38.6 °C (101.5 °F)] 37.2 °C (98.9 °F)  Pulse:  [] 82  Resp:  [12-25] 18  BP: (103-131)/(61-89) 131/75  SpO2:  [92 %-98 %] 93 %    Physical Exam  Vitals signs reviewed.   Constitutional:       General: He is not in acute distress.     Appearance: He is not diaphoretic.   HENT:      Head: Normocephalic.      Mouth/Throat:      Mouth: Mucous membranes are dry.   Eyes:      General:         Right eye: No discharge.         Left eye: No discharge.      Extraocular Movements: " Extraocular movements intact.      Pupils: Pupils are equal, round, and reactive to light.   Neck:      Musculoskeletal: Normal range of motion. No muscular tenderness.   Cardiovascular:      Rate and Rhythm: Normal rate and regular rhythm.      Pulses: Normal pulses.   Pulmonary:      Effort: Pulmonary effort is normal. No respiratory distress.      Breath sounds: No wheezing or rales.   Abdominal:      General: There is no distension.      Palpations: Abdomen is soft.      Tenderness: There is no abdominal tenderness. There is no guarding.   Musculoskeletal:         General: No tenderness.      Right lower leg: No edema.      Left lower leg: No edema.   Skin:     General: Skin is dry.   Neurological:      General: No focal deficit present.      Mental Status: He is alert.      Motor: Tremor present.   Psychiatric:         Mood and Affect: Mood normal.         Speech: Speech normal.         Behavior: Behavior is cooperative.      Comments: Odd affect         Fluids    Intake/Output Summary (Last 24 hours) at 3/30/2020 0726  Last data filed at 3/30/2020 0600  Gross per 24 hour   Intake 4033.33 ml   Output 600 ml   Net 3433.33 ml       Laboratory  Recent Labs     03/29/20  1220 03/30/20  0406   WBC 9.3 5.0   RBC 4.10* 3.79*   HEMOGLOBIN 14.5 13.4*   HEMATOCRIT 40.8* 39.0*   MCV 99.5* 102.9*   MCH 35.4* 35.4*   MCHC 35.5* 34.4   RDW 50.3* 54.2*   PLATELETCT 91* 83*   MPV 10.0 9.7     Recent Labs     03/29/20  1220 03/30/20  0406   SODIUM 125* 139   POTASSIUM 3.6 3.9   CHLORIDE 86* 103   CO2 18* 25   GLUCOSE 82 86   BUN 36* 21   CREATININE 1.18 0.79   CALCIUM 8.3* 8.2*                   Imaging  DX-NECK FOR SOFT TISSUE   Final Result      Unremarkable neck soft tissues. No radiopaque foreign body. Patent airway. Normal epiglottis.      DX-CHEST-PORTABLE (1 VIEW)   Final Result      No acute cardiopulmonary abnormality.           Assessment/Plan  * Alcohol withdrawal (HCC)- (present on admission)  Assessment &  Plan  Drinks vodka daily. Last CIWA score of 6  - tele monitoring  - d/c gabapentin and valproic acid; no indication  - [facility] protocol  - correct electrolytes as needed  - thiamine, folic acid, MVI  - continued counseling and education    Psychiatry evaluated, legal hold extended as he is unable to care for himself.     Fever- (present on admission)  Assessment & Plan  Afebrile since admission. Influenza negative.   - covid 19 results pending    Acute alcoholic hepatitis- (present on admission)  Assessment & Plan  Mild, in addition to chronic hepatitis C. Bilirubin normalized. LFTs improved. Does not meet criteria for use of steroids at this time.    Hyponatremia- (present on admission)  Assessment & Plan  Due to dehydration. Resolved.     Hep C w/o coma, chronic (HCC)- (present on admission)  Assessment & Plan  Elevated liver enzymes are intermittent but more likely acute alcoholic hepatitis at this time.    Essential hypertension- (present on admission)  Assessment & Plan  Normotensive.   - Continue lisinopril, with holding parameters    Methamphetamine abuse (HCC)  Assessment & Plan  U tox positive for amphetamines  - counseling and education    Thrombocytopenia (HCC)- (present on admission)  Assessment & Plan  Lower than usual, most likely due to alcohol abuse causing bone marrow suppression.  - continue to monitor  - SCDs for pharmacologic prophylaxis    Tobacco abuse- (present on admission)  Assessment & Plan  Tobacco use.   - nicotine replacement ordered    BPH (benign prostatic hypertrophy)- (present on admission)  Assessment & Plan  - Continue tamsulosin       VTE prophylaxis: SCDs

## 2020-03-30 NOTE — ASSESSMENT & PLAN NOTE
-counseled for more than 10 minutes on tobacco cessation [id number]   -I have ordered nicotine replacement therapy

## 2020-03-31 LAB
ALBUMIN SERPL BCP-MCNC: 3 G/DL (ref 3.2–4.9)
ALBUMIN/GLOB SERPL: 1 G/DL
ALP SERPL-CCNC: 57 U/L (ref 30–99)
ALT SERPL-CCNC: 56 U/L (ref 2–50)
ANION GAP SERPL CALC-SCNC: 11 MMOL/L (ref 7–16)
AST SERPL-CCNC: 87 U/L (ref 12–45)
BACTERIA UR CULT: NORMAL
BILIRUB SERPL-MCNC: 0.8 MG/DL (ref 0.1–1.5)
BUN SERPL-MCNC: 10 MG/DL (ref 8–22)
CALCIUM SERPL-MCNC: 7.8 MG/DL (ref 8.5–10.5)
CHLORIDE SERPL-SCNC: 105 MMOL/L (ref 96–112)
CO2 SERPL-SCNC: 23 MMOL/L (ref 20–33)
CREAT SERPL-MCNC: 0.59 MG/DL (ref 0.5–1.4)
ERYTHROCYTE [DISTWIDTH] IN BLOOD BY AUTOMATED COUNT: 56.9 FL (ref 35.9–50)
GLOBULIN SER CALC-MCNC: 2.9 G/DL (ref 1.9–3.5)
GLUCOSE SERPL-MCNC: 80 MG/DL (ref 65–99)
HCT VFR BLD AUTO: 36.7 % (ref 42–52)
HGB BLD-MCNC: 12.2 G/DL (ref 14–18)
MCH RBC QN AUTO: 35.4 PG (ref 27–33)
MCHC RBC AUTO-ENTMCNC: 33.2 G/DL (ref 33.7–35.3)
MCV RBC AUTO: 106.4 FL (ref 81.4–97.8)
PLATELET # BLD AUTO: 92 K/UL (ref 164–446)
PMV BLD AUTO: 10 FL (ref 9–12.9)
POTASSIUM SERPL-SCNC: 3.3 MMOL/L (ref 3.6–5.5)
PROT SERPL-MCNC: 5.9 G/DL (ref 6–8.2)
RBC # BLD AUTO: 3.45 M/UL (ref 4.7–6.1)
SARS-COV-2 RNA RESP QL NAA+PROBE: NOT DETECTED
SIGNIFICANT IND 70042: NORMAL
SITE SITE: NORMAL
SODIUM SERPL-SCNC: 139 MMOL/L (ref 135–145)
SOURCE SOURCE: NORMAL
SPECIMEN SOURCE: NORMAL
WBC # BLD AUTO: 3.7 K/UL (ref 4.8–10.8)

## 2020-03-31 PROCEDURE — 700102 HCHG RX REV CODE 250 W/ 637 OVERRIDE(OP): Performed by: HOSPITALIST

## 2020-03-31 PROCEDURE — 770020 HCHG ROOM/CARE - TELE (206)

## 2020-03-31 PROCEDURE — 99233 SBSQ HOSP IP/OBS HIGH 50: CPT | Performed by: INTERNAL MEDICINE

## 2020-03-31 PROCEDURE — A9270 NON-COVERED ITEM OR SERVICE: HCPCS | Performed by: STUDENT IN AN ORGANIZED HEALTH CARE EDUCATION/TRAINING PROGRAM

## 2020-03-31 PROCEDURE — A9270 NON-COVERED ITEM OR SERVICE: HCPCS | Performed by: HOSPITALIST

## 2020-03-31 PROCEDURE — 85027 COMPLETE CBC AUTOMATED: CPT

## 2020-03-31 PROCEDURE — 700105 HCHG RX REV CODE 258: Performed by: HOSPITALIST

## 2020-03-31 PROCEDURE — 700111 HCHG RX REV CODE 636 W/ 250 OVERRIDE (IP): Performed by: HOSPITALIST

## 2020-03-31 PROCEDURE — 80053 COMPREHEN METABOLIC PANEL: CPT

## 2020-03-31 PROCEDURE — A9270 NON-COVERED ITEM OR SERVICE: HCPCS | Performed by: INTERNAL MEDICINE

## 2020-03-31 PROCEDURE — 36415 COLL VENOUS BLD VENIPUNCTURE: CPT

## 2020-03-31 PROCEDURE — 700102 HCHG RX REV CODE 250 W/ 637 OVERRIDE(OP): Performed by: INTERNAL MEDICINE

## 2020-03-31 PROCEDURE — 700102 HCHG RX REV CODE 250 W/ 637 OVERRIDE(OP): Performed by: STUDENT IN AN ORGANIZED HEALTH CARE EDUCATION/TRAINING PROGRAM

## 2020-03-31 RX ORDER — POTASSIUM CHLORIDE 20 MEQ/1
40 TABLET, EXTENDED RELEASE ORAL ONCE
Status: COMPLETED | OUTPATIENT
Start: 2020-03-31 | End: 2020-03-31

## 2020-03-31 RX ADMIN — TAMSULOSIN HYDROCHLORIDE 0.4 MG: 0.4 CAPSULE ORAL at 05:12

## 2020-03-31 RX ADMIN — SENNOSIDES AND DOCUSATE SODIUM 2 TABLET: 8.6; 5 TABLET ORAL at 05:12

## 2020-03-31 RX ADMIN — LORAZEPAM 1 MG: 2 INJECTION INTRAMUSCULAR; INTRAVENOUS at 08:18

## 2020-03-31 RX ADMIN — POTASSIUM CHLORIDE 40 MEQ: 1500 TABLET, EXTENDED RELEASE ORAL at 11:30

## 2020-03-31 RX ADMIN — FOLIC ACID 1 MG: 1 TABLET ORAL at 05:12

## 2020-03-31 RX ADMIN — THERA TABS 1 TABLET: TAB at 05:12

## 2020-03-31 RX ADMIN — LISINOPRIL 20 MG: 20 TABLET ORAL at 17:43

## 2020-03-31 RX ADMIN — LORAZEPAM 1 MG: 2 INJECTION INTRAMUSCULAR; INTRAVENOUS at 05:15

## 2020-03-31 RX ADMIN — NICOTINE TRANSDERMAL SYSTEM 21 MG: 21 PATCH, EXTENDED RELEASE TRANSDERMAL at 05:13

## 2020-03-31 RX ADMIN — THIAMINE HCL TAB 100 MG 100 MG: 100 TAB at 05:12

## 2020-03-31 RX ADMIN — SODIUM CHLORIDE: 9 INJECTION, SOLUTION INTRAVENOUS at 05:13

## 2020-03-31 RX ADMIN — ARIPIPRAZOLE 10 MG: 10 TABLET ORAL at 05:12

## 2020-03-31 ASSESSMENT — ENCOUNTER SYMPTOMS
BLOOD IN STOOL: 0
FALLS: 0
HEADACHES: 0
SHORTNESS OF BREATH: 0
COUGH: 1
CONSTIPATION: 0
NAUSEA: 0
FEVER: 0
TINGLING: 0
ABDOMINAL PAIN: 0
PALPITATIONS: 0
SPUTUM PRODUCTION: 0
SORE THROAT: 0
CHILLS: 0
DIZZINESS: 0
WHEEZING: 0
WEAKNESS: 0
BLURRED VISION: 0
DIARRHEA: 0
HEMOPTYSIS: 0
VOMITING: 0
DEPRESSION: 0
SINUS PAIN: 0
NERVOUS/ANXIOUS: 0

## 2020-03-31 ASSESSMENT — LIFESTYLE VARIABLES
AUDITORY DISTURBANCES: NOT PRESENT
VISUAL DISTURBANCES: NOT PRESENT
VISUAL DISTURBANCES: NOT PRESENT
AUDITORY DISTURBANCES: NOT PRESENT
ANXIETY: NO ANXIETY (AT EASE)
AUDITORY DISTURBANCES: NOT PRESENT
NAUSEA AND VOMITING: NO NAUSEA AND NO VOMITING
TOTAL SCORE: 1
HEADACHE, FULLNESS IN HEAD: NOT PRESENT
PAROXYSMAL SWEATS: NO SWEAT VISIBLE
TREMOR: NO TREMOR
PAROXYSMAL SWEATS: *
AGITATION: NORMAL ACTIVITY
TOTAL SCORE: 1
NAUSEA AND VOMITING: NO NAUSEA AND NO VOMITING
AUDITORY DISTURBANCES: NOT PRESENT
ORIENTATION AND CLOUDING OF SENSORIUM: ORIENTED AND CAN DO SERIAL ADDITIONS
ORIENTATION AND CLOUDING OF SENSORIUM: ORIENTED AND CAN DO SERIAL ADDITIONS
AGITATION: NORMAL ACTIVITY
TREMOR: TREMOR NOT VISIBLE BUT CAN BE FELT, FINGERTIP TO FINGERTIP
NAUSEA AND VOMITING: NO NAUSEA AND NO VOMITING
PAROXYSMAL SWEATS: NO SWEAT VISIBLE
TREMOR: *
HEADACHE, FULLNESS IN HEAD: VERY MILD
TOTAL SCORE: 1
TREMOR: NO TREMOR
ANXIETY: NO ANXIETY (AT EASE)
ORIENTATION AND CLOUDING OF SENSORIUM: CANNOT DO SERIAL ADDITIONS OR IS UNCERTAIN ABOUT DATE
ANXIETY: NO ANXIETY (AT EASE)
ANXIETY: NO ANXIETY (AT EASE)
TREMOR: *
HEADACHE, FULLNESS IN HEAD: NOT PRESENT
AGITATION: NORMAL ACTIVITY
PAROXYSMAL SWEATS: NO SWEAT VISIBLE
AUDITORY DISTURBANCES: NOT PRESENT
AGITATION: NORMAL ACTIVITY
TREMOR: TREMOR NOT VISIBLE BUT CAN BE FELT, FINGERTIP TO FINGERTIP
TOTAL SCORE: 3
AGITATION: NORMAL ACTIVITY
ANXIETY: *
TOTAL SCORE: 7
HEADACHE, FULLNESS IN HEAD: VERY MILD
AUDITORY DISTURBANCES: NOT PRESENT
PAROXYSMAL SWEATS: NO SWEAT VISIBLE
HEADACHE, FULLNESS IN HEAD: NOT PRESENT
NAUSEA AND VOMITING: NO NAUSEA AND NO VOMITING
VISUAL DISTURBANCES: NOT PRESENT
VISUAL DISTURBANCES: NOT PRESENT
NAUSEA AND VOMITING: MILD NAUSEA WITH NO VOMITING
VISUAL DISTURBANCES: NOT PRESENT
AGITATION: SOMEWHAT MORE THAN NORMAL ACTIVITY
NAUSEA AND VOMITING: NO NAUSEA AND NO VOMITING
PAROXYSMAL SWEATS: NO SWEAT VISIBLE
ORIENTATION AND CLOUDING OF SENSORIUM: ORIENTED AND CAN DO SERIAL ADDITIONS
HEADACHE, FULLNESS IN HEAD: MILD
ANXIETY: MODERATELY ANXIOUS OR GUARDED, SO ANXIETY IS INFERRED
TREMOR: NO TREMOR
ANXIETY: NO ANXIETY (AT EASE)
VISUAL DISTURBANCES: NOT PRESENT
ORIENTATION AND CLOUDING OF SENSORIUM: CANNOT DO SERIAL ADDITIONS OR IS UNCERTAIN ABOUT DATE
TOTAL SCORE: 1
ORIENTATION AND CLOUDING OF SENSORIUM: CANNOT DO SERIAL ADDITIONS OR IS UNCERTAIN ABOUT DATE
AGITATION: NORMAL ACTIVITY
NAUSEA AND VOMITING: NO NAUSEA AND NO VOMITING
ORIENTATION AND CLOUDING OF SENSORIUM: ORIENTED AND CAN DO SERIAL ADDITIONS
AUDITORY DISTURBANCES: NOT PRESENT
VISUAL DISTURBANCES: NOT PRESENT
HEADACHE, FULLNESS IN HEAD: NOT PRESENT
PAROXYSMAL SWEATS: NO SWEAT VISIBLE
TOTAL SCORE: 11

## 2020-03-31 NOTE — RESPIRATORY CARE
COPD EDUCATION by COPD CLINICAL EDUCATOR  3/31/2020 at 7:56 AM by Jewels Pemberton, RRT     Patient reviewed by COPD education team. Patient does not have a history or diagnosis of COPD and is a non-smoker, therefore does not qualify for the COPD program.

## 2020-03-31 NOTE — CARE PLAN
Problem: Respiratory:  Goal: Respiratory status will improve  Intervention: Assess and monitor pulmonary status  Note: Pulmonary status assessed with each encounter. Oxygen titrated as needed. Will continue to monitor.          Problem: Pain Management  Goal: Pain level will decrease to patient's comfort goal  Intervention: Follow pain managment plan developed in collaboration with patient and Interdisciplinary Team  Note: Pharmacological and nonpharmacological methods used to control pain. Pain assessed Q2.  Medications administered as needed per the MAR.

## 2020-04-01 VITALS
TEMPERATURE: 97.8 F | HEART RATE: 89 BPM | OXYGEN SATURATION: 98 % | HEIGHT: 74 IN | BODY MASS INDEX: 28.63 KG/M2 | DIASTOLIC BLOOD PRESSURE: 92 MMHG | RESPIRATION RATE: 18 BRPM | SYSTOLIC BLOOD PRESSURE: 143 MMHG | WEIGHT: 223.11 LBS

## 2020-04-01 PROBLEM — R50.9 FEVER: Status: RESOLVED | Noted: 2020-03-29 | Resolved: 2020-04-01

## 2020-04-01 PROBLEM — E87.1 HYPONATREMIA: Status: RESOLVED | Noted: 2020-03-29 | Resolved: 2020-04-01

## 2020-04-01 LAB
ANION GAP SERPL CALC-SCNC: 10 MMOL/L (ref 7–16)
BASOPHILS # BLD AUTO: 0.5 % (ref 0–1.8)
BASOPHILS # BLD: 0.02 K/UL (ref 0–0.12)
BUN SERPL-MCNC: 7 MG/DL (ref 8–22)
CALCIUM SERPL-MCNC: 8.2 MG/DL (ref 8.5–10.5)
CHLORIDE SERPL-SCNC: 103 MMOL/L (ref 96–112)
CO2 SERPL-SCNC: 26 MMOL/L (ref 20–33)
CREAT SERPL-MCNC: 0.58 MG/DL (ref 0.5–1.4)
EOSINOPHIL # BLD AUTO: 0.11 K/UL (ref 0–0.51)
EOSINOPHIL NFR BLD: 2.8 % (ref 0–6.9)
ERYTHROCYTE [DISTWIDTH] IN BLOOD BY AUTOMATED COUNT: 55.7 FL (ref 35.9–50)
GLUCOSE SERPL-MCNC: 96 MG/DL (ref 65–99)
HCT VFR BLD AUTO: 39.4 % (ref 42–52)
HGB BLD-MCNC: 13.2 G/DL (ref 14–18)
IMM GRANULOCYTES # BLD AUTO: 0.03 K/UL (ref 0–0.11)
IMM GRANULOCYTES NFR BLD AUTO: 0.8 % (ref 0–0.9)
LYMPHOCYTES # BLD AUTO: 1.4 K/UL (ref 1–4.8)
LYMPHOCYTES NFR BLD: 36.2 % (ref 22–41)
MAGNESIUM SERPL-MCNC: 1.7 MG/DL (ref 1.5–2.5)
MCH RBC QN AUTO: 35.4 PG (ref 27–33)
MCHC RBC AUTO-ENTMCNC: 33.5 G/DL (ref 33.7–35.3)
MCV RBC AUTO: 105.6 FL (ref 81.4–97.8)
MONOCYTES # BLD AUTO: 0.4 K/UL (ref 0–0.85)
MONOCYTES NFR BLD AUTO: 10.3 % (ref 0–13.4)
NEUTROPHILS # BLD AUTO: 1.91 K/UL (ref 1.82–7.42)
NEUTROPHILS NFR BLD: 49.4 % (ref 44–72)
NRBC # BLD AUTO: 0 K/UL
NRBC BLD-RTO: 0 /100 WBC
PHOSPHATE SERPL-MCNC: 2.7 MG/DL (ref 2.5–4.5)
PLATELET # BLD AUTO: 107 K/UL (ref 164–446)
PMV BLD AUTO: 9.8 FL (ref 9–12.9)
POTASSIUM SERPL-SCNC: 3.6 MMOL/L (ref 3.6–5.5)
RBC # BLD AUTO: 3.73 M/UL (ref 4.7–6.1)
SODIUM SERPL-SCNC: 139 MMOL/L (ref 135–145)
WBC # BLD AUTO: 3.9 K/UL (ref 4.8–10.8)

## 2020-04-01 PROCEDURE — 80048 BASIC METABOLIC PNL TOTAL CA: CPT

## 2020-04-01 PROCEDURE — 99232 SBSQ HOSP IP/OBS MODERATE 35: CPT | Performed by: PSYCHIATRY & NEUROLOGY

## 2020-04-01 PROCEDURE — A9270 NON-COVERED ITEM OR SERVICE: HCPCS | Performed by: HOSPITALIST

## 2020-04-01 PROCEDURE — 700102 HCHG RX REV CODE 250 W/ 637 OVERRIDE(OP): Performed by: HOSPITALIST

## 2020-04-01 PROCEDURE — 97165 OT EVAL LOW COMPLEX 30 MIN: CPT

## 2020-04-01 PROCEDURE — 700102 HCHG RX REV CODE 250 W/ 637 OVERRIDE(OP): Performed by: STUDENT IN AN ORGANIZED HEALTH CARE EDUCATION/TRAINING PROGRAM

## 2020-04-01 PROCEDURE — A9270 NON-COVERED ITEM OR SERVICE: HCPCS | Performed by: STUDENT IN AN ORGANIZED HEALTH CARE EDUCATION/TRAINING PROGRAM

## 2020-04-01 PROCEDURE — 83735 ASSAY OF MAGNESIUM: CPT

## 2020-04-01 PROCEDURE — A9270 NON-COVERED ITEM OR SERVICE: HCPCS | Performed by: INTERNAL MEDICINE

## 2020-04-01 PROCEDURE — 700102 HCHG RX REV CODE 250 W/ 637 OVERRIDE(OP): Performed by: INTERNAL MEDICINE

## 2020-04-01 PROCEDURE — 97161 PT EVAL LOW COMPLEX 20 MIN: CPT

## 2020-04-01 PROCEDURE — 99239 HOSP IP/OBS DSCHRG MGMT >30: CPT | Performed by: INTERNAL MEDICINE

## 2020-04-01 PROCEDURE — 84100 ASSAY OF PHOSPHORUS: CPT

## 2020-04-01 PROCEDURE — 85025 COMPLETE CBC W/AUTO DIFF WBC: CPT

## 2020-04-01 PROCEDURE — 36415 COLL VENOUS BLD VENIPUNCTURE: CPT

## 2020-04-01 PROCEDURE — 700111 HCHG RX REV CODE 636 W/ 250 OVERRIDE (IP): Performed by: HOSPITALIST

## 2020-04-01 RX ORDER — LANOLIN ALCOHOL/MO/W.PET/CERES
100 CREAM (GRAM) TOPICAL DAILY
Qty: 30 TAB | Refills: 0 | Status: SHIPPED | OUTPATIENT
Start: 2020-04-02

## 2020-04-01 RX ORDER — FOLIC ACID 1 MG/1
1 TABLET ORAL DAILY
Qty: 30 TAB | Refills: 0 | Status: SHIPPED | OUTPATIENT
Start: 2020-04-02

## 2020-04-01 RX ORDER — ARIPIPRAZOLE 10 MG/1
10 TABLET ORAL DAILY
Qty: 90 TAB | Refills: 0 | Status: SHIPPED | OUTPATIENT
Start: 2020-04-02

## 2020-04-01 RX ORDER — NICOTINE 21 MG/24HR
1 PATCH, TRANSDERMAL 24 HOURS TRANSDERMAL EVERY 24 HOURS
Qty: 30 PATCH | Refills: 0 | Status: SHIPPED | OUTPATIENT
Start: 2020-04-01

## 2020-04-01 RX ORDER — POTASSIUM CHLORIDE 20 MEQ/1
40 TABLET, EXTENDED RELEASE ORAL ONCE
Status: COMPLETED | OUTPATIENT
Start: 2020-04-01 | End: 2020-04-01

## 2020-04-01 RX ORDER — GUAIFENESIN/DEXTROMETHORPHAN 100-10MG/5
10 SYRUP ORAL EVERY 6 HOURS PRN
Qty: 840 ML | Refills: 0 | Status: SHIPPED | OUTPATIENT
Start: 2020-04-01

## 2020-04-01 RX ADMIN — ARIPIPRAZOLE 10 MG: 10 TABLET ORAL at 05:53

## 2020-04-01 RX ADMIN — LORAZEPAM 1 MG: 2 INJECTION INTRAMUSCULAR; INTRAVENOUS at 05:55

## 2020-04-01 RX ADMIN — Medication 400 MG: at 09:32

## 2020-04-01 RX ADMIN — POTASSIUM CHLORIDE 40 MEQ: 1500 TABLET, EXTENDED RELEASE ORAL at 09:32

## 2020-04-01 RX ADMIN — NICOTINE TRANSDERMAL SYSTEM 21 MG: 21 PATCH, EXTENDED RELEASE TRANSDERMAL at 05:54

## 2020-04-01 RX ADMIN — FOLIC ACID 1 MG: 1 TABLET ORAL at 05:54

## 2020-04-01 RX ADMIN — THIAMINE HCL TAB 100 MG 100 MG: 100 TAB at 05:54

## 2020-04-01 RX ADMIN — THERA TABS 1 TABLET: TAB at 05:54

## 2020-04-01 RX ADMIN — TAMSULOSIN HYDROCHLORIDE 0.4 MG: 0.4 CAPSULE ORAL at 05:53

## 2020-04-01 ASSESSMENT — LIFESTYLE VARIABLES
PAROXYSMAL SWEATS: NO SWEAT VISIBLE
PAROXYSMAL SWEATS: NO SWEAT VISIBLE
TOTAL SCORE: 1
TOTAL SCORE: 2
VISUAL DISTURBANCES: NOT PRESENT
ANXIETY: NO ANXIETY (AT EASE)
AUDITORY DISTURBANCES: NOT PRESENT
TREMOR: NO TREMOR
TREMOR: NO TREMOR
ORIENTATION AND CLOUDING OF SENSORIUM: ORIENTED AND CAN DO SERIAL ADDITIONS
PAROXYSMAL SWEATS: NO SWEAT VISIBLE
VISUAL DISTURBANCES: NOT PRESENT
VISUAL DISTURBANCES: NOT PRESENT
TOTAL SCORE: 4
NAUSEA AND VOMITING: NO NAUSEA AND NO VOMITING
AGITATION: SOMEWHAT MORE THAN NORMAL ACTIVITY
ORIENTATION AND CLOUDING OF SENSORIUM: ORIENTED AND CAN DO SERIAL ADDITIONS
TREMOR: NO TREMOR
AGITATION: NORMAL ACTIVITY
NAUSEA AND VOMITING: NO NAUSEA AND NO VOMITING
ORIENTATION AND CLOUDING OF SENSORIUM: ORIENTED AND CAN DO SERIAL ADDITIONS
AUDITORY DISTURBANCES: NOT PRESENT
ORIENTATION AND CLOUDING OF SENSORIUM: CANNOT DO SERIAL ADDITIONS OR IS UNCERTAIN ABOUT DATE
AGITATION: MODERATELY FIDGETY AND RESTLESS
HEADACHE, FULLNESS IN HEAD: NOT PRESENT
TREMOR: NO TREMOR
VISUAL DISTURBANCES: NOT PRESENT
TOTAL SCORE: 9
ANXIETY: *
NAUSEA AND VOMITING: NO NAUSEA AND NO VOMITING
AUDITORY DISTURBANCES: NOT PRESENT
ANXIETY: *
NAUSEA AND VOMITING: NO NAUSEA AND NO VOMITING
AUDITORY DISTURBANCES: NOT PRESENT
ANXIETY: MILDLY ANXIOUS
AGITATION: SOMEWHAT MORE THAN NORMAL ACTIVITY
PAROXYSMAL SWEATS: NO SWEAT VISIBLE
HEADACHE, FULLNESS IN HEAD: NOT PRESENT

## 2020-04-01 ASSESSMENT — ACTIVITIES OF DAILY LIVING (ADL): TOILETING: INDEPENDENT

## 2020-04-01 ASSESSMENT — COGNITIVE AND FUNCTIONAL STATUS - GENERAL
SUGGESTED CMS G CODE MODIFIER DAILY ACTIVITY: CH
MOBILITY SCORE: 24
DAILY ACTIVITIY SCORE: 24
SUGGESTED CMS G CODE MODIFIER MOBILITY: CH

## 2020-04-01 ASSESSMENT — GAIT ASSESSMENTS
DISTANCE (FEET): 50
GAIT LEVEL OF ASSIST: SUPERVISED

## 2020-04-01 NOTE — DISCHARGE INSTRUCTIONS
Discharge Instructions per Dr. Carlos Bautista D.O.    DIET: Diet Order Regular (safety tray, no sharps)    ACTIVITY: As tolerated    A proper diet that is low in grease, fat, and salt, along with 30 minutes of exercise per day will lead to weight loss, and better controlled blood sugar and blood pressure.    DIAGNOSIS: Alcohol withdrawal (HCC)    Follow up with your Primary Care Provider Mohsen Tamasaby, M.D. as scheduled or sooner if your symptoms persist or worsen.  Return to Emergency Room for sever chest pain, shortness of breath, signs of a stroke, or any other emergencies.          Alcohol Withdrawal  Alcohol withdrawal is a group of symptoms that can develop when a person who drinks heavily and regularly stops drinking or drinks less.  What are the causes?  Heavy and regular drinking can cause chemicals that send signals from the brain to the body (neurotransmitters) to deactivate. Alcohol withdrawal develops when deactivated neurotransmitters reactivate because a person stops drinking or drinks less.  What increases the risk?  The more a person drinks and the longer he or she drinks, the greater the risk of alcohol withdrawal. Severe withdrawal is more likely to develop in someone who:  · Had severe alcohol withdrawal in the past.  · Had a seizure during a previous episode of alcohol withdrawal.  · Is elderly.  · Is pregnant.  · Has been abusing drugs.  · Has other medical problems, including:  ¨ Infection.  ¨ Heart, lung, or liver disease.  ¨ Seizures.  ¨ Mental health problems.  What are the signs or symptoms?  Symptoms of this condition can be mild to moderate, or they can be severe.  Mild to moderate symptoms may include:  · Fatigue.  · Nightmares.  · Trouble sleeping.  · Depression.  · Anxiety.  · Inability to think clearly.  · Mood swings.  · Irritability.  · Loss of appetite.  · Nausea or vomiting.  · Clammy skin.  · Extreme sweating.  · Rapid heartbeat.  · Shakiness.  · Uncontrollable shaking  (tremor).  Severe symptoms may include:  · Fever.  · Seizures.  · Severe confusion.  · Feeling or seeing things that are not there (hallucinations).  Symptoms usually begin within eight hours after a person stops drinking or drinks less. They can last for weeks.  How is this diagnosed?  Alcohol withdrawal is diagnosed with a medical history and physical exam. Sometimes, urine and blood tests are also done.  How is this treated?  Treatment may involve:  · Monitoring blood pressure, pulse, and breathing.  · Getting fluids through an IV tube.  · Medicine to reduce anxiety.  · Medicine to prevent or control seizures.  · Multivitamins and B vitamins.  · Having a health care provider check on you daily.  If symptoms are moderate to severe or if there is a risk of severe withdrawal, treatment may be done at a hospital or treatment center.  Follow these instructions at home:  · Take medicines and vitamin supplements only as directed by your health care provider.  · Do not drink alcohol.  · Have someone stay with you or be available if you need help.  · Drink enough fluid to keep your urine clear or pale yellow.  · Consider joining a 12-step program or another alcohol support group.  Contact a health care provider if:  · Your symptoms get worse or do not go away.  · You cannot keep food or water in your stomach.  · You are struggling with not drinking alcohol.  · You cannot stop drinking alcohol.  Get help right away if:  · You have an irregular heartbeat.  · You have chest pain.  · You have trouble breathing.  · You have symptoms of severe withdrawal, such as:  ¨ A fever.  ¨ Seizures.  ¨ Severe confusion.  ¨ Hallucinations.  This information is not intended to replace advice given to you by your health care provider. Make sure you discuss any questions you have with your health care provider.  Document Released: 09/27/2006 Document Revised: 04/26/2017 Document Reviewed: 10/06/2015  [employer] Patient Education ©  2017 [employer]    Discharge Instructions    Discharged to home by car with relative. Discharged via walking, hospital escort: Yes.  Special equipment needed: Not Applicable    Be sure to schedule a follow-up appointment with your primary care doctor or any specialists as instructed.     Discharge Plan:   Diet Plan: Discussed  Activity Level: Discussed  Confirmed Follow up Appointment: Patient to Call and Schedule Appointment  Confirmed Symptoms Management: Discussed  Medication Reconciliation Updated: Yes  Influenza Vaccine Indication: Patient Refuses    I understand that a diet low in cholesterol, fat, and sodium is recommended for good health. Unless I have been given specific instructions below for another diet, I accept this instruction as my diet prescription.   Other diet: regular    Special Instructions: None    · Is patient discharged on Warfarin / Coumadin?   No     Depression / Suicide Risk    As you are discharged from this [facility] facility, it is important to learn how to keep safe from harming yourself.    Recognize the warning signs:  · Abrupt changes in personality, positive or negative- including increase in energy   · Giving away possessions  · Change in eating patterns- significant weight changes-  positive or negative  · Change in sleeping patterns- unable to sleep or sleeping all the time   · Unwillingness or inability to communicate  · Depression  · Unusual sadness, discouragement and loneliness  · Talk of wanting to die  · Neglect of personal appearance   · Rebelliousness- reckless behavior  · Withdrawal from people/activities they love  · Confusion- inability to concentrate     If you or a loved one observes any of these behaviors or has concerns about self-harm, here's what you can do:  · Talk about it- your feelings and reasons for harming yourself  · Remove any means that you might use to hurt yourself (examples: pills, rope, extension cords, firearm)  · Get professional help from  the community (Mental Health, Substance Abuse, psychological counseling)  · Do not be alone:Call your Safe Contact- someone whom you trust who will be there for you.  · Call your local CRISIS HOTLINE 623-1812 or 582-823-2577  · Call your local Children's Mobile Crisis Response Team [place] (101) 975-5262 or www.[employer]  · Call the toll free National Suicide Prevention Hotlines   · National Suicide Prevention Lifeline 464-409-HXZP (1867)  · National Hope Line Network 800-SUICIDE (420-6586)

## 2020-04-01 NOTE — DISCHARGE PLANNING
Anticipated Discharge Disposition:   Home    Action:    Legal hold dc'd. Pt given information on intensive outpatient programs with [facility] and [facility]. Pt stated he's been to both places and familiar.  Pt appreciative.    Barriers to Discharge:    None    Plan:    DC

## 2020-04-01 NOTE — PSYCHIATRY
"PSYCHIATRIC FOLLOW-UP:(established)   *Reason for admission: Alcohol Withdrawal                   *Reason for consult:      Psychosis             *HPI:   Pt stated that he got delusional after having meth, which he believes that a [place] man, that was at his neighbors house drinking with them, placed it his vodka.beer.   He says that he does not use drugs and that after this experience, he does not want to drink again. Pt was able to describe the delusions that he was experience, and stated it felt like a dream. Denied feeling paranoid, or having any SI/HI/AVH. \"My mind is clear again\". Pt will be returning home, and is looking forward to celebrate his step's son 3 yo birthday. Pt denies any depressed mood, no chelsea or anxiety. He is planning to go for a walk in visit to see his PCP in the next few weeks.       I spoke with his roommate/long time friend Judy ([phone]); who stated that pt will be returning home, and that she has no safety concerns with his discharge, and will encourage him to go to [facility] outpatient clinic or PCP for follow up.     Medical ROS (as pertinent):   Denies any HA, diziness, visual changes, N/V, CP/SOb, abdominal pain, diarrhea, constipation, tremors.                      *Psychiatric Examination:  Vitals:   Vitals:    04/01/20 0800   BP: 143/92   Pulse: 89   Resp: 18   Temp: 36.6 °C (97.8 °F)   SpO2: 98%       Appearance: appears stated age, fair grooming and hygiene, calm, cooperative, good eye contact  Abnormal movements: none  Gait/posture: normal  Speech: normal volume, tone and rhythm  Though process: linear and goal oriented  Associations: no loose associations  Thought content: denies AVH, no paranoia or delusions elicited  Judgement and Insight: fair/fair  Orientation:oriented to person, place, time and situation  Recent and Remote Memory: intact  Attention Span and Concentration: intact  Language: fluid   Fund of Knowledge: not tested   Mood and Affect: \"good\", euthymic "   SI/HI:denies SI/HI            Current Facility-Administered Medications   Medication Dose Route Frequency Provider Last Rate Last Dose   • Respiratory Therapy Consult   Nebulization Continuous RT Rita Montano M.D.       • albuterol (PROVENTIL) 2.5mg/0.5ml nebulizer solution 2.5 mg  2.5 mg Nebulization Q6HRS PRN (RT) Rita Montano M.D.       • ARIPiprazole (ABILIFY) tablet 10 mg  10 mg Oral DAILY Piyush Cali M.D.   10 mg at 04/01/20 0553   • tamsulosin (FLOMAX) capsule 0.4 mg  0.4 mg Oral DAILY Quyen Craig M.D.   0.4 mg at 04/01/20 0553   • lisinopril (PRINIVIL) tablet 20 mg  20 mg Oral Q EVENING Quyen Craig M.D.   20 mg at 03/31/20 1743   • senna-docusate (PERICOLACE or SENOKOT S) 8.6-50 MG per tablet 2 Tab  2 Tab Oral BID Rita Montano M.D.   2 Tab at 03/31/20 0512    And   • polyethylene glycol/lytes (MIRALAX) PACKET 1 Packet  1 Packet Oral QDAY PRN Rita Montano M.D.        And   • magnesium hydroxide (MILK OF MAGNESIA) suspension 30 mL  30 mL Oral QDAY PRN Rita Montano M.D.        And   • bisacodyl (DULCOLAX) suppository 10 mg  10 mg Rectal QDAY PRN Rita Montano M.D.       • acetaminophen (TYLENOL) tablet 1,000 mg  1,000 mg Oral Q6HRS PRN Rita Montano M.D.   1,000 mg at 03/30/20 2121   • thiamine tablet 100 mg  100 mg Oral DAILY Rita Montano M.D.   100 mg at 04/01/20 0554    And   • multivitamin (THERAGRAN) tablet 1 Tab  1 Tab Oral DAILY Rita Montano M.D.   1 Tab at 04/01/20 0554    And   • folic acid (FOLVITE) tablet 1 mg  1 mg Oral DAILY Rita Montano M.D.   1 mg at 04/01/20 0554   • LORazepam (ATIVAN) tablet 0.5 mg  0.5 mg Oral Q4HRS PRCASANDRA Montano M.D.   0.5 mg at 03/30/20 0500   • LORazepam (ATIVAN) tablet 1 mg  1 mg Oral Q4HRS SUNNI Montano M.D.   1 mg at 03/30/20 1023    Or   • LORazepam (ATIVAN) injection 0.5 mg  0.5 mg Intravenous Q4HRS PRCASANDRA Montano M.D.       • LORazepam (ATIVAN) tablet 2 mg  2 mg Oral Q2HRS PRCASANDRA Montano M.D.   2 mg at  03/30/20 0106    Or   • LORazepam (ATIVAN) injection 1 mg  1 mg Intravenous Q2HRS PRN Rita Montano M.D.   1 mg at 04/01/20 0555   • LORazepam (ATIVAN) tablet 3 mg  3 mg Oral Q HOUR PRN Rita Montano M.D.        Or   • LORazepam (ATIVAN) injection 1.5 mg  1.5 mg Intravenous Q HOUR PRN Rita Montano M.D.       • LORazepam (ATIVAN) tablet 4 mg  4 mg Oral Q15 MIN PRCASANDRA Montano M.D.        Or   • LORazepam (ATIVAN) injection 2 mg  2 mg Intravenous Q15 MIN PRCASANDRA Montano M.D.       • guaiFENesin dextromethorphan (ROBITUSSIN DM) 100-10 MG/5ML syrup 10 mL  10 mL Oral Q6HRS PRN Rita Montano M.D.   10 mL at 03/30/20 2121   • nicotine (NICODERM) 21 MG/24HR 21 mg  21 mg Transdermal Daily-0600 Rita Montano M.D.   21 mg at 04/01/20 0554    And   • nicotine polacrilex (NICORETTE) 2 MG piece 2 mg  2 mg Oral Q HOUR PRN Rita Montano M.D.         Assessment: Pt is psychiatrically stable and cleared for discharge. Denies any SI/HI and is able to advocate for himself.     Dx:  Alcohol use dso  Alcohol withdrawal, resolved  Encephalopathy resolved  Unspecified Psychotic disorder, stable    Plan:  1- Legal hold discontinued  2- Continue abilify 10mg PO daily for mood/psychosis  3- I requested SW to provide pt with outpatient psychiatric services in the community for follow up.   4- Judy, his roommate, has no safety concerns with his discharge  5- Psychiatry signing off    Thank you for the consult.

## 2020-04-01 NOTE — DISCHARGE PLANNING
Anticipated Discharge Disposition:   In Psych Hospital    Action:    Requested YOVANNY Lam to please send referrals to inpatient psych hospitals.    Barriers to Discharge:    Placement acceptance    Plan:    F/U referrals.

## 2020-04-01 NOTE — THERAPY
"Pt admitted w/ ETOH.  Per chart review, he has a psych hx.  He lives w/ his wife in a single story house.  He was indep w/ mobility PTA.  Today, he presents essentially at his prior level.  No loss of balance w/ mobility.  Recommend oob/amb prn w/ nsg.  No acute PT needs.  PT will be available for d/c needs only    Physical Therapy Evaluation completed.   Bed Mobility:  Supine to Sit: Supervised  Transfers: Sit to Stand: Supervised  Gait: Level Of Assist: Supervised with No Equipment Needed       Plan of Care: Patient with no further skilled PT needs in the acute care setting at this time  Discharge Recommendations: Equipment: No Equipment Needed. Post-acute therapy   Anticipate that the patient will have no further physical therapy needs after discharge from the hospital.  See \"Rehab Therapy-Acute\" Patient Summary Report for complete documentation.     "

## 2020-04-01 NOTE — DISCHARGE SUMMARY
"Discharge Summary    CHIEF COMPLAINT ON ADMISSION  Chief Complaint   Patient presents with   • Food Stuck In Throat     x2 days, piece of \"dried hamburger\"   • ETOH Withdrawal     last drink 3days ago   • Cough       Reason for Admission  EMS     Admission Date  3/29/2020    CODE STATUS  Full Code    HPI & HOSPITAL COURSE  Mr. Chace Gong is a 55 y.o. male with history of COPD, alcohol abuse, methamphetamine abuse, hepatitis C, schizoaffective versus bipolar disorder who presented on 3/29/2020 with globus sensation in his throat.  Patient was hallucinating, going through alcohol withdrawal and drug screen was positive for methamphetamines and cannabinoids.  Was started on CIWA protocol.  Patient was evaluated by psychiatry and started on Abilify.  There was concern that he cannot care for himself with these paranoid delusions and he was placed on legal hold.  COVID-19 and influenza negative.  Patient has completed going through withdrawal and was mentating appropriately.  Psychiatry removed legal hold.  PT/OT recommended no needs.  Patient tolerated Abilify well and will be discharged home to follow-up with outpatient psych.    Therefore, he is discharged in fair and stable condition to home with close outpatient follow-up.    The patient met 2-midnight criteria for an inpatient stay at the time of discharge.    Discharge Date  4/1/2020    FOLLOW UP ITEMS POST DISCHARGE  None    DISCHARGE DIAGNOSES  Principal Problem:    Alcohol withdrawal (HCC) POA: Yes  Active Problems:    Hep C w/o coma, chronic (HCC) POA: Yes    Acute alcoholic hepatitis POA: Yes    Thrombocytopenia (HCC) POA: Yes    Methamphetamine abuse (HCC) POA: Unknown    BPH (benign prostatic hypertrophy) POA: Yes      Overview: ICD-10 transition      IMO Update    Tobacco abuse POA: Yes  Resolved Problems:    Fever POA: Yes    Essential hypertension POA: Yes      Overview: Uncertain date of diagnosis, last treated with medication while in retirement. "             Unknown medication history.      Reports 100 pound weight loss and no need for antihypertensive medication       since.    Hyponatremia POA: Yes      FOLLOW UP  Mohsen Tamasaby, M.D.  [address]  [facility] [address] [phone]    In 1 week  As needed, If symptoms worsen    [facility]  [address] [place] [id number] [phone]  In 1 week  As needed, If symptoms worsen      MEDICATIONS ON DISCHARGE     Medication List      START taking these medications      Instructions   ARIPiprazole 10 MG Tabs  Start taking on:  April 2, 2020  Commonly known as:  ABILIFY   Take 1 Tab by mouth every day.  Dose:  10 mg     folic acid 1 MG Tabs  Start taking on:  April 2, 2020  Commonly known as:  FOLVITE   Take 1 Tab by mouth every day.  Dose:  1 mg     guaiFENesin dextromethorphan 100-10 MG/5ML Syrp syrup  Commonly known as:  ROBITUSSIN DM   Take 10 mL by mouth every 6 hours as needed for Cough.  Dose:  10 mL     multivitamin Tabs  Start taking on:  April 2, 2020   Take 1 Tab by mouth every day.  Dose:  1 Tab     nicotine 21 MG/24HR Pt24  Commonly known as:  NICODERM   Apply 1 Patch to skin as directed every 24 hours.  Dose:  1 Patch     thiamine 100 MG tablet  Start taking on:  April 2, 2020  Commonly known as:  THIAMINE   Take 1 Tab by mouth every day.  Dose:  100 mg        CONTINUE taking these medications      Instructions   benzonatate 100 MG Caps  Commonly known as:  TESSALON   Take 1 Cap by mouth 3 times a day as needed for Cough.  Dose:  100 mg     lisinopril 20 MG Tabs  Commonly known as:  PRINIVIL   Take 20 mg by mouth every evening.  Dose:  20 mg     tamsulosin 0.4 MG capsule  Commonly known as:  FLOMAX   Take 1 Cap by mouth every day.  Dose:  0.4 mg        STOP taking these medications    azithromycin 250 MG Tabs  Commonly known as:  ZITHROMAX            Allergies  Allergies   Allergen Reactions   • Penicillins Unspecified     Pt states  "that his mom told him he is allergic to penicillins.   • Hydrocodone Unspecified     Pt states \"I'm not sure\".   • Sulfa Drugs      \"my mom told me I was allergic to it when i was a kid\"       DIET  Orders Placed This Encounter   Procedures   • Diet Order Regular (safety tray, no sharps)     Standing Status:   Standing     Number of Occurrences:   1     Order Specific Question:   Diet:     Answer:   Regular [1]     Comments:   safety tray, no sharps       ACTIVITY  As tolerated.  Weight bearing as tolerated    CONSULTATIONS  Psychiatry    PROCEDURES  None    LABORATORY  Lab Results   Component Value Date    SODIUM 139 04/01/2020    POTASSIUM 3.6 04/01/2020    CHLORIDE 103 04/01/2020    CO2 26 04/01/2020    GLUCOSE 96 04/01/2020    BUN 7 (L) 04/01/2020    CREATININE 0.58 04/01/2020    CREATININE 1.0 01/21/2009        Lab Results   Component Value Date    WBC 3.9 (L) 04/01/2020    HEMOGLOBIN 13.2 (L) 04/01/2020    HEMATOCRIT 39.4 (L) 04/01/2020    PLATELETCT 107 (L) 04/01/2020        I discussed medications and side effects with the patient.  I discussed prognosis and importance of medical compliance with the patient.  I counseled the patient about diet, exercise, weight loss, smoking cessation, and life style modifications.  All questions and concerns have been addressed.  Total time of the discharge process was 35 minutes.    "

## 2020-04-01 NOTE — PROGRESS NOTES
Hospital Medicine Daily Progress Note    Date of Service  3/31/2020    Chief Complaint  55 y.o. male admitted 3/29/2020 with food caught in throat    Hospital Course    Mr. Chace Gong is a 55 y.o. male with history of COPD, alcohol abuse, methamphetamine abuse, hepatitis C, schizoaffective versus bipolar disorder who presented on 3/29/2020 with globus sensation in his throat.  Patient was hallucinating, going through alcohol withdrawal and drug screen was positive for methamphetamines and cannabinoids.  Was started on CIWA protocol.  Patient was evaluated by psychiatry and started on Abilify.  There was concern that he cannot care for himself with these paranoid delusions and he was placed on legal hold.  COVID-19 and influenza negative.      Interval Problem Update  Patient was seen and examined at bedside.  I have personally reviewed vitals, labs, and imaging.    3/31.  Patient denies fever, chills, chest pain, shortness of breath.  He does report chronic cough and postnasal drip and is currently at his baseline..   Patient is mentating well today.  Alert and oriented x3.  Good p.o. intake.  IV fluids discontinued.  He would like to go home but needs clearance from psychiatry who will come to see him tomorrow.  Tolerating Abilify well.    Consultants/Specialty  Psychiatry    Code Status  Full    Disposition  Likely discharge home when medically cleared  Patient lives with his wife in a house.  Reports intentions to quit drinking alcohol, Methamphetamines, cannabinoids    Review of Systems  Review of Systems   Constitutional: Negative for chills and fever.   HENT: Negative for congestion, sinus pain and sore throat.    Eyes: Negative for blurred vision.   Respiratory: Positive for cough. Negative for hemoptysis, sputum production, shortness of breath and wheezing.    Cardiovascular: Negative for chest pain, palpitations and leg swelling.   Gastrointestinal: Negative for abdominal pain, blood in stool,  constipation, diarrhea, nausea and vomiting.   Genitourinary: Negative for dysuria, frequency, hematuria and urgency.   Musculoskeletal: Negative for falls.   Skin: Negative for rash.   Neurological: Negative for dizziness, tingling, weakness and headaches.   Psychiatric/Behavioral: Negative for depression. The patient is not nervous/anxious.    All other systems reviewed and are negative.       Physical Exam  Temp:  [36.2 °C (97.1 °F)-38.2 °C (100.8 °F)] 38.2 °C (100.8 °F)  Pulse:  [] 97  Resp:  [18-20] 18  BP: (124-145)/(72-95) 138/87  SpO2:  [94 %-98 %] 97 %    Physical Exam  Vitals signs and nursing note reviewed.   Constitutional:       General: He is not in acute distress.     Appearance: Normal appearance.   HENT:      Head: Normocephalic and atraumatic.      Nose: Nose normal.      Mouth/Throat:      Mouth: Mucous membranes are moist.      Pharynx: Oropharynx is clear.   Eyes:      Extraocular Movements: Extraocular movements intact.      Conjunctiva/sclera: Conjunctivae normal.   Neck:      Musculoskeletal: Normal range of motion and neck supple.   Cardiovascular:      Rate and Rhythm: Normal rate and regular rhythm.      Pulses: Normal pulses.      Heart sounds: Normal heart sounds. No murmur. No friction rub. No gallop.    Pulmonary:      Effort: Pulmonary effort is normal. No respiratory distress.      Breath sounds: Normal breath sounds. No wheezing or rales.   Chest:      Chest wall: No tenderness.   Abdominal:      General: Abdomen is flat. Bowel sounds are normal. There is no distension.      Palpations: Abdomen is soft. There is no mass.      Tenderness: There is no abdominal tenderness. There is no guarding.   Musculoskeletal: Normal range of motion.   Skin:     General: Skin is warm.      Capillary Refill: Capillary refill takes less than 2 seconds.   Neurological:      General: No focal deficit present.      Mental Status: He is alert and oriented to person, place, and time. Mental status  is at baseline.      Cranial Nerves: No cranial nerve deficit.      Motor: No weakness.   Psychiatric:         Mood and Affect: Mood normal.         Behavior: Behavior normal.         Fluids    Intake/Output Summary (Last 24 hours) at 3/31/2020 1753  Last data filed at 3/31/2020 1200  Gross per 24 hour   Intake 3360 ml   Output 2450 ml   Net 910 ml       Laboratory  Recent Labs     03/29/20  1220 03/30/20  0406 03/31/20  0403   WBC 9.3 5.0 3.7*   RBC 4.10* 3.79* 3.45*   HEMOGLOBIN 14.5 13.4* 12.2*   HEMATOCRIT 40.8* 39.0* 36.7*   MCV 99.5* 102.9* 106.4*   MCH 35.4* 35.4* 35.4*   MCHC 35.5* 34.4 33.2*   RDW 50.3* 54.2* 56.9*   PLATELETCT 91* 83* 92*   MPV 10.0 9.7 10.0     Recent Labs     03/29/20  1220 03/30/20  0406 03/31/20  0403   SODIUM 125* 139 139   POTASSIUM 3.6 3.9 3.3*   CHLORIDE 86* 103 105   CO2 18* 25 23   GLUCOSE 82 86 80   BUN 36* 21 10   CREATININE 1.18 0.79 0.59   CALCIUM 8.3* 8.2* 7.8*                   Imaging  DX-NECK FOR SOFT TISSUE   Final Result      Unremarkable neck soft tissues. No radiopaque foreign body. Patent airway. Normal epiglottis.      DX-CHEST-PORTABLE (1 VIEW)   Final Result      No acute cardiopulmonary abnormality.           Assessment/Plan  * Alcohol withdrawal (HCC)- (present on admission)  Assessment & Plan  Drinks vodka daily. Last WA score of 6  - tele monitoring  - d/c gabapentin and valproic acid; no indication  - [facility] protocol  - correct electrolytes as needed  - thiamine, folic acid, MVI  - continued counseling and education    Psychiatry evaluated, legal hold extended as he is unable to care for himself.     Fever- (present on admission)  Assessment & Plan  Afebrile since admission. Influenza negative.   COVID-19 negative    Acute alcoholic hepatitis- (present on admission)  Assessment & Plan  Mild, in addition to chronic hepatitis C. Bilirubin normalized. LFTs improved. Does not meet criteria for use of steroids at this time.    Hyponatremia- (present on  admission)  Assessment & Plan  Due to dehydration. Resolved.     Hep C w/o coma, chronic (HCC)- (present on admission)  Assessment & Plan  Elevated liver enzymes are intermittent but more likely acute alcoholic hepatitis at this time.  Follow-up for hepatitis as outpatient    Essential hypertension- (present on admission)  Assessment & Plan  Normotensive.   - Continue lisinopril, with holding parameters    Methamphetamine abuse (HCC)  Assessment & Plan  U tox positive for amphetamines  - counseling and education    Thrombocytopenia (HCC)- (present on admission)  Assessment & Plan  Lower than usual, most likely due to alcohol abuse causing bone marrow suppression.  - continue to monitor  - SCDs for pharmacologic prophylaxis    Tobacco abuse- (present on admission)  Assessment & Plan  -counseled for more than 10 minutes on tobacco cessation [id number]   -I have ordered nicotine replacement therapy      BPH (benign prostatic hypertrophy)- (present on admission)  Assessment & Plan  - Continue tamsulosin    Gastrointestinal prophylaxis: The patient has no risk factors for developing gastric ulcers or gastritis.  As the patient is tolerating oral intake, GI prophylaxis is not indicated at this time.  Antibiotics: None  Diet Order Regular (safety tray, no sharps)  Code status: Full  Prognosis: Guarded  Risk: The Patient is at HIGH risk for complications and decompensation secondary to his multiple cormorbidities including alcohol withdrawal requiring IV benzodiazepines and telemetry monitoring.  Fever of unknown origin negative for influenza and COVID-19.  Schizoaffective versus bipolar with paranoid delusions unable to take care of himself requiring legal hold and starting antipsychotic.    I have personally reviewed notes, labs, vitals, imaging.  I discussed the plan of care with bedside RN as well as on multidisciplinary rounds       VTE prophylaxis: SCDs

## 2020-04-01 NOTE — PROGRESS NOTES
Pt AAOx4, calm and cooperative. Shower completed this am. Denies pain or SOB this am. VSS. Eager to go home.

## 2020-04-01 NOTE — DISCHARGE PLANNING
Per JIN Arriaga, YOVANNY sent referrals to [place] inpatient psych facilities: [facility], [facility], and [facility].    1967  Per request of [facility], [facility] faxed legal hold packet and 's note confirming pt is COVID-19 & Influenza negative.

## 2020-04-01 NOTE — PSYCHIATRY
BRIEF PSYCHIATRIC CONSULT NOTE: patient seen, full note to follow.  -Legal Hold: discontinued  Continue abilify 10mg PO daily for psychosis/mod  Requested SW to provide pt with resources for outpatient psychiatric services in the community  Psychiatry signing off    Thank you for the consult.

## 2020-04-01 NOTE — PROGRESS NOTES
Pt given DC instructions regarding meds, follow up, activities. Pt verbalized understanding of instructions. Pt Dc'd home, ambulating out at aprox 1540.

## 2020-04-01 NOTE — CARE PLAN
Problem: Safety  Goal: Will remain free from injury  Outcome: PROGRESSING AS EXPECTED     Problem: Venous Thromboembolism (VTW)/Deep Vein Thrombosis (DVT) Prevention:  Goal: Patient will participate in Venous Thrombosis (VTE)/Deep Vein Thrombosis (DVT)Prevention Measures  Outcome: PROGRESSING AS EXPECTED   Sitter at door, refuses SCDs but up in room to ambulate ad reji

## 2020-04-01 NOTE — CARE PLAN
Problem: Safety  Goal: Will remain free from falls  Outcome: PROGRESSING AS EXPECTED  Intervention: Assess risk factors for falls  Note: Patient assessed as moderate fall risk and appropriate fall precautions implemented.      Problem: Knowledge Deficit  Goal: Knowledge of disease process/condition, treatment plan, diagnostic tests, and medications will improve  Outcome: PROGRESSING AS EXPECTED  Intervention: Assess knowledge level of disease process/condition, treatment plan, diagnostic tests, and medications  Note: POC discussed with patient including legal hold and surrounding requirements and rules regarding that. Patient verbalizes understanding, all questions answered.

## 2020-05-27 ENCOUNTER — HOSPITAL ENCOUNTER (EMERGENCY)
Facility: MEDICAL CENTER | Age: 55
End: 2020-05-28
Attending: EMERGENCY MEDICINE
Payer: MEDICARE

## 2020-05-27 ENCOUNTER — APPOINTMENT (OUTPATIENT)
Dept: RADIOLOGY | Facility: MEDICAL CENTER | Age: 55
End: 2020-05-27
Attending: EMERGENCY MEDICINE
Payer: MEDICARE

## 2020-05-27 VITALS
RESPIRATION RATE: 20 BRPM | BODY MASS INDEX: 28.23 KG/M2 | DIASTOLIC BLOOD PRESSURE: 85 MMHG | TEMPERATURE: 98.1 F | SYSTOLIC BLOOD PRESSURE: 141 MMHG | OXYGEN SATURATION: 92 % | WEIGHT: 220 LBS | HEIGHT: 74 IN | HEART RATE: 98 BPM

## 2020-05-27 DIAGNOSIS — J44.1 ACUTE EXACERBATION OF CHRONIC OBSTRUCTIVE PULMONARY DISEASE (COPD) (HCC): ICD-10-CM

## 2020-05-27 LAB
ALBUMIN SERPL BCP-MCNC: 4.2 G/DL (ref 3.2–4.9)
ALBUMIN/GLOB SERPL: 1.1 G/DL
ALP SERPL-CCNC: 88 U/L (ref 30–99)
ALT SERPL-CCNC: 35 U/L (ref 2–50)
ANION GAP SERPL CALC-SCNC: 19 MMOL/L (ref 7–16)
APPEARANCE UR: CLEAR
AST SERPL-CCNC: 55 U/L (ref 12–45)
BACTERIA #/AREA URNS HPF: NEGATIVE /HPF
BASOPHILS # BLD AUTO: 0.3 % (ref 0–1.8)
BASOPHILS # BLD: 0.03 K/UL (ref 0–0.12)
BILIRUB SERPL-MCNC: 0.5 MG/DL (ref 0.1–1.5)
BILIRUB UR QL STRIP.AUTO: NEGATIVE
BUN SERPL-MCNC: 7 MG/DL (ref 8–22)
CALCIUM SERPL-MCNC: 8.4 MG/DL (ref 8.5–10.5)
CHLORIDE SERPL-SCNC: 100 MMOL/L (ref 96–112)
CO2 SERPL-SCNC: 22 MMOL/L (ref 20–33)
COLOR UR: YELLOW
CREAT SERPL-MCNC: 0.66 MG/DL (ref 0.5–1.4)
EOSINOPHIL # BLD AUTO: 0.05 K/UL (ref 0–0.51)
EOSINOPHIL NFR BLD: 0.5 % (ref 0–6.9)
EPI CELLS #/AREA URNS HPF: ABNORMAL /HPF
ERYTHROCYTE [DISTWIDTH] IN BLOOD BY AUTOMATED COUNT: 47.7 FL (ref 35.9–50)
GLOBULIN SER CALC-MCNC: 3.9 G/DL (ref 1.9–3.5)
GLUCOSE SERPL-MCNC: 122 MG/DL (ref 65–99)
GLUCOSE UR STRIP.AUTO-MCNC: NEGATIVE MG/DL
HCT VFR BLD AUTO: 44.7 % (ref 42–52)
HGB BLD-MCNC: 15.9 G/DL (ref 14–18)
IMM GRANULOCYTES # BLD AUTO: 0.05 K/UL (ref 0–0.11)
IMM GRANULOCYTES NFR BLD AUTO: 0.5 % (ref 0–0.9)
KETONES UR STRIP.AUTO-MCNC: NEGATIVE MG/DL
LACTATE BLD-SCNC: 3.9 MMOL/L (ref 0.5–2)
LEUKOCYTE ESTERASE UR QL STRIP.AUTO: NEGATIVE
LYMPHOCYTES # BLD AUTO: 3.04 K/UL (ref 1–4.8)
LYMPHOCYTES NFR BLD: 33 % (ref 22–41)
MCH RBC QN AUTO: 34.6 PG (ref 27–33)
MCHC RBC AUTO-ENTMCNC: 35.6 G/DL (ref 33.7–35.3)
MCV RBC AUTO: 97.4 FL (ref 81.4–97.8)
MICRO URNS: ABNORMAL
MONOCYTES # BLD AUTO: 0.61 K/UL (ref 0–0.85)
MONOCYTES NFR BLD AUTO: 6.6 % (ref 0–13.4)
NEUTROPHILS # BLD AUTO: 5.42 K/UL (ref 1.82–7.42)
NEUTROPHILS NFR BLD: 59.1 % (ref 44–72)
NITRITE UR QL STRIP.AUTO: NEGATIVE
NRBC # BLD AUTO: 0 K/UL
NRBC BLD-RTO: 0 /100 WBC
NT-PROBNP SERPL IA-MCNC: 44 PG/ML (ref 0–125)
PH UR STRIP.AUTO: 6 [PH] (ref 5–8)
PLATELET # BLD AUTO: 95 K/UL (ref 164–446)
PMV BLD AUTO: 9.9 FL (ref 9–12.9)
POTASSIUM SERPL-SCNC: 3.9 MMOL/L (ref 3.6–5.5)
PROT SERPL-MCNC: 8.1 G/DL (ref 6–8.2)
PROT UR QL STRIP: 30 MG/DL
RBC # BLD AUTO: 4.59 M/UL (ref 4.7–6.1)
RBC # URNS HPF: ABNORMAL /HPF
RBC UR QL AUTO: ABNORMAL
RENAL EPI CELLS #/AREA URNS HPF: NEGATIVE /HPF
SODIUM SERPL-SCNC: 141 MMOL/L (ref 135–145)
SP GR UR STRIP.AUTO: 1.01
TRANS CELLS #/AREA URNS HPF: NEGATIVE /HPF
TROPONIN T SERPL-MCNC: 15 NG/L (ref 6–19)
UROBILINOGEN UR STRIP.AUTO-MCNC: 1 MG/DL
WBC # BLD AUTO: 9.2 K/UL (ref 4.8–10.8)
WBC #/AREA URNS HPF: ABNORMAL /HPF

## 2020-05-27 PROCEDURE — 83880 ASSAY OF NATRIURETIC PEPTIDE: CPT

## 2020-05-27 PROCEDURE — 83605 ASSAY OF LACTIC ACID: CPT

## 2020-05-27 PROCEDURE — 80053 COMPREHEN METABOLIC PANEL: CPT

## 2020-05-27 PROCEDURE — 700105 HCHG RX REV CODE 258: Performed by: EMERGENCY MEDICINE

## 2020-05-27 PROCEDURE — 87150 DNA/RNA AMPLIFIED PROBE: CPT

## 2020-05-27 PROCEDURE — 85025 COMPLETE CBC W/AUTO DIFF WBC: CPT

## 2020-05-27 PROCEDURE — 87077 CULTURE AEROBIC IDENTIFY: CPT

## 2020-05-27 PROCEDURE — 36415 COLL VENOUS BLD VENIPUNCTURE: CPT

## 2020-05-27 PROCEDURE — 87040 BLOOD CULTURE FOR BACTERIA: CPT

## 2020-05-27 PROCEDURE — 71045 X-RAY EXAM CHEST 1 VIEW: CPT

## 2020-05-27 PROCEDURE — 84484 ASSAY OF TROPONIN QUANT: CPT

## 2020-05-27 PROCEDURE — 94660 CPAP INITIATION&MGMT: CPT

## 2020-05-27 PROCEDURE — 81001 URINALYSIS AUTO W/SCOPE: CPT

## 2020-05-27 PROCEDURE — 87086 URINE CULTURE/COLONY COUNT: CPT

## 2020-05-27 PROCEDURE — 99284 EMERGENCY DEPT VISIT MOD MDM: CPT

## 2020-05-27 RX ORDER — SODIUM CHLORIDE, SODIUM LACTATE, POTASSIUM CHLORIDE, CALCIUM CHLORIDE 600; 310; 30; 20 MG/100ML; MG/100ML; MG/100ML; MG/100ML
1000 INJECTION, SOLUTION INTRAVENOUS ONCE
Status: COMPLETED | OUTPATIENT
Start: 2020-05-27 | End: 2020-05-27

## 2020-05-27 RX ORDER — PREDNISONE 20 MG/1
20 TABLET ORAL DAILY
Qty: 5 TAB | Refills: 0 | Status: SHIPPED | OUTPATIENT
Start: 2020-05-27 | End: 2020-05-27 | Stop reason: SDUPTHER

## 2020-05-27 RX ORDER — DIPHENHYDRAMINE HCL 25 MG
25 CAPSULE ORAL NIGHTLY PRN
Qty: 4 CAP | Refills: 0 | Status: SHIPPED | OUTPATIENT
Start: 2020-05-27

## 2020-05-27 RX ORDER — PREDNISONE 20 MG/1
60 TABLET ORAL DAILY
Qty: 15 TAB | Refills: 0 | Status: SHIPPED | OUTPATIENT
Start: 2020-05-27 | End: 2020-06-01

## 2020-05-27 RX ADMIN — SODIUM CHLORIDE, POTASSIUM CHLORIDE, SODIUM LACTATE AND CALCIUM CHLORIDE 1000 ML: 600; 310; 30; 20 INJECTION, SOLUTION INTRAVENOUS at 21:08

## 2020-05-27 ASSESSMENT — PULMONARY FUNCTION TESTS: EPAP_CMH2O: 6

## 2020-05-28 NOTE — ED PROVIDER NOTES
"ED Provider Note    CHIEF COMPLAINT  Chief Complaint   Patient presents with   • Shortness of Breath     pt from home today due to respiratory distress , satting at 82% RA. Pt placed on CPAP, pt has history of  and ETOH       HPI  Alonzo Leyva is a 89 y.o. adult who presents with shortness of breath.  He was given Solu-Medrol and mag and CPAP prior to arrival.  He was found in mild respiratory distress with oxygen saturations of 82% on room air prior to arrival.  Upon my bedside evaluation, the patient was resting comfortably on oxygen mask.  He has been drinking alcohol earlier today and has slurred speech and appears rather disheveled.  He states that he was short of breath earlier today.  Denies abdominal pain, nausea, vomiting, diarrhea.  No fever or cough or recent sick contacts.    REVIEW OF SYSTEMS  See HPI for further details.  Limited secondary to alcohol intoxication.    PAST MEDICAL HISTORY       SOCIAL HISTORY  Social History     Tobacco Use   • Smoking status: Not on file   Substance and Sexual Activity   • Alcohol use: Not on file   • Drug use: Not on file   • Sexual activity: Not on file       SURGICAL HISTORY  patient denies any surgical history    CURRENT MEDICATIONS  Home Medications    **Home medications have not yet been reviewed for this encounter**         ALLERGIES  No Known Allergies    PHYSICAL EXAM  VITAL SIGNS: BP (!) 169/95   Pulse 128   Resp 24   Ht 1.88 m (6' 2\")   Wt 99.8 kg (220 lb)   SpO2 100%   BMI 28.25 kg/m²   Pulse ox interpretation: I interpret this pulse ox as normal.  Constitutional: Alert in no apparent distress.  HENT: No signs of trauma, Bilateral external ears normal, Nose normal.   Eyes: Pupils are equal and reactive, Conjunctiva normal, Non-icteric.   Neck: Normal range of motion, No tenderness, Supple, No stridor.   Cardiovascular: Regular rate and rhythm.   Thorax & Lungs: Normal breath sounds, No respiratory distress, No wheezing, No chest tenderness. " "  Abdomen: Bowel sounds normal, Soft, No tenderness, No masses, No pulsatile masses. No peritoneal signs.  Skin: Warm, Dry, No erythema, No rash.   Back: No bony tenderness, No CVA tenderness.   Extremities: Intact distal pulses, No edema, No tenderness, No cyanosis  Neurologic: Alert, slurred speech, No focal deficits noted.       DIAGNOSTIC STUDIES / PROCEDURES      LABS  Labs Reviewed   LACTIC ACID - Abnormal; Notable for the following components:       Result Value    Lactic Acid 3.9 (*)     All other components within normal limits   CBC WITH DIFFERENTIAL - Abnormal; Notable for the following components:    RBC 4.59 (*)     MCH 34.6 (*)     MCHC 35.6 (*)     Platelet Count 95 (*)     All other components within normal limits   COMP METABOLIC PANEL - Abnormal; Notable for the following components:    Anion Gap 19.0 (*)     Glucose 122 (*)     Bun 7 (*)     Calcium 8.4 (*)     AST(SGOT) 55 (*)     Globulin 3.9 (*)     All other components within normal limits   URINALYSIS - Abnormal; Notable for the following components:    Protein 30 (*)     Occult Blood Small (*)     All other components within normal limits    Narrative:     Indication for culture:->Septic Shock: Persistent  hypotension,  Lactic acid > 4, vasopressors/inotropes started   URINE MICROSCOPIC (W/UA) - Abnormal; Notable for the following components:    WBC 0-2 (*)     RBC 5-10 (*)     All other components within normal limits    Narrative:     Indication for culture:->Septic Shock: Persistent  hypotension,  Lactic acid > 4, vasopressors/inotropes started   URINE CULTURE(NEW)    Narrative:     Indication for culture:->Septic Shock: Persistent  hypotension,  Lactic acid > 4, vasopressors/inotropes started   BLOOD CULTURE    Narrative:     Per Hospital Policy: Only change Specimen Src: to \"Line\" if  specified by physician order.   BLOOD CULTURE    Narrative:     Per Hospital Policy: Only change Specimen Src: to \"Line\" if  specified by physician order. "   TROPONIN   PROBRAIN NATRIURETIC PEPTIDE, NT   ESTIMATED GFR   LACTIC ACID   LACTIC ACID       RADIOLOGY  DX-CHEST-PORTABLE (1 VIEW)   Final Result         1. No acute cardiopulmonary abnormalities are identified.          COURSE & MEDICAL DECISION MAKING    Medications   lactated ringers infusion (BOLUS) (0 mL Intravenous Stopped 5/27/20 4069)       Pertinent Labs & Imaging studies reviewed. (See chart for details)  89 y.o. adult presenting with shortness of breath.  Has a history of COPD.  Was found to be hypoxic prior to arrival.  Was treated with CPAP, nebulizers, Solu-Medrol, magnesium.  Currently appears much improved.  He is slightly tachycardic and has slightly elevated lactate.  Likely related to breathing treatments prior to arrival.  He was monitored closely here in the emergency department and did not have any worsening of his symptoms.  No longer hypoxic.  No respiratory distress.    He was given IV fluid hydration given his tachycardia.  HYDRATION: Based on the patient's presentation of Tachycardia the patient was given IV fluids. IV Hydration was used because oral hydration was not as rapid as required. Upon recheck following hydration, the patient was improved.    The patient will be treated with prednisone for suspected COPD exacerbation.  Low suspicion for ACS or alternative explanation for his symptoms such as pulmonary embolus.  Chest x-ray is unremarkable.  No evidence of pulmonary edema or focal intra-trait.  No obvious signs of pneumonia.  Patient was discharged home in stable condition.  He is agreeable with the discharge plan.  He is requesting a sleep aid and I recommended taking diphenhydramine over the next few days to see if that helps with his sleeping.    The patient was instructed to follow-up with primary care physician for further management.  To return immediately for any worsening symptoms or development of any other concerning signs or symptoms. The patient verbalizes  "understanding in their own words.    /85   Pulse 98   Temp 36.7 °C (98.1 °F) (Temporal)   Resp 20   Ht 1.88 m (6' 2\")   Wt 99.8 kg (220 lb)   SpO2 92%   BMI 28.25 kg/m²     The patient was referred to primary care where they will receive further BP management.      [facility], Emergency Dept  [address] [address] [phone]    As needed, If symptoms worsen    Primary care doctor    Schedule an appointment as soon as possible for a visit         FINAL IMPRESSION  1. Acute exacerbation of chronic obstructive pulmonary disease (COPD) (HCC)            Electronically signed by: Chace Camargo M.D., 5/27/2020 8:16 PM    "

## 2020-05-28 NOTE — ED NOTES
Pt provided discharge instructions. Pt verbalized understanding. Pt leaving ER in stable condition. Pt using medicaid taxi to get home

## 2020-05-28 NOTE — ED TRIAGE NOTES
"Chief Complaint   Patient presents with   • Shortness of Breath     pt from home today due to respiratory distress , satting at 82% RA. Pt placed on CPAP, pt has history of  and ETOH       Pt BIB EMS from home. Pt is agitated, pt refusing EKG. Pt placed on BIPAP here at [facility]. Pt was given solumedrol by EMS and mag.    Pulse 128   Resp 24   Ht 1.88 m (6' 2\")   Wt 99.8 kg (220 lb)   SpO2 100%   BMI 28.25 kg/m²     "

## 2020-05-29 LAB
BACTERIA BLD CULT: ABNORMAL
BACTERIA BLD CULT: ABNORMAL
SIGNIFICANT IND 70042: ABNORMAL
SITE SITE: ABNORMAL
SOURCE SOURCE: ABNORMAL

## 2020-05-29 NOTE — ED NOTES
"ED Positive Culture Follow-up/Notification Note:    Date: 5/29/2020     Patient seen in the ED on 5/27/2020 for SOB. Patient has been drinking earlier. Has a h/o COPD. CXR clear.   1. Acute exacerbation of chronic obstructive pulmonary disease (COPD) (Formerly [facility])       Discharge Medication List as of 5/27/2020 11:59 PM      START taking these medications    Details   diphenhydrAMINE (BENADRYL) 25 MG capsule Take 1 Cap by mouth at bedtime as needed for Sleep., Disp-4 Cap,R-0, Print Rx Paper             Allergies: Penicillins; Hydrocodone; and Sulfa drugs     Vitals:    05/27/20 1958 05/27/20 2001 05/27/20 2100 05/27/20 2358   BP:  (Abnormal) 169/95 140/82 141/85   Pulse: 128  100 98   Resp: 24 20 20   Temp: 37.1 °C (98.8 °F)   36.7 °C (98.1 °F)   TempSrc: Temporal   Temporal   SpO2: 100%  100% 92%   Weight:       Height:           Final cultures:   Results     Procedure Component Value Units Date/Time    BLOOD CULTURE [[id number]]  (Abnormal) Collected:  05/27/20 2038    Order Status:  Completed Specimen:  Blood from Peripheral Updated:  05/29/20 1140     Significant Indicator POS     Source BLD     Site PERIPHERAL     Culture Result Growth detected by Bactec instrument.05/28/2020  19:50  Negative for Staphylococcus aureus and MRSA by PCR. Correlate  ongoing need for antibiotics with clinical condition.        Coagulase-negative Staphylococcus species  Possible Contaminant  Isolated from one bottle only, correlate with clinical  condition.      Narrative:       CALL  [place]  ER tel. ,  CALLED  ER tel.  05/28/2020, 20:00, RB PERF. RESULTS CALLED TO:Kingsley VEGA  Per Hospital Policy: Only change Specimen Src: to \"Line\" if  specified by physician order.  Left Forearm/Arm    BLOOD CULTURE [[id number]] Collected:  05/27/20 2005    Order Status:  Completed Specimen:  Blood from Peripheral Updated:  05/29/20 0710     Significant Indicator NEG     Source BLD     Site PERIPHERAL     Culture Result No Growth  Note: Blood cultures are " "incubated for 5 days and  are monitored continuously.Positive blood cultures  are called to the RN and reported as soon as  they are identified.      Narrative:       Per Hospital Policy: Only change Specimen Src: to \"Line\" if  specified by physician order.  No site indicated    URINALYSIS [[id number]]  (Abnormal) Collected:  05/27/20 2038    Order Status:  Completed Specimen:  Urine Updated:  05/27/20 2200     Color Yellow     Character Clear     Specific Gravity 1.010     Ph 6.0     Glucose Negative mg/dL      Ketones Negative mg/dL      Protein 30 mg/dL      Bilirubin Negative     Urobilinogen, Urine 1.0     Nitrite Negative     Leukocyte Esterase Negative     Occult Blood Small     Micro Urine Req Microscopic    Narrative:       Indication for culture:->Septic Shock: Persistent  hypotension,  Lactic acid > 4, vasopressors/inotropes started    URINE CULTURE(NEW) [[id number]] Collected:  05/27/20 2038    Order Status:  Completed Specimen:  Urine Updated:  05/27/20 2043    Narrative:       Indication for culture:->Septic Shock: Persistent  hypotension,  Lactic acid > 4, vasopressors/inotropes started          Plan:   Patient growing CoNS from 1/4 blood culture bottles, most likely a contaminant. Patient also denied surgical history that would suggest prosthetic materials enabling CoNS bacteremia. Per ER pharmacist, ERP also decided not to call patient back in. No change required at this time.     Nora Wray, PharmD      "

## 2020-05-30 LAB
BACTERIA UR CULT: NORMAL
SIGNIFICANT IND 70042: NORMAL
SITE SITE: NORMAL
SOURCE SOURCE: NORMAL

## 2020-06-02 LAB
BACTERIA BLD CULT: NORMAL
SIGNIFICANT IND 70042: NORMAL
SITE SITE: NORMAL
SOURCE SOURCE: NORMAL

## 2020-11-12 ENCOUNTER — HOSPITAL ENCOUNTER (OUTPATIENT)
Dept: LAB | Facility: MEDICAL CENTER | Age: 55
End: 2020-11-12
Attending: FAMILY MEDICINE
Payer: MEDICARE

## 2020-11-12 PROCEDURE — U0003 INFECTIOUS AGENT DETECTION BY NUCLEIC ACID (DNA OR RNA); SEVERE ACUTE RESPIRATORY SYNDROME CORONAVIRUS 2 (SARS-COV-2) (CORONAVIRUS DISEASE [COVID-19]), AMPLIFIED PROBE TECHNIQUE, MAKING USE OF HIGH THROUGHPUT TECHNOLOGIES AS DESCRIBED BY CMS-2020-01-R: HCPCS

## 2020-11-12 PROCEDURE — C9803 HOPD COVID-19 SPEC COLLECT: HCPCS

## 2020-11-13 LAB
COVID ORDER STATUS COVID19: NORMAL
SARS-COV-2 RNA RESP QL NAA+PROBE: NOTDETECTED
SPECIMEN SOURCE: NORMAL

## 2021-02-28 ENCOUNTER — HOSPITAL ENCOUNTER (EMERGENCY)
Facility: MEDICAL CENTER | Age: 56
End: 2021-02-28
Attending: EMERGENCY MEDICINE
Payer: MEDICARE

## 2021-02-28 VITALS
TEMPERATURE: 99.3 F | OXYGEN SATURATION: 97 % | BODY MASS INDEX: 28.21 KG/M2 | HEART RATE: 95 BPM | DIASTOLIC BLOOD PRESSURE: 99 MMHG | RESPIRATION RATE: 20 BRPM | WEIGHT: 219.8 LBS | SYSTOLIC BLOOD PRESSURE: 141 MMHG | HEIGHT: 74 IN

## 2021-02-28 DIAGNOSIS — A53.9 SYPHILIS: ICD-10-CM

## 2021-02-28 DIAGNOSIS — N48.22 PENILE CELLULITIS: ICD-10-CM

## 2021-02-28 PROCEDURE — 86593 SYPHILIS TEST NON-TREP QUANT: CPT

## 2021-02-28 PROCEDURE — 99284 EMERGENCY DEPT VISIT MOD MDM: CPT

## 2021-02-28 PROCEDURE — 700101 HCHG RX REV CODE 250

## 2021-02-28 PROCEDURE — 36415 COLL VENOUS BLD VENIPUNCTURE: CPT

## 2021-02-28 PROCEDURE — 87591 N.GONORRHOEAE DNA AMP PROB: CPT

## 2021-02-28 PROCEDURE — 87491 CHLMYD TRACH DNA AMP PROBE: CPT

## 2021-02-28 PROCEDURE — 700111 HCHG RX REV CODE 636 W/ 250 OVERRIDE (IP): Performed by: EMERGENCY MEDICINE

## 2021-02-28 PROCEDURE — A9270 NON-COVERED ITEM OR SERVICE: HCPCS | Performed by: EMERGENCY MEDICINE

## 2021-02-28 PROCEDURE — 96372 THER/PROPH/DIAG INJ SC/IM: CPT

## 2021-02-28 PROCEDURE — 86780 TREPONEMA PALLIDUM: CPT

## 2021-02-28 PROCEDURE — 700102 HCHG RX REV CODE 250 W/ 637 OVERRIDE(OP): Performed by: EMERGENCY MEDICINE

## 2021-02-28 PROCEDURE — 86592 SYPHILIS TEST NON-TREP QUAL: CPT

## 2021-02-28 RX ORDER — CLINDAMYCIN HYDROCHLORIDE 150 MG/1
300 CAPSULE ORAL ONCE
Status: COMPLETED | OUTPATIENT
Start: 2021-02-28 | End: 2021-02-28

## 2021-02-28 RX ORDER — CEPHALEXIN 500 MG/1
500 CAPSULE ORAL 4 TIMES DAILY
Qty: 28 CAPSULE | Refills: 0 | Status: SHIPPED | OUTPATIENT
Start: 2021-02-28 | End: 2021-03-07

## 2021-02-28 RX ORDER — OXYCODONE HYDROCHLORIDE AND ACETAMINOPHEN 5; 325 MG/1; MG/1
1 TABLET ORAL EVERY 4 HOURS PRN
Status: DISCONTINUED | OUTPATIENT
Start: 2021-02-28 | End: 2021-03-01 | Stop reason: HOSPADM

## 2021-02-28 RX ORDER — DOXYCYCLINE 100 MG/1
100 CAPSULE ORAL 2 TIMES DAILY
Qty: 20 CAPSULE | Refills: 0 | Status: SHIPPED | OUTPATIENT
Start: 2021-02-28 | End: 2021-03-10

## 2021-02-28 RX ORDER — CEFTRIAXONE 500 MG/1
500 INJECTION, POWDER, FOR SOLUTION INTRAMUSCULAR; INTRAVENOUS ONCE
Status: COMPLETED | OUTPATIENT
Start: 2021-02-28 | End: 2021-02-28

## 2021-02-28 RX ORDER — DOXYCYCLINE 100 MG/1
100 TABLET ORAL ONCE
Status: COMPLETED | OUTPATIENT
Start: 2021-02-28 | End: 2021-02-28

## 2021-02-28 RX ORDER — LIDOCAINE HYDROCHLORIDE 10 MG/ML
INJECTION, SOLUTION INFILTRATION; PERINEURAL
Status: COMPLETED
Start: 2021-02-28 | End: 2021-02-28

## 2021-02-28 RX ADMIN — LIDOCAINE HYDROCHLORIDE 1 ML: 10 INJECTION, SOLUTION INFILTRATION; PERINEURAL at 22:04

## 2021-02-28 RX ADMIN — CLINDAMYCIN HYDROCHLORIDE 300 MG: 150 CAPSULE ORAL at 19:49

## 2021-02-28 RX ADMIN — CEFTRIAXONE SODIUM 500 MG: 500 INJECTION, POWDER, FOR SOLUTION INTRAMUSCULAR; INTRAVENOUS at 22:00

## 2021-02-28 RX ADMIN — Medication 1 ML: at 22:04

## 2021-02-28 RX ADMIN — OXYCODONE HYDROCHLORIDE AND ACETAMINOPHEN 1 TABLET: 5; 325 TABLET ORAL at 20:22

## 2021-02-28 RX ADMIN — PENICILLIN G BENZATHINE 2.4 MILLION UNITS: 1200000 INJECTION, SUSPENSION INTRAMUSCULAR at 22:02

## 2021-02-28 RX ADMIN — DOXYCYCLINE 100 MG: 100 TABLET, FILM COATED ORAL at 22:00

## 2021-02-28 ASSESSMENT — FIBROSIS 4 INDEX: FIB4 SCORE: 5.38

## 2021-02-28 ASSESSMENT — LIFESTYLE VARIABLES: DO YOU DRINK ALCOHOL: NO

## 2021-02-28 NOTE — LETTER
3/1/2021               Chace Gabriel Mayeshomer  [address] [address]        Dear Chace (MR#[id number])    As we have been unable to contact you by phone, this letter is sent in regards to your, recent visit to the [facility] Emergency Department on 2/28/2021.  During the visit, tests were performed to assist the physician in a medical diagnosis.  A review of those tests requires that we notify you of the following:    Your culture test was positive for Syphilis, a sexually transmitted infection. This was already treated appropriately in the Emergency Department. .       Based on the above findings it is recommended that you seek testing for the presence of additional sexually transmitted infections from the Health Department. Also, it is advised that you  abstain from sexual intercourse 7 days after antibiotic therapy is completed, inform your sexual partner(s) within the previous 60 days of the above findings and direct them to the Health Department for testing. Should your symptoms progress, it is important that you follow up with your primary care physician, your local urgent care office, or return to the emergency department for further work up in order to prevent long term health issues.      Thank you for your cooperation in the matter.    Sincerely,  ED Culture Follow-Up Staff  Myles Mobley, PharmD,     [facility], Emergency Department  [facility], [place] [address] [phone] (ED Culture Line)  [phone]

## 2021-03-01 LAB
C TRACH DNA SPEC QL NAA+PROBE: NEGATIVE
N GONORRHOEA DNA SPEC QL NAA+PROBE: NEGATIVE
RPR SER QL: REACTIVE
RPR SER-TITR: NORMAL {TITER}
SPECIMEN SOURCE: NORMAL
TREPONEMA PALLIDUM IGG+IGM AB [PRESENCE] IN SERUM OR PLASMA BY IMMUNOASSAY: REACTIVE

## 2021-03-01 NOTE — DISCHARGE INSTRUCTIONS
These go to public health for HIV testing.  You will need to notify your partners that you do have syphilis and they will need to be tested as well.  We have treated you for gonorrhea chlamydia as well.  Please follow-up with your primary care physician as well as needed.  In addition, you do have cellulitis or tearing of your skin or penis I will be giving you medications for this as well.  Utilize a lotion such as a triple antibiotic ointment on the cuts to help with the healing process.

## 2021-03-01 NOTE — ED NOTES
Pt ambulatory to room, agree with triage note. Pt provided warm blanket, wouldn't change into gown as it's cold but in PJs for easy assessment.   Chart up for ERP.

## 2021-03-01 NOTE — ED PROVIDER NOTES
ED Provider Note    CHIEF COMPLAINT  Chief Complaint   Patient presents with   • Penis Pain     x 1 month       HPI  Chace Gong is a 56 y.o. male who presents to the emergency department with penile pain for proxy 1 month.  He states that he has been having multiple sexual partners and 3 symptoms with multiple different people for the last 4 weeks.  He states that he tore the foreskin of his penis with aggressive masturbation and stroking from other partners.  He continues to masturbate and tears the skin and has severe pain.  Is been slight redness but no discharge.  He noticed a few days ago that there is a lesion on them base of his penis and scrotum notes somewhat hard and indurated but not painful.  Denies any rash in his arms or legs, also complains of slight body aches.  Denies penile discharge, fever, shakes, chills, sweats, nausea, vomiting.    REVIEW OF SYSTEMS  Positives as above. Pertinent negatives include fever, shakes, chills, sweats, nausea, vomiting  All other 10 review of systems are negative    PAST MEDICAL HISTORY  Past Medical History:   Diagnosis Date   • Achilles tendinitis 2009   • Arthralgia 2009   • Bronchitis 2009   • Dyslipidemia 2009   • GERD (gastroesophageal reflux disease) 2009   • Hepatitis C    • HTN (hypertension) 2009   • Hypertension    • Low back pain    • Psychiatric disorder     anxiety       FAMILY HISTORY  Noncontributory    SOCIAL HISTORY  Social History     Socioeconomic History   • Marital status:      Spouse name: Not on file   • Number of children: Not on file   • Years of education: Not on file   • Highest education level: Not on file   Occupational History   • Not on file   Tobacco Use   • Smoking status: Current Some Day Smoker     Packs/day: 0.25     Years: 30.00     Pack years: 7.50     Types: Cigarettes     Start date: 2015     Last attempt to quit: 2015     Years since quittin.2   • Smokeless tobacco:  Never Used   • Tobacco comment: initiated cessation 12 Aug 2015, occasional cigar   Substance and Sexual Activity   • Alcohol use: Yes     Alcohol/week: 0.0 oz     Comment: vodka /beer   • Drug use: Yes     Types: Inhaled, Marijuana     Comment: has med marijuana card; smokes 1 joint every morning, speed   • Sexual activity: Yes     Partners: Male   Other Topics Concern   • Not on file   Social History Narrative   • Not on file     Social Determinants of Health     Financial Resource Strain:    • Difficulty of Paying Living Expenses:    Food Insecurity:    • Worried About Running Out of Food in the Last Year:    • Ran Out of Food in the Last Year:    Transportation Needs:    • Lack of Transportation (Medical):    • Lack of Transportation (Non-Medical):    Physical Activity:    • Days of Exercise per Week:    • Minutes of Exercise per Session:    Stress:    • Feeling of Stress :    Social Connections:    • Frequency of Communication with Friends and Family:    • Frequency of Social Gatherings with Friends and Family:    • Attends Christianity Services:    • Active Member of Clubs or Organizations:    • Attends Club or Organization Meetings:    • Marital Status:    Intimate Partner Violence:    • Fear of Current or Ex-Partner:    • Emotionally Abused:    • Physically Abused:    • Sexually Abused:        SURGICAL HISTORY  Past Surgical History:   Procedure Laterality Date   • ELBOW ARTHROTOMY  5/17/2013    Performed by Jonathan Maurice M.D. at [facility]   • LOOSE BODY REMOVAL  5/17/2013    Performed by Jonathan Maurice M.D. at [facility]   • ARTHROSCOPY, KNEE  2013    right    • ARTHROTOMY  2011    right shoulder   • NEUROLYSIS  1/21/2009    Performed by JONATHAN MAURICE at [facility]   • CARPAL TUNNEL RELEASE  1/21/2009    Performed by JONATHAN MAURICE at [facility]   • HARDWARE REMOVAL ORTHO  1/21/2009    Performed by JONATHAN MAURICE at SURGERY  "[facility]   • SYNOVECTOMY  1/21/2009    Performed by JONATHAN MAURICE at SURGERY [facility]   • OTHER ORTHOPEDIC SURGERY  2001    [facility] wrist surg       CURRENT MEDICATIONS  Home Medications    **Home medications have not yet been reviewed for this encounter**         ALLERGIES  Allergies   Allergen Reactions   • Penicillins Unspecified     Pt states that his mom told him he is allergic to penicillins.   • Hydrocodone Unspecified     Pt states \"I'm not sure\".   • Sulfa Drugs      \"my mom told me I was allergic to it when i was a kid\"       PHYSICAL EXAM  VITAL SIGNS: /78   Pulse 98   Temp 37.4 °C (99.3 °F) (Temporal)   Resp 16   Ht 1.88 m (6' 2\")   Wt 99.7 kg (219 lb 12.8 oz)   SpO2 94%   BMI 28.22 kg/m²      Constitutional: Well developed, Well nourished, No acute distress, Non-toxic appearance.   Eyes: PERRLA, EOMI, Conjunctiva normal, No discharge.   Cardiovascular: Normal heart rate, Normal rhythm, No murmurs, No rubs, No gallops, and intact distal pulses.   Thorax & Lungs:  No respiratory distress, no rales, no rhonchi, No wheezing, No chest wall tenderness.   Abdomen: Bowel sounds normal, Soft, No tenderness, No guarding, No rebound, No pulsatile masses.   Skin: Warm, Dry, No erythema, No rash.   : Circumcised male, he does have a tear of the foreskin on the penis at approximately 1:00 and 6:00, slight surrounding erythema, discharge, no abscess, at the base of the scrotum and penis connection there is evidence of a chancre-like lesion approxi-1 cm, nontender, nonfluctuant, firm, no penile discharge no scrotal edema, erythema or testicular tenderness  Extremities: Full range of motion, no deformity, no edema.  Neurologic: Alert & oriented x 3, No focal deficits noted, acting appropriately on exam.  Psychiatric: Affect normal for clinical presentation.    Results for orders placed or performed during the hospital encounter of 02/28/21   T.PALLIDUM AB EIA   Result Value Ref Range    " Syphilis, Treponemal Qual Reactive (A) Non-Reactive   Chlamydia/GC PCR Urine Or Swab    Specimen: Urine, First Catch; Genital   Result Value Ref Range    Source Urine          COURSE & MEDICAL DECISION MAKING  Pertinent Labs & Imaging studies reviewed. (See chart for details)  This is a pleasant 56-year-old male presents with penile cellulitis in the chancre on his scrotum.  Is concern for possible syphilis therefore test was obtained and was positive.  GC chlamydia has been sent.  The patient does have evidence of early syphilis therefore received penicillin 2,400,000 units IM.  I also treated him with ceftriaxone 5 mg IM, doxycycline 100 mg p.o. twice daily x10 days.  He also has slight cellulitis initially given clindamycin because he said he was allergic to penicillin but then he refused without asked him therefore he received IM penicillin received prescription for Keflex and doxy in case he has MRSA as well on his penis.  The patient is to follow-up with [facility] for HIV testing, he will notify his partners that he does have syphilis in the to be checked as well.  Strict return precautions have been given.      FINAL IMPRESSION     1. Penile cellulitis Active   2. Syphilis Active       DISPOSITION:  Patient will be discharged home in stable condition.    FOLLOW UP:  [facility], Emergency Dept  [address] [address] [phone]    If symptoms worsen      OUTPATIENT MEDICATIONS:  New Prescriptions    CEPHALEXIN (KEFLEX) 500 MG CAP    Take 1 capsule by mouth 4 times a day for 7 days.    DOXYCYCLINE (MONODOX) 100 MG CAPSULE    Take 1 capsule by mouth 2 times a day for 10 days.         Electronically signed by: Saman Bryant D.O., 2/28/2021 6:57 PM

## 2021-03-01 NOTE — ED TRIAGE NOTES
"Chief Complaint   Patient presents with   • Penis Pain     x 1 month     Pt amb to triage with steady gait for above complaint. \" I had a threesome and it lasted a month\".  \"I had my 56th birthday and it didn't stop\" Reports painful wound on penis. Will not elaborate.     Pt is alert and oriented, speaking in full sentences, follows commands and responds appropriately to questions. Resp are even and unlabored. No behavioral indicators of pain.   Pt placed in lobby. Pt educated on triage process. Pt encouraged to alert staff for any changes.    /79   Pulse (!) 110   Temp 37.3 °C (99.2 °F) (Temporal)   Resp 18   Ht 1.88 m (6' 2\")   Wt 99.7 kg (219 lb 12.8 oz)   SpO2 97%   BMI 28.22 kg/m²         "

## 2021-03-01 NOTE — ED NOTES
"Discharge instructions given to patient. Education provided on diagnosis, treatment, follow up, and prescriptions provided. Pt verbalized understanding. All questions answered.  Pt ambulatory to the [facility]. /99   Pulse 95   Temp 37.4 °C (99.3 °F) (Temporal)   Resp 20   Ht 1.88 m (6' 2\")   Wt 99.7 kg (219 lb 12.8 oz)   SpO2 97%   BMI 28.22 kg/m²     "

## 2021-03-01 NOTE — ED NOTES
"ED Positive Culture Follow-up/Notification Note:    Date: 3/1/2021     Patient seen in the ED on 2/28/2021 for penile pain x1 month .   1. Penile cellulitis Active   2. Syphilis Active      Discharge Medication List as of 2/28/2021 10:40 PM      START taking these medications    Details   doxycycline (MONODOX) 100 MG capsule Take 1 capsule by mouth 2 times a day for 10 days., Disp-20 capsule, R-0, Normal      cephALEXin (KEFLEX) 500 MG Cap Take 1 capsule by mouth 4 times a day for 7 days., Disp-28 capsule, R-0, Normal           Ceftriaxone 500 mg IM x1, Clindamycin 300 mg PO x1, doxycycline 10 mg PO x1, penicillin 2.4 million units IM x1 given in ED     Allergies: Penicillins, Hydrocodone, and Sulfa drugs     Vitals:    02/28/21 1606 02/28/21 1621 02/28/21 2132 02/28/21 2239   BP: 121/79  119/78 141/99   Pulse: (!) 110  98 95   Resp: 18  16 20   Temp: 37.3 °C (99.2 °F)  37.4 °C (99.3 °F)    TempSrc: Temporal  Temporal    SpO2: 97%  94% 97%   Weight:  99.7 kg (219 lb 12.8 oz)     Height: 1.88 m (6' 2\")          Final cultures:   Results     Procedure Component Value Units Date/Time    Chlamydia/GC PCR Urine Or Swab [[id number]] Collected: 02/28/21 1900    Order Status: Completed Specimen: Genital from Urine, First Catch Updated: 02/28/21 1958     Source Urine          Plan:   Patient presented with a chancre likely primary syphilis. Both treponemal and non-treponemal tests positive.   Patient treated appropriately with penicillin 2.4 million units IM x1.   Patient treated for syphilis in the ER. No additional treatment necessary. Attempted to contact patient, left message. Will send letter with the following: Counseled the patient to abstain from sexual intercourse 7 days after antibiotic therapy is completed, to notify any sexual partners within the last 60 days to go the the Health Department for testing, to seek further HIV/STD testing, and to seek medical attention if symptoms persist.       Myles Mobley, " PharmD

## 2021-03-15 DIAGNOSIS — Z23 NEED FOR VACCINATION: ICD-10-CM

## 2021-03-25 NOTE — ED AVS SNAPSHOT
Home Care Instructions                                                                                                                Chace Gong   MRN: [id number]    Department:  [facility], Emergency Dept   Date of Visit:  3/3/2017            [facility], Emergency Dept    [address]    [place] [address]    Phone:  [phone]      You were seen by     Michael Pierce M.D.      Your Diagnosis Was     Alcohol abuse     F10.10       These are the medications you received during your hospitalization from 03/03/2017 1210 to 03/03/2017 1411     Date/Time Order Dose Route Action    03/03/2017 1341 er detox iv 1000 mL (D5LR + thiamine 100 mg + folic acid 1 mg + magnesium 1 g) infusion 1,000 mL Intravenous New Bag      Medication Information     Review all of your home medications and newly ordered medications with your primary doctor and/or pharmacist as soon as possible. Follow medication instructions as directed by your doctor and/or pharmacist.     Please keep your complete medication list with you and share with your physician. Update the information when medications are discontinued, doses are changed, or new medications (including over-the-counter products) are added; and carry medication information at all times in the event of emergency situations.               Medication List      ASK your doctor about these medications        Instructions    oxycodone-acetaminophen 5-325 MG Tabs   Commonly known as:  PERCOCET    Take 1-2 Tabs by mouth every 6 hours as needed.   Dose:  1-2 Tab               Procedures and tests performed during your visit     CBC WITH DIFFERENTIAL    COMP METABOLIC PANEL    EKG (ER)    ESTIMATED GFR    POC BREATHALIZER    URINE DRUG SCREEN        Discharge Instructions       Alcohol Abuse and Nutrition  Alcohol abuse is any pattern of alcohol consumption that harms your health, relationships, or work. Alcohol abuse can affect how your body  breaks down and absorbs nutrients from food by causing your liver to work abnormally. Additionally, many people who abuse alcohol do not eat enough carbohydrates, protein, fat, vitamins, and minerals. This can cause poor nutrition (malnutrition) and a lack of nutrients (nutrient deficiencies), which can lead to further complications.  Nutrients that are commonly lacking (deficient) among people who abuse alcohol include:  · Vitamins.  ¨ Vitamin A. This is stored in your liver. It is important for your vision, metabolism, and ability to fight off infections (immunity).  ¨ B vitamins. These include vitamins such as folate, thiamin, and niacin. These are important in new cell growth and maintenance.  ¨ Vitamin C. This plays an important role in iron absorption, wound healing, and immunity.  ¨ Vitamin D. This is produced by your liver, but you can also get vitamin D from food. Vitamin D is necessary for your body to absorb and use calcium.  · Minerals.  ¨ Calcium. This is important for your bones and your heart and blood vessel (cardiovascular) function.  ¨ Iron. This is important for blood, muscle, and nervous system functioning.  ¨ Magnesium. This plays an important role in muscle and nerve function, and it helps to control blood sugar and blood pressure.  ¨ Zinc. This is important for the normal function of your nervous system and digestive system (gastrointestinal tract).  Nutrition is an essential component of therapy for alcohol abuse. Your health care provider or dietitian will work with you to design a plan that can help restore nutrients to your body and prevent potential complications.  WHAT IS MY PLAN?  Your dietitian may develop a specific diet plan that is based on your condition and any other complications you may have. A diet plan will commonly include:  · A balanced diet.  ¨ Grains: 6-8 oz per day.  ¨ Vegetables: 2-3 cups per day.  ¨ Fruits: 1-2 cups per day.  ¨ Meat and other protein: 5-6 oz per  day.  ¨ Dairy: 2-3 cups per day.  · Vitamin and mineral supplements.  WHAT DO I NEED TO KNOW ABOUT ALCOHOL AND NUTRITION?  · Consume foods that are high in antioxidants, such as grapes, berries, nuts, green tea, and dark green and orange vegetables. This can help to counteract some of the stress that is placed on your liver by consuming alcohol.  · Avoid food and drinks that are high in fat and sugar. Foods such as sugared soft drinks, salty snack foods, and candy contain empty calories. This means that they lack important nutrients such as protein, fiber, and vitamins.  · Eat frequent meals and snacks. Try to eat 5-6 small meals each day.  · Eat a variety of fresh fruits and vegetables each day. This will help you get plenty of water, fiber, and vitamins in your diet.  · Drink plenty of water and other clear fluids. Try to drink at least 48-64 oz (1.5-2 L) of water per day.  · If you are a vegetarian, eat a variety of protein-rich foods. Pair whole grains with plant-based proteins at meals and snacks to obtain the greatest nutrient benefit from your food. For example, eat rice with beans, put peanut butter on whole-grain toast, or eat oatmeal with sunflower seeds.  · Soak beans and whole grains overnight before cooking. This can help your body to absorb the nutrients more easily.  · Include foods fortified with vitamins and minerals in your diet. Commonly fortified foods include milk, orange juice, cereal, and bread.  · If you are malnourished, your dietitian may recommend a high-protein, high-calorie diet. This may include:  ¨ 2,000-3,000 calories (kilocalories) per day.  ¨  grams of protein per day.  · Your health care provider may recommend a complete nutritional supplement beverage. This can help to restore calories, protein, and vitamins to your body. Depending on your condition, you may be advised to consume this instead of or in addition to meals.  · Limit your intake of caffeine. Replace drinks like  coffee and black tea with decaffeinated coffee and herbal tea.  · Eat a variety of foods that are high in omega fatty acids. These include fish, nuts and seeds, and soybeans. These foods may help your liver to recover and may also stabilize your mood.  · Certain medicines may cause changes in your appetite, taste, and weight. Work with your health care provider and dietitian to make any adjustments to your medicines and diet plan.  · Include other healthy lifestyle choices in your daily routine.  ¨ Be physically active.  ¨ Get enough sleep.  ¨ Spend time doing activities that you enjoy.  · If you are unable to take in enough food and calories by mouth, your health care provider may recommend a feeding tube. This is a tube that passes through your nose and throat, directly into your stomach. Nutritional supplement beverages can be given to you through the feeding tube to help you get the nutrients you need.  · Take vitamin or mineral supplements as recommended by your health care provider.  WHAT FOODS CAN I EAT?  Grains  Enriched pasta. Enriched rice. Fortified whole-grain bread. Fortified whole-grain cereal. Barley. Brown rice. Quinoa. Millet.  Vegetables  All fresh, frozen, and canned vegetables. Spinach. Kale. Artichoke. Carrots. Winter squash and pumpkin. Sweet potatoes. Broccoli. Cabbage. Cucumbers. Tomatoes. Sweet peppers. Green beans. Peas. Corn.  Fruits  All fresh and frozen fruits. Berries. Grapes. [place]. Papaya. Guava. Cherries. Apples. Bananas. Peaches. Plums. Pineapple. Watermelon. Cantaloupe. Oranges. Avocado.  Meats and Other Protein Sources  Beef liver. Lean beef. Pork. Fresh and canned chicken. Fresh fish. Oysters. Sardines. Canned tuna. Shrimp. Eggs with yolks. Nuts and seeds. Peanut butter. Beans and lentils. Soybeans. Tofu.  Dairy  Whole, low-fat, and nonfat milk. Whole, low-fat, and nonfat yogurt. Cottage cheese. Sour cream. Hard and soft cheeses.  Beverages  Water. Herbal tea. Decaffeinated  coffee. Decaffeinated green tea. 100% fruit juice. 100% vegetable juice. Instant breakfast shakes.  Condiments  Ketchup. Mayonnaise. Mustard. Salad dressing. Barbecue sauce.  Sweets and Desserts  Sugar-free ice cream. Sugar-free pudding. Sugar-free gelatin.  Fats and Oils  Butter. Vegetable oil, flaxseed oil, olive oil, and walnut oil.  Other  Complete nutrition shakes. Protein bars. Sugar-free gum.  The items listed above may not be a complete list of recommended foods or beverages. Contact your dietitian for more options.  WHAT FOODS ARE NOT RECOMMENDED?  Grains  Sugar-sweetened breakfast cereals. Flavored instant oatmeal. Fried breads.  Vegetables  Breaded or deep-fried vegetables.  Fruits  Dried fruit with added sugar. Candied fruit. Canned fruit in syrup.  Meats and Other Protein Sources  Breaded or deep-fried meats.  Dairy  Flavored milks. Fried cheese curds or fried cheese sticks.  Beverages  Alcohol. Sugar-sweetened soft drinks. Sugar-sweetened tea. Caffeinated coffee and tea.  Condiments  Sugar. Honey. Agave nectar. Molasses.  Sweets and Desserts  Chocolate. Cake. Cookies. Candy.  Other  Potato chips. Pretzels. Salted nuts. Candied nuts.  The items listed above may not be a complete list of foods and beverages to avoid. Contact your dietitian for more information.     This information is not intended to replace advice given to you by your health care provider. Make sure you discuss any questions you have with your health care provider.     Document Released: 10/12/2006 Document Revised: 01/08/2016 Document Reviewed: 07/21/2015  [employer] Interactive Patient Education ©2016 [employer]            Patient Information     Patient Information    Following emergency treatment: all patient requiring follow-up care must return either to a private physician or a clinic if your condition worsens before you are able to obtain further medical attention, please return to the emergency room.     Billing  Information    At [facility], we work to make the billing process streamlined for our patients.  Our Representatives are here to answer any questions you may have regarding your hospital bill.  If you have insurance coverage and have supplied your insurance information to us, we will submit a claim to your insurer on your behalf.  Should you have any questions regarding your bill, we can be reached online or by phone as follows:  Online: You are able pay your bills online or live chat with our representatives about any billing questions you may have. We are here to help Monday - Friday from 8:00am to 7:30pm and 9:00am - 12:00pm on Saturdays.  Please visit [email or web address]  for more information.   Phone:  [phone] or [phone]    Please note that your emergency physician, surgeon, pathologist, radiologist, anesthesiologist, and other specialists are not employed by [facility] and will therefore bill separately for their services.  Please contact them directly for any questions concerning their bills at the numbers below:     Emergency Physician Services:  [phone]  [facility]:  [phone]  [facility]:  [phone]  [facility]:  [phone]    1. Your final bill may vary from the amount quoted upon discharge if all procedures are not complete at that time, or if your doctor has additional procedures of which we are not aware. You will receive an additional bill if you return to the Emergency Department at [facility] for suture removal regardless of the facility of which the sutures were placed.     2. Please arrange for settlement of this account at the emergency registration.    3. All self-pay accounts are due in full at the time of treatment.  If you are unable to meet this obligation then payment is expected within 4-5 days.     4. If you have had radiology studies (CT, X-ray, Ultrasound, MRI), you have  received a preliminary result during your emergency department visit. Please contact the radiology department [phone] to receive a copy of your final result. Please discuss the Final result with your primary physician or with the follow up physician provided.     Crisis Hotline:  [place] Crisis Hotline:  9-807-WAGCAPT or 1-655.627.4393  [place] Crisis Hotline:    1-144.583.4060 or 300-954-0012         ED Discharge Follow Up Questions    1. In order to provide you with very good care, we would like to follow up with a phone call in the next few days.  May we have your permission to contact you?     YES /  NO    2. What is the best phone number to call you? (       )_____-__________    3. What is the best time to call you?      Morning  /  Afternoon  /  Evening                   Patient Signature:  ____________________________________________________________    Date:  ____________________________________________________________                [id number]

## 2021-07-31 ENCOUNTER — HOSPITAL ENCOUNTER (EMERGENCY)
Facility: MEDICAL CENTER | Age: 56
End: 2021-07-31
Attending: EMERGENCY MEDICINE
Payer: MEDICARE

## 2021-07-31 VITALS
OXYGEN SATURATION: 98 % | BODY MASS INDEX: 31.94 KG/M2 | HEART RATE: 110 BPM | HEIGHT: 74 IN | RESPIRATION RATE: 17 BRPM | WEIGHT: 248.9 LBS | TEMPERATURE: 97.6 F | SYSTOLIC BLOOD PRESSURE: 158 MMHG | DIASTOLIC BLOOD PRESSURE: 95 MMHG

## 2021-07-31 DIAGNOSIS — Z20.2 STD EXPOSURE: ICD-10-CM

## 2021-07-31 LAB
C TRACH DNA SPEC QL NAA+PROBE: NEGATIVE
N GONORRHOEA DNA SPEC QL NAA+PROBE: NEGATIVE
RPR SER QL: NON REACTIVE
SPECIMEN SOURCE: NORMAL
TREPONEMA PALLIDUM IGG+IGM AB [PRESENCE] IN SERUM OR PLASMA BY IMMUNOASSAY: REACTIVE

## 2021-07-31 PROCEDURE — 86780 TREPONEMA PALLIDUM: CPT

## 2021-07-31 PROCEDURE — 86592 SYPHILIS TEST NON-TREP QUAL: CPT

## 2021-07-31 PROCEDURE — 99283 EMERGENCY DEPT VISIT LOW MDM: CPT

## 2021-07-31 PROCEDURE — 87591 N.GONORRHOEAE DNA AMP PROB: CPT

## 2021-07-31 PROCEDURE — 87491 CHLMYD TRACH DNA AMP PROBE: CPT

## 2021-07-31 PROCEDURE — 36415 COLL VENOUS BLD VENIPUNCTURE: CPT

## 2021-07-31 ASSESSMENT — FIBROSIS 4 INDEX: FIB4 SCORE: 5.38

## 2021-07-31 NOTE — ED NOTES
Patient ambulaotry to [facility] 31 for triage complaint. Patient sitting on chair.

## 2021-07-31 NOTE — LETTER
8/3/2021               Chace Gabriel [place]  [address] [address]        Dear Chace (MR#[id number])    As we have been unable to contact you by phone, this letter is sent in regards to your, recent visit to the [facility] Emergency Department on 7/31/2021. During the visit, tests were performed to assist the physician in your medical diagnosis. A review of your tests requires that we notify you of the following:    Your culture test for Syphilis indicated that you are NOT actively infected with Syphilis. Therefore, you do not need any further treatment for Syphilis at this time. Additionally, your Chlamydia and Gonorrhea tests were both NEGATIVE.      If you are interested in receiving testing for other sexually transmitted infections, including hepatitis and HIV, we recommend you contact the Health Department.    Sincerely,  ED Culture Follow-Up Staff  Tita Chapin, PharmD    [facility] hospital, Emergency Department  [address] [place] [id number] [phone] (ED Culture Line)

## 2021-07-31 NOTE — LETTER
8/3/2021               Chace Gong  [address] [address]        Dear Chace (MR#[id number])    As we have been unable to contact you by phone, this letter is sent in regards to your, recent visit to the [facility] Emergency Department on 7/31/2021. During the visit, tests were performed to assist the physician in your medical diagnosis. A review of your tests requires that we notify you of the following:    Your culture test for Syphilis indicated that you are NOT actively infected with Syphilis. Therefore, you do not need any further treatment for Syphilis at this time. Your Chlamydia and Gonorrhea tests were both NEGATIVE.       Thank you,    Sincerely,  ED Culture Follow-Up Staff  Tita Chapin, PharmD    [facility], Emergency Department  [address] [place] [id number] [phone] (ED Culture Line)

## 2021-07-31 NOTE — DISCHARGE INSTRUCTIONS
You were seen in the ER due to concern for syphilis.  We did pull a blood test from you and we tested for gonorrhea and chlamydia as well.  You have chosen to leave before the test result returns.  We will call you if the test is positive and you will have to return to the ER for treatment.  It will be important that you follow-up with a primary care physician.  I did give you a list of local clinics for you can establish.  You can follow-up with your PCP to if you would like.  Return to the ER with any new or worsening symptoms.  Good luck, I hope you feel better soon!

## 2021-07-31 NOTE — ED NOTES
"Pt discharged home, pt is A/O x 4, ambulatory with a steady gait. Patient in possession of all belongings. Pt provided discharge education and information pertaining to medications and follow up appointments. Dicussed signs and symptoms to return to the ER, patient verbalized understanding. Pt received copy of discharge instructions and verbalized understanding.     /95   Pulse (!) 110   Temp 36.4 °C (97.6 °F) (Oral)   Resp 17   Ht 1.88 m (6' 2\")   Wt 113 kg (248 lb 14.4 oz)   SpO2 98%   BMI 31.96 kg/m²     "

## 2021-07-31 NOTE — ED TRIAGE NOTES
"Chief Complaint   Patient presents with   • Exposure to STD     Pt concerned he still has syphilis. Pt stated he was here previously and \"the treatment didn't take.\" Pt unable to state when he had it and what treatment he recieved        Pt amb to triage with steady gait for above complaint.   Pt is alert and oriented, speaking in full sentences, follows commands and responds appropriately to questions. Resp are even and unlabored. No obvious acute distress.  Pt given urine cup  Pt placed in lobby. Pt educated on triage process. Pt encouraged to alert staff for any changes.    Vitals:    07/31/21 0428   BP: 153/112   Pulse: (!) 119   Resp: 16   Temp: 36.7 °C (98.1 °F)   SpO2: 94%       "

## 2021-07-31 NOTE — ED PROVIDER NOTES
"ED Provider Note    CHIEF COMPLAINT  Chief Complaint   Patient presents with   • Exposure to STD     Pt concerned he still has syphilis. Pt stated he was here previously and \"the treatment didn't take.\" Pt unable to state when he had it and what treatment he recieved        HPI  Chace Gong is a 56 y.o. male who presents with a chief complaint of possible STD.  Patient notes that he was diagnosed with syphilis earlier this year and was treated with antibiotics for it.  He states that the ulceration on his penis resolved with treatment and has not returned but he has been mean to his girlfriend recently and she requested that he come and be reevaluated for syphilis. He has been sexually active with multiple partners, both male and female, and does not always use protection.  He denies any pain, dysuria, purulent discharge from the penis, chest pain, shortness of breath.  He does have intercourse with multiple partners sometimes not protected.  He did have 1 episode of emesis earlier this morning after eating a sausage McMuffin from [employer] but feels improved presently.    REVIEW OF SYSTEMS  See HPI for further details.  STD check.  All other systems are negative.     PAST MEDICAL HISTORY   has a past medical history of Achilles tendinitis (2009), Arthralgia (2009), Bronchitis (2009), Dyslipidemia (2009), GERD (gastroesophageal reflux disease) (2009), Hepatitis C, HTN (hypertension) (2009), Hypertension, Low back pain, and Psychiatric disorder.    SOCIAL HISTORY  Social History     Tobacco Use   • Smoking status: Current Some Day Smoker     Packs/day: 0.25     Years: 30.00     Pack years: 7.50     Types: Cigarettes     Start date: 2015     Last attempt to quit: 2015     Years since quittin.7   • Smokeless tobacco: Never Used   • Tobacco comment: initiated cessation 12 Aug 2015, occasional cigar   Substance and Sexual Activity   • Alcohol use: Yes     " "Alcohol/week: 0.0 oz     Comment: vodka /beer   • Drug use: Yes     Types: Inhaled, Marijuana     Comment: has med marijuana card; smokes 1 joint every morning, speed   • Sexual activity: Yes     Partners: Male       SURGICAL HISTORY   has a past surgical history that includes neurolysis (1/21/2009); other orthopedic surgery (2001); carpal tunnel release (1/21/2009); hardware removal ortho (1/21/2009); synovectomy (1/21/2009); arthrotomy (2011); arthroscopy, knee (2013); elbow arthrotomy (5/17/2013); and loose body removal (5/17/2013).    CURRENT MEDICATIONS  Home Medications    **Home medications have not yet been reviewed for this encounter**         ALLERGIES  Allergies   Allergen Reactions   • Penicillins Unspecified     Pt states that his mom told him he is allergic to penicillins.   • Hydrocodone Unspecified     Pt states \"I'm not sure\".   • Sulfa Drugs      \"my mom told me I was allergic to it when i was a kid\"       PHYSICAL EXAM  VITAL SIGNS: /112   Pulse (!) 119   Temp 36.7 °C (98.1 °F) (Temporal)   Resp 16   Ht 1.88 m (6' 2\")   Wt 113 kg (248 lb 14.4 oz)   SpO2 94%   BMI 31.96 kg/m²    Pulse ox interpretation: I interpret this pulse ox as normal.  Constitutional: Alert in no apparent distress.  HENT: No signs of trauma, Bilateral external ears normal, Nose normal. Moist mucous membranes.  Eyes: Pupils are equal and reactive, Conjunctiva normal, Non-icteric.   Neck: Normal range of motion, No tenderness, Supple, No stridor.   Lymphatic: No lymphadenopathy noted.   Cardiovascular: Warm and well perfused. Pulses symmetrical.  Thorax & Lungs: Normal breath sounds, No respiratory distress, No wheezing, No chest tenderness.   Abdomen: Bowel sounds normal, Soft, No tenderness, No masses, No pulsatile masses. No peritoneal signs.  : Normal external male genitalia. No chancre or ulcerations. No discharge. No erythema. No tenderness.  Skin: Warm, Dry, No erythema, No rash.   Back: Normal alignment. "   Extremities: Intact distal pulses, No edema, No tenderness, No cyanosis.  Musculoskeletal: No major deformities noted.   Neurologic: Alert, No focal deficits noted.   Psychiatric: Affect normal, Judgment normal, Mood normal.     DIAGNOSTIC STUDIES / PROCEDURES    LABS  Results for orders placed or performed during the hospital encounter of 07/31/21   T.PALLIDUM AB EIA   Result Value Ref Range    Syphilis, Treponemal Qual Reactive (A) Non-Reactive   Chlamydia/GC PCR Urine Or Swab    Specimen: Urine, First Catch   Result Value Ref Range    Source Urine    RPR (SYPHILIS)   Result Value Ref Range    Rapid Plasma Reagin -Rpr- Non Reactive Non Reactive       COURSE & MEDICAL DECISION MAKING  Pertinent Labs & Imaging studies reviewed. (See chart for details)  Records obtained and reviewed: Patient was seen in this emergency department 2/2021 for penile cellulitis and chancre on his scrotum concerning for possible syphilis.  This test was obtained and was positive and a GC/chlamydia was also sent and was negative.  He received 2,400,000 units IM of penicillin as well as ceftriaxone and doxycycline for 10 days.  He was to follow-up with public health for HIV testing.    This is a 56 year old male here with concern for syphilis as he has been sexually active and unprotected with male and female partners since his most recent diagnosis of syphilis in February. He notes some white bumps on the penis but denies fevers, penile discharge, ulcerations, or dysuria.    He has normal appearing external male genitalia without obvious ulceration, erythema, chancre, discharge. No white bumps were noted. He wanted to be tested again for syphilis and did not want to wait for test results. He was discharged.    I reviewed the results of his blood work. The treponemal AB test was positive but this has been positive in the past making it difficult to determine if he has an acute infection. Given his sexual activity he is at very high risk  for reinfection so I think it would be prudent for him to receive PCN. His GC/chlamydia test is pending. I called and spoke with the patient and requested that he return to the ER. He has agreed and will come in later today.    The patient will return for worsening symptoms and is stable at the time of discharge. The patient verbalizes understanding and will comply.    FINAL IMPRESSION  1. Syphilis    Electronically signed by: Brandon Santillan M.D., 7/31/2021 5:04 AM

## 2021-08-03 LAB — T PALLIDUM AB SER QL AGGL: REACTIVE

## 2021-08-03 NOTE — ED NOTES
"ED Positive Culture Follow-up/Notification Note:    Date: 8/3/2021     Patient seen in the ED on 7/31/2021 for STI exposure. Of note, patient was previously treated for Syphilis in Feb 2021.  1. STD exposure       Discharge Medication List as of 7/31/2021  5:37 AM          Allergies: Penicillins, Hydrocodone, and Sulfa drugs     Vitals:    07/31/21 0428 07/31/21 0430 07/31/21 0539   BP: 153/112  158/95   Pulse: (!) 119  (!) 110   Resp: 16  17   Temp: 36.7 °C (98.1 °F)  36.4 °C (97.6 °F)   TempSrc: Temporal  Oral   SpO2: 94%  98%   Weight:  113 kg (248 lb 14.4 oz)    Height: 1.88 m (6' 2\") 1.88 m (6' 2\")        Final cultures:   Results     Procedure Component Value Units Date/Time    Chlamydia/GC PCR Urine Or Swab [[id number]] Collected: 07/31/21 0444    Order Status: Completed Specimen: Urine, First Catch Updated: 07/31/21 1901     C. trachomatis by PCR Negative     N. gonorrhoeae by PCR Negative     Source Urine        Results for SIMON MITCHELL (MRN [id number]) as of 8/3/2021 09:52   7/31/2021 05:28   Rapid Plasma Reagin -Rpr- Non Reactive   Syphilis, Treponemal Qual Reactive (A)       Plan:   Reactive treponemal test with non-reactive rapid plasma reagin test likely indicates previously treated Syphilis, further treatment not needed at this time. Attempted to call patient but no answer, LVM. Sent letter to inform patient of findings.    Tita Chapin, PharmD  PGY2 Infectious Diseases Pharmacy Resident    "

## 2021-08-11 ENCOUNTER — HOSPITAL ENCOUNTER (EMERGENCY)
Facility: MEDICAL CENTER | Age: 56
End: 2021-08-11
Payer: MEDICARE

## 2021-08-11 VITALS
HEART RATE: 105 BPM | DIASTOLIC BLOOD PRESSURE: 112 MMHG | HEIGHT: 74 IN | SYSTOLIC BLOOD PRESSURE: 165 MMHG | RESPIRATION RATE: 16 BRPM | TEMPERATURE: 98.2 F | BODY MASS INDEX: 32.51 KG/M2 | OXYGEN SATURATION: 96 % | WEIGHT: 253.31 LBS

## 2021-08-11 PROCEDURE — 302449 STATCHG TRIAGE ONLY (STATISTIC): Mod: EDC

## 2021-08-11 ASSESSMENT — FIBROSIS 4 INDEX: FIB4 SCORE: 5.38

## 2021-08-11 NOTE — ED TRIAGE NOTES
".  Chief Complaint   Patient presents with   • Exposure to STD     pt states he tested positive for syphillis, and needs treated. was just seen at 7/31        BP (!) 165/112   Pulse (!) 105   Temp 36.8 °C (98.2 °F) (Temporal)   Resp 16   Ht 1.88 m (6' 2\")   Wt 115 kg (253 lb 4.9 oz)   SpO2 96%   BMI 32.52 kg/m²     "

## 2021-08-19 ENCOUNTER — HOSPITAL ENCOUNTER (EMERGENCY)
Facility: MEDICAL CENTER | Age: 56
End: 2021-08-19
Attending: EMERGENCY MEDICINE
Payer: MEDICARE

## 2021-08-19 VITALS
SYSTOLIC BLOOD PRESSURE: 150 MMHG | OXYGEN SATURATION: 95 % | RESPIRATION RATE: 20 BRPM | WEIGHT: 240 LBS | BODY MASS INDEX: 30.8 KG/M2 | HEART RATE: 108 BPM | DIASTOLIC BLOOD PRESSURE: 95 MMHG | TEMPERATURE: 96.6 F | HEIGHT: 74 IN

## 2021-08-19 DIAGNOSIS — F10.930 ALCOHOL WITHDRAWAL SYNDROME WITHOUT COMPLICATION (HCC): ICD-10-CM

## 2021-08-19 LAB
ALBUMIN SERPL BCP-MCNC: 4 G/DL (ref 3.2–4.9)
ALBUMIN/GLOB SERPL: 1.2 G/DL
ALP SERPL-CCNC: 87 U/L (ref 30–99)
ALT SERPL-CCNC: 58 U/L (ref 2–50)
ANION GAP SERPL CALC-SCNC: 19 MMOL/L (ref 7–16)
AST SERPL-CCNC: 64 U/L (ref 12–45)
BASOPHILS # BLD AUTO: 0.4 % (ref 0–1.8)
BASOPHILS # BLD: 0.04 K/UL (ref 0–0.12)
BILIRUB SERPL-MCNC: 0.6 MG/DL (ref 0.1–1.5)
BUN SERPL-MCNC: 15 MG/DL (ref 8–22)
CALCIUM SERPL-MCNC: 8.4 MG/DL (ref 8.5–10.5)
CHLORIDE SERPL-SCNC: 95 MMOL/L (ref 96–112)
CO2 SERPL-SCNC: 21 MMOL/L (ref 20–33)
CREAT SERPL-MCNC: 0.88 MG/DL (ref 0.5–1.4)
EOSINOPHIL # BLD AUTO: 0.07 K/UL (ref 0–0.51)
EOSINOPHIL NFR BLD: 0.7 % (ref 0–6.9)
ERYTHROCYTE [DISTWIDTH] IN BLOOD BY AUTOMATED COUNT: 50.6 FL (ref 35.9–50)
ETHANOL BLD-MCNC: <10.1 MG/DL (ref 0–10)
GLOBULIN SER CALC-MCNC: 3.4 G/DL (ref 1.9–3.5)
GLUCOSE SERPL-MCNC: 161 MG/DL (ref 65–99)
HCT VFR BLD AUTO: 47.9 % (ref 42–52)
HGB BLD-MCNC: 16.3 G/DL (ref 14–18)
IMM GRANULOCYTES # BLD AUTO: 0.05 K/UL (ref 0–0.11)
IMM GRANULOCYTES NFR BLD AUTO: 0.5 % (ref 0–0.9)
LIPASE SERPL-CCNC: 69 U/L (ref 11–82)
LYMPHOCYTES # BLD AUTO: 2.38 K/UL (ref 1–4.8)
LYMPHOCYTES NFR BLD: 24.7 % (ref 22–41)
MCH RBC QN AUTO: 33.3 PG (ref 27–33)
MCHC RBC AUTO-ENTMCNC: 34 G/DL (ref 33.7–35.3)
MCV RBC AUTO: 97.8 FL (ref 81.4–97.8)
MONOCYTES # BLD AUTO: 0.73 K/UL (ref 0–0.85)
MONOCYTES NFR BLD AUTO: 7.6 % (ref 0–13.4)
NEUTROPHILS # BLD AUTO: 6.37 K/UL (ref 1.82–7.42)
NEUTROPHILS NFR BLD: 66.1 % (ref 44–72)
NRBC # BLD AUTO: 0 K/UL
NRBC BLD-RTO: 0 /100 WBC
PLATELET # BLD AUTO: 181 K/UL (ref 164–446)
PMV BLD AUTO: 8.9 FL (ref 9–12.9)
POTASSIUM SERPL-SCNC: 3.8 MMOL/L (ref 3.6–5.5)
PROT SERPL-MCNC: 7.4 G/DL (ref 6–8.2)
RBC # BLD AUTO: 4.9 M/UL (ref 4.7–6.1)
SODIUM SERPL-SCNC: 135 MMOL/L (ref 135–145)
WBC # BLD AUTO: 9.6 K/UL (ref 4.8–10.8)

## 2021-08-19 PROCEDURE — 96375 TX/PRO/DX INJ NEW DRUG ADDON: CPT

## 2021-08-19 PROCEDURE — 83690 ASSAY OF LIPASE: CPT

## 2021-08-19 PROCEDURE — 700101 HCHG RX REV CODE 250: Performed by: EMERGENCY MEDICINE

## 2021-08-19 PROCEDURE — 96366 THER/PROPH/DIAG IV INF ADDON: CPT

## 2021-08-19 PROCEDURE — 82077 ASSAY SPEC XCP UR&BREATH IA: CPT

## 2021-08-19 PROCEDURE — 80053 COMPREHEN METABOLIC PANEL: CPT

## 2021-08-19 PROCEDURE — 99284 EMERGENCY DEPT VISIT MOD MDM: CPT

## 2021-08-19 PROCEDURE — 96376 TX/PRO/DX INJ SAME DRUG ADON: CPT

## 2021-08-19 PROCEDURE — 700111 HCHG RX REV CODE 636 W/ 250 OVERRIDE (IP): Performed by: EMERGENCY MEDICINE

## 2021-08-19 PROCEDURE — 85025 COMPLETE CBC W/AUTO DIFF WBC: CPT

## 2021-08-19 PROCEDURE — 96365 THER/PROPH/DIAG IV INF INIT: CPT

## 2021-08-19 RX ORDER — LORAZEPAM 2 MG/ML
1 INJECTION INTRAMUSCULAR ONCE
Status: COMPLETED | OUTPATIENT
Start: 2021-08-19 | End: 2021-08-19

## 2021-08-19 RX ORDER — GABAPENTIN 300 MG/1
CAPSULE ORAL
Qty: 10 CAPSULE | Refills: 0 | Status: SHIPPED | OUTPATIENT
Start: 2021-08-19

## 2021-08-19 RX ORDER — ONDANSETRON 2 MG/ML
4 INJECTION INTRAMUSCULAR; INTRAVENOUS ONCE
Status: DISCONTINUED | OUTPATIENT
Start: 2021-08-19 | End: 2021-08-19 | Stop reason: HOSPADM

## 2021-08-19 RX ADMIN — THIAMINE HYDROCHLORIDE: 100 INJECTION, SOLUTION INTRAMUSCULAR; INTRAVENOUS at 07:48

## 2021-08-19 RX ADMIN — LORAZEPAM 1 MG: 2 INJECTION INTRAMUSCULAR; INTRAVENOUS at 09:57

## 2021-08-19 RX ADMIN — LORAZEPAM 1 MG: 2 INJECTION INTRAMUSCULAR; INTRAVENOUS at 07:45

## 2021-08-19 ASSESSMENT — LIFESTYLE VARIABLES
NAUSEA AND VOMITING: NO NAUSEA AND NO VOMITING
PAROXYSMAL SWEATS: BEADS OF SWEAT OBVIOUS ON FOREHEAD
TOTAL SCORE: 12
HEADACHE, FULLNESS IN HEAD: NOT PRESENT
VISUAL DISTURBANCES: NOT PRESENT
AGITATION: NORMAL ACTIVITY
TREMOR: MODERATE TREMOR WITH ARMS EXTENDED
AUDITORY DISTURBANCES: NOT PRESENT
ORIENTATION AND CLOUDING OF SENSORIUM: ORIENTED AND CAN DO SERIAL ADDITIONS
ANXIETY: MODERATELY ANXIOUS OR GUARDED, SO ANXIETY IS INFERRED

## 2021-08-19 ASSESSMENT — FIBROSIS 4 INDEX: FIB4 SCORE: 5.38

## 2021-08-19 NOTE — ED PROVIDER NOTES
"ED Provider Note    CHIEF COMPLAINT  Chief Complaint   Patient presents with   • ETOH Withdrawal       HPI  Chace Gong is a 56 y.o. male who presents with complaint of alcohol withdrawal.  He states he stopped drinking alcohol yesterday.  Patient states he is a binge drinker, most recent binge started 7 days ago.  No suicidal ideation.  Patient has abdominal cramping, nausea, vomited once yesterday.  No hematemesis.  No diarrhea.  He denies chest pain or difficulty breathing.  No fever, no cough.  Patient denies injury.  No suicidal ideation.  Patient requesting medications to \"help me sleep\".    REVIEW OF SYSTEMS  Constitutional: Shakiness, denies fever  Respiratory: No shortness of breath  Cardiac: No chest pain or syncope  Gastrointestinal: Nausea, cramping  Musculoskeletal: Body aches.  Denies focal back or neck pain  Neurologic: No headache  Psychiatric: No suicidal ideation     All other systems are negative.     PAST MEDICAL HISTORY  Past Medical History:   Diagnosis Date   • Achilles tendinitis 6/24/2009   • Arthralgia 6/24/2009   • Bronchitis 6/24/2009   • Dyslipidemia 6/24/2009   • GERD (gastroesophageal reflux disease) 6/24/2009   • Hepatitis C    • HTN (hypertension) 6/24/2009   • Hypertension    • Low back pain    • Psychiatric disorder     anxiety       FAMILY HISTORY  Family History   Problem Relation Age of Onset   • Cancer Mother         lung / throat / breast   • Hypertension Mother    • Hypertension Father    • Hyperlipidemia Father    • Stroke Father    • Alcohol/Drug Father    • Diabetes Sister    • Hypertension Sister    • Alcohol/Drug Sister    • Cancer Brother    • Hypertension Brother    • Alcohol/Drug Brother    • Stroke Brother    • Alcohol/Drug Brother    • Cancer Sister         lung   • Heart Disease Neg Hx        SOCIAL HISTORY  Social History     Socioeconomic History   • Marital status:      Spouse name: Not on file   • Number of children: Not on file   • Years of " education: Not on file   • Highest education level: Not on file   Occupational History   • Not on file   Tobacco Use   • Smoking status: Current Some Day Smoker     Packs/day: 0.25     Years: 30.00     Pack years: 7.50     Types: Cigarettes     Start date: 2015     Last attempt to quit: 2015     Years since quittin.7   • Smokeless tobacco: Never Used   • Tobacco comment: initiated cessation 12 Aug 2015, occasional cigar   Substance and Sexual Activity   • Alcohol use: Yes     Alcohol/week: 0.0 oz     Comment: vodka /beer   • Drug use: Yes     Types: Inhaled, Marijuana     Comment: has med marijuana card; smokes 1 joint every morning, speed   • Sexual activity: Yes     Partners: Male   Other Topics Concern   • Not on file   Social History Narrative   • Not on file     Social Determinants of Health     Financial Resource Strain:    • Difficulty of Paying Living Expenses:    Food Insecurity:    • Worried About Running Out of Food in the Last Year:    • Ran Out of Food in the Last Year:    Transportation Needs:    • Lack of Transportation (Medical):    • Lack of Transportation (Non-Medical):    Physical Activity:    • Days of Exercise per Week:    • Minutes of Exercise per Session:    Stress:    • Feeling of Stress :    Social Connections:    • Frequency of Communication with Friends and Family:    • Frequency of Social Gatherings with Friends and Family:    • Attends [employer] Services:    • Active Member of Clubs or Organizations:    • Attends Club or Organization Meetings:    • Marital Status:    Intimate Partner Violence:    • Fear of Current or Ex-Partner:    • Emotionally Abused:    • Physically Abused:    • Sexually Abused:        SURGICAL HISTORY  Past Surgical History:   Procedure Laterality Date   • ELBOW ARTHROTOMY  2013    Performed by Sylvester Michael M.D. at [facility]   • LOOSE BODY REMOVAL  2013    Performed by Sylvester Michael M.D. at [facility]  "  • ARTHROSCOPY, KNEE  2013    right    • ARTHROTOMY  2011    right shoulder   • NEUROLYSIS  1/21/2009    Performed by JONATHAN MAURICE at [facility]   • CARPAL TUNNEL RELEASE  1/21/2009    Performed by JONATHAN MAURICE at [facility]   • HARDWARE REMOVAL ORTHO  1/21/2009    Performed by JONATHAN MAURICE at [facility]   • SYNOVECTOMY  1/21/2009    Performed by JONATHAN MAURICE at [facility]   • OTHER ORTHOPEDIC SURGERY  2001    [facility] wrist surg       CURRENT MEDICATIONS  Home Medications    **Home medications have not yet been reviewed for this encounter**         ALLERGIES  Allergies   Allergen Reactions   • Penicillins Unspecified     Pt states that his mom told him he is allergic to penicillins.   • Hydrocodone Unspecified     Pt states \"I'm not sure\".   • Sulfa Drugs      \"my mom told me I was allergic to it when i was a kid\"       PHYSICAL EXAM  VITAL SIGNS: /98   Pulse (!) 102   Temp 35.8 °C (96.5 °F) (Temporal)   Resp 20   Ht 1.88 m (6' 2\")   Wt 109 kg (240 lb)   SpO2 97%   BMI 30.81 kg/m²   Constitutional: Mild distress.  Well-nourished  ENT: Nares clear, mucous membranes moist.  Eyes:  Conjunctiva normal, No discharge.    Lymphatic: No adenopathy.   Cardiovascular: Tachycardic heart rate, Normal rhythm.   Pulmonary: No wheezing, no rales  Gastrointestinal: Soft, nontender, no guarding  Skin: Warm, Dry, No jaundice.   Musculoskeletal:  No CVA tenderness.   Neurologic:  Normal motor and sensory function  Psychiatric: Anxious.    RADIOLOGY/PROCEDURES/Labs  Results for orders placed or performed during the hospital encounter of 08/19/21   CBC WITH DIFFERENTIAL   Result Value Ref Range    WBC 9.6 4.8 - 10.8 K/uL    RBC 4.90 4.70 - 6.10 M/uL    Hemoglobin 16.3 14.0 - 18.0 g/dL    Hematocrit 47.9 42.0 - 52.0 %    MCV 97.8 81.4 - 97.8 fL    MCH 33.3 (H) 27.0 - 33.0 pg    MCHC 34.0 33.7 - 35.3 g/dL    RDW 50.6 (H) 35.9 - 50.0 fL    Platelet " Count 181 164 - 446 K/uL    MPV 8.9 (L) 9.0 - 12.9 fL    Neutrophils-Polys 66.10 44.00 - 72.00 %    Lymphocytes 24.70 22.00 - 41.00 %    Monocytes 7.60 0.00 - 13.40 %    Eosinophils 0.70 0.00 - 6.90 %    Basophils 0.40 0.00 - 1.80 %    Immature Granulocytes 0.50 0.00 - 0.90 %    Nucleated RBC 0.00 /100 WBC    Neutrophils (Absolute) 6.37 1.82 - 7.42 K/uL    Lymphs (Absolute) 2.38 1.00 - 4.80 K/uL    Monos (Absolute) 0.73 0.00 - 0.85 K/uL    Eos (Absolute) 0.07 0.00 - 0.51 K/uL    Baso (Absolute) 0.04 0.00 - 0.12 K/uL    Immature Granulocytes (abs) 0.05 0.00 - 0.11 K/uL    NRBC (Absolute) 0.00 K/uL   COMP METABOLIC PANEL   Result Value Ref Range    Sodium 135 135 - 145 mmol/L    Potassium 3.8 3.6 - 5.5 mmol/L    Chloride 95 (L) 96 - 112 mmol/L    Co2 21 20 - 33 mmol/L    Anion Gap 19.0 (H) 7.0 - 16.0    Glucose 161 (H) 65 - 99 mg/dL    Bun 15 8 - 22 mg/dL    Creatinine 0.88 0.50 - 1.40 mg/dL    Calcium 8.4 (L) 8.5 - 10.5 mg/dL    AST(SGOT) 64 (H) 12 - 45 U/L    ALT(SGPT) 58 (H) 2 - 50 U/L    Alkaline Phosphatase 87 30 - 99 U/L    Total Bilirubin 0.6 0.1 - 1.5 mg/dL    Albumin 4.0 3.2 - 4.9 g/dL    Total Protein 7.4 6.0 - 8.2 g/dL    Globulin 3.4 1.9 - 3.5 g/dL    A-G Ratio 1.2 g/dL   LIPASE   Result Value Ref Range    Lipase 69 11 - 82 U/L   DIAGNOSTIC ALCOHOL   Result Value Ref Range    Diagnostic Alcohol <10.1 0.0 - 10.0 mg/dL   ESTIMATED GFR   Result Value Ref Range    GFR If African American >60 >60 mL/min/1.73 m 2    GFR If Non African American >60 >60 mL/min/1.73 m 2         COURSE & MEDICAL DECISION MAKING  Pertinent Labs & Imaging studies reviewed. (See chart for details)  Patient treated with IV Ativan, IV detoxication fluid with improvement.  Heart rate currently 102, he appears less tremulous.  Patient denies suicidal ideation, no hallucination, no evidence of delirium tremens at this time.  Patient states he would like to discontinue drinking, plan for  consultation when discharging the  patient to detoxification treatment center.  No evidence of pancreatitis, his alcohol level is currently 0.  Mild transaminitis likely secondary to alcohol abuse.  Patient shows good renal function, normal pulse oximetry, good electrolyte balance.    FINAL IMPRESSION  1. Alcohol withdrawal syndrome without complication (HCC)       Addendum: Patient has refused referral or transfer to detox center, will go home.  He has been provided prescription for 4-day course of gabapentin taper to treat alcohol withdrawal if he chooses to quit      Electronically signed by: Chace Holcomb M.D., 8/19/2021 7:43 AM

## 2021-08-19 NOTE — ED TRIAGE NOTES
"Chace Gabriel Hameedni  56 y.o. male  Chief Complaint   Patient presents with   • ETOH Withdrawal     Patient reports \"I'm detoxing from alcohol\". Patient unable to recall when was his last drink. Patient anxious in triage, patient sweaty, GCS 15.    Vitals:    08/19/21 0700   BP: 148/98   Pulse: (!) 102   Resp: 20   Temp: 35.8 °C (96.5 °F)   SpO2: 97%        "

## 2021-09-20 NOTE — ED NOTES
ALERT at bedside  
Boxed lunch given    
Friend Jduy Nagy [phone]  
Pt appears to be sleeping. Sitter at door.  
Pt placed in hospital gown. Belongings collected and given to security for inspection. Security confiscated 1 lighter.  
Pt placed on legal hold by MD  
Sitter now available and sitting outside pts room for 1:1 continuous observation due to elopement risk  
English

## 2021-10-31 ENCOUNTER — APPOINTMENT (OUTPATIENT)
Dept: RADIOLOGY | Facility: MEDICAL CENTER | Age: 56
End: 2021-10-31
Attending: EMERGENCY MEDICINE
Payer: MEDICARE

## 2021-10-31 ENCOUNTER — HOSPITAL ENCOUNTER (EMERGENCY)
Facility: MEDICAL CENTER | Age: 56
End: 2021-10-31
Attending: EMERGENCY MEDICINE
Payer: MEDICARE

## 2021-10-31 VITALS
WEIGHT: 240 LBS | RESPIRATION RATE: 20 BRPM | TEMPERATURE: 98.5 F | SYSTOLIC BLOOD PRESSURE: 135 MMHG | BODY MASS INDEX: 30.8 KG/M2 | DIASTOLIC BLOOD PRESSURE: 85 MMHG | HEART RATE: 99 BPM | OXYGEN SATURATION: 93 % | HEIGHT: 74 IN
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
## 2021-10-31 LAB
ABO + RH BLD: NORMAL
ABO GROUP BLD: NORMAL
ALBUMIN SERPL BCP-MCNC: 4.8 G/DL (ref 3.2–4.9)
ALBUMIN/GLOB SERPL: 1.3 G/DL
ALP SERPL-CCNC: 85 U/L (ref 30–99)
ALT SERPL-CCNC: 36 U/L (ref 2–50)
ANION GAP SERPL CALC-SCNC: 12 MMOL/L (ref 7–16)
APTT PPP: 27.2 SEC (ref 24.7–36)
AST SERPL-CCNC: 38 U/L (ref 12–45)
BILIRUB SERPL-MCNC: 0.6 MG/DL (ref 0.1–1.5)
BLD GP AB SCN SERPL QL: NORMAL
BUN SERPL-MCNC: 17 MG/DL (ref 8–22)
CALCIUM SERPL-MCNC: 7.9 MG/DL (ref 8.5–10.5)
CHLORIDE SERPL-SCNC: 101 MMOL/L (ref 96–112)
CO2 SERPL-SCNC: 25 MMOL/L (ref 20–33)
CREAT SERPL-MCNC: 1.16 MG/DL (ref 0.5–1.4)
ERYTHROCYTE [DISTWIDTH] IN BLOOD BY AUTOMATED COUNT: 48.2 FL (ref 35.9–50)
ETHANOL BLD-MCNC: <10.1 MG/DL (ref 0–10)
GLOBULIN SER CALC-MCNC: 3.6 G/DL (ref 1.9–3.5)
GLUCOSE SERPL-MCNC: 99 MG/DL (ref 65–99)
HCT VFR BLD AUTO: 51.9 % (ref 42–52)
HGB BLD-MCNC: 17.3 G/DL (ref 14–18)
INR PPP: 1.07 (ref 0.87–1.13)
MCH RBC QN AUTO: 32.9 PG (ref 27–33)
MCHC RBC AUTO-ENTMCNC: 33.3 G/DL (ref 33.7–35.3)
MCV RBC AUTO: 98.7 FL (ref 81.4–97.8)
PLATELET # BLD AUTO: 212 K/UL (ref 164–446)
PMV BLD AUTO: 9.2 FL (ref 9–12.9)
POTASSIUM SERPL-SCNC: 3.8 MMOL/L (ref 3.6–5.5)
PROT SERPL-MCNC: 8.4 G/DL (ref 6–8.2)
PROTHROMBIN TIME: 13.6 SEC (ref 12–14.6)
RBC # BLD AUTO: 5.26 M/UL (ref 4.7–6.1)
RH BLD: NORMAL
SODIUM SERPL-SCNC: 138 MMOL/L (ref 135–145)
WBC # BLD AUTO: 8.8 K/UL (ref 4.8–10.8)

## 2021-10-31 PROCEDURE — 303747 HCHG EXTRA SUTURE

## 2021-10-31 PROCEDURE — 99284 EMERGENCY DEPT VISIT MOD MDM: CPT

## 2021-10-31 PROCEDURE — 10120 INC&RMVL FB SUBQ TISS SMPL: CPT

## 2021-10-31 PROCEDURE — 85730 THROMBOPLASTIN TIME PARTIAL: CPT

## 2021-10-31 PROCEDURE — 70486 CT MAXILLOFACIAL W/O DYE: CPT

## 2021-10-31 PROCEDURE — 85610 PROTHROMBIN TIME: CPT

## 2021-10-31 PROCEDURE — 72131 CT LUMBAR SPINE W/O DYE: CPT

## 2021-10-31 PROCEDURE — 76705 ECHO EXAM OF ABDOMEN: CPT

## 2021-10-31 PROCEDURE — 86901 BLOOD TYPING SEROLOGIC RH(D): CPT

## 2021-10-31 PROCEDURE — 304217 HCHG IRRIGATION SYSTEM

## 2021-10-31 PROCEDURE — 71045 X-RAY EXAM CHEST 1 VIEW: CPT

## 2021-10-31 PROCEDURE — 85027 COMPLETE CBC AUTOMATED: CPT

## 2021-10-31 PROCEDURE — 700117 HCHG RX CONTRAST REV CODE 255: Performed by: EMERGENCY MEDICINE

## 2021-10-31 PROCEDURE — 82077 ASSAY SPEC XCP UR&BREATH IA: CPT

## 2021-10-31 PROCEDURE — 307744 HCHG RED TRAUMA TEAM SERVICES

## 2021-10-31 PROCEDURE — 304999 HCHG REPAIR-SIMPLE/INTERMED LEVEL 1

## 2021-10-31 PROCEDURE — 71260 CT THORAX DX C+: CPT | Mod: MG

## 2021-10-31 PROCEDURE — 86900 BLOOD TYPING SEROLOGIC ABO: CPT

## 2021-10-31 PROCEDURE — 70450 CT HEAD/BRAIN W/O DYE: CPT

## 2021-10-31 PROCEDURE — 700101 HCHG RX REV CODE 250: Performed by: EMERGENCY MEDICINE

## 2021-10-31 PROCEDURE — 86850 RBC ANTIBODY SCREEN: CPT

## 2021-10-31 PROCEDURE — 72125 CT NECK SPINE W/O DYE: CPT | Mod: MG

## 2021-10-31 PROCEDURE — 80053 COMPREHEN METABOLIC PANEL: CPT

## 2021-10-31 PROCEDURE — 72128 CT CHEST SPINE W/O DYE: CPT | Mod: MG

## 2021-10-31 RX ORDER — LIDOCAINE HYDROCHLORIDE AND EPINEPHRINE BITARTRATE 20; .01 MG/ML; MG/ML
10 INJECTION, SOLUTION SUBCUTANEOUS ONCE
Status: COMPLETED | OUTPATIENT
Start: 2021-10-31 | End: 2021-10-31

## 2021-10-31 RX ORDER — CEPHALEXIN 500 MG/1
500 CAPSULE ORAL 4 TIMES DAILY
Qty: 28 CAPSULE | Refills: 0 | Status: SHIPPED | OUTPATIENT
Start: 2021-10-31 | End: 2021-11-07

## 2021-10-31 RX ADMIN — LIDOCAINE HYDROCHLORIDE AND EPINEPHRINE 10 ML: 20; 10 INJECTION, SOLUTION INFILTRATION; PERINEURAL at 14:36

## 2021-10-31 RX ADMIN — IOHEXOL 100 ML: 350 INJECTION, SOLUTION INTRAVENOUS at 12:31

## 2021-10-31 ASSESSMENT — LIFESTYLE VARIABLES: DO YOU DRINK ALCOHOL: NO

## 2021-10-31 ASSESSMENT — FIBROSIS 4 INDEX: FIB4 SCORE: 2.18
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
## 2021-10-31 NOTE — DISCHARGE INSTRUCTIONS
Keep wounds moist with antibiotic ointment.  The wound on the forehead that I sewed--the stitches will fall out on their own.
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
## 2022-04-26 NOTE — ED NOTES
Alerted ERP of pt's continues diaphoresis and tachycardia, as well as his refusal to go to [facility] with [facility] Support.  Decision to medicate with 1 mg lorazepam and then discharge with gabapentin as d/c meds.      Pt ambulated to  with steady gait, agreeable to plan of care.  
All lines and monitors DC'd.  Discharge instructions given, questions answered.  Ambulated out of ER, escorted by RN.  Pt states all belongings in possession.  Instructed not to drive after pain medication and pt verbalizes understanding.  RX x1 given.     
MD at bedside.   
MD at bedside.   
Rounded on pt who is sleeping.  Chest rise noted.  VSS.  Detox bag is finishing.      
Buffy, TIFF

## 2022-07-20 ENCOUNTER — HOSPITAL ENCOUNTER (EMERGENCY)
Facility: MEDICAL CENTER | Age: 57
End: 2022-07-20
Attending: EMERGENCY MEDICINE
Payer: MEDICARE

## 2022-07-20 ENCOUNTER — APPOINTMENT (OUTPATIENT)
Dept: RADIOLOGY | Facility: MEDICAL CENTER | Age: 57
End: 2022-07-20
Attending: EMERGENCY MEDICINE
Payer: MEDICARE

## 2022-07-20 VITALS
OXYGEN SATURATION: 94 % | TEMPERATURE: 97 F | RESPIRATION RATE: 18 BRPM | WEIGHT: 280 LBS | HEART RATE: 85 BPM | SYSTOLIC BLOOD PRESSURE: 133 MMHG | BODY MASS INDEX: 35.94 KG/M2 | DIASTOLIC BLOOD PRESSURE: 82 MMHG | HEIGHT: 74 IN

## 2022-07-20 DIAGNOSIS — T14.8XXA ABRASION: ICD-10-CM

## 2022-07-20 DIAGNOSIS — S93.402A SPRAIN OF LEFT ANKLE, UNSPECIFIED LIGAMENT, INITIAL ENCOUNTER: ICD-10-CM

## 2022-07-20 PROCEDURE — 73610 X-RAY EXAM OF ANKLE: CPT | Mod: LT

## 2022-07-20 PROCEDURE — 96375 TX/PRO/DX INJ NEW DRUG ADDON: CPT

## 2022-07-20 PROCEDURE — 302874 HCHG BANDAGE ACE 2 OR 3""

## 2022-07-20 PROCEDURE — 700111 HCHG RX REV CODE 636 W/ 250 OVERRIDE (IP): Performed by: EMERGENCY MEDICINE

## 2022-07-20 PROCEDURE — 99285 EMERGENCY DEPT VISIT HI MDM: CPT

## 2022-07-20 PROCEDURE — 96374 THER/PROPH/DIAG INJ IV PUSH: CPT

## 2022-07-20 PROCEDURE — 73590 X-RAY EXAM OF LOWER LEG: CPT | Mod: LT

## 2022-07-20 PROCEDURE — 700111 HCHG RX REV CODE 636 W/ 250 OVERRIDE (IP)

## 2022-07-20 RX ORDER — ONDANSETRON 2 MG/ML
4 INJECTION INTRAMUSCULAR; INTRAVENOUS ONCE
Status: COMPLETED | OUTPATIENT
Start: 2022-07-20 | End: 2022-07-20

## 2022-07-20 RX ORDER — MORPHINE SULFATE 4 MG/ML
4 INJECTION INTRAVENOUS ONCE
Status: COMPLETED | OUTPATIENT
Start: 2022-07-20 | End: 2022-07-20

## 2022-07-20 RX ADMIN — MORPHINE SULFATE 4 MG: 4 INJECTION INTRAVENOUS at 07:03

## 2022-07-20 RX ADMIN — ONDANSETRON 4 MG: 2 INJECTION, SOLUTION INTRAMUSCULAR; INTRAVENOUS at 07:03

## 2022-07-20 RX ADMIN — FENTANYL CITRATE 100 MCG: 50 INJECTION, SOLUTION INTRAMUSCULAR; INTRAVENOUS at 06:50

## 2022-07-20 ASSESSMENT — FIBROSIS 4 INDEX: FIB4 SCORE: 1.67

## 2022-07-20 NOTE — ED NOTES
Report given to Colby CONLEY. Safety measures in place. Call light within reach. Care relinquished.

## 2022-07-20 NOTE — ED NOTES
Pt provided with discharge instructions. Pt had no further questions. Pt ambulated to [facility]

## 2022-07-20 NOTE — ED TRIAGE NOTES
Chief Complaint   Patient presents with   • Fall Less than 10 Feet     Fall 6 ft from ladder   • Ankle Pain     10/10, splinted by EMS     BIB [facility] from home for above. Pt reports he fell off his ladder this morning. - head strike. + ankle pain with splint in place (L). CMS intact. Hx CHF, HTN, COPD. PIV placed en route, 100 mcg fentanyl given en route. Pt placed in gown, labs drawn, chart up for ERP.

## 2022-07-20 NOTE — ED PROVIDER NOTES
ED Provider Note        Primary care provider: Mohsen Tamasaby, M.D.    I verified that the patient was wearing a mask and I was wearing appropriate PPE every time I entered the room. The patient's mask was on the patient at all times during my encounter except for a brief view of the oropharynx.      CHIEF COMPLAINT  Chief Complaint   Patient presents with   • Fall Less than 10 Feet     Fall 6 ft from ladder   • Ankle Pain     10/10, splinted by EMS       HPI  Chace Gong is a 57 y.o. male who presents to the Emergency Department with chief complaint of ankle pain.  Patient reports 10 out of 10 pain in his left ankle after falling off a ladder.  Misstepped on a rung and fell approximately 6 feet onto the ground.  Denies hitting his head no loss conscious no chest abdominal pain no pain in upper extremities or right lower extremity has been otherwise well recently.  The pain in his left ankle is a 10 out of 10 worse with any movement or manipulation no alleviating factors.  Up-to-date on tetanus.    REVIEW OF SYSTEMS  10 systems reviewed and otherwise negative, pertinent positives and negatives listed in the history of present illness.    PAST MEDICAL HISTORY   has a past medical history of Achilles tendinitis (6/24/2009), Arthralgia (6/24/2009), Bronchitis (6/24/2009), Dyslipidemia (6/24/2009), GERD (gastroesophageal reflux disease) (6/24/2009), Hepatitis C, HTN (hypertension) (6/24/2009), Hypertension, Low back pain, and Psychiatric disorder.    SURGICAL HISTORY   has a past surgical history that includes neurolysis (1/21/2009); other orthopedic surgery (2001); carpal tunnel release (1/21/2009); hardware removal ortho (1/21/2009); synovectomy (1/21/2009); arthrotomy (2011); arthroscopy, knee (2013); elbow arthrotomy (5/17/2013); and loose body removal (5/17/2013).    SOCIAL HISTORY  Social History     Tobacco Use   • Smoking status: Current Some Day Smoker     Packs/day: 0.50     Years: 30.00     Pack  "years: 15.00     Types: Cigarettes     Start date: 2015     Last attempt to quit: 2015     Years since quittin.6   • Smokeless tobacco: Never Used   • Tobacco comment: initiated cessation 12 Aug 2015, occasional cigar   Vaping Use   • Vaping Use: Never used   Substance Use Topics   • Alcohol use: Yes     Alcohol/week: 0.0 oz     Comment: vodka /beer   • Drug use: Yes     Types: Inhaled, Marijuana     Comment: has med marijuana card; smokes 1 joint every morning, speed      Social History     Substance and Sexual Activity   Drug Use Yes   • Types: Inhaled, Marijuana    Comment: has med marijuana card; smokes 1 joint every morning, speed       FAMILY HISTORY  Non-Contributory    CURRENT MEDICATIONS  Home Medications    **Home medications have not yet been reviewed for this encounter**         ALLERGIES  Allergies   Allergen Reactions   • Penicillins Unspecified     Pt states that his mom told him he is allergic to penicillins.   • Hydrocodone Unspecified     Pt states \"I'm not sure\".   • Sulfa Drugs      \"my mom told me I was allergic to it when i was a kid\"          PHYSICAL EXAM  VITAL SIGNS: BP (!) 142/102   Pulse 90   Temp (!) 35.6 °C (96.1 °F) (Temporal)   Resp 20   Ht 1.88 m (6' 2\")   Wt (!) 127 kg (280 lb)   SpO2 95%   BMI 35.95 kg/m²   Pulse ox interpretation: I interpret this pulse ox as normal.  Constitutional: Alert and oriented x 3, minimal distress  HEENT: Atraumatic normocephalic, pupils are equal round, extraocular movements are intact. The nares is clear, external ears are normal, mouth shows moist mucous membranes  Neck: no obvious JVD or tracheal deviation  Cardiovascular: Regular rate and rhythm no murmur rub or gallop   Thorax & Lungs: No respiratory distress, no wheezes rales or rhonchi, No chest tenderness.   GI: Soft nontender nondistended positive bowel sounds, no peritoneal signs  Skin: Moderate abrasion over the left lateral malleolus and to the left lateral " shin  Musculoskeletal: Bilateral upper extremities show some chronic previous orthopedic surgery but no acute deformity moving with full range and strength of right lower extremity is unremarkable left lower extremity is in air splint by EMS.  This is taken down there is no tenderness about the hip or knee normal range strength hip and knee.  Limited dorsiflexion plantarflexion due to pain abrasion over the lateral malleolus and minor edema over the lateral mall normal cap refill and sensation in all toes.  No tenderness over the Achilles negative Nagy test  Neurologic: Cranial nerves III through XII are grossly intact, no sensory deficit, no cerebellar dysfunction   Psychiatric: Appropriate affect for situation at this time        DIAGNOSTIC STUDIES / PROCEDURES  LABS      Results for orders placed or performed during the hospital encounter of 10/31/21   DIAGNOSTIC ALCOHOL   Result Value Ref Range    Diagnostic Alcohol <10.1 0.0 - 10.0 mg/dL   CBC WITHOUT DIFFERENTIAL   Result Value Ref Range    WBC 8.8 4.8 - 10.8 K/uL    RBC 5.26 4.70 - 6.10 M/uL    Hemoglobin 17.3 14.0 - 18.0 g/dL    Hematocrit 51.9 42.0 - 52.0 %    MCV 98.7 (H) 81.4 - 97.8 fL    MCH 32.9 27.0 - 33.0 pg    MCHC 33.3 (L) 33.7 - 35.3 g/dL    RDW 48.2 35.9 - 50.0 fL    Platelet Count 212 164 - 446 K/uL    MPV 9.2 9.0 - 12.9 fL   Comp Metabolic Panel   Result Value Ref Range    Sodium 138 135 - 145 mmol/L    Potassium 3.8 3.6 - 5.5 mmol/L    Chloride 101 96 - 112 mmol/L    Co2 25 20 - 33 mmol/L    Anion Gap 12.0 7.0 - 16.0    Glucose 99 65 - 99 mg/dL    Bun 17 8 - 22 mg/dL    Creatinine 1.16 0.50 - 1.40 mg/dL    Calcium 7.9 (L) 8.5 - 10.5 mg/dL    AST(SGOT) 38 12 - 45 U/L    ALT(SGPT) 36 2 - 50 U/L    Alkaline Phosphatase 85 30 - 99 U/L    Total Bilirubin 0.6 0.1 - 1.5 mg/dL    Albumin 4.8 3.2 - 4.9 g/dL    Total Protein 8.4 (H) 6.0 - 8.2 g/dL    Globulin 3.6 (H) 1.9 - 3.5 g/dL    A-G Ratio 1.3 g/dL   COD - Adult (Type and Screen)   Result Value  "Ref Range    ABO Grouping Only O     Rh Grouping Only NEG     Antibody Screen-Cod NEG    Prothrombin Time   Result Value Ref Range    PT 13.6 12.0 - 14.6 sec    INR 1.07 0.87 - 1.13   APTT   Result Value Ref Range    APTT 27.2 24.7 - 36.0 sec   ABO Rh Confirm   Result Value Ref Range    ABO Rh Confirm O NEG    ESTIMATED GFR   Result Value Ref Range    GFR If African American >60 >60 mL/min/1.73 m 2    GFR If Non African American >60 >60 mL/min/1.73 m 2       All labs reviewed by me.      RADIOLOGY  No orders to display         COURSE & MEDICAL DECISION MAKING  Pertinent Labs & Imaging studies reviewed. (See chart for details)    6:46 AM - Patient seen and examined at bedside.       Patient noted to have slightly elevated blood pressure likely circumstantial secondary to presenting complaint. Referred to primary care physician for further evaluation.      Medical Decision Making: Tetanus up-to-date abrasions over the left lateral malleolus were bandaged appropriately all x-rays are unremarkable.  Patient is given instructions to weight-bear as tolerated he is placed in ankle air splint follow-up with orthopedics in 1 week if having ongoing symptoms return here for worsening pain numbness tingling weakness any other acute symptom change or concerns otherwise discharged in stable and improved condition.    BP (!) 142/102   Pulse 90   Temp (!) 35.6 °C (96.1 °F) (Temporal)   Resp 20   Ht 1.88 m (6' 2\")   Wt (!) 127 kg (280 lb)   SpO2 95%   BMI 35.95 kg/m²     Carlos Bhatia M.D.  [address] [facility] [id number] [phone]    Schedule an appointment as soon as possible for a visit       [facility], Emergency Dept  [address] [facility] [address] [phone]    in 12-24 hours if symptoms persist, immediately If symptoms worsen, or if you develop any other symptoms or concerns      Discharge Medication List as of 7/20/2022  7:52 AM          FINAL IMPRESSION  1. Sprain of " left ankle, unspecified ligament, initial encounter Active   2. Abrasion Active         This dictation has been created using voice recognition software and/or scribes. The accuracy of the dictation is limited by the abilities of the software and the expertise of the scribes. I expect there may be some errors of grammar and possibly content. I made every attempt to manually correct the errors within my dictation. However, errors related to voice recognition software and/or scribes may still exist and should be interpreted within the appropriate context.

## 2023-02-10 ENCOUNTER — HOSPITAL ENCOUNTER (INPATIENT)
Facility: MEDICAL CENTER | Age: 58
LOS: 6 days | DRG: 189 | End: 2023-02-16
Attending: EMERGENCY MEDICINE | Admitting: HOSPITALIST
Payer: MEDICARE

## 2023-02-10 ENCOUNTER — APPOINTMENT (OUTPATIENT)
Dept: RADIOLOGY | Facility: MEDICAL CENTER | Age: 58
DRG: 189 | End: 2023-02-10
Attending: HOSPITALIST
Payer: MEDICARE

## 2023-02-10 ENCOUNTER — APPOINTMENT (OUTPATIENT)
Dept: RADIOLOGY | Facility: MEDICAL CENTER | Age: 58
DRG: 189 | End: 2023-02-10
Attending: EMERGENCY MEDICINE
Payer: MEDICARE

## 2023-02-10 DIAGNOSIS — R39.198 DIFFICULTY URINATING: ICD-10-CM

## 2023-02-10 DIAGNOSIS — J96.01 ACUTE RESPIRATORY FAILURE WITH HYPOXIA (HCC): ICD-10-CM

## 2023-02-10 DIAGNOSIS — I16.0 HYPERTENSIVE URGENCY: ICD-10-CM

## 2023-02-10 DIAGNOSIS — F10.921 ALCOHOL INTOXICATION WITH DELIRIUM (HCC): ICD-10-CM

## 2023-02-10 PROBLEM — F10.929 ALCOHOL INTOXICATION (HCC): Status: ACTIVE | Noted: 2023-02-10

## 2023-02-10 PROBLEM — R79.89 ELEVATED LIVER FUNCTION TESTS: Status: ACTIVE | Noted: 2023-02-10

## 2023-02-10 PROBLEM — F17.200 SMOKER: Status: ACTIVE | Noted: 2023-02-10

## 2023-02-10 PROBLEM — F10.939 ALCOHOL WITHDRAWAL (HCC): Status: ACTIVE | Noted: 2023-02-10

## 2023-02-10 PROBLEM — E87.6 HYPOKALEMIA: Status: ACTIVE | Noted: 2023-02-10

## 2023-02-10 PROBLEM — N40.0 BPH (BENIGN PROSTATIC HYPERPLASIA): Status: ACTIVE | Noted: 2023-02-10

## 2023-02-10 PROBLEM — J96.90 RESPIRATORY FAILURE (HCC): Status: ACTIVE | Noted: 2023-02-10

## 2023-02-10 PROBLEM — R93.89 ABNORMAL CXR (CHEST X-RAY): Status: ACTIVE | Noted: 2023-02-10

## 2023-02-10 LAB
ALBUMIN SERPL BCP-MCNC: 3.8 G/DL (ref 3.2–4.9)
ALBUMIN/GLOB SERPL: 1.1 G/DL
ALP SERPL-CCNC: 79 U/L (ref 30–99)
ALT SERPL-CCNC: 53 U/L (ref 2–50)
ANION GAP SERPL CALC-SCNC: 12 MMOL/L (ref 7–16)
AST SERPL-CCNC: 92 U/L (ref 12–45)
BASOPHILS # BLD AUTO: 0.2 % (ref 0–1.8)
BASOPHILS # BLD: 0.02 K/UL (ref 0–0.12)
BILIRUB SERPL-MCNC: 0.5 MG/DL (ref 0.1–1.5)
BUN SERPL-MCNC: 9 MG/DL (ref 8–22)
CALCIUM ALBUM COR SERPL-MCNC: 7.7 MG/DL (ref 8.5–10.5)
CALCIUM SERPL-MCNC: 7.5 MG/DL (ref 8.5–10.5)
CHLORIDE SERPL-SCNC: 98 MMOL/L (ref 96–112)
CK SERPL-CCNC: 1069 U/L (ref 0–154)
CK SERPL-CCNC: 774 U/L (ref 0–154)
CO2 SERPL-SCNC: 29 MMOL/L (ref 20–33)
CREAT SERPL-MCNC: 0.64 MG/DL (ref 0.5–1.4)
D DIMER PPP IA.FEU-MCNC: 2.59 UG/ML (FEU) (ref 0–0.5)
EOSINOPHIL # BLD AUTO: 0.03 K/UL (ref 0–0.51)
EOSINOPHIL NFR BLD: 0.4 % (ref 0–6.9)
ERYTHROCYTE [DISTWIDTH] IN BLOOD BY AUTOMATED COUNT: 50 FL (ref 35.9–50)
ETHANOL BLD-MCNC: 453.4 MG/DL
FLUAV RNA SPEC QL NAA+PROBE: NEGATIVE
FLUBV RNA SPEC QL NAA+PROBE: NEGATIVE
GFR SERPLBLD CREATININE-BSD FMLA CKD-EPI: 110 ML/MIN/1.73 M 2
GLOBULIN SER CALC-MCNC: 3.4 G/DL (ref 1.9–3.5)
GLUCOSE SERPL-MCNC: 104 MG/DL (ref 65–99)
HCT VFR BLD AUTO: 43.5 % (ref 42–52)
HGB BLD-MCNC: 15.4 G/DL (ref 14–18)
IMM GRANULOCYTES # BLD AUTO: 0.05 K/UL (ref 0–0.11)
IMM GRANULOCYTES NFR BLD AUTO: 0.6 % (ref 0–0.9)
LACTATE SERPL-SCNC: 2.3 MMOL/L (ref 0.5–2)
LACTATE SERPL-SCNC: 2.8 MMOL/L (ref 0.5–2)
LACTATE SERPL-SCNC: 2.8 MMOL/L (ref 0.5–2)
LIPASE SERPL-CCNC: 40 U/L (ref 11–82)
LYMPHOCYTES # BLD AUTO: 2.16 K/UL (ref 1–4.8)
LYMPHOCYTES NFR BLD: 26.2 % (ref 22–41)
MAGNESIUM SERPL-MCNC: 1.8 MG/DL (ref 1.5–2.5)
MCH RBC QN AUTO: 33.7 PG (ref 27–33)
MCHC RBC AUTO-ENTMCNC: 35.4 G/DL (ref 33.7–35.3)
MCV RBC AUTO: 95.2 FL (ref 81.4–97.8)
MONOCYTES # BLD AUTO: 0.54 K/UL (ref 0–0.85)
MONOCYTES NFR BLD AUTO: 6.6 % (ref 0–13.4)
NEUTROPHILS # BLD AUTO: 5.43 K/UL (ref 1.82–7.42)
NEUTROPHILS NFR BLD: 66 % (ref 44–72)
NRBC # BLD AUTO: 0 K/UL
NRBC BLD-RTO: 0 /100 WBC
NT-PROBNP SERPL IA-MCNC: 132 PG/ML (ref 0–125)
PLATELET # BLD AUTO: 53 K/UL (ref 164–446)
PMV BLD AUTO: 9.4 FL (ref 9–12.9)
POTASSIUM SERPL-SCNC: 3.5 MMOL/L (ref 3.6–5.5)
PROT SERPL-MCNC: 7.2 G/DL (ref 6–8.2)
RBC # BLD AUTO: 4.57 M/UL (ref 4.7–6.1)
RSV RNA SPEC QL NAA+PROBE: NEGATIVE
SARS-COV-2 RNA RESP QL NAA+PROBE: NOTDETECTED
SODIUM SERPL-SCNC: 139 MMOL/L (ref 135–145)
SPECIMEN SOURCE: NORMAL
TROPONIN T SERPL-MCNC: 33 NG/L (ref 6–19)
TROPONIN T SERPL-MCNC: 37 NG/L (ref 6–19)
WBC # BLD AUTO: 8.2 K/UL (ref 4.8–10.8)

## 2023-02-10 PROCEDURE — 87040 BLOOD CULTURE FOR BACTERIA: CPT | Mod: 91

## 2023-02-10 PROCEDURE — 93005 ELECTROCARDIOGRAM TRACING: CPT | Performed by: HOSPITALIST

## 2023-02-10 PROCEDURE — 99285 EMERGENCY DEPT VISIT HI MDM: CPT

## 2023-02-10 PROCEDURE — 82550 ASSAY OF CK (CPK): CPT

## 2023-02-10 PROCEDURE — 82077 ASSAY SPEC XCP UR&BREATH IA: CPT

## 2023-02-10 PROCEDURE — 83690 ASSAY OF LIPASE: CPT

## 2023-02-10 PROCEDURE — 0241U HCHG SARS-COV-2 COVID-19 NFCT DS RESP RNA 4 TRGT MIC: CPT

## 2023-02-10 PROCEDURE — A9270 NON-COVERED ITEM OR SERVICE: HCPCS | Performed by: HOSPITALIST

## 2023-02-10 PROCEDURE — 80053 COMPREHEN METABOLIC PANEL: CPT

## 2023-02-10 PROCEDURE — 87150 DNA/RNA AMPLIFIED PROBE: CPT

## 2023-02-10 PROCEDURE — 83880 ASSAY OF NATRIURETIC PEPTIDE: CPT

## 2023-02-10 PROCEDURE — 85379 FIBRIN DEGRADATION QUANT: CPT

## 2023-02-10 PROCEDURE — 83735 ASSAY OF MAGNESIUM: CPT

## 2023-02-10 PROCEDURE — 99223 1ST HOSP IP/OBS HIGH 75: CPT | Mod: AI,25 | Performed by: HOSPITALIST

## 2023-02-10 PROCEDURE — 700111 HCHG RX REV CODE 636 W/ 250 OVERRIDE (IP): Performed by: HOSPITALIST

## 2023-02-10 PROCEDURE — 83605 ASSAY OF LACTIC ACID: CPT | Mod: 91

## 2023-02-10 PROCEDURE — 770020 HCHG ROOM/CARE - TELE (206)

## 2023-02-10 PROCEDURE — 71045 X-RAY EXAM CHEST 1 VIEW: CPT

## 2023-02-10 PROCEDURE — HZ2ZZZZ DETOXIFICATION SERVICES FOR SUBSTANCE ABUSE TREATMENT: ICD-10-PCS | Performed by: HOSPITALIST

## 2023-02-10 PROCEDURE — C9803 HOPD COVID-19 SPEC COLLECT: HCPCS | Performed by: EMERGENCY MEDICINE

## 2023-02-10 PROCEDURE — 85025 COMPLETE CBC W/AUTO DIFF WBC: CPT

## 2023-02-10 PROCEDURE — 700105 HCHG RX REV CODE 258: Performed by: HOSPITALIST

## 2023-02-10 PROCEDURE — 84484 ASSAY OF TROPONIN QUANT: CPT

## 2023-02-10 PROCEDURE — 36415 COLL VENOUS BLD VENIPUNCTURE: CPT

## 2023-02-10 PROCEDURE — 700102 HCHG RX REV CODE 250 W/ 637 OVERRIDE(OP): Performed by: HOSPITALIST

## 2023-02-10 PROCEDURE — 99406 BEHAV CHNG SMOKING 3-10 MIN: CPT

## 2023-02-10 PROCEDURE — 99406 BEHAV CHNG SMOKING 3-10 MIN: CPT | Performed by: HOSPITALIST

## 2023-02-10 RX ORDER — LORAZEPAM 1 MG/1
0.5 TABLET ORAL EVERY 4 HOURS PRN
Status: DISCONTINUED | OUTPATIENT
Start: 2023-02-10 | End: 2023-02-12

## 2023-02-10 RX ORDER — FOLIC ACID 1 MG/1
1 TABLET ORAL DAILY
Status: DISPENSED | OUTPATIENT
Start: 2023-02-11 | End: 2023-02-15

## 2023-02-10 RX ORDER — LORAZEPAM 2 MG/ML
0.5 INJECTION INTRAMUSCULAR EVERY 4 HOURS PRN
Status: DISCONTINUED | OUTPATIENT
Start: 2023-02-10 | End: 2023-02-12

## 2023-02-10 RX ORDER — SODIUM CHLORIDE 9 MG/ML
INJECTION, SOLUTION INTRAVENOUS CONTINUOUS
Status: DISCONTINUED | OUTPATIENT
Start: 2023-02-10 | End: 2023-02-15

## 2023-02-10 RX ORDER — LORAZEPAM 2 MG/1
4 TABLET ORAL
Status: DISCONTINUED | OUTPATIENT
Start: 2023-02-10 | End: 2023-02-12

## 2023-02-10 RX ORDER — HYDRALAZINE HYDROCHLORIDE 20 MG/ML
10 INJECTION INTRAMUSCULAR; INTRAVENOUS EVERY 4 HOURS PRN
Status: DISCONTINUED | OUTPATIENT
Start: 2023-02-10 | End: 2023-02-16 | Stop reason: HOSPADM

## 2023-02-10 RX ORDER — LORAZEPAM 2 MG/1
2 TABLET ORAL
Status: DISCONTINUED | OUTPATIENT
Start: 2023-02-10 | End: 2023-02-12

## 2023-02-10 RX ORDER — POTASSIUM CHLORIDE 20 MEQ/1
20 TABLET, EXTENDED RELEASE ORAL ONCE
Status: COMPLETED | OUTPATIENT
Start: 2023-02-10 | End: 2023-02-10

## 2023-02-10 RX ORDER — GAUZE BANDAGE 2" X 2"
100 BANDAGE TOPICAL DAILY
Status: DISCONTINUED | OUTPATIENT
Start: 2023-02-11 | End: 2023-02-12

## 2023-02-10 RX ORDER — LORAZEPAM 2 MG/ML
1 INJECTION INTRAMUSCULAR
Status: DISCONTINUED | OUTPATIENT
Start: 2023-02-10 | End: 2023-02-12

## 2023-02-10 RX ORDER — LORAZEPAM 1 MG/1
1 TABLET ORAL EVERY 4 HOURS PRN
Status: DISCONTINUED | OUTPATIENT
Start: 2023-02-10 | End: 2023-02-12

## 2023-02-10 RX ORDER — LORAZEPAM 2 MG/ML
1.5 INJECTION INTRAMUSCULAR
Status: DISCONTINUED | OUTPATIENT
Start: 2023-02-10 | End: 2023-02-12

## 2023-02-10 RX ORDER — LORAZEPAM 2 MG/ML
2 INJECTION INTRAMUSCULAR
Status: DISCONTINUED | OUTPATIENT
Start: 2023-02-10 | End: 2023-02-12

## 2023-02-10 RX ADMIN — POTASSIUM CHLORIDE 20 MEQ: 1500 TABLET, EXTENDED RELEASE ORAL at 20:47

## 2023-02-10 RX ADMIN — SODIUM CHLORIDE: 9 INJECTION, SOLUTION INTRAVENOUS at 20:50

## 2023-02-10 RX ADMIN — LORAZEPAM 2 MG: 2 INJECTION INTRAMUSCULAR; INTRAVENOUS at 20:42

## 2023-02-10 ASSESSMENT — ENCOUNTER SYMPTOMS
BRUISES/BLEEDS EASILY: 0
INSOMNIA: 1
BACK PAIN: 0
COUGH: 0
HEMOPTYSIS: 0
PALPITATIONS: 0
NERVOUS/ANXIOUS: 1
DEPRESSION: 0
DOUBLE VISION: 0
SINUS PAIN: 0
DIZZINESS: 0
PND: 0
BLURRED VISION: 0
TREMORS: 1
CHILLS: 0
MYALGIAS: 0
HEARTBURN: 0
CLAUDICATION: 0
WHEEZING: 0
VOMITING: 0
NAUSEA: 0
HEADACHES: 0
FEVER: 0

## 2023-02-10 ASSESSMENT — LIFESTYLE VARIABLES
TOTAL SCORE: CONTINUOUS HALLUCINATIONS
PAROXYSMAL SWEATS: DRENCHING SWEATS
AGITATION: *
TREMOR: SEVERE TREMOR, EVEN WITH ARMS NOT EXTENDED
AUDITORY DISTURBANCES: CONTINUOUS HALLUCINATIONS
ANXIETY: *
NAUSEA AND VOMITING: CONSTANT NAUSEA, FREQUENT DRY HEAVES AND VOMITING
SUBSTANCE_ABUSE: 0
AUDITORY DISTURBANCES: CONTINUOUS HALLUCINATIONS
HEADACHE, FULLNESS IN HEAD: MODERATELY SEVERE
AUDITORY DISTURBANCES: CONTINUOUS HALLUCINATIONS
VISUAL DISTURBANCES: CONTINUOUS HALLUCINATIONS
AGITATION: *
NAUSEA AND VOMITING: CONSTANT NAUSEA, FREQUENT DRY HEAVES AND VOMITING
TOTAL SCORE: 61
HEADACHE, FULLNESS IN HEAD: MODERATELY SEVERE
ORIENTATION AND CLOUDING OF SENSORIUM: DATE DISORIENTATION BY MORE THAN TWO CALENDAR DAYS
ORIENTATION AND CLOUDING OF SENSORIUM: DATE DISORIENTATION BY MORE THAN TWO CALENDAR DAYS
AGITATION: *
VISUAL DISTURBANCES: CONTINUOUS HALLUCINATIONS
ORIENTATION AND CLOUDING OF SENSORIUM: DATE DISORIENTATION BY MORE THAN TWO CALENDAR DAYS
VISUAL DISTURBANCES: CONTINUOUS HALLUCINATIONS
PAROXYSMAL SWEATS: DRENCHING SWEATS
TOTAL SCORE: CONTINUOUS HALLUCINATIONS
ANXIETY: *
TOTAL SCORE: 61
HEADACHE, FULLNESS IN HEAD: MODERATELY SEVERE
PAROXYSMAL SWEATS: DRENCHING SWEATS
TREMOR: SEVERE TREMOR, EVEN WITH ARMS NOT EXTENDED
ANXIETY: *
TOTAL SCORE: 61
TOTAL SCORE: CONTINUOUS HALLUCINATIONS
TREMOR: SEVERE TREMOR, EVEN WITH ARMS NOT EXTENDED
NAUSEA AND VOMITING: CONSTANT NAUSEA, FREQUENT DRY HEAVES AND VOMITING

## 2023-02-10 NOTE — ED NOTES
ED tech Pedro bladder scanned patient. 434 mL on scan.    Bedside report given to YAEL Morales. Patient care transferred.

## 2023-02-10 NOTE — ED PROVIDER NOTES
"ED Provider Note    CHIEF COMPLAINT  Chief Complaint   Patient presents with    Alcohol Intoxication     BIB EMS from home in [place]. Per EMS \"patient has been lying in bed and people have been bringing him drugs and alcohol.\" Reports drinking 5 oz vodka daily x 5 days. AAO x 3, GCS 14. Denies SI.     Difficulty Urinating     Recent prostate cancer diagnosis. States \"I have to push on my taint to pee.\" EMS reports patient was incontinent of urine in ambulance.       EXTERNAL RECORDS REVIEWED  none    HPI/ROS  LIMITATION TO HISTORY   Patient is intoxicated  OUTSIDE HISTORIAN(S):  jr Gong is a 58 y.o. male who presents to the emergency department he is acutely intoxicated.  He has been drinking heavily for the last several days.  He is difficult to get a history from.  Other than drinking heavily states he is having a hard time urinating.  He states he has depression as testicles are below his testicles to urinate.   Is really difficult to get much additional history from him.  He is so intoxicated he cannot answer many questions he has slurred speech and quickly drifts off or trails off.  He is incoherent from alcohol.    PAST MEDICAL HISTORY   Unable to obtain reliable past history because of his level of intoxication.    SURGICAL HISTORY  patient denies any surgical history    FAMILY HISTORY  No family history on file.    SOCIAL HISTORY  Social History     Tobacco Use    Smoking status: Not on file    Smokeless tobacco: Not on file   Substance and Sexual Activity    Alcohol use: Not on file    Drug use: Not on file    Sexual activity: Not on file       CURRENT MEDICATIONS  Home Medications       Reviewed by Adilson Munguia R.N. (Registered Nurse) on 02/10/23 at 1127  Med List Status: Partial     Medication Last Dose Status        Patient Oscar Taking any Medications                           ALLERGIES  Allergies   Allergen Reactions    Sulfa Drugs Unspecified     unknown       PHYSICAL EXAM  VITAL " "SIGNS: /82   Pulse 97   Temp 36.7 °C (98.1 °F) (Temporal)   Resp 18   Ht 1.88 m (6' 2\")   Wt 99.8 kg (220 lb)   SpO2 96%   BMI 28.25 kg/m²    Constitutional: Somnolent but arousable.  Slurred speech does not answer questions coherently.  HENT: Normocephalic, Atraumatic, Bilateral external ears normal,  Eyes: PERRL, EOMI, Conjunctiva normal, No discharge.   Neck: Normal range of motion,  Cardiovascular: Normal heart rate, Normal rhythm, No murmurs, No rubs, No gallops.   Thorax & Lungs: Normal breath sounds, No respiratory distress, No wheezing,  Abdomen: Bowel sounds normal, Soft, No tenderness,  Skin: Warm, Dry, No erythema, No rash.   Back: No tenderness, No CVA tenderness.   Genitalia: Normal  exam no signs of infection no perineal swelling.  No scrotal swelling or erythema.  Musculoskeletal: Good range of motion in all major joints.  Neurologic: Slurred speech appears intoxicated unable to answer questions or follow commands.    Results for orders placed or performed during the hospital encounter of 02/10/23   LACTIC ACID   Result Value Ref Range    Lactic Acid 2.8 (H) 0.5 - 2.0 mmol/L   LACTIC ACID   Result Value Ref Range    Lactic Acid 2.3 (H) 0.5 - 2.0 mmol/L   LACTIC ACID   Result Value Ref Range    Lactic Acid 2.8 (H) 0.5 - 2.0 mmol/L   CBC WITH DIFFERENTIAL   Result Value Ref Range    WBC 8.2 4.8 - 10.8 K/uL    RBC 4.57 (L) 4.70 - 6.10 M/uL    Hemoglobin 15.4 14.0 - 18.0 g/dL    Hematocrit 43.5 42.0 - 52.0 %    MCV 95.2 81.4 - 97.8 fL    MCH 33.7 (H) 27.0 - 33.0 pg    MCHC 35.4 (H) 33.7 - 35.3 g/dL    RDW 50.0 35.9 - 50.0 fL    Platelet Count 53 (L) 164 - 446 K/uL    MPV 9.4 9.0 - 12.9 fL    Neutrophils-Polys 66.00 44.00 - 72.00 %    Lymphocytes 26.20 22.00 - 41.00 %    Monocytes 6.60 0.00 - 13.40 %    Eosinophils 0.40 0.00 - 6.90 %    Basophils 0.20 0.00 - 1.80 %    Immature Granulocytes 0.60 0.00 - 0.90 %    Nucleated RBC 0.00 /100 WBC    Neutrophils (Absolute) 5.43 1.82 - 7.42 K/uL    " Lymphs (Absolute) 2.16 1.00 - 4.80 K/uL    Monos (Absolute) 0.54 0.00 - 0.85 K/uL    Eos (Absolute) 0.03 0.00 - 0.51 K/uL    Baso (Absolute) 0.02 0.00 - 0.12 K/uL    Immature Granulocytes (abs) 0.05 0.00 - 0.11 K/uL    NRBC (Absolute) 0.00 K/uL   COMP METABOLIC PANEL   Result Value Ref Range    Sodium 139 135 - 145 mmol/L    Potassium 3.5 (L) 3.6 - 5.5 mmol/L    Chloride 98 96 - 112 mmol/L    Co2 29 20 - 33 mmol/L    Anion Gap 12.0 7.0 - 16.0    Glucose 104 (H) 65 - 99 mg/dL    Bun 9 8 - 22 mg/dL    Creatinine 0.64 0.50 - 1.40 mg/dL    Calcium 7.5 (L) 8.5 - 10.5 mg/dL    AST(SGOT) 92 (H) 12 - 45 U/L    ALT(SGPT) 53 (H) 2 - 50 U/L    Alkaline Phosphatase 79 30 - 99 U/L    Total Bilirubin 0.5 0.1 - 1.5 mg/dL    Albumin 3.8 3.2 - 4.9 g/dL    Total Protein 7.2 6.0 - 8.2 g/dL    Globulin 3.4 1.9 - 3.5 g/dL    A-G Ratio 1.1 g/dL   LIPASE   Result Value Ref Range    Lipase 40 11 - 82 U/L   TROPONIN   Result Value Ref Range    Troponin T 37 (H) 6 - 19 ng/L   proBrain Natriuretic Peptide, NT   Result Value Ref Range    NT-proBNP 132 (H) 0 - 125 pg/mL   DIAGNOSTIC ALCOHOL   Result Value Ref Range    Diagnostic Alcohol 453.4 (HH) <10.1 mg/dL   CoV-2, FLU A/B, and RSV by PCR (2-4 Hours CEPHEID) : Collect NP swab in VTM    Specimen: Respirate   Result Value Ref Range    Influenza virus A RNA Negative Negative    Influenza virus B, PCR Negative Negative    RSV, PCR Negative Negative    SARS-CoV-2 by PCR NotDetected     SARS-CoV-2 Source NP Swab    CREATINE KINASE   Result Value Ref Range    CPK Total 774 (H) 0 - 154 U/L   CORRECTED CALCIUM   Result Value Ref Range    Correct Calcium 7.7 (L) 8.5 - 10.5 mg/dL   ESTIMATED GFR   Result Value Ref Range    GFR (CKD-EPI) 110 >60 mL/min/1.73 m 2        RADIOLOGY/PROCEDURES  DX-CHEST-PORTABLE (1 VIEW)   Final Result      1.  Questionable nonspecific right hilar/perihilar prominence of uncertain etiology or significance.   2.  No definite consolidation or pleural effusion.             Bladder scan initially was around 400.  We will observe him and recheck this.      It was rechecked hours later it was 197.  He has a diaper in place.  Likely chronically incontinent.          RADIOLOGY  I have independently interpreted the diagnostic imaging associated with this visit and am waiting the final reading from the radiologist.   My preliminary interpretation is a follows: Chest x-ray shows no pneumothorax or obvious pneumonia      COURSE & MEDICAL DECISION MAKING    ED Observation Status? Yes; I am placing the patient in to an observation status due to a diagnostic uncertainty as well as therapeutic intensity. Patient placed in observation status at 7:41 PM, 2/10/2023.     Observation plan is as follows: Patient admitted to etiology he metabolizes alcohol and if we determined he has a diagnosis for that require hospitalization or if he can metabolize alcohol and go home.    Upon Reevaluation, the patient's condition has: not improved; and will be escalated to hospitalization.    Patient discharged from ED Observation status at  1700(Time) 2/10 (Date).     INITIAL ASSESSMENT, COURSE AND PLAN  Care Narrative: Patient presents with acute altered mental status and intoxication and difficulty urinating.  Initially it is extremely hard to get a history from him.    A broad differential diagnosis was considered including but not limited to urinary retention, penile or scrotal or perineal infection, BPH, medications or other medical causes causing urinary retention.    The patient is extremely intoxicated level of 450.  He is essentially incoherent and were unable to get any history from him.  He was worked up with labs.  He is known to be hypoxemic on room air and x-rays obtained.  X-ray does not show an obvious infiltrate I do not see any obvious signs of infection.  Bladder scan is obtained however I feel since he only has 450 cc of urine if we put a catheter in him he will just pull it out and  injure himself because of his level of intoxication.  He has a diaper in place we will just watch this for a little while collecting urine when he is a little bit more sober.  Ultimately we did rescan him later to see if had a large amount of urine he was down to 197.  I think he is just laying in bed urinating himself.    There is no signs of sepsis.  I do not see an urgency to catheterize him start him on antibiotics.  His mental status is continued to improve he has no signs of head trauma I do not think he has a intracranial abnormality.  Is not febrile or toxic.      He remains hypoxemic on room air.  He does have a mildly elevated troponin.  Again I think we need to wait for his alcohol level to metabolize to continue his work-up however his hypoxemia is enough to hospitalize him at this time because he is acutely hypoxemic and his acute respiratory failure.  Ultimately discussed case with the hospitalist the patient hospitalized for further work-up and treatment and care transferred at that time.        ADDITIONAL PROBLEM LIST  Alcohol abuse  DISPOSITION AND DISCUSSIONS  I have discussed management of the patient with the following physicians and SANNA's: [facility] Dr[facility] Mcfadden's        Escalation of care considered, and ultimately not performed: Consider antibiotics but I do not see a source of infection.    Barriers to care at this time, including but not limited to: Alcohol intoxication alcohol abuse    FINAL DIAGNOSIS  1. Alcohol intoxication with delirium (HCC)    2. Acute respiratory failure with hypoxia (HCC)    3. Difficulty urinating           Electronically signed by: [place] M.D., 2/10/2023 12:12 PM       I have reviewed and confirmed nurses' notes...

## 2023-02-10 NOTE — ED TRIAGE NOTES
"Riccardo Donoho  58 y.o. male  Chief Complaint   Patient presents with    Alcohol Intoxication     BIB EMS from home in [place]. Per EMS \"patient has been lying in bed and people have been bringing him drugs and alcohol.\" Reports drinking 5 oz vodka daily x 5 days. AAO x 3, GCS 14. Denies SI.     Difficulty Urinating     Recent prostate cancer diagnosis. States \"I have to push on my taint to pee.\" EMS reports patient was incontinent of urine in ambulance.     Pt BIB EMS for above complaint.     Patient placed on 6 L NC for RA sat 80%, 91% on 6 L. Not on home O2.     Pt is GCS 14, speaking in full sentences, follows commands and responds appropriately to questions. Resp are even and unlabored.     Pt placed in green 31.    This RN masked and in appropriate PPE during encounter.     Vitals:    02/10/23 1124   BP: 122/83   Pulse: (!) 104   Resp: 18   Temp: 36.7 °C (98.1 °F)   SpO2: (!) 80%      "

## 2023-02-11 ENCOUNTER — APPOINTMENT (OUTPATIENT)
Dept: RADIOLOGY | Facility: MEDICAL CENTER | Age: 58
DRG: 189 | End: 2023-02-11
Attending: HOSPITALIST
Payer: MEDICARE

## 2023-02-11 PROBLEM — D69.6 THROMBOCYTOPENIA (HCC): Status: ACTIVE | Noted: 2023-02-11

## 2023-02-11 PROBLEM — R78.81 BACTEREMIA: Status: ACTIVE | Noted: 2023-02-11

## 2023-02-11 PROBLEM — M62.82 RHABDOMYOLYSIS: Status: ACTIVE | Noted: 2023-02-11

## 2023-02-11 PROBLEM — E87.6 HYPOKALEMIA: Status: RESOLVED | Noted: 2023-02-10 | Resolved: 2023-02-11

## 2023-02-11 PROBLEM — E87.20 LACTIC ACIDOSIS: Status: ACTIVE | Noted: 2023-02-11

## 2023-02-11 LAB
ALBUMIN SERPL BCP-MCNC: 3.6 G/DL (ref 3.2–4.9)
ALBUMIN SERPL BCP-MCNC: 3.7 G/DL (ref 3.2–4.9)
ALBUMIN/GLOB SERPL: 1.2 G/DL
ALBUMIN/GLOB SERPL: 1.2 G/DL
ALP SERPL-CCNC: 72 U/L (ref 30–99)
ALP SERPL-CCNC: 77 U/L (ref 30–99)
ALT SERPL-CCNC: 58 U/L (ref 2–50)
ALT SERPL-CCNC: 62 U/L (ref 2–50)
AMMONIA PLAS-SCNC: 28 UMOL/L (ref 11–45)
ANION GAP SERPL CALC-SCNC: 13 MMOL/L (ref 7–16)
ANION GAP SERPL CALC-SCNC: 14 MMOL/L (ref 7–16)
APPEARANCE UR: CLEAR
AST SERPL-CCNC: 104 U/L (ref 12–45)
AST SERPL-CCNC: 136 U/L (ref 12–45)
BACTERIA #/AREA URNS HPF: NEGATIVE /HPF
BILIRUB SERPL-MCNC: 0.7 MG/DL (ref 0.1–1.5)
BILIRUB SERPL-MCNC: 0.9 MG/DL (ref 0.1–1.5)
BILIRUB UR QL STRIP.AUTO: NEGATIVE
BUN SERPL-MCNC: 10 MG/DL (ref 8–22)
BUN SERPL-MCNC: 10 MG/DL (ref 8–22)
CALCIUM ALBUM COR SERPL-MCNC: 8.1 MG/DL (ref 8.5–10.5)
CALCIUM ALBUM COR SERPL-MCNC: 8.7 MG/DL (ref 8.5–10.5)
CALCIUM SERPL-MCNC: 7.9 MG/DL (ref 8.5–10.5)
CALCIUM SERPL-MCNC: 8.4 MG/DL (ref 8.5–10.5)
CHLORIDE SERPL-SCNC: 92 MMOL/L (ref 96–112)
CHLORIDE SERPL-SCNC: 94 MMOL/L (ref 96–112)
CHOLEST SERPL-MCNC: 172 MG/DL (ref 100–199)
CK SERPL-CCNC: 1196 U/L (ref 0–154)
CK SERPL-CCNC: 1380 U/L (ref 0–154)
CK SERPL-CCNC: 1466 U/L (ref 0–154)
CK SERPL-CCNC: 1506 U/L (ref 0–154)
CK SERPL-CCNC: 1608 U/L (ref 0–154)
CO2 SERPL-SCNC: 24 MMOL/L (ref 20–33)
CO2 SERPL-SCNC: 27 MMOL/L (ref 20–33)
COLOR UR: YELLOW
CREAT SERPL-MCNC: 0.43 MG/DL (ref 0.5–1.4)
CREAT SERPL-MCNC: 0.51 MG/DL (ref 0.5–1.4)
EKG IMPRESSION: NORMAL
EPI CELLS #/AREA URNS HPF: NEGATIVE /HPF
ERYTHROCYTE [DISTWIDTH] IN BLOOD BY AUTOMATED COUNT: 48.6 FL (ref 35.9–50)
GFR SERPLBLD CREATININE-BSD FMLA CKD-EPI: 118 ML/MIN/1.73 M 2
GFR SERPLBLD CREATININE-BSD FMLA CKD-EPI: 124 ML/MIN/1.73 M 2
GGT SERPL-CCNC: 139 U/L (ref 7–51)
GLOBULIN SER CALC-MCNC: 3.1 G/DL (ref 1.9–3.5)
GLOBULIN SER CALC-MCNC: 3.2 G/DL (ref 1.9–3.5)
GLUCOSE SERPL-MCNC: 117 MG/DL (ref 65–99)
GLUCOSE SERPL-MCNC: 84 MG/DL (ref 65–99)
GLUCOSE UR STRIP.AUTO-MCNC: NEGATIVE MG/DL
HCT VFR BLD AUTO: 39.2 % (ref 42–52)
HDLC SERPL-MCNC: 101 MG/DL
HGB BLD-MCNC: 13.9 G/DL (ref 14–18)
HYALINE CASTS #/AREA URNS LPF: ABNORMAL /LPF
INR PPP: 1.08 (ref 0.87–1.13)
KETONES UR STRIP.AUTO-MCNC: NEGATIVE MG/DL
LACTATE SERPL-SCNC: 2 MMOL/L (ref 0.5–2)
LACTATE SERPL-SCNC: 2.3 MMOL/L (ref 0.5–2)
LDH SERPL L TO P-CCNC: 435 U/L (ref 107–266)
LDLC SERPL CALC-MCNC: 64 MG/DL
LEUKOCYTE ESTERASE UR QL STRIP.AUTO: NEGATIVE
MCH RBC QN AUTO: 33.3 PG (ref 27–33)
MCHC RBC AUTO-ENTMCNC: 35.5 G/DL (ref 33.7–35.3)
MCV RBC AUTO: 94 FL (ref 81.4–97.8)
MICRO URNS: ABNORMAL
NITRITE UR QL STRIP.AUTO: NEGATIVE
PH UR STRIP.AUTO: 8 [PH] (ref 5–8)
PHOSPHATE SERPL-MCNC: 2.1 MG/DL (ref 2.5–4.5)
PLATELET # BLD AUTO: 32 K/UL (ref 164–446)
PMV BLD AUTO: 9.7 FL (ref 9–12.9)
POTASSIUM SERPL-SCNC: 3.6 MMOL/L (ref 3.6–5.5)
POTASSIUM SERPL-SCNC: 3.7 MMOL/L (ref 3.6–5.5)
PROT SERPL-MCNC: 6.7 G/DL (ref 6–8.2)
PROT SERPL-MCNC: 6.9 G/DL (ref 6–8.2)
PROT UR QL STRIP: NEGATIVE MG/DL
PROTHROMBIN TIME: 13.8 SEC (ref 12–14.6)
RBC # BLD AUTO: 4.17 M/UL (ref 4.7–6.1)
RBC # URNS HPF: ABNORMAL /HPF
RBC UR QL AUTO: ABNORMAL
SODIUM SERPL-SCNC: 130 MMOL/L (ref 135–145)
SODIUM SERPL-SCNC: 134 MMOL/L (ref 135–145)
SP GR UR STRIP.AUTO: 1.02
TRIGL SERPL-MCNC: 36 MG/DL (ref 0–149)
TROPONIN T SERPL-MCNC: 33 NG/L (ref 6–19)
UROBILINOGEN UR STRIP.AUTO-MCNC: 2 MG/DL
WBC # BLD AUTO: 5.3 K/UL (ref 4.8–10.8)
WBC #/AREA URNS HPF: ABNORMAL /HPF

## 2023-02-11 PROCEDURE — 82085 ASSAY OF ALDOLASE: CPT

## 2023-02-11 PROCEDURE — 700117 HCHG RX CONTRAST REV CODE 255: Performed by: HOSPITALIST

## 2023-02-11 PROCEDURE — 770001 HCHG ROOM/CARE - MED/SURG/GYN PRIV*

## 2023-02-11 PROCEDURE — 80053 COMPREHEN METABOLIC PANEL: CPT

## 2023-02-11 PROCEDURE — 84484 ASSAY OF TROPONIN QUANT: CPT

## 2023-02-11 PROCEDURE — 82140 ASSAY OF AMMONIA: CPT

## 2023-02-11 PROCEDURE — 93010 ELECTROCARDIOGRAM REPORT: CPT | Performed by: INTERNAL MEDICINE

## 2023-02-11 PROCEDURE — 700101 HCHG RX REV CODE 250: Performed by: HOSPITALIST

## 2023-02-11 PROCEDURE — 700102 HCHG RX REV CODE 250 W/ 637 OVERRIDE(OP): Performed by: HOSPITALIST

## 2023-02-11 PROCEDURE — 700111 HCHG RX REV CODE 636 W/ 250 OVERRIDE (IP)

## 2023-02-11 PROCEDURE — A9270 NON-COVERED ITEM OR SERVICE: HCPCS | Performed by: HOSPITALIST

## 2023-02-11 PROCEDURE — 71275 CT ANGIOGRAPHY CHEST: CPT

## 2023-02-11 PROCEDURE — 80061 LIPID PANEL: CPT

## 2023-02-11 PROCEDURE — 83615 LACTATE (LD) (LDH) ENZYME: CPT

## 2023-02-11 PROCEDURE — 700101 HCHG RX REV CODE 250

## 2023-02-11 PROCEDURE — 99233 SBSQ HOSP IP/OBS HIGH 50: CPT | Mod: GC | Performed by: HOSPITALIST

## 2023-02-11 PROCEDURE — A9270 NON-COVERED ITEM OR SERVICE: HCPCS

## 2023-02-11 PROCEDURE — 83605 ASSAY OF LACTIC ACID: CPT | Mod: 91

## 2023-02-11 PROCEDURE — 700102 HCHG RX REV CODE 250 W/ 637 OVERRIDE(OP)

## 2023-02-11 PROCEDURE — 36415 COLL VENOUS BLD VENIPUNCTURE: CPT

## 2023-02-11 PROCEDURE — 82977 ASSAY OF GGT: CPT

## 2023-02-11 PROCEDURE — 81001 URINALYSIS AUTO W/SCOPE: CPT

## 2023-02-11 PROCEDURE — 700105 HCHG RX REV CODE 258

## 2023-02-11 PROCEDURE — 87086 URINE CULTURE/COLONY COUNT: CPT

## 2023-02-11 PROCEDURE — 82550 ASSAY OF CK (CPK): CPT

## 2023-02-11 PROCEDURE — 84100 ASSAY OF PHOSPHORUS: CPT

## 2023-02-11 PROCEDURE — 85610 PROTHROMBIN TIME: CPT

## 2023-02-11 PROCEDURE — 700111 HCHG RX REV CODE 636 W/ 250 OVERRIDE (IP): Performed by: HOSPITALIST

## 2023-02-11 PROCEDURE — 85027 COMPLETE CBC AUTOMATED: CPT

## 2023-02-11 RX ORDER — CHLORDIAZEPOXIDE HYDROCHLORIDE 25 MG/1
25 CAPSULE, GELATIN COATED ORAL EVERY 8 HOURS
Status: DISCONTINUED | OUTPATIENT
Start: 2023-02-11 | End: 2023-02-11

## 2023-02-11 RX ORDER — POTASSIUM CHLORIDE 20 MEQ/1
20 TABLET, EXTENDED RELEASE ORAL ONCE
Status: COMPLETED | OUTPATIENT
Start: 2023-02-11 | End: 2023-02-11

## 2023-02-11 RX ORDER — LISINOPRIL 20 MG/1
20 TABLET ORAL
Status: DISCONTINUED | OUTPATIENT
Start: 2023-02-11 | End: 2023-02-16 | Stop reason: HOSPADM

## 2023-02-11 RX ORDER — CHLORDIAZEPOXIDE HYDROCHLORIDE 25 MG/1
50 CAPSULE, GELATIN COATED ORAL EVERY 6 HOURS
Status: DISCONTINUED | OUTPATIENT
Start: 2023-02-11 | End: 2023-02-12

## 2023-02-11 RX ORDER — CHLORDIAZEPOXIDE HYDROCHLORIDE 25 MG/1
25 CAPSULE, GELATIN COATED ORAL ONCE
Status: COMPLETED | OUTPATIENT
Start: 2023-02-11 | End: 2023-02-11

## 2023-02-11 RX ADMIN — CHLORDIAZEPOXIDE HYDROCHLORIDE 25 MG: 25 CAPSULE ORAL at 11:42

## 2023-02-11 RX ADMIN — LORAZEPAM 4 MG: 2 TABLET ORAL at 01:49

## 2023-02-11 RX ADMIN — LORAZEPAM 2 MG: 2 INJECTION INTRAMUSCULAR; INTRAVENOUS at 09:17

## 2023-02-11 RX ADMIN — LORAZEPAM 3 MG: 1 TABLET ORAL at 19:46

## 2023-02-11 RX ADMIN — THIAMINE HCL TAB 100 MG 100 MG: 100 TAB at 06:24

## 2023-02-11 RX ADMIN — DEXTROSE MONOHYDRATE 15 MMOL: 5 INJECTION, SOLUTION INTRAVENOUS at 09:36

## 2023-02-11 RX ADMIN — THERA TABS 1 TABLET: TAB at 06:24

## 2023-02-11 RX ADMIN — LORAZEPAM 2 MG: 2 INJECTION INTRAMUSCULAR; INTRAVENOUS at 11:42

## 2023-02-11 RX ADMIN — LISINOPRIL 20 MG: 20 TABLET ORAL at 17:02

## 2023-02-11 RX ADMIN — IOHEXOL 65 ML: 350 INJECTION, SOLUTION INTRAVENOUS at 05:30

## 2023-02-11 RX ADMIN — LORAZEPAM 1.5 MG: 2 INJECTION INTRAMUSCULAR; INTRAVENOUS at 18:42

## 2023-02-11 RX ADMIN — CEFTRIAXONE SODIUM 2000 MG: 10 INJECTION, POWDER, FOR SOLUTION INTRAVENOUS at 06:25

## 2023-02-11 RX ADMIN — LORAZEPAM 4 MG: 2 TABLET ORAL at 23:52

## 2023-02-11 RX ADMIN — LORAZEPAM 2 MG: 2 INJECTION INTRAMUSCULAR; INTRAVENOUS at 02:22

## 2023-02-11 RX ADMIN — LORAZEPAM 2 MG: 2 INJECTION INTRAMUSCULAR; INTRAVENOUS at 00:39

## 2023-02-11 RX ADMIN — FOLIC ACID 1 MG: 1 TABLET ORAL at 06:24

## 2023-02-11 RX ADMIN — LORAZEPAM 1.5 MG: 2 INJECTION INTRAMUSCULAR; INTRAVENOUS at 13:58

## 2023-02-11 RX ADMIN — LORAZEPAM 4 MG: 2 TABLET ORAL at 21:52

## 2023-02-11 RX ADMIN — LORAZEPAM 1.5 MG: 2 INJECTION INTRAMUSCULAR; INTRAVENOUS at 15:53

## 2023-02-11 RX ADMIN — THIAMINE HYDROCHLORIDE: 100 INJECTION, SOLUTION INTRAMUSCULAR; INTRAVENOUS at 00:48

## 2023-02-11 RX ADMIN — POTASSIUM CHLORIDE 20 MEQ: 1500 TABLET, EXTENDED RELEASE ORAL at 08:13

## 2023-02-11 RX ADMIN — LORAZEPAM 2 MG: 2 INJECTION INTRAMUSCULAR; INTRAVENOUS at 11:01

## 2023-02-11 RX ADMIN — LORAZEPAM 2 MG: 2 INJECTION INTRAMUSCULAR; INTRAVENOUS at 03:52

## 2023-02-11 RX ADMIN — LORAZEPAM 2 MG: 2 INJECTION INTRAMUSCULAR; INTRAVENOUS at 10:16

## 2023-02-11 RX ADMIN — LORAZEPAM 4 MG: 2 TABLET ORAL at 08:13

## 2023-02-11 RX ADMIN — LORAZEPAM 4 MG: 2 TABLET ORAL at 22:58

## 2023-02-11 RX ADMIN — CHLORDIAZEPOXIDE HYDROCHLORIDE 50 MG: 25 CAPSULE ORAL at 17:02

## 2023-02-11 RX ADMIN — CHLORDIAZEPOXIDE HYDROCHLORIDE 25 MG: 25 CAPSULE ORAL at 09:32

## 2023-02-11 RX ADMIN — LORAZEPAM 4 MG: 2 TABLET ORAL at 21:21

## 2023-02-11 RX ADMIN — LORAZEPAM 2 MG: 2 INJECTION INTRAMUSCULAR; INTRAVENOUS at 13:03

## 2023-02-11 RX ADMIN — LORAZEPAM 2 MG: 2 INJECTION INTRAMUSCULAR; INTRAVENOUS at 17:02

## 2023-02-11 ASSESSMENT — LIFESTYLE VARIABLES
HEADACHE, FULLNESS IN HEAD: VERY MILD
TOTAL SCORE: 26
AGITATION: *
HEADACHE, FULLNESS IN HEAD: MODERATELY SEVERE
VISUAL DISTURBANCES: MILD SENSITIVITY
HEADACHE, FULLNESS IN HEAD: MILD
TREMOR: SEVERE TREMOR, EVEN WITH ARMS NOT EXTENDED
PAROXYSMAL SWEATS: DRENCHING SWEATS
AUDITORY DISTURBANCES: MILD HARSHNESS OR ABILITY TO FRIGHTEN
VISUAL DISTURBANCES: CONTINUOUS HALLUCINATIONS
TOTAL SCORE: 35
TOTAL SCORE: CONTINUOUS HALLUCINATIONS
SKIP TO QUESTIONS 9-10: 0
TREMOR: SEVERE TREMOR, EVEN WITH ARMS NOT EXTENDED
TOTAL SCORE: 26
TOTAL SCORE: 58
ANXIETY: *
TREMOR: MODERATE TREMOR WITH ARMS EXTENDED
AUDITORY DISTURBANCES: MILD HARSHNESS OR ABILITY TO FRIGHTEN
TOTAL SCORE: 25
TREMOR: *
VISUAL DISTURBANCES: CONTINUOUS HALLUCINATIONS
TREMOR: SEVERE TREMOR, EVEN WITH ARMS NOT EXTENDED
PAROXYSMAL SWEATS: *
AGITATION: MODERATELY FIDGETY AND RESTLESS
TREMOR: NO TREMOR
HEADACHE, FULLNESS IN HEAD: MILD
PAROXYSMAL SWEATS: BEADS OF SWEAT OBVIOUS ON FOREHEAD
TOTAL SCORE: 26
AGITATION: MODERATELY FIDGETY AND RESTLESS
TREMOR: *
AGITATION: MODERATELY FIDGETY AND RESTLESS
TREMOR: *
PAROXYSMAL SWEATS: DRENCHING SWEATS
HEADACHE, FULLNESS IN HEAD: VERY MILD
SUBSTANCE_ABUSE: 1
TOTAL SCORE: 27
PAROXYSMAL SWEATS: *
TOTAL SCORE: 58
AUDITORY DISTURBANCES: CONTINUOUS HALLUCINATIONS
ANXIETY: MODERATELY ANXIOUS OR GUARDED, SO ANXIETY IS INFERRED
NAUSEA AND VOMITING: CONSTANT NAUSEA, FREQUENT DRY HEAVES AND VOMITING
VISUAL DISTURBANCES: MODERATELY SEVERE HALLUCINATIONS
ANXIETY: *
VISUAL DISTURBANCES: SEVERE HALLUCINATIONS
ORIENTATION AND CLOUDING OF SENSORIUM: DISORIENTED FOR PLACE AND / OR PERSON
ANXIETY: MODERATELY ANXIOUS OR GUARDED, SO ANXIETY IS INFERRED
TOTAL SCORE: 23
TREMOR: *
AGITATION: *
AUDITORY DISTURBANCES: MODERATE HARSHNESS OR ABILITY TO FRIGHTEN
PAROXYSMAL SWEATS: BEADS OF SWEAT OBVIOUS ON FOREHEAD
NAUSEA AND VOMITING: MILD NAUSEA WITH NO VOMITING
NAUSEA AND VOMITING: NO NAUSEA AND NO VOMITING
VISUAL DISTURBANCES: CONTINUOUS HALLUCINATIONS
AUDITORY DISTURBANCES: SEVERE HALLUCINATIONS
NAUSEA AND VOMITING: NO NAUSEA AND NO VOMITING
HEADACHE, FULLNESS IN HEAD: SEVERE
NAUSEA AND VOMITING: *
TREMOR: TREMOR NOT VISIBLE BUT CAN BE FELT, FINGERTIP TO FINGERTIP
AGITATION: *
AUDITORY DISTURBANCES: MODERATE HARSHNESS OR ABILITY TO FRIGHTEN
ORIENTATION AND CLOUDING OF SENSORIUM: DATE DISORIENTATION BY NO MORE THAN TWO CALENDAR DAYS
HEADACHE, FULLNESS IN HEAD: MILD
ANXIETY: *
ORIENTATION AND CLOUDING OF SENSORIUM: DATE DISORIENTATION BY MORE THAN TWO CALENDAR DAYS
AUDITORY DISTURBANCES: MODERATELY SEVERE HALLUCINATIONS
TREMOR: *
TOTAL SCORE: 30
ORIENTATION AND CLOUDING OF SENSORIUM: DATE DISORIENTATION BY MORE THAN TWO CALENDAR DAYS
TREMOR: TREMOR NOT VISIBLE BUT CAN BE FELT, FINGERTIP TO FINGERTIP
HEADACHE, FULLNESS IN HEAD: VERY MILD
ANXIETY: *
AUDITORY DISTURBANCES: CONTINUOUS HALLUCINATIONS
TREMOR: *
TOTAL SCORE: VERY MILD ITCHING, PINS AND NEEDLES SENSATION, BURNING OR NUMBNESS
HEADACHE, FULLNESS IN HEAD: MODERATELY SEVERE
NAUSEA AND VOMITING: CONSTANT NAUSEA, FREQUENT DRY HEAVES AND VOMITING
PAROXYSMAL SWEATS: BEADS OF SWEAT OBVIOUS ON FOREHEAD
AGITATION: MODERATELY FIDGETY AND RESTLESS
TOTAL SCORE: 19
ANXIETY: *
ORIENTATION AND CLOUDING OF SENSORIUM: DATE DISORIENTATION BY MORE THAN TWO CALENDAR DAYS
ORIENTATION AND CLOUDING OF SENSORIUM: DISORIENTED FOR PLACE AND / OR PERSON
NAUSEA AND VOMITING: MILD NAUSEA WITH NO VOMITING
HOW OFTEN DO YOU HAVE SIX OR MORE DRINKS ON ONE OCCASION: DAILY OR ALMOST DAILY
NAUSEA AND VOMITING: *
PAROXYSMAL SWEATS: BARELY PERCEPTIBLE SWEATING. PALMS MOIST
AGITATION: *
AGITATION: *
VISUAL DISTURBANCES: CONTINUOUS HALLUCINATIONS
ORIENTATION AND CLOUDING OF SENSORIUM: DISORIENTED FOR PLACE AND / OR PERSON
AUDITORY DISTURBANCES: CONTINUOUS HALLUCINATIONS
ORIENTATION AND CLOUDING OF SENSORIUM: DATE DISORIENTATION BY MORE THAN TWO CALENDAR DAYS
HEADACHE, FULLNESS IN HEAD: MODERATE
AUDITORY DISTURBANCES: MILD HARSHNESS OR ABILITY TO FRIGHTEN
TOTAL SCORE: 36
AGITATION: MODERATELY FIDGETY AND RESTLESS
ANXIETY: MILDLY ANXIOUS
NAUSEA AND VOMITING: *
NAUSEA AND VOMITING: MILD NAUSEA WITH NO VOMITING
TOTAL SCORE: 58
PAROXYSMAL SWEATS: DRENCHING SWEATS
TREMOR: *
ORIENTATION AND CLOUDING OF SENSORIUM: DATE DISORIENTATION BY MORE THAN TWO CALENDAR DAYS
HEADACHE, FULLNESS IN HEAD: NOT PRESENT
NAUSEA AND VOMITING: CONSTANT NAUSEA, FREQUENT DRY HEAVES AND VOMITING
NAUSEA AND VOMITING: *
AUDITORY DISTURBANCES: CONTINUOUS HALLUCINATIONS
ANXIETY: *
AGITATION: *
NAUSEA AND VOMITING: *
TREMOR: TREMOR NOT VISIBLE BUT CAN BE FELT, FINGERTIP TO FINGERTIP
ANXIETY: MODERATELY ANXIOUS OR GUARDED, SO ANXIETY IS INFERRED
ANXIETY: *
TREMOR: SEVERE TREMOR, EVEN WITH ARMS NOT EXTENDED
VISUAL DISTURBANCES: CONTINUOUS HALLUCINATIONS
TOTAL SCORE: 58
ANXIETY: MODERATELY ANXIOUS OR GUARDED, SO ANXIETY IS INFERRED
PAROXYSMAL SWEATS: DRENCHING SWEATS
ANXIETY: MODERATELY ANXIOUS OR GUARDED, SO ANXIETY IS INFERRED
NAUSEA AND VOMITING: CONSTANT NAUSEA, FREQUENT DRY HEAVES AND VOMITING
ORIENTATION AND CLOUDING OF SENSORIUM: DISORIENTED FOR PLACE AND / OR PERSON
PAROXYSMAL SWEATS: BARELY PERCEPTIBLE SWEATING. PALMS MOIST
PAROXYSMAL SWEATS: *
ORIENTATION AND CLOUDING OF SENSORIUM: DISORIENTED FOR PLACE AND / OR PERSON
AUDITORY DISTURBANCES: MILD HARSHNESS OR ABILITY TO FRIGHTEN
AGITATION: *
NAUSEA AND VOMITING: MILD NAUSEA WITH NO VOMITING
AUDITORY DISTURBANCES: CONTINUOUS HALLUCINATIONS
VISUAL DISTURBANCES: MODERATELY SEVERE HALLUCINATIONS
PAROXYSMAL SWEATS: *
NAUSEA AND VOMITING: NO NAUSEA AND NO VOMITING
AUDITORY DISTURBANCES: MODERATE HARSHNESS OR ABILITY TO FRIGHTEN
HOW MANY STANDARD DRINKS CONTAINING ALCOHOL DO YOU HAVE ON A TYPICAL DAY: 10 OR MORE
AGITATION: *
HEADACHE, FULLNESS IN HEAD: VERY MILD
ORIENTATION AND CLOUDING OF SENSORIUM: DISORIENTED FOR PLACE AND / OR PERSON
HEADACHE, FULLNESS IN HEAD: EXTREMELY SEVERE
PAROXYSMAL SWEATS: *
VISUAL DISTURBANCES: SEVERE HALLUCINATIONS
HEADACHE, FULLNESS IN HEAD: NOT PRESENT
ORIENTATION AND CLOUDING OF SENSORIUM: DATE DISORIENTATION BY MORE THAN TWO CALENDAR DAYS
AGITATION: MODERATELY FIDGETY AND RESTLESS
NAUSEA AND VOMITING: *
AUDITORY DISTURBANCES: NOT PRESENT
AUDITORY DISTURBANCES: CONTINUOUS HALLUCINATIONS
AGITATION: MODERATELY FIDGETY AND RESTLESS
HEADACHE, FULLNESS IN HEAD: MODERATE
TOTAL SCORE: 35
TOTAL SCORE: 29
VISUAL DISTURBANCES: CONTINUOUS HALLUCINATIONS
PAROXYSMAL SWEATS: BARELY PERCEPTIBLE SWEATING. PALMS MOIST
TREMOR: *
HEADACHE, FULLNESS IN HEAD: MILD
VISUAL DISTURBANCES: CONTINUOUS HALLUCINATIONS
ANXIETY: *
ORIENTATION AND CLOUDING OF SENSORIUM: DISORIENTED FOR PLACE AND / OR PERSON
ORIENTATION AND CLOUDING OF SENSORIUM: DATE DISORIENTATION BY NO MORE THAN TWO CALENDAR DAYS
PAROXYSMAL SWEATS: *
TOTAL SCORE: CONTINUOUS HALLUCINATIONS
HEADACHE, FULLNESS IN HEAD: EXTREMELY SEVERE
ANXIETY: *
VISUAL DISTURBANCES: CONTINUOUS HALLUCINATIONS
TOTAL SCORE: 28
PAROXYSMAL SWEATS: *
AUDITORY DISTURBANCES: MILD HARSHNESS OR ABILITY TO FRIGHTEN
NAUSEA AND VOMITING: MILD NAUSEA WITH NO VOMITING
AGITATION: *
VISUAL DISTURBANCES: MODERATELY SEVERE HALLUCINATIONS
AUDITORY DISTURBANCES: VERY MILD HARSHNESS OR ABILITY TO FRIGHTEN
ORIENTATION AND CLOUDING OF SENSORIUM: DATE DISORIENTATION BY NO MORE THAN TWO CALENDAR DAYS
ORIENTATION AND CLOUDING OF SENSORIUM: DISORIENTED FOR PLACE AND / OR PERSON
AGITATION: MODERATELY FIDGETY AND RESTLESS
TOTAL SCORE: 31
HEADACHE, FULLNESS IN HEAD: MODERATE
ANXIETY: *
TOTAL SCORE: 23
HEADACHE, FULLNESS IN HEAD: VERY MILD
AUDITORY DISTURBANCES: MILD HARSHNESS OR ABILITY TO FRIGHTEN
NAUSEA AND VOMITING: MILD NAUSEA WITH NO VOMITING
PAROXYSMAL SWEATS: BEADS OF SWEAT OBVIOUS ON FOREHEAD
AGITATION: *
TREMOR: NO TREMOR
ANXIETY: MODERATELY ANXIOUS OR GUARDED, SO ANXIETY IS INFERRED
VISUAL DISTURBANCES: CONTINUOUS HALLUCINATIONS
PAROXYSMAL SWEATS: *
VISUAL DISTURBANCES: CONTINUOUS HALLUCINATIONS
ORIENTATION AND CLOUDING OF SENSORIUM: DATE DISORIENTATION BY MORE THAN TWO CALENDAR DAYS
VISUAL DISTURBANCES: CONTINUOUS HALLUCINATIONS
VISUAL DISTURBANCES: CONTINUOUS HALLUCINATIONS
AGITATION: MODERATELY FIDGETY AND RESTLESS
ANXIETY: *
PAROXYSMAL SWEATS: BARELY PERCEPTIBLE SWEATING. PALMS MOIST
VISUAL DISTURBANCES: MODERATELY SEVERE HALLUCINATIONS
TREMOR: *
NAUSEA AND VOMITING: MILD NAUSEA WITH NO VOMITING
ORIENTATION AND CLOUDING OF SENSORIUM: DISORIENTED FOR PLACE AND / OR PERSON
AGITATION: *
TOTAL SCORE: CONTINUOUS HALLUCINATIONS
AUDIT-C TOTAL SCORE: 12
HOW OFTEN DO YOU HAVE A DRINK CONTAINING ALCOHOL: 4 OR MORE TIMES A WEEK
HEADACHE, FULLNESS IN HEAD: MILD
ANXIETY: *
TREMOR: TREMOR NOT VISIBLE BUT CAN BE FELT, FINGERTIP TO FINGERTIP
AUDITORY DISTURBANCES: CONTINUOUS HALLUCINATIONS
TOTAL SCORE: 32
ANXIETY: MODERATELY ANXIOUS OR GUARDED, SO ANXIETY IS INFERRED
VISUAL DISTURBANCES: CONTINUOUS HALLUCINATIONS
ORIENTATION AND CLOUDING OF SENSORIUM: DATE DISORIENTATION BY MORE THAN TWO CALENDAR DAYS
TOTAL SCORE: CONTINUOUS HALLUCINATIONS

## 2023-02-11 ASSESSMENT — COGNITIVE AND FUNCTIONAL STATUS - GENERAL
EATING MEALS: A LITTLE
MOBILITY SCORE: 17
MOVING FROM LYING ON BACK TO SITTING ON SIDE OF FLAT BED: UNABLE
SUGGESTED CMS G CODE MODIFIER DAILY ACTIVITY: CJ
MOVING TO AND FROM BED TO CHAIR: A LOT
SUGGESTED CMS G CODE MODIFIER MOBILITY: CK
TURNING FROM BACK TO SIDE WHILE IN FLAT BAD: A LOT
DAILY ACTIVITIY SCORE: 22
TOILETING: A LITTLE

## 2023-02-11 ASSESSMENT — FIBROSIS 4 INDEX: FIB4 SCORE: 24.32

## 2023-02-11 ASSESSMENT — ENCOUNTER SYMPTOMS
SHORTNESS OF BREATH: 0
ABDOMINAL PAIN: 0
NAUSEA: 0
COUGH: 0
PALPITATIONS: 0
VOMITING: 0

## 2023-02-11 NOTE — ASSESSMENT & PLAN NOTE
Per EMS, patient was found lying in bed at home intoxicated, likely for prolonged period of time  Improved after hydration,

## 2023-02-11 NOTE — H&P
"Hospital Medicine History & Physical Note    Date of Service  2/10/2023    Primary Care Physician  Mohsen Tamasaby, M.D.    Consultants  None    Code Status  Full Code    Chief Complaint  Chief Complaint   Patient presents with    Alcohol Intoxication     BIB EMS from home in [place]. Per EMS \"patient has been lying in bed and people have been bringing him drugs and alcohol.\" Reports drinking 5 oz vodka daily x 5 days. AAO x 3, GCS 14. Denies SI.     Difficulty Urinating     Recent prostate cancer diagnosis. States \"I have to push on my taint to pee.\" EMS reports patient was incontinent of urine in ambulance.       History of Presenting Illness  Chace Gong is a 58 y.o. male who presented 2/10/2023 with past medical history of BPH, alcohol abuse, smoker, he is coming today complaining of difficulty urinating, patient is very poor historian since he is intoxicated with alcohol, per report patient has been having problems urinating, creatinine initiated within normal limits, when I tried to talk with patient patient stated that he was feeling anxious and he has not slept for several days and he would like to sleep, patient denies any chest pain, he complains of mild shortness of breath, denies any fever chills, patient stated that he gets shaky when he stops drinking alcohol, patient stated that he drinks at least 1 pint every day, smokes 4 to 5 cigarettes, patient denies any drug use, patient was found to be hypoxic and requiring 6 L of O2, patient was mildly tachycardic, chest x-ray did not show any acute findings, patient had mild elevated troponin, EKG pending, CPK was elevated at 774, alcohol level 453, I ordered and reviewed D-dimer which is elevated and I have ordered a CTA which is pending at this time, patient is going to be admitted started on CIWA protocol gentle hydration follow-up CTA, have discussed case and plan of care with patient nurse staff ER physician request to have an answer.  Bladder scan " is pending        Review of Systems  Review of Systems   Constitutional:  Negative for chills and fever.        Intoxicated   HENT:  Negative for congestion, nosebleeds and sinus pain.    Eyes:  Negative for blurred vision and double vision.   Respiratory:  Negative for cough, hemoptysis and wheezing.    Cardiovascular:  Negative for chest pain, palpitations, claudication, leg swelling and PND.   Gastrointestinal:  Negative for heartburn, nausea and vomiting.   Genitourinary:  Negative for hematuria and urgency.        Difficulty urinating   Musculoskeletal:  Negative for back pain and myalgias.   Skin:  Negative for rash.   Neurological:  Positive for tremors. Negative for dizziness and headaches.   Endo/Heme/Allergies:  Does not bruise/bleed easily.   Psychiatric/Behavioral:  Negative for depression, substance abuse and suicidal ideas. The patient is nervous/anxious and has insomnia.      Past Medical History  No past medical history    Surgical History  No recent surgeries    Family History    Family history reviewed with patient. There is no family history that is pertinent to the chief complaint.     Social History   Drinks alcohol daily  Smokes 4 to 5 cigarettes  Denies drug use    Allergies  Allergies   Allergen Reactions    Sulfa Drugs Unspecified     unknown       Medications  None       Physical Exam  Temp:  [36.7 °C (98.1 °F)] 36.7 °C (98.1 °F)  Pulse:  [] 110  Resp:  [16-18] 16  BP: (122-129)/(79-83) 129/79  SpO2:  [80 %-96 %] 90 %  Blood Pressure: 129/79   Temperature: 36.7 °C (98.1 °F)   Pulse: (!) 110   Respiration: 16   Pulse Oximetry: 90 %       Physical Exam  Vitals and nursing note reviewed.   Constitutional:       General: He is not in acute distress.     Appearance: Normal appearance. He is not ill-appearing.      Comments: Patient is intoxicated with alcohol   HENT:      Head: Normocephalic and atraumatic.      Mouth/Throat:      Mouth: Mucous membranes are dry.      Pharynx: Oropharynx  is clear. No oropharyngeal exudate or posterior oropharyngeal erythema.   Eyes:      General: No scleral icterus.        Right eye: No discharge.         Left eye: No discharge.      Extraocular Movements: Extraocular movements intact.      Conjunctiva/sclera: Conjunctivae normal.   Cardiovascular:      Rate and Rhythm: Regular rhythm. Tachycardia present.      Pulses: Normal pulses.      Heart sounds: Normal heart sounds.   Pulmonary:      Effort: Pulmonary effort is normal. No respiratory distress.      Breath sounds: Normal breath sounds. No wheezing or rales.   Abdominal:      General: Bowel sounds are normal. There is no distension.      Palpations: Abdomen is soft.      Tenderness: There is no abdominal tenderness. There is no guarding.   Musculoskeletal:      Cervical back: Normal range of motion and neck supple. No rigidity.      Right lower leg: No edema.      Left lower leg: No edema.   Skin:     General: Skin is warm and dry.      Capillary Refill: Capillary refill takes less than 2 seconds.      Coloration: Skin is not jaundiced.   Neurological:      General: No focal deficit present.      Mental Status: He is disoriented.      Cranial Nerves: No cranial nerve deficit.      Motor: No weakness.   Psychiatric:         Mood and Affect: Mood normal.         Behavior: Behavior normal.       Laboratory:  Recent Labs     02/10/23  1144   WBC 8.2   RBC 4.57*   HEMOGLOBIN 15.4   HEMATOCRIT 43.5   MCV 95.2   MCH 33.7*   MCHC 35.4*   RDW 50.0   PLATELETCT 53*   MPV 9.4     Recent Labs     02/10/23  1144   SODIUM 139   POTASSIUM 3.5*   CHLORIDE 98   CO2 29   GLUCOSE 104*   BUN 9   CREATININE 0.64   CALCIUM 7.5*     Recent Labs     02/10/23  1144   ALTSGPT 53*   ASTSGOT 92*   ALKPHOSPHAT 79   TBILIRUBIN 0.5   LIPASE 40   GLUCOSE 104*         Recent Labs     02/10/23  1144   NTPROBNP 132*         Recent Labs     02/10/23  1144   TROPONINT 37*       Imaging:  DX-CHEST-PORTABLE (1 VIEW)   Final Result      1.   Questionable nonspecific right hilar/perihilar prominence of uncertain etiology or significance.   2.  No definite consolidation or pleural effusion.          X-Ray:  I have personally reviewed the images and compared with prior images.    Assessment/Plan:  Justification for Admission Status  I anticipate this patient will require at least two midnights for appropriate medical management, necessitating inpatient admission because patient will need close monitoring for alcohol withdrawal, hypoxia, follow-up CTA chest        * Respiratory failure (HCC)- (present on admission)  Assessment & Plan  Unclear   cxr clear  Elevated ddimer cta chest ordered.   Continue o2 per protocol.     Smoker- (present on admission)  Assessment & Plan  Patient was counseled regarding smoking cessation over 4 minutes  Nicotine patch ordered.       Abnormal CXR (chest x-ray)- (present on admission)  Assessment & Plan  Follow up cta chest    BPH (benign prostatic hyperplasia)- (present on admission)  Assessment & Plan  Check bladder scan. Pending.     Elevated liver function tests- (present on admission)  Assessment & Plan  Likely due to alcohol use  Will check hepatitis panel   F.u cmp in am.  Check inr    Hypokalemia- (present on admission)  Assessment & Plan  Replacing  Check mg    Alcohol withdrawal (HCC)- (present on admission)  Assessment & Plan  Started on ciwa protocol  Monitoring electrolytes  Alcohol cessation recommended.     Alcohol intoxication (HCC)- (present on admission)  Assessment & Plan  Started on ciwa protocol  Alcohol cessation recommended        VTE prophylaxis: SCDs/TEDs      Condition guarded

## 2023-02-11 NOTE — PROGRESS NOTES
Report received, patient is restless and fidgety. Currently in no pain. Does not respond well to commands. Bed in lowest position and call light within reach. Seizure precautions in place.

## 2023-02-11 NOTE — ASSESSMENT & PLAN NOTE
SpO2 80% on arrival, requiring 6L NC for 91%. Improved back down to 90% RA, which is baseline  Likely secondary to alcohol intoxication causing bradypnea  Hx of smoking and may have COPD, needs outpatient PFTs  Cessation encouragement once more alert  CXR negative acute  CTA negative PE  Remains requiring 1-2 L O2 via NC.  RT per protocol  Aspiration precautions given that he failed his swallow 2/13

## 2023-02-11 NOTE — ASSESSMENT & PLAN NOTE
likely secondary to hypoperfusion from hypovolemia due to alcohol intoxication, complicated by cirrhosis  LA 2.8 to 2.3 to 2.0 s/p IVF 100ml/hr  RESOLVED

## 2023-02-11 NOTE — PROGRESS NOTES
Daily Progress Note:     Date of Service: 2/11/2023  Primary Team: [facility] Blue Team   Attending: Manuel Banda M.D.   Senior Resident: Dr. Crisostomo  Intern: Dr. Heather Lubin  Contact:  [phone]    Hospital Course:   59 yo M with PMHx alcohol abuse without documented withdrawal seizure, ICU or intubation, hep C , tobacco use, HTN, HLD, anxiety who presents with alcohol intoxication and admitted 2/10/23 for alcohol withdrawal and bacteremia on ceftriaxone (2/11 - )    Interval Update:  -patient sleeping on exam. Denies any complaints. States he drank 1 gallon of beer yesterday but normally doesn't drink. Talking to himself and having visual and auditory hallucinations.  -denies urinary symptoms  -CIWA scores 61, 58, 58, 26, received 24mg ativan in a span of 15 hours. Added librium 50mg q6h. IMCU aware of patient in case need to transfer him    Consultants/Specialty:  [facility]    Review of Systems:  Review of Systems   Unable to perform ROS: Mental status change   Respiratory:  Negative for cough and shortness of breath.    Cardiovascular:  Negative for chest pain and palpitations.   Gastrointestinal:  Negative for abdominal pain, nausea and vomiting.   Psychiatric/Behavioral:  Positive for substance abuse (alcohol).      Objective:   Vitals:   Temp:  [36.7 °C (98 °F)-36.7 °C (98.1 °F)] 36.7 °C (98 °F)  Pulse:  [] 82  Resp:  [16-20] 20  BP: (122-155)/() 155/92  SpO2:  [80 %-96 %] 91 %    Physical Exam  Constitutional:       General: He is not in acute distress.     Appearance: He is obese.      Comments: Sleeping on exam. Talking to himself. Arouses to repeated physical stimuli   HENT:      Head: Normocephalic and atraumatic.      Mouth/Throat:      Mouth: Mucous membranes are moist.      Pharynx: Oropharynx is clear.   Eyes:      General: No scleral icterus.     Extraocular Movements: Extraocular movements intact.   Cardiovascular:      Rate and Rhythm: Regular rhythm. Tachycardia present.      Heart sounds:  No murmur heard.  Pulmonary:      Effort: Pulmonary effort is normal. No respiratory distress.      Breath sounds: Normal breath sounds. No wheezing.   Abdominal:      General: There is no distension.      Palpations: Abdomen is soft.      Tenderness: There is no abdominal tenderness. There is no guarding or rebound.   Musculoskeletal:      Cervical back: Normal range of motion and neck supple.      Right lower leg: No edema.      Left lower leg: No edema.   Skin:     Findings: Lesion (old lesions feet secondary to walking barefoot) present.   Neurological:      Mental Status: He is disoriented.      Comments: A&Ox1 secondary to alcohol intoxication   Psychiatric:      Comments: Endorses visual and auditory hallucinations     Labs:   Lab Results   Component Value Date/Time    WBC 5.3 02/11/2023 03:11 AM    RBC 4.17 (L) 02/11/2023 03:11 AM    HEMOGLOBIN 13.9 (L) 02/11/2023 03:11 AM    HEMATOCRIT 39.2 (L) 02/11/2023 03:11 AM    MCV 94.0 02/11/2023 03:11 AM    MCH 33.3 (H) 02/11/2023 03:11 AM    MCHC 35.5 (H) 02/11/2023 03:11 AM    MPV 9.7 02/11/2023 03:11 AM    NEUTSPOLYS 66.00 02/10/2023 11:44 AM    LYMPHOCYTES 26.20 02/10/2023 11:44 AM    MONOCYTES 6.60 02/10/2023 11:44 AM    EOSINOPHILS 0.40 02/10/2023 11:44 AM    BASOPHILS 0.20 02/10/2023 11:44 AM        Lab Results   Component Value Date/Time    SODIUM 134 (L) 02/11/2023 03:11 AM    POTASSIUM 3.7 02/11/2023 03:11 AM    CHLORIDE 94 (L) 02/11/2023 03:11 AM    CO2 27 02/11/2023 03:11 AM    GLUCOSE 117 (H) 02/11/2023 03:11 AM    BUN 10 02/11/2023 03:11 AM    CREATININE 0.43 (L) 02/11/2023 03:11 AM        No results found for: ZBJTQ37W, SXNZGC268S, LNASO924U, ARTHCO3, ARTBE, GAPBG, WCE6DED0    Lab Results   Component Value Date/Time    PROTHROMBTM 13.8 02/11/2023 03:11 AM    INR 1.08 02/11/2023 03:11 AM        Imaging:   CT-CTA CHEST PULMONARY ARTERY W/ RECONS   Final Result         1.  No large central pulmonary embolus is appreciated, evaluation of the subsegmental  branches is essentially nondiagnostic due to motion artifacts. Additional imaging would be required for definitive exclusion of small distal pulmonary emboli.   2.  Scattered hazy pulmonary opacities suggests subtle infiltrates.   3.  Hepatomegaly and diffuse hepatic steatosis   4.  Irregular hepatic contour favoring changes of cirrhosis   5.  Atherosclerosis and atherosclerotic coronary artery disease.      DX-CHEST-PORTABLE (1 VIEW)   Final Result      1.  Questionable nonspecific right hilar/perihilar prominence of uncertain etiology or significance.   2.  No definite consolidation or pleural effusion.        Assessment and Plan:  59 yo M with PMHx alcohol abuse without documented withdrawal seizure, ICU or intubation, hep C , tobacco use, HTN, HLD, anxiety who presents with alcohol intoxication and admitted 2/10/23 for alcohol withdrawal on CIWA and bacteremia on ceftriaxone (2/11 - )    * Alcohol withdrawal (HCC)- (present on admission)  Assessment & Plan  CIWA protocol started on admit but scores of 60, 50, 30s so added librium 50mg q6h  [facility] aware of patient in case of potential transfer  No documented history of alcohol withdrawal seizures, ICU admit or intubation    Aspiration, seizure, fall precautions    Rhabdomyolysis- (present on admission)  Assessment & Plan  Per EMS, patient was found lying in bed at home intoxicated, likely for prolonged period of time  CPK 1069 to 1380  No evidence of kidney injury  IVF 100ml to 150ml/hr  Trend q4h    Bacteremia- (present on admission)  Assessment & Plan  BC x1 preliminary gram negative rods  Started on ceftriaxone (2/11- )  F/u final BC read    Thrombocytopenia (HCC)- (present on admission)  Assessment & Plan  PLT 37. Seems like isolated thrombocytopenia. Could be secondary to cirrhosis due to alcohol use, hep C vs ITP  Review of previous records shows that last PLT level 2021 within normal limits  Holding off on chemical DVT prophylaxis;  SCDs  Monitoring    Elevated liver function tests- (present on admission)  Assessment & Plan  2/2 alcoholic hepatitis. Maddrey score 4.4, good prognosis. No indication for steroids at this time    Alcohol intoxication (HCC)- (present on admission)  Assessment & Plan  Started on ciwa protocol  Alcohol cessation recommended    Lactic acidosis- (present on admission)  Assessment & Plan  likely secondary to hypoperfusion from hypovolemia due to alcohol intoxication, complicated by cirrhosis  LA 2.8 to 2.3 to 2.0 s/p IVF 100ml/hr  RESOLVED    Smoker- (present on admission)  Assessment & Plan  Patient was counseled regarding smoking cessation over 4 minutes  Nicotine patch ordered.       BPH (benign prostatic hyperplasia)- (present on admission)  Assessment & Plan  Bladder scan 432ml, post void 130ml  EUD    Respiratory failure (HCC)- (present on admission)  Assessment & Plan  SpO2 80% on arrival, requiring 6L NC for 91%. Improved back down to 90% RA, which is baseline  Likely secondary to alcohol intoxication causing bradypnea  CXR negative acute  CTA negative PE  RESOLVED      Code Status: FULL  DVT prophylaxis: SCDs; chemical held due to platelets 37  Diet: CLD  GI: none  Disposition: to remain inpatient    Heathre Lubin DO  PGY-1 Internal Medicine

## 2023-02-11 NOTE — ED NOTES
Med rec completed per pt and home pharmacy ([facility])     Pt states that he has not taken his meds in about 4 months     Pt states that he takes Lisinopril 20 mg daily ([facility] has not filled this medication since July 2022)   Pt states that he takes Flomax 0.4 mg daily ([facility] has not filled this medication recently)

## 2023-02-11 NOTE — CARE PLAN
The patient is Watcher - Medium risk of patient condition declining or worsening    Shift Goals  Clinical Goals: o2 saturation  Patient Goals: rest  Family Goals: yahaira      Problem: Optimal Care for Alcohol Withdrawal  Goal: Optimal Care for the alcohol withdrawal patient  Outcome: Progressing  Note: CIWA precautions in place, no seizures seen, pt being medicated per MAR, discussed with MD regarding concerns over acuity of patient, new orders.      Problem: Fall Risk  Goal: Patient will remain free from falls  Outcome: Progressing  Note: Discussed patient mobility status with interdisciplinary team, fall precautions in place, pt fall prevention education done, pt educated to call for assistance when needed.      Problem: Psychosocial  Goal: Patient's level of anxiety will decrease  Outcome: Progressing     Problem: Seizure Precautions  Goal: Implementation of seizure precautions  Outcome: Progressing, none seen during this shift.

## 2023-02-11 NOTE — ASSESSMENT & PLAN NOTE
Bladder scan 432ml, post void 130ml  Follow up with urology outpatient  Stat oral flomax once able to take orals   Watch for retention

## 2023-02-11 NOTE — ASSESSMENT & PLAN NOTE
Likely acute bone marrow insult from EtOH and may have chronic liver injury as well.  Following CBC

## 2023-02-11 NOTE — ASSESSMENT & PLAN NOTE
CIWA protocol ,Scheduled librium, was not effective  Required precedex during admit  Aspiration, seizure, fall precautions

## 2023-02-11 NOTE — CARE PLAN
The patient is Watcher - Medium risk of patient condition declining or worsening    Shift Goals  Clinical Goals: o2 saturation  Patient Goals: rest  Family Goals: yahaira    Progress made toward(s) clinical / shift goals:  Alcohol withdrawal (if so please specify if acute or chronic and with or without acute delirium).   Steroid withdrawal (if so please specify if substance was properly administered)   Narcotic withdrawal  Other explanation of clinical findings  Unable to determine      Patient is not progressing towards the following goals:      Problem: Optimal Care for Alcohol Withdrawal  Goal: Optimal Care for the alcohol withdrawal patient  Description: Target End Date:  1 to 3 days    1.  Alcohol history screening done on admission  2.  CIWA-AR score assessment (includes assessment of nausea/vomiting, tremor, sweats, anxiety, agitation, tactile, visual and auditory disturbances, headache and orientation/sensorium).  Document on CIWA flowsheet.  3.  Follow CIWA-AR score protocol  4.  Frequent reorientation  5.  Monitor for fluid and electrolyte imbalance.  6.  Assess for respiratory depression due to sedation (pulse ox)  7.  Consider thiamine, multivitamins, folic acid and magnesium sulfate per provider order  8.  Collaborate with Social Workers/Case Management  9.  Collaborate with mental health  Outcome: Not Progressing  Note:   Anxiety disorder is secondary to the alcohol dependence  Anxiety disorder is not secondary to the alcohol dependence  Unable to determine

## 2023-02-11 NOTE — ASSESSMENT & PLAN NOTE
Initially on CIWA protocol, then escalated to ICU care with Precedex  Alcohol cessation recommended  Currently improved, vitamin support, low-dose benzodiazepines support

## 2023-02-12 ENCOUNTER — APPOINTMENT (OUTPATIENT)
Dept: RADIOLOGY | Facility: MEDICAL CENTER | Age: 58
DRG: 189 | End: 2023-02-12
Attending: INTERNAL MEDICINE
Payer: MEDICARE

## 2023-02-12 PROBLEM — F10.231 ALCOHOL DEPENDENCE WITH WITHDRAWAL DELIRIUM (HCC): Status: ACTIVE | Noted: 2023-02-12

## 2023-02-12 LAB
ALBUMIN SERPL BCP-MCNC: 3.4 G/DL (ref 3.2–4.9)
ALBUMIN/GLOB SERPL: 1.2 G/DL
ALP SERPL-CCNC: 77 U/L (ref 30–99)
ALT SERPL-CCNC: 80 U/L (ref 2–50)
AMPHET UR QL SCN: NEGATIVE
ANION GAP SERPL CALC-SCNC: 12 MMOL/L (ref 7–16)
APPEARANCE UR: CLEAR
AST SERPL-CCNC: 206 U/L (ref 12–45)
BACTERIA #/AREA URNS HPF: NEGATIVE /HPF
BACTERIA BLD CULT: ABNORMAL
BARBITURATES UR QL SCN: NEGATIVE
BASOPHILS # BLD AUTO: 0.2 % (ref 0–1.8)
BASOPHILS # BLD: 0.01 K/UL (ref 0–0.12)
BENZODIAZ UR QL SCN: POSITIVE
BILIRUB SERPL-MCNC: 0.8 MG/DL (ref 0.1–1.5)
BILIRUB UR QL STRIP.AUTO: NEGATIVE
BUN SERPL-MCNC: 9 MG/DL (ref 8–22)
BZE UR QL SCN: NEGATIVE
CALCIUM ALBUM COR SERPL-MCNC: 8.6 MG/DL (ref 8.5–10.5)
CALCIUM SERPL-MCNC: 8.1 MG/DL (ref 8.5–10.5)
CANNABINOIDS UR QL SCN: POSITIVE
CHLORIDE SERPL-SCNC: 99 MMOL/L (ref 96–112)
CK SERPL-CCNC: 1599 U/L (ref 0–154)
CO2 SERPL-SCNC: 24 MMOL/L (ref 20–33)
COLOR UR: YELLOW
CREAT SERPL-MCNC: 0.52 MG/DL (ref 0.5–1.4)
EKG IMPRESSION: NORMAL
EOSINOPHIL # BLD AUTO: 0.06 K/UL (ref 0–0.51)
EOSINOPHIL NFR BLD: 1.4 % (ref 0–6.9)
EPI CELLS #/AREA URNS HPF: NEGATIVE /HPF
ERYTHROCYTE [DISTWIDTH] IN BLOOD BY AUTOMATED COUNT: 49.5 FL (ref 35.9–50)
GFR SERPLBLD CREATININE-BSD FMLA CKD-EPI: 118 ML/MIN/1.73 M 2
GLOBULIN SER CALC-MCNC: 2.9 G/DL (ref 1.9–3.5)
GLUCOSE SERPL-MCNC: 85 MG/DL (ref 65–99)
GLUCOSE UR STRIP.AUTO-MCNC: NEGATIVE MG/DL
HAV IGM SERPL QL IA: ABNORMAL
HBV CORE IGM SER QL: ABNORMAL
HBV SURFACE AG SER QL: ABNORMAL
HCT VFR BLD AUTO: 38.4 % (ref 42–52)
HCV AB SER QL: REACTIVE
HGB BLD-MCNC: 13.5 G/DL (ref 14–18)
HIV 1+2 AB+HIV1 P24 AG SERPL QL IA: NORMAL
HYALINE CASTS #/AREA URNS LPF: ABNORMAL /LPF
IMM GRANULOCYTES # BLD AUTO: 0.06 K/UL (ref 0–0.11)
IMM GRANULOCYTES NFR BLD AUTO: 1.4 % (ref 0–0.9)
INR PPP: 1.14 (ref 0.87–1.13)
KETONES UR STRIP.AUTO-MCNC: NEGATIVE MG/DL
LEUKOCYTE ESTERASE UR QL STRIP.AUTO: NEGATIVE
LYMPHOCYTES # BLD AUTO: 0.86 K/UL (ref 1–4.8)
LYMPHOCYTES NFR BLD: 20.1 % (ref 22–41)
MAGNESIUM SERPL-MCNC: 1.8 MG/DL (ref 1.5–2.5)
MCH RBC QN AUTO: 33.3 PG (ref 27–33)
MCHC RBC AUTO-ENTMCNC: 35.2 G/DL (ref 33.7–35.3)
MCV RBC AUTO: 94.6 FL (ref 81.4–97.8)
METHADONE UR QL SCN: NEGATIVE
MICRO URNS: ABNORMAL
MONOCYTES # BLD AUTO: 0.34 K/UL (ref 0–0.85)
MONOCYTES NFR BLD AUTO: 7.9 % (ref 0–13.4)
NEUTROPHILS # BLD AUTO: 2.95 K/UL (ref 1.82–7.42)
NEUTROPHILS NFR BLD: 69 % (ref 44–72)
NITRITE UR QL STRIP.AUTO: NEGATIVE
NRBC # BLD AUTO: 0 K/UL
NRBC BLD-RTO: 0 /100 WBC
OPIATES UR QL SCN: NEGATIVE
OXYCODONE UR QL SCN: NEGATIVE
PCP UR QL SCN: NEGATIVE
PH UR STRIP.AUTO: 8 [PH] (ref 5–8)
PHOSPHATE SERPL-MCNC: 2.5 MG/DL (ref 2.5–4.5)
PLATELET # BLD AUTO: 39 K/UL (ref 164–446)
PMV BLD AUTO: 10.2 FL (ref 9–12.9)
POTASSIUM SERPL-SCNC: 3.5 MMOL/L (ref 3.6–5.5)
PROPOXYPH UR QL SCN: NEGATIVE
PROT SERPL-MCNC: 6.3 G/DL (ref 6–8.2)
PROT UR QL STRIP: NEGATIVE MG/DL
PROTHROMBIN TIME: 14.5 SEC (ref 12–14.6)
RBC # BLD AUTO: 4.06 M/UL (ref 4.7–6.1)
RBC # URNS HPF: ABNORMAL /HPF
RBC UR QL AUTO: ABNORMAL
SIGNIFICANT IND 70042: ABNORMAL
SITE SITE: ABNORMAL
SODIUM SERPL-SCNC: 135 MMOL/L (ref 135–145)
SOURCE SOURCE: ABNORMAL
SP GR UR STRIP.AUTO: 1.01
T PALLIDUM AB SER QL IA: REACTIVE
UROBILINOGEN UR STRIP.AUTO-MCNC: 1 MG/DL
WBC # BLD AUTO: 4.3 K/UL (ref 4.8–10.8)
WBC #/AREA URNS HPF: ABNORMAL /HPF

## 2023-02-12 PROCEDURE — 85610 PROTHROMBIN TIME: CPT

## 2023-02-12 PROCEDURE — 80307 DRUG TEST PRSMV CHEM ANLYZR: CPT

## 2023-02-12 PROCEDURE — 80053 COMPREHEN METABOLIC PANEL: CPT

## 2023-02-12 PROCEDURE — 99292 CRITICAL CARE ADDL 30 MIN: CPT | Performed by: INTERNAL MEDICINE

## 2023-02-12 PROCEDURE — 86592 SYPHILIS TEST NON-TREP QUAL: CPT

## 2023-02-12 PROCEDURE — 700111 HCHG RX REV CODE 636 W/ 250 OVERRIDE (IP): Performed by: INTERNAL MEDICINE

## 2023-02-12 PROCEDURE — 83735 ASSAY OF MAGNESIUM: CPT

## 2023-02-12 PROCEDURE — 86780 TREPONEMA PALLIDUM: CPT

## 2023-02-12 PROCEDURE — 700105 HCHG RX REV CODE 258: Performed by: INTERNAL MEDICINE

## 2023-02-12 PROCEDURE — 700101 HCHG RX REV CODE 250: Performed by: INTERNAL MEDICINE

## 2023-02-12 PROCEDURE — 80074 ACUTE HEPATITIS PANEL: CPT

## 2023-02-12 PROCEDURE — G0475 HIV COMBINATION ASSAY: HCPCS

## 2023-02-12 PROCEDURE — 93005 ELECTROCARDIOGRAM TRACING: CPT | Performed by: INTERNAL MEDICINE

## 2023-02-12 PROCEDURE — 81001 URINALYSIS AUTO W/SCOPE: CPT

## 2023-02-12 PROCEDURE — 87522 HEPATITIS C REVRS TRNSCRPJ: CPT

## 2023-02-12 PROCEDURE — 99291 CRITICAL CARE FIRST HOUR: CPT | Performed by: INTERNAL MEDICINE

## 2023-02-12 PROCEDURE — 93010 ELECTROCARDIOGRAM REPORT: CPT | Performed by: INTERNAL MEDICINE

## 2023-02-12 PROCEDURE — 700111 HCHG RX REV CODE 636 W/ 250 OVERRIDE (IP): Performed by: HOSPITALIST

## 2023-02-12 PROCEDURE — 770022 HCHG ROOM/CARE - ICU (200)

## 2023-02-12 PROCEDURE — 82550 ASSAY OF CK (CPK): CPT

## 2023-02-12 PROCEDURE — 84100 ASSAY OF PHOSPHORUS: CPT

## 2023-02-12 PROCEDURE — 85025 COMPLETE CBC W/AUTO DIFF WBC: CPT

## 2023-02-12 PROCEDURE — 71045 X-RAY EXAM CHEST 1 VIEW: CPT

## 2023-02-12 RX ORDER — MAGNESIUM SULFATE HEPTAHYDRATE 40 MG/ML
2 INJECTION, SOLUTION INTRAVENOUS ONCE
Status: COMPLETED | OUTPATIENT
Start: 2023-02-12 | End: 2023-02-12

## 2023-02-12 RX ORDER — GAUZE BANDAGE 2" X 2"
100 BANDAGE TOPICAL DAILY
Status: DISCONTINUED | OUTPATIENT
Start: 2023-02-15 | End: 2023-02-16 | Stop reason: HOSPADM

## 2023-02-12 RX ORDER — POTASSIUM CHLORIDE 7.45 MG/ML
10 INJECTION INTRAVENOUS
Status: COMPLETED | OUTPATIENT
Start: 2023-02-12 | End: 2023-02-12

## 2023-02-12 RX ORDER — LORAZEPAM 2 MG/ML
2 INJECTION INTRAMUSCULAR EVERY 4 HOURS
Status: DISCONTINUED | OUTPATIENT
Start: 2023-02-12 | End: 2023-02-14

## 2023-02-12 RX ORDER — DEXMEDETOMIDINE HYDROCHLORIDE 4 UG/ML
.1-1.5 INJECTION, SOLUTION INTRAVENOUS CONTINUOUS
Status: DISCONTINUED | OUTPATIENT
Start: 2023-02-12 | End: 2023-02-14

## 2023-02-12 RX ORDER — LORAZEPAM 2 MG/ML
1-2 INJECTION INTRAMUSCULAR
Status: DISCONTINUED | OUTPATIENT
Start: 2023-02-12 | End: 2023-02-15

## 2023-02-12 RX ORDER — LABETALOL HYDROCHLORIDE 5 MG/ML
10 INJECTION, SOLUTION INTRAVENOUS
Status: DISCONTINUED | OUTPATIENT
Start: 2023-02-12 | End: 2023-02-16 | Stop reason: HOSPADM

## 2023-02-12 RX ADMIN — LORAZEPAM 2 MG: 2 INJECTION INTRAMUSCULAR; INTRAVENOUS at 02:38

## 2023-02-12 RX ADMIN — POTASSIUM CHLORIDE 10 MEQ: 7.46 INJECTION, SOLUTION INTRAVENOUS at 12:16

## 2023-02-12 RX ADMIN — PIPERACILLIN AND TAZOBACTAM 4.5 G: 4; .5 INJECTION, POWDER, LYOPHILIZED, FOR SOLUTION INTRAVENOUS; PARENTERAL at 21:20

## 2023-02-12 RX ADMIN — PIPERACILLIN AND TAZOBACTAM 4.5 G: 4; .5 INJECTION, POWDER, LYOPHILIZED, FOR SOLUTION INTRAVENOUS; PARENTERAL at 03:39

## 2023-02-12 RX ADMIN — LORAZEPAM 2 MG: 2 INJECTION INTRAMUSCULAR; INTRAVENOUS at 17:47

## 2023-02-12 RX ADMIN — LORAZEPAM 2 MG: 2 INJECTION INTRAMUSCULAR; INTRAVENOUS at 14:10

## 2023-02-12 RX ADMIN — POTASSIUM CHLORIDE 10 MEQ: 7.46 INJECTION, SOLUTION INTRAVENOUS at 10:53

## 2023-02-12 RX ADMIN — PIPERACILLIN AND TAZOBACTAM 4.5 G: 4; .5 INJECTION, POWDER, LYOPHILIZED, FOR SOLUTION INTRAVENOUS; PARENTERAL at 04:14

## 2023-02-12 RX ADMIN — HYDRALAZINE HYDROCHLORIDE 10 MG: 20 INJECTION INTRAMUSCULAR; INTRAVENOUS at 21:21

## 2023-02-12 RX ADMIN — DEXMEDETOMIDINE 0.9 MCG/KG/HR: 200 INJECTION, SOLUTION INTRAVENOUS at 11:18

## 2023-02-12 RX ADMIN — MAGNESIUM SULFATE HEPTAHYDRATE 2 G: 40 INJECTION, SOLUTION INTRAVENOUS at 10:48

## 2023-02-12 RX ADMIN — LORAZEPAM 2 MG: 2 INJECTION INTRAMUSCULAR; INTRAVENOUS at 03:36

## 2023-02-12 RX ADMIN — DEXMEDETOMIDINE 1.1 MCG/KG/HR: 200 INJECTION, SOLUTION INTRAVENOUS at 06:02

## 2023-02-12 RX ADMIN — LORAZEPAM 2 MG: 2 INJECTION INTRAMUSCULAR; INTRAVENOUS at 21:21

## 2023-02-12 RX ADMIN — THIAMINE HYDROCHLORIDE 500 MG: 100 INJECTION, SOLUTION INTRAMUSCULAR; INTRAVENOUS at 03:01

## 2023-02-12 RX ADMIN — SODIUM CHLORIDE: 9 INJECTION, SOLUTION INTRAVENOUS at 00:57

## 2023-02-12 RX ADMIN — DEXMEDETOMIDINE 0.2 MCG/KG/HR: 200 INJECTION, SOLUTION INTRAVENOUS at 01:26

## 2023-02-12 RX ADMIN — PIPERACILLIN AND TAZOBACTAM 4.5 G: 4; .5 INJECTION, POWDER, LYOPHILIZED, FOR SOLUTION INTRAVENOUS; PARENTERAL at 14:10

## 2023-02-12 ASSESSMENT — PAIN DESCRIPTION - PAIN TYPE
TYPE: ACUTE PAIN

## 2023-02-12 ASSESSMENT — FIBROSIS 4 INDEX: FIB4 SCORE: 33.66

## 2023-02-12 NOTE — PROGRESS NOTES
4 Eyes Skin Assessment Completed by YAEL Roy and YAEL Sierra.    Head WDL  Ears Redness and Blanching  Nose WDL  Mouth WDL  Neck Redness  Breast/Chest Redness  Shoulder Blades Redness  Spine WDL  (R) Arm/Elbow/Hand Redness, Blanching, and Scab, old healing wound  (L) Arm/Elbow/Hand Redness, Blanching, and Scab, old healing wound  Abdomen WDL  Groin Redness and Blanching  Scrotum/Coccyx/Buttocks Redness and Blanching  (R) Leg Redness and Blanching  (L) Leg Redness and Blanching  (R) Heel/Foot/Toe Boggy, old open wound  (L) Heel/Foot/Toe Boggy          Devices In Places ECG, Tele Box, Blood Pressure Cuff, Pulse Ox, Condom Cath, and Nasal Cannula      Interventions In Place Gray Ear Foams, Pillows, Q2 Turns, and Low Air Loss Mattress    Possible Skin Injury Yes    Pictures Uploaded Into Epic Yes  Wound Consult Placed Yes  RN Wound Prevention Protocol Ordered No

## 2023-02-12 NOTE — CONSULTS
CRITICAL CARE MEDICINE ATTENDING CONSULTATION    Date of admission  2/10/2023    Chief Complaint  58 y.o. male admitted 2/10/2023 with acute call intoxication and difficulty urinating.    History of Present Illness      I was kindly asked by Dr. Manuel Banda to see and evaluate this gentleman for worsening alcohol withdrawal delirium.  The entire history is obtained from healthcare providers and the medical record as this gentleman cannot provide me with any history.  Bless his heart, this gentleman has a long history of alcohol dependence and continues to be a dedicated drinker.  Additionally, he has a history of prostate carcinoma, tobacco abuse, hepatitis C, primary hypertension, dyslipidemia, methamphetamine abuse, cannabis abuse and syphilis.  He presented to [facility] on or about 2/10/2023 with a complaint of difficulty urinating and profound intoxication with a blood alcohol level of 0.453.  He was admitted to the hospital and placed on the CIWA protocol.  Blood culture obtained on presentation is positive for gram-negative rods as well as possible Staphylococcus species which is negative for Staphylococcus aureus and MRSA by PCR.  He is on ceftriaxone.  I am told by the staff that his CIWA scores are escalating and he has required 16 mg of lorazepam and just the last 2-1/2 hours.  I am transferring him to ICU due to concern for his worsening alcohol withdrawal delirium.      Review of Systems  Review of Systems   Unable to perform ROS: Acuity of condition     Vital Signs for the last 24 hours  Temp:  [36.7 °C (98 °F)-37.5 °C (99.5 °F)] 37.3 °C (99.2 °F)  Pulse:  [] 98  Resp:  [18-24] 24  BP: (111-155)/() 147/88  SpO2:  [90 %-97 %] 96 %    Hemodynamic parameters for the last 24 hours       Vent Settings for the last 24 hours       Physical Exam  Physical Exam  Constitutional:       Comments: Disheveled gentleman with soiled drawers pulled down around his upper thighs   HENT:      Head:  Normocephalic.      Mouth/Throat:      Mouth: Mucous membranes are dry.   Eyes:      Pupils: Pupils are equal, round, and reactive to light.   Cardiovascular:      Comments: Sinus tachycardia  Pulmonary:      Breath sounds: Rales (Coarse crackles bilaterally) present. No wheezing.   Abdominal:      General: There is no distension.      Tenderness: There is no abdominal tenderness.   Musculoskeletal:      Cervical back: Normal range of motion.      Right lower leg: No edema.      Left lower leg: No edema.   Neurological:      Comments: Tremulous with totally incomprehensible, garbled speech       Medications  Current Facility-Administered Medications   Medication Dose Route Frequency Provider Last Rate Last Admin    thiamine (B-1) 500 mg in dextrose 5% 100 mL IVPB  500 mg Intravenous DAILY Carlos Cassidy M.D.        Followed by    [START ON 2/15/2023] thiamine (Vitamin B-1) tablet 100 mg  100 mg Oral DAILY Carlos Cassidy M.D.        dexmedetomidine (PRECEDEX) 400 mcg/100mL NS premix infusion  0.1-1 mcg/kg/hr (Ideal) Intravenous Continuous Carlos Cassidy M.D.        LORazepam (ATIVAN) injection 2 mg  2 mg Intravenous Q4HRS Carlos Cassidy M.D.        LORazepam (ATIVAN) injection 1-2 mg  1-2 mg Intravenous Q2HRS PRN Carlos Cassidy M.D.        piperacillin-tazobactam (Zosyn) 4.5 g in  mL IVPB  4.5 g Intravenous Once Carlos Cassidy M.D.        And    piperacillin-tazobactam (Zosyn) 4.5 g in  mL IVPB  4.5 g Intravenous Q8HRS Carlos Cassidy M.D.        lisinopril (PRINIVIL) tablet 20 mg  20 mg Oral Q DAY Heather Lubin D.O.   20 mg at 02/11/23 1702    dextrose 10 % BOLUS 25 g  25 g Intravenous Q15 MIN PRN Karan Baum M.D.        Respiratory Therapy Consult   Nebulization Continuous RT Johnson Reynolds M.D.        NS infusion   Intravenous Continuous Carlos Cassidy M.D. 175 mL/hr at 02/11/23 2034 Rate Change at 02/11/23 2034    hydrALAZINE  (APRESOLINE) injection 10 mg  10 mg Intravenous Q4HRS PRN Johnson Reynolds M.D.        multivitamin tablet 1 Tablet  1 Tablet Oral DAILY Johnson Reynolds M.D.   1 Tablet at 02/11/23 0624    And    folic acid (FOLVITE) tablet 1 mg  1 mg Oral DAILY Johnson Reynolds M.D.   1 mg at 02/11/23 0624       Fluids    Intake/Output Summary (Last 24 hours) at 2/12/2023 0042  Last data filed at 2/11/2023 1800  Gross per 24 hour   Intake --   Output 1100 ml   Net -1100 ml       Laboratory      Recent Labs     02/10/23  1144 02/10/23  2055 02/11/23  0311 02/11/23  1831   SODIUM 139  --  134* 130*   POTASSIUM 3.5*  --  3.7 3.6   CHLORIDE 98  --  94* 92*   CO2 29  --  27 24   BUN 9  --  10 10   CREATININE 0.64  --  0.43* 0.51   MAGNESIUM  --  1.8  --   --    PHOSPHORUS  --   --  2.1*  --    CALCIUM 7.5*  --  7.9* 8.4*     Recent Labs     02/10/23  1144 02/11/23  0311 02/11/23  1831   ALTSGPT 53* 58* 62*   ASTSGOT 92* 104* 136*   ALKPHOSPHAT 79 72 77   TBILIRUBIN 0.5 0.7 0.9   GAMMAGT  --   --  139*   LIPASE 40  --   --    GLUCOSE 104* 117* 84     Recent Labs     02/10/23  1144 02/11/23  0311 02/11/23  1831   WBC 8.2 5.3  --    NEUTSPOLYS 66.00  --   --    LYMPHOCYTES 26.20  --   --    MONOCYTES 6.60  --   --    EOSINOPHILS 0.40  --   --    BASOPHILS 0.20  --   --    ASTSGOT 92* 104* 136*   ALTSGPT 53* 58* 62*   ALKPHOSPHAT 79 72 77   TBILIRUBIN 0.5 0.7 0.9     Recent Labs     02/10/23  1144 02/11/23  0311   RBC 4.57* 4.17*   HEMOGLOBIN 15.4 13.9*   HEMATOCRIT 43.5 39.2*   PLATELETCT 53* 32*   PROTHROMBTM  --  13.8   INR  --  1.08       Imaging  CT:    CTA of pulmonary arteries on 2/11/2023 personally reviewed.  Scattered patchy opacities.    Assessment/Plan      Alcohol dependence with severe and worsening alcohol withdrawal delirium   Admitted with profound alcohol intoxication with BA of 0.453   Scheduled lorazepam   I am titrating dexmedetomidine and administering additional lorazepam based upon serial RASS  assessments   High-dose IV thiamine and supplemental vitamins    Positive blood cultures for gram-negative rods and gram-positive cocci   I am concerned that the gram-negative rods are a true pathogen - escalate ceftriaxone to Zosyn pending culture results   Source of gram-negative rods is not entirely clear - possible sources are genitourinary or gastrointestinal   Gram-positive cocci may represent contaminants    Rhabdomyolysis   IV fluid resuscitation    Thrombocytopenia   Suspect secondary to bone marrow suppression from alcohol abuse   Query splenic sequestration   Pharmacologic DVT prophylaxis contraindicated at this time    Prolonged QTc   Optimize magnesium   Avoid QT prolonging medications    History of hepatitis C   Check hepatitis panel and quantitative RNA    Primary hypertension   Goal SBP less than 160   Continue lisinopril    History of methamphetamine and cannabis abuse   Check urine drug screen    History of syphilis   Apparently treated in the past   Check T. pallidum antibody    History of prostate carcinoma    History of dyslipidemia      VTE:  Contraindicated  Ulcer: Not Indicated  Lines: Hutchinson Catheter  Ongoing indication addressed    I have performed a physical exam and reviewed and updated ROS and Plan today (2/12/2023). In review of yesterday's note (2/11/2023), there are no changes except as documented above.     I have assessed and reassessed his respiratory status, blood pressure, hemodynamics, cardiovascular status and neurologic status.  This gentleman has severe and worsening alcohol withdrawal delirium is at increased risk for worsening respiratory, cardiovascular and CNS system dysfunction.    Discussed patient condition and risk of morbidity and/or mortality with RN    The patient remains critically ill.  Critical care time = 105 minutes in directly providing and coordinating critical care and extensive data review.  No time overlap and excludes procedures.    A Critical Care  Medicine progress note may have been authored by a resident physician or advanced practitioner of nursing under my direct supervision on this date of service.  As the supervising and attending physician, I have either attested to or cosigned that document.  IN THE EVENT THAT DISCREPANCIES EXIST BETWEEN THIS DOCUMENT AND ANY DOCUMENT THAT I HAVE ATTESTED TO OR COSIGNED ON THIS DATE OF SERVICE, THEN THIS DOCUMENT REMAINS THE FINAL AUTHORITY AS TO MY ASSESSMENT AND PLAN REGARDING THE CARE OF THIS PATIENT.    Carlos Cassidy MD  Pulmonary and Critical Care Medicine

## 2023-02-12 NOTE — PROGRESS NOTES
Critical Care Progress Note    Date of admission  2/10/2023    Chief Complaint  58 y.o. male admitted 2/10/2023 with alcohol intoxication    Hospital Course  Mr. Gong is a 58 year old male with past medical history significant for alcohol dependence who continues to be dedicated drinker, prostate carcinoma, tobacco abuse, hepatitis C, primary hypertension, dyslipidemia, methamphetamine abuse, cannabis abuse, and syphilis who presented to the emergency department on 2/10/2023 with complaint of difficulty urinating and profound intoxication with a blood alcohol level of 0.453.  The patient was admitted to the hospital and placed on the CIWA protocol.  Blood cultures were obtained in the ER that were positive for gram-negative rods as well as possible Staphylococcus species which was negative for staph coccus aureus and MRSA by PCR.  He was started on ceftriaxone.  Unfortunately, the patient had escalating CIWA scores on 2/11 which necessitated a transfer to the ICU for higher level of care due to his worsening alcohol withdrawal delirium.    Interval Problem Update  Reviewed last 24 hour events:   - Precedex at 1.1   - ativan held   - RASS -4   - a/o x 0, not following   - afebrile   - SR 60-100s   - -150s   - minimal UOP, condom cath   - PIVs   - BM pta   - no RT issues   - SCDs   - day 1 of zosyn   - BCs: coag-neg staph, bacillus species   - platelets 39   - K 3.5   - Mg 1.8    Review of Systems  Review of Systems   Unable to perform ROS: Mental status change      Vital Signs for last 24 hours   Temp:  [36.7 °C (98 °F)-37.5 °C (99.5 °F)] 37.4 °C (99.4 °F)  Pulse:  [] 98  Resp:  [18-24] 24  BP: (111-155)/() 147/88  SpO2:  [91 %-97 %] 96 %    Hemodynamic parameters for last 24 hours       Respiratory Information for the last 24 hours       Physical Exam   Physical Exam  Vitals and nursing note reviewed.   Constitutional:       Appearance: He is ill-appearing.      Comments: Appears older than  stated age and chronically ill, quite disheveled   HENT:      Head: Normocephalic and atraumatic.      Right Ear: External ear normal.      Left Ear: External ear normal.      Nose: Nose normal. No rhinorrhea.   Eyes:      General: No scleral icterus.     Conjunctiva/sclera: Conjunctivae normal.      Pupils: Pupils are equal, round, and reactive to light.   Cardiovascular:      Rate and Rhythm: Normal rate and regular rhythm.      Heart sounds: Normal heart sounds. No murmur heard.  Pulmonary:      Breath sounds: Normal breath sounds. No wheezing.   Chest:      Chest wall: No tenderness.   Abdominal:      General: There is no distension.      Palpations: Abdomen is soft.      Tenderness: There is no abdominal tenderness. There is no guarding or rebound.   Musculoskeletal:         General: Normal range of motion.      Cervical back: Normal range of motion and neck supple.      Right lower leg: No edema.      Left lower leg: No edema.   Lymphadenopathy:      Cervical: No cervical adenopathy.   Skin:     General: Skin is warm and dry.      Capillary Refill: Capillary refill takes less than 2 seconds.      Findings: No rash.   Neurological:      General: No focal deficit present.      Sensory: No sensory deficit.      Motor: No weakness.      Comments: Sleeping like a baby, w/d to painful stimuli, not following commands   Psychiatric:      Comments: Unable to assess       Medications  Current Facility-Administered Medications   Medication Dose Route Frequency Provider Last Rate Last Admin    thiamine (B-1) 500 mg in dextrose 5% 100 mL IVPB  500 mg Intravenous DAILY Carlos Cassidy M.D. 200 mL/hr at 02/12/23 0301 500 mg at 02/12/23 0301    Followed by    [START ON 2/15/2023] thiamine (Vitamin B-1) tablet 100 mg  100 mg Oral DAILY Carlos Cassidy M.D.        dexmedetomidine (PRECEDEX) 400 mcg/100mL NS premix infusion  0.1-1.5 mcg/kg/hr (Ideal) Intravenous Continuous Carlos Cassidy M.D. 20.6 mL/hr at  02/12/23 0331 1 mcg/kg/hr at 02/12/23 0331    LORazepam (ATIVAN) injection 2 mg  2 mg Intravenous Q4HRS Carlos Cassidy M.D.   2 mg at 02/12/23 0238    LORazepam (ATIVAN) injection 1-2 mg  1-2 mg Intravenous Q2HRS PRN Carlos Cassidy M.D.   2 mg at 02/12/23 0336    piperacillin-tazobactam (Zosyn) 4.5 g in  mL IVPB  4.5 g Intravenous Q8HRS Carlos Cassidy M.D. 25 mL/hr at 02/12/23 0414 4.5 g at 02/12/23 0414    lisinopril (PRINIVIL) tablet 20 mg  20 mg Oral Q DAY Heather Lubin D.OAnam   20 mg at 02/11/23 1702    dextrose 10 % BOLUS 25 g  25 g Intravenous Q15 MIN PRN Karan Baum M.D.        Respiratory Therapy Consult   Nebulization Continuous RT Johnson Reynolds M.D.        NS infusion   Intravenous Continuous Carlos Cassidy M.D. 125 mL/hr at 02/12/23 0240 Rate Verify at 02/12/23 0240    hydrALAZINE (APRESOLINE) injection 10 mg  10 mg Intravenous Q4HRS PRN Johnson Reynolds M.D.        multivitamin tablet 1 Tablet  1 Tablet Oral DAILY Johnson Reynolds M.D.   1 Tablet at 02/11/23 0624    And    folic acid (FOLVITE) tablet 1 mg  1 mg Oral DAILY Johnson Reynolds M.D.   1 mg at 02/11/23 0624       Fluids    Intake/Output Summary (Last 24 hours) at 2/12/2023 0636  Last data filed at 2/11/2023 2300  Gross per 24 hour   Intake --   Output 1300 ml   Net -1300 ml       Laboratory      Recent Labs     02/11/23  1831 02/11/23  2219 02/12/23  0245   CPKTOTAL 1506* 1608* 1599*     Recent Labs     02/10/23  2055 02/11/23  0311 02/11/23  1831 02/12/23  0245   SODIUM  --  134* 130* 135   POTASSIUM  --  3.7 3.6 3.5*   CHLORIDE  --  94* 92* 99   CO2  --  27 24 24   BUN  --  10 10 9   CREATININE  --  0.43* 0.51 0.52   MAGNESIUM 1.8  --   --  1.8   PHOSPHORUS  --  2.1*  --  2.5   CALCIUM  --  7.9* 8.4* 8.1*     Recent Labs     02/10/23  1144 02/11/23  0311 02/11/23  1831 02/12/23  0245   ALTSGPT 53* 58* 62* 80*   ASTSGOT 92* 104* 136* 206*   ALKPHOSPHAT 79 72 77 77   TBILIRUBIN 0.5  0.7 0.9 0.8   GAMMAGT  --   --  139*  --    LIPASE 40  --   --   --    GLUCOSE 104* 117* 84 85     Recent Labs     02/10/23  1144 02/11/23 0311 02/11/23  1831 02/12/23  0245   WBC 8.2 5.3  --  4.3*   NEUTSPOLYS 66.00  --   --  69.00   LYMPHOCYTES 26.20  --   --  20.10*   MONOCYTES 6.60  --   --  7.90   EOSINOPHILS 0.40  --   --  1.40   BASOPHILS 0.20  --   --  0.20   ASTSGOT 92* 104* 136* 206*   ALTSGPT 53* 58* 62* 80*   ALKPHOSPHAT 79 72 77 77   TBILIRUBIN 0.5 0.7 0.9 0.8     Recent Labs     02/10/23  1144 02/11/23 0311 02/12/23  0245   RBC 4.57* 4.17* 4.06*   HEMOGLOBIN 15.4 13.9* 13.5*   HEMATOCRIT 43.5 39.2* 38.4*   PLATELETCT 53* 32* 39*   PROTHROMBTM  --  13.8 14.5   INR  --  1.08 1.14*       Imaging  CTA chest:  1.  No large central pulmonary embolus is appreciated, evaluation of the subsegmental branches is essentially nondiagnostic due to motion artifacts. Additional imaging would be required for definitive exclusion of small distal pulmonary emboli.  2.  Scattered hazy pulmonary opacities suggests subtle infiltrates.  3.  Hepatomegaly and diffuse hepatic steatosis  4.  Irregular hepatic contour favoring changes of cirrhosis  5.  Atherosclerosis and atherosclerotic coronary artery disease.    Assessment/Plan    Alcohol dependence with severe and worsening alcohol withdrawal delirium              Admitted with profound alcohol intoxication with BA of 0.453              Scheduled lorazepam              I am titrating dexmedetomidine and administering additional lorazepam based upon a RASS goal of -1 to +1              High-dose IV thiamine and supplemental vitamins     Positive blood cultures for gram-negative rods and gram-positive cocci              I am concerned that the gram-negative rods are a true pathogen - cont Zosyn pending culture results              Source of gram-negative rods is not entirely clear - possible sources are genitourinary or gastrointestinal, ? aspiration               Gram-positive cocci may represent contaminants     Rhabdomyolysis             Cont IVF resuscitation     Thrombocytopenia              Suspect secondary to bone marrow suppression from alcohol abuse              Query splenic sequestration              Pharmacologic DVT prophylaxis contraindicated at this time     Prolonged QTc              Optimize magnesium              Avoid QT prolonging medications     History of hepatitis C              Check hepatitis panel and quantitative RNA     Primary hypertension              Goal SBP less than 160              Continue lisinopril     History of methamphetamine and cannabis abuse              Check urine drug screen     History of syphilis              Apparently treated in the past              Positive treponemal ab-->reflex RPR titer     History of prostate carcinoma     History of dyslipidemia    VTE:  Contraindicated  Ulcer: Not Indicated  Lines:  PIVs    I have performed a physical exam and reviewed and updated ROS and Plan today (2/12/2023). In review of yesterday's note (2/11/2023), there are no changes except as documented above.     Discussed patient condition and risk of morbidity and/or mortality with RN, RT, Pharmacy, , and Charge nurse / hot rounds    The patient remains critically ill requiring active titration of Precedex infusion for RASS goals of -1 to +1.  I have assessed and reassessed this patient's hemodynamics, respiratory status, airway, and neurological status.  The patient remains at high risk of clinical deterioration, worsening vital organ dysfunction, and death without the above critical care interventions.    Additional critical care time to Dr. Cassidy from earlier today = 95 minutes in directly providing and coordinating critical care and extensive data review.  No time overlap and excludes procedures.

## 2023-02-12 NOTE — CARE PLAN
The patient is Watcher - Medium risk of patient condition declining or worsening    Shift Goals  Clinical Goals: Safety  Patient Goals: ATUL  Family Goals: ATUL    Progress made toward(s) clinical / shift goals:      Problem: Safety - Medical Restraint  Goal: Remains free of injury from restraints (Restraint for Interference with Medical Device)  Outcome: Progressing     Problem: Pain - Standard  Goal: Alleviation of pain or a reduction in pain to the patient’s comfort goal  Outcome: Progressing       Patient is not progressing towards the following goals:

## 2023-02-12 NOTE — PROGRESS NOTES
[facility] Progress Note      Admit Date: 2/10/2023    Resident(s): Devan Spence M.D.   Attending:  LESVIA BROOKS/ Dr. Silveira    Patient ID:    Name:  Chace Gong     YOB: 1965  Age:  58 y.o.  male   MRN:  [id number]    Hospital Course:  Mr. Gong is our 58 year old male patient with a past medical history significant for prostate carcinoma, tobacco abuse, hepatitis C, primary hypertension, dyslipidemia, methamphetamine abuse, cannabis abuse and syphilis who presented 2/10 with profound intoxication with a blood alcohol level of 0.453. Placed on CIWA protocol. Blood culture obtained on presentation is positive for gram-negative rods as well as possible Staphylococcus species which is negative for Staphylococcus aureus and MRSA by PCR. Started on Ceftriaxone. CIWA scores escalated on the floor (required 16mg Lorazepam) and was upgraded to CCU for concern for worsening alcohol withdrawal delirium.    Consultants:  Critical Care    Interval Events:  No acute overnight events  Prexedex gtt for sedation  Responds to verbal and withdraws to pain  SBP 120s-150s  CPK elevated  Zosyn  RPR pending    Vitals Range last 24h:  Temp:  [36.7 °C (98.1 °F)-37.5 °C (99.5 °F)] 37.4 °C (99.4 °F)  Pulse:  [] 65  Resp:  [18-33] 19  BP: (111-156)/() 152/104  SpO2:  [90 %-99 %] 99 %      Intake/Output Summary (Last 24 hours) at 2/12/2023 1036  Last data filed at 2/12/2023 0600  Gross per 24 hour   Intake 241.54 ml   Output 1550 ml   Net -1308.46 ml        ROS   Unable to perform ROS: Acuity of condition    PHYSICAL EXAM:  Vitals:    02/12/23 0600 02/12/23 0700 02/12/23 0800 02/12/23 0900   BP: 115/79 (!) 156/106 125/84 (!) 152/104   Pulse: 74 65 63 65   Resp: (!) 23 (!) 28 (!) 24 19   Temp:       TempSrc:       SpO2: 94% 96% 91% 99%   Weight:       Height:        Body mass index is 29.49 kg/m².    O2 therapy: Pulse Oximetry: 99 %, O2 (LPM): 2, O2 Delivery Device: Silicone Nasal Cannula         Physical  Exam  Constitutional:       Comments: Disheveled gentleman with soiled drawers pulled down around his upper thighs   HENT:      Head: Normocephalic.      Mouth/Throat:      Mouth: Mucous membranes are dry.   Eyes:      Pupils: Pupils are equal, round, and reactive to light.   Cardiovascular:      Comments: Sinus tachycardia  Pulmonary:      Breath sounds: Rales (Coarse crackles bilaterally) present. No wheezing.   Abdominal:      General: There is no distension.      Tenderness: There is no abdominal tenderness.   Musculoskeletal:      Cervical back: Normal range of motion.      Right lower leg: No edema.      Left lower leg: No edema.   Neurological:      Comments: Somnolent at bedside        Recent Labs     02/10/23  2055 02/11/23  0311 02/11/23  1831 02/12/23  0245   SODIUM  --  134* 130* 135   POTASSIUM  --  3.7 3.6 3.5*   CHLORIDE  --  94* 92* 99   CO2  --  27 24 24   BUN  --  10 10 9   CREATININE  --  0.43* 0.51 0.52   MAGNESIUM 1.8  --   --  1.8   PHOSPHORUS  --  2.1*  --  2.5   CALCIUM  --  7.9* 8.4* 8.1*     Recent Labs     02/10/23  1144 02/11/23  0311 02/11/23  1831 02/12/23  0245   ALTSGPT 53* 58* 62* 80*   ASTSGOT 92* 104* 136* 206*   ALKPHOSPHAT 79 72 77 77   TBILIRUBIN 0.5 0.7 0.9 0.8   GAMMAGT  --   --  139*  --    LIPASE 40  --   --   --    GLUCOSE 104* 117* 84 85     Recent Labs     02/10/23  1144 02/11/23  0311 02/12/23  0245   RBC 4.57* 4.17* 4.06*   HEMOGLOBIN 15.4 13.9* 13.5*   HEMATOCRIT 43.5 39.2* 38.4*   PLATELETCT 53* 32* 39*   PROTHROMBTM  --  13.8 14.5   INR  --  1.08 1.14*     Recent Labs     02/10/23  1144 02/11/23  0311 02/11/23  1831 02/12/23  0245   WBC 8.2 5.3  --  4.3*   NEUTSPOLYS 66.00  --   --  69.00   LYMPHOCYTES 26.20  --   --  20.10*   MONOCYTES 6.60  --   --  7.90   EOSINOPHILS 0.40  --   --  1.40   BASOPHILS 0.20  --   --  0.20   ASTSGOT 92* 104* 136* 206*   ALTSGPT 53* 58* 62* 80*   ALKPHOSPHAT 79 72 77 77   TBILIRUBIN 0.5 0.7 0.9 0.8       Meds:   thiamine  500 mg 500 mg  (02/12/23 0301)    Followed by    [START ON 2/15/2023] thiamine  100 mg      dexmedetomidine (Precedex) infusion  0.1-1.5 mcg/kg/hr (Ideal) 1.1 mcg/kg/hr (02/12/23 0602)    LORazepam  2 mg      LORazepam  1-2 mg      piperacillin-tazobactam  4.5 g Stopped (02/12/23 0814)    MD Alert...Adult ICU Electrolyte Replacement per Pharmacy        PEDS potassium chloride (KCL-PERIPHERAL) IV  10 mEq      magnesium sulfate  2 g      lisinopril  20 mg      dextrose bolus  25 g      Respiratory Therapy Consult        NS   125 mL/hr at 02/12/23 0240    hydrALAZINE  10 mg      multivitamin  1 Tablet      And    folic acid  1 mg          Imaging:  DX-CHEST-PORTABLE (1 VIEW)   Final Result         1.  No acute cardiopulmonary disease.      CT-CTA CHEST PULMONARY ARTERY W/ RECONS   Final Result         1.  No large central pulmonary embolus is appreciated, evaluation of the subsegmental branches is essentially nondiagnostic due to motion artifacts. Additional imaging would be required for definitive exclusion of small distal pulmonary emboli.   2.  Scattered hazy pulmonary opacities suggests subtle infiltrates.   3.  Hepatomegaly and diffuse hepatic steatosis   4.  Irregular hepatic contour favoring changes of cirrhosis   5.  Atherosclerosis and atherosclerotic coronary artery disease.      DX-CHEST-PORTABLE (1 VIEW)   Final Result      1.  Questionable nonspecific right hilar/perihilar prominence of uncertain etiology or significance.   2.  No definite consolidation or pleural effusion.          ASSESSEMENT and PLAN:    #Alcohol dependence  #Alcohol withdrawal delirium  -Admitted with alcohol intoxication; BA 0.453  -[facility] protocol; scheduled Lorazepam  -C/w IV Thiamine and Multivitamins    #Positive Blood cultures  -Blood cultures positive for gram negative rods and gram positive cocci  -C/w Zosyn  -Source unkown at this time    #Rhabdomyolysis  -C/w IVF resuscitation    #Thrombocytopenia  -Likely in the setting of chronic  alcoholism  -Bone marrow suppression  -DVT ppx pharmacologic contraindicated    #Prolonged Qtc  -Replete lytes; Mg  -Avoid QT prolonging medications    #History of Hepatitis C  -Hepatitis C antibody reactive    #Hypertension  -C/w Lisinopril  -Goal SBP <160    #History of Syphilis  -Reflex to RPR pending    Devan Spence M.D.

## 2023-02-12 NOTE — CARE PLAN
The patient is Unstable - High likelihood or risk of patient condition declining or worsening    Shift Goals  Clinical Goals: CIWA, Safety  Patient Goals: ATUL  Family Goals: ATUL    Progress made toward(s) clinical / shift goals:    Problem: Seizure Precautions  Goal: Implementation of seizure precautions  Outcome: Progressing  Note: Seizure precautions remain in place       Patient is not progressing towards the following goals:      Problem: Optimal Care for Alcohol Withdrawal  Goal: Optimal Care for the alcohol withdrawal patient  Outcome: Not Progressing  Note: Pt CIWA scores continue to increase     Problem: Psychosocial  Goal: Patient's level of anxiety will decrease  Outcome: Not Progressing  Note: Pt still highly anxious/agitated

## 2023-02-12 NOTE — HOSPITAL COURSE
Mr. Gong is a 58 year old male with past medical history significant for alcohol dependence who continues to be dedicated drinker, prostate carcinoma, tobacco abuse, hepatitis C, primary hypertension, dyslipidemia, methamphetamine abuse, cannabis abuse, and syphilis who presented to the emergency department on 2/10/2023 with complaint of difficulty urinating and profound intoxication with a blood alcohol level of 0.453.  The patient was admitted to the hospital and placed on the CIWA protocol.  Blood cultures were obtained in the ER that were positive for gram-negative rods as well as possible Staphylococcus species which was negative for staph coccus aureus and MRSA by PCR.  He was started on ceftriaxone.  Unfortunately, the patient had escalating CIWA scores on 2/11 which necessitated a transfer to the ICU for higher level of care due to his worsening alcohol withdrawal delirium.

## 2023-02-12 NOTE — PROGRESS NOTES
Pt A&O to self only at times. Other times Disoriented x4. Pt CIWA scores high since beginning of shift: 23, 26, 27, 29, 30. Having continuous auditory and visual hallucinations. Medicated with PRN Ativan multiple times with no response. CIWA scores continue to rise. MD notified and at the bedside. Pt transferred to ICU for higher level of care.

## 2023-02-12 NOTE — PROGRESS NOTES
Pt transported to T613 by [facility] RN. Assumed pt care. Pt A&O-0, restless and agitated. VSS, all lines and drips verified. Fall precautions in place.

## 2023-02-13 PROBLEM — R13.10 DYSPHAGIA: Status: ACTIVE | Noted: 2023-02-13

## 2023-02-13 PROBLEM — I16.0 HYPERTENSIVE URGENCY: Status: ACTIVE | Noted: 2023-02-13

## 2023-02-13 LAB
ALBUMIN SERPL BCP-MCNC: 3.5 G/DL (ref 3.2–4.9)
ALBUMIN/GLOB SERPL: 1.1 G/DL
ALP SERPL-CCNC: 80 U/L (ref 30–99)
ALT SERPL-CCNC: 78 U/L (ref 2–50)
ANION GAP SERPL CALC-SCNC: 15 MMOL/L (ref 7–16)
AST SERPL-CCNC: 130 U/L (ref 12–45)
BASOPHILS # BLD AUTO: 0.4 % (ref 0–1.8)
BASOPHILS # BLD: 0.02 K/UL (ref 0–0.12)
BILIRUB SERPL-MCNC: 0.8 MG/DL (ref 0.1–1.5)
BUN SERPL-MCNC: 12 MG/DL (ref 8–22)
CALCIUM ALBUM COR SERPL-MCNC: 8.7 MG/DL (ref 8.5–10.5)
CALCIUM SERPL-MCNC: 8.3 MG/DL (ref 8.5–10.5)
CHLORIDE SERPL-SCNC: 104 MMOL/L (ref 96–112)
CK SERPL-CCNC: 686 U/L (ref 0–154)
CO2 SERPL-SCNC: 19 MMOL/L (ref 20–33)
CREAT SERPL-MCNC: 0.62 MG/DL (ref 0.5–1.4)
EOSINOPHIL # BLD AUTO: 0.11 K/UL (ref 0–0.51)
EOSINOPHIL NFR BLD: 2.4 % (ref 0–6.9)
ERYTHROCYTE [DISTWIDTH] IN BLOOD BY AUTOMATED COUNT: 52.9 FL (ref 35.9–50)
GFR SERPLBLD CREATININE-BSD FMLA CKD-EPI: 111 ML/MIN/1.73 M 2
GLOBULIN SER CALC-MCNC: 3.2 G/DL (ref 1.9–3.5)
GLUCOSE SERPL-MCNC: 82 MG/DL (ref 65–99)
HCT VFR BLD AUTO: 40.9 % (ref 42–52)
HGB BLD-MCNC: 13.8 G/DL (ref 14–18)
IMM GRANULOCYTES # BLD AUTO: 0.06 K/UL (ref 0–0.11)
IMM GRANULOCYTES NFR BLD AUTO: 1.3 % (ref 0–0.9)
INR PPP: 1.21 (ref 0.87–1.13)
LYMPHOCYTES # BLD AUTO: 1.17 K/UL (ref 1–4.8)
LYMPHOCYTES NFR BLD: 25.1 % (ref 22–41)
MAGNESIUM SERPL-MCNC: 1.9 MG/DL (ref 1.5–2.5)
MCH RBC QN AUTO: 32.7 PG (ref 27–33)
MCHC RBC AUTO-ENTMCNC: 33.7 G/DL (ref 33.7–35.3)
MCV RBC AUTO: 96.9 FL (ref 81.4–97.8)
MONOCYTES # BLD AUTO: 0.42 K/UL (ref 0–0.85)
MONOCYTES NFR BLD AUTO: 9 % (ref 0–13.4)
NEUTROPHILS # BLD AUTO: 2.89 K/UL (ref 1.82–7.42)
NEUTROPHILS NFR BLD: 61.8 % (ref 44–72)
NRBC # BLD AUTO: 0.02 K/UL
NRBC BLD-RTO: 0.4 /100 WBC
PHOSPHATE SERPL-MCNC: 3.2 MG/DL (ref 2.5–4.5)
PLATELET # BLD AUTO: 55 K/UL (ref 164–446)
PMV BLD AUTO: 10.6 FL (ref 9–12.9)
POTASSIUM SERPL-SCNC: 3.7 MMOL/L (ref 3.6–5.5)
PROT SERPL-MCNC: 6.7 G/DL (ref 6–8.2)
PROTHROMBIN TIME: 15.1 SEC (ref 12–14.6)
RBC # BLD AUTO: 4.22 M/UL (ref 4.7–6.1)
SODIUM SERPL-SCNC: 138 MMOL/L (ref 135–145)
WBC # BLD AUTO: 4.7 K/UL (ref 4.8–10.8)

## 2023-02-13 PROCEDURE — 700111 HCHG RX REV CODE 636 W/ 250 OVERRIDE (IP): Performed by: INTERNAL MEDICINE

## 2023-02-13 PROCEDURE — 700102 HCHG RX REV CODE 250 W/ 637 OVERRIDE(OP)

## 2023-02-13 PROCEDURE — 80053 COMPREHEN METABOLIC PANEL: CPT

## 2023-02-13 PROCEDURE — A9270 NON-COVERED ITEM OR SERVICE: HCPCS | Performed by: HOSPITALIST

## 2023-02-13 PROCEDURE — 93005 ELECTROCARDIOGRAM TRACING: CPT | Performed by: INTERNAL MEDICINE

## 2023-02-13 PROCEDURE — 700105 HCHG RX REV CODE 258: Performed by: INTERNAL MEDICINE

## 2023-02-13 PROCEDURE — 83735 ASSAY OF MAGNESIUM: CPT

## 2023-02-13 PROCEDURE — 87040 BLOOD CULTURE FOR BACTERIA: CPT

## 2023-02-13 PROCEDURE — 92610 EVALUATE SWALLOWING FUNCTION: CPT

## 2023-02-13 PROCEDURE — 700111 HCHG RX REV CODE 636 W/ 250 OVERRIDE (IP): Performed by: HOSPITALIST

## 2023-02-13 PROCEDURE — 700101 HCHG RX REV CODE 250: Performed by: STUDENT IN AN ORGANIZED HEALTH CARE EDUCATION/TRAINING PROGRAM

## 2023-02-13 PROCEDURE — 700105 HCHG RX REV CODE 258: Performed by: HOSPITALIST

## 2023-02-13 PROCEDURE — 700102 HCHG RX REV CODE 250 W/ 637 OVERRIDE(OP): Performed by: STUDENT IN AN ORGANIZED HEALTH CARE EDUCATION/TRAINING PROGRAM

## 2023-02-13 PROCEDURE — 99233 SBSQ HOSP IP/OBS HIGH 50: CPT | Performed by: HOSPITALIST

## 2023-02-13 PROCEDURE — 700101 HCHG RX REV CODE 250: Performed by: INTERNAL MEDICINE

## 2023-02-13 PROCEDURE — A9270 NON-COVERED ITEM OR SERVICE: HCPCS

## 2023-02-13 PROCEDURE — 84100 ASSAY OF PHOSPHORUS: CPT

## 2023-02-13 PROCEDURE — 700102 HCHG RX REV CODE 250 W/ 637 OVERRIDE(OP): Performed by: HOSPITALIST

## 2023-02-13 PROCEDURE — 85610 PROTHROMBIN TIME: CPT

## 2023-02-13 PROCEDURE — A9270 NON-COVERED ITEM OR SERVICE: HCPCS | Performed by: STUDENT IN AN ORGANIZED HEALTH CARE EDUCATION/TRAINING PROGRAM

## 2023-02-13 PROCEDURE — 770000 HCHG ROOM/CARE - INTERMEDIATE ICU *

## 2023-02-13 PROCEDURE — 87077 CULTURE AEROBIC IDENTIFY: CPT

## 2023-02-13 PROCEDURE — 82550 ASSAY OF CK (CPK): CPT

## 2023-02-13 PROCEDURE — 85025 COMPLETE CBC W/AUTO DIFF WBC: CPT

## 2023-02-13 PROCEDURE — 92612 ENDOSCOPY SWALLOW (FEES) VID: CPT

## 2023-02-13 RX ORDER — CLONIDINE HYDROCHLORIDE 0.1 MG/1
0.1 TABLET ORAL EVERY 6 HOURS PRN
Status: DISCONTINUED | OUTPATIENT
Start: 2023-02-13 | End: 2023-02-13

## 2023-02-13 RX ORDER — CLONIDINE 0.1 MG/24H
1 PATCH, EXTENDED RELEASE TRANSDERMAL
Status: DISCONTINUED | OUTPATIENT
Start: 2023-02-13 | End: 2023-02-14

## 2023-02-13 RX ORDER — ACETAMINOPHEN 325 MG/1
650 TABLET ORAL EVERY 4 HOURS PRN
Status: DISCONTINUED | OUTPATIENT
Start: 2023-02-13 | End: 2023-02-16 | Stop reason: HOSPADM

## 2023-02-13 RX ORDER — ENALAPRILAT 1.25 MG/ML
1.25 INJECTION INTRAVENOUS EVERY 6 HOURS PRN
Status: DISCONTINUED | OUTPATIENT
Start: 2023-02-13 | End: 2023-02-16 | Stop reason: HOSPADM

## 2023-02-13 RX ORDER — MAGNESIUM SULFATE HEPTAHYDRATE 40 MG/ML
2 INJECTION, SOLUTION INTRAVENOUS ONCE
Status: COMPLETED | OUTPATIENT
Start: 2023-02-13 | End: 2023-02-13

## 2023-02-13 RX ORDER — POTASSIUM CHLORIDE 7.45 MG/ML
10 INJECTION INTRAVENOUS
Status: DISPENSED | OUTPATIENT
Start: 2023-02-13 | End: 2023-02-13

## 2023-02-13 RX ADMIN — ENALAPRILAT 1.25 MG: 1.25 INJECTION INTRAVENOUS at 19:35

## 2023-02-13 RX ADMIN — LABETALOL HYDROCHLORIDE 10 MG: 5 INJECTION, SOLUTION INTRAVENOUS at 00:08

## 2023-02-13 RX ADMIN — LORAZEPAM 2 MG: 2 INJECTION INTRAMUSCULAR; INTRAVENOUS at 23:04

## 2023-02-13 RX ADMIN — PIPERACILLIN AND TAZOBACTAM 4.5 G: 4; .5 INJECTION, POWDER, LYOPHILIZED, FOR SOLUTION INTRAVENOUS; PARENTERAL at 05:14

## 2023-02-13 RX ADMIN — LORAZEPAM 2 MG: 2 INJECTION INTRAMUSCULAR; INTRAVENOUS at 05:11

## 2023-02-13 RX ADMIN — THIAMINE HYDROCHLORIDE 500 MG: 100 INJECTION, SOLUTION INTRAMUSCULAR; INTRAVENOUS at 10:06

## 2023-02-13 RX ADMIN — LORAZEPAM 2 MG: 2 INJECTION INTRAMUSCULAR; INTRAVENOUS at 14:56

## 2023-02-13 RX ADMIN — LORAZEPAM 2 MG: 2 INJECTION INTRAMUSCULAR; INTRAVENOUS at 01:16

## 2023-02-13 RX ADMIN — CLONIDINE 1 PATCH: 0.1 PATCH TRANSDERMAL at 19:35

## 2023-02-13 RX ADMIN — LABETALOL HYDROCHLORIDE 10 MG: 5 INJECTION, SOLUTION INTRAVENOUS at 15:01

## 2023-02-13 RX ADMIN — LISINOPRIL 20 MG: 20 TABLET ORAL at 05:11

## 2023-02-13 RX ADMIN — LABETALOL HYDROCHLORIDE 10 MG: 5 INJECTION, SOLUTION INTRAVENOUS at 20:51

## 2023-02-13 RX ADMIN — HYDRALAZINE HYDROCHLORIDE 10 MG: 20 INJECTION INTRAMUSCULAR; INTRAVENOUS at 16:59

## 2023-02-13 RX ADMIN — MAGNESIUM SULFATE HEPTAHYDRATE 2 G: 40 INJECTION, SOLUTION INTRAVENOUS at 09:59

## 2023-02-13 RX ADMIN — HYDRALAZINE HYDROCHLORIDE 10 MG: 20 INJECTION INTRAMUSCULAR; INTRAVENOUS at 23:04

## 2023-02-13 RX ADMIN — LORAZEPAM 2 MG: 2 INJECTION INTRAMUSCULAR; INTRAVENOUS at 19:36

## 2023-02-13 RX ADMIN — PIPERACILLIN AND TAZOBACTAM 4.5 G: 4; .5 INJECTION, POWDER, LYOPHILIZED, FOR SOLUTION INTRAVENOUS; PARENTERAL at 13:04

## 2023-02-13 RX ADMIN — FOLIC ACID 1 MG: 1 TABLET ORAL at 05:11

## 2023-02-13 RX ADMIN — DEXMEDETOMIDINE 0.4 MCG/KG/HR: 200 INJECTION, SOLUTION INTRAVENOUS at 03:17

## 2023-02-13 RX ADMIN — SODIUM CHLORIDE: 9 INJECTION, SOLUTION INTRAVENOUS at 22:32

## 2023-02-13 RX ADMIN — LABETALOL HYDROCHLORIDE 10 MG: 5 INJECTION, SOLUTION INTRAVENOUS at 16:04

## 2023-02-13 RX ADMIN — PIPERACILLIN AND TAZOBACTAM 4.5 G: 4; .5 INJECTION, POWDER, LYOPHILIZED, FOR SOLUTION INTRAVENOUS; PARENTERAL at 20:52

## 2023-02-13 RX ADMIN — SODIUM CHLORIDE: 9 INJECTION, SOLUTION INTRAVENOUS at 12:42

## 2023-02-13 RX ADMIN — ACETAMINOPHEN 650 MG: 325 TABLET, FILM COATED ORAL at 01:12

## 2023-02-13 RX ADMIN — THERA TABS 1 TABLET: TAB at 05:11

## 2023-02-13 RX ADMIN — ACETAMINOPHEN 650 MG: 325 TABLET, FILM COATED ORAL at 06:25

## 2023-02-13 RX ADMIN — SODIUM CHLORIDE: 9 INJECTION, SOLUTION INTRAVENOUS at 03:16

## 2023-02-13 RX ADMIN — LABETALOL HYDROCHLORIDE 10 MG: 5 INJECTION, SOLUTION INTRAVENOUS at 14:02

## 2023-02-13 ASSESSMENT — FIBROSIS 4 INDEX
FIB4 SCORE: 12.4
FIB4 SCORE: 33.66

## 2023-02-13 ASSESSMENT — PAIN DESCRIPTION - PAIN TYPE
TYPE: ACUTE PAIN

## 2023-02-13 ASSESSMENT — ENCOUNTER SYMPTOMS
ABDOMINAL PAIN: 0
SPEECH CHANGE: 1
NAUSEA: 0
FEVER: 0

## 2023-02-13 ASSESSMENT — LIFESTYLE VARIABLES: SUBSTANCE_ABUSE: 1

## 2023-02-13 NOTE — CARE PLAN
The patient is Stable - Low risk of patient condition declining or worsening    Shift Goals  Clinical Goals:  (RASS: 0)  Patient Goals:  (water)  Family Goals: ATUL    Progress made toward(s) clinical / shift goals:  RASS: 0    Patient is not progressing towards the following goals: patient failed a swallow evaluation. We are awaiting FEES results so patient has not been cleared for water yet.        Problem: Optimal Care for Alcohol Withdrawal  Goal: Optimal Care for the alcohol withdrawal patient  Outcome: Progressing  Note: Patient has been weaned off of a precedex gtt has had had a RASS of 0.

## 2023-02-13 NOTE — PROGRESS NOTES
Patient failed his swallow evaluation and needs a FEES. Ativan to be held until after FEES so that patient is not too sleepy; this order was received by Dr. Shepard. Orders verified and implemented.

## 2023-02-13 NOTE — THERAPY
"Speech Language Pathology   Clinical Swallow Evaluation     Patient Name: Chace Gong  AGE:  58 y.o., SEX:  male  Medical Record #: [id number]  Today's Date: 2023     Precautions  Precautions: Fall Risk, Swallow Precautions ( See Comments)    HPI: 58 male who presented 2/10/2012 for difficult urinating and acute alcohol intoxication. Inconsistent historian upon admission. Transferred to ICU on  for worsening delirium. No hx SLP in Epic.    CMHx: Alcohol withdrawal with delirium, respiratory failure, acute intoxication  PMHx: BPH, alcohol use, smoking, prostate cancer    Ct Chest/Pulm :  \"1.  No large central pulmonary embolus is appreciated, evaluation of the subsegmental branches is essentially nondiagnostic due to motion artifacts. Additional imaging would be required for definitive exclusion of small distal pulmonary emboli.  2.  Scattered hazy pulmonary opacities suggests subtle infiltrates.  3.  Hepatomegaly and diffuse hepatic steatosis  4.  Irregular hepatic contour favoring changes of cirrhosis  5.  Atherosclerosis and atherosclerotic coronary artery disease.\"    Level of Consciousness: Alert, Awake  Affect/Behavior: Calm, Cooperative  Follows Directives: Yes - simple commands only  Orientation: Self, , General place, Current year  Hearing: Functional hearing  Vision: Functional vision      Prior Living Situation & Level of Function:  Reports strained vocal quality and congested cough are both baseline. Reports \"I should wear oxygen at home\" but does not. RG/TN diet at baseline, does report acid reflux but does not take prescribed medications.       Oral Mechanism Evaluation  Facial Symmetry: Equal  Facial Sensation: Equal  Labial Observations: WFL  Lingual Observations: Midline  Dentition: Good  Comments:      Voice  Quality: Strain  Resonance: WFL  Intensity: Alternating  Cough: Congested but not productive  Comments: Pt reports that strained vocal quality is baseline for him. When asked if " "he had previous seen an ENT, pt stated \"Oh yeah they put a camera in my nose and snipped snipped around there.\"     States cough is baseline and intermittently productive at home: \"Oh yeah I'm always coughing like that.\"      Current Method of Nutrition   NPO until cleared by speech pathology    From RN note 8:02 AM:   \"Received report from YAEL Gonzales. Assumed care of patient. Lines and gtts verified. Per report received, patient coughed when taking his medications this AM and his lungs sound \"junky.\"      Dr. Jorge at bedside. Discussed with him that per report received, patient coughed when receiving his medications this AM. \"    Subjective  Pt agreeable and cooperative with SLP evaluation tasks, albeit confused. Has been asking DO and RN for water. \"Oh I have been waiting for this water they didn't give it to me all night.\"       Assessment  Positioning: Bed - Chair Position  Bolus Administration: Patient  Some difficulty with self-feed, reported \"Why am I so much weaker right now?\"  Oxygen Requirements:  2 L Nasal Cannula  Factor(s) Affecting Performance: None    Swallowing Trials  Ice: Impaired  Thin Liquid (TN0): Impaired  Pureed (PU4): Impaired  Easy to Chew (EC7): Impaired    Comments:  Pt w/ impaired self-feeding; difficulty with hand to mouth, reported weakness not his baseline, and demonstrated very small self-collected boluses. Impaired bolus stripping, containment WFL.  Presumed total AP transit and effective bolus formation. Functional mastication of solid consistencies. Throat clear following ice chips and congested cough, both immediate and delayed, with TN0 and PU4. Cough was not productive although pt did request to blow nose several times throughout eval. Congested cough was present prior to PO but did increase in incidence during and after trials. Vocal quality consistent but was strained at baseline, which is also concerning for possible impairments in airway protection.    Pt verbalized " "agreement to FEES procedure (\"Yeah you can give me milk on the test\") but does not have clear sustained learning evidence.       Clinical Impressions  Pt presents with clinical signs of possible oropharyngeal dysphagia and possible airway invasion. Congested cough and strained vocal quality are both of concern, as is new reported weakness, confusion, and reduced mobility. Hx reflux with delayed cough is also of concern. Would recommend FEES to further evaluate swallow prior to initiation of PO.      Recommendations  1.  NPO pending FEES  2.  Instrumentation: FEES  3. Okay for ice chips w/ RN in interim.     Discussed holding Ativan until after FEES with RN and DO. Dr. Shepard verbalized agreement to ensure adequate participation in study.       Plan    Recommend Speech Therapy 3 times per week until therapy goals are met for the following treatments:  Dysphagia Training and Patient / Family / Caregiver Education.    Discharge Recommendations: Other (pending results of FEES)       Objective   02/13/23 1040   Initial Contact Note    Initial Contact Note  Order Received and Verified, Speech Therapy Evaluation in Progress with Full Report to Follow.   Vitals   Pulse Oximetry 92 %   O2 (LPM) 2   O2 Delivery Device Silicone Nasal Cannula   Prior Level Of Function   Communication Impaired   Swallow Impaired   Dentition Intact   Dentures None   Patient's Primary Language English   Dysphagia Rating   Nutritional Liquid Intake Rating Scale Nothing by mouth   Nutritional Food Intake Rating Scale Nothing by mouth   Patient / Family Goals   Patient / Family Goal #1 \"I have been waiting for that water\"   Short Term Goals   Short Term Goal # 1 Pt will complete FEES with SLP to further evaluate swallow and inform POC.   Education Group   Education Provided Dysphagia;Role of Speech Therapy   Dysphagia Patient Response Patient;Acceptance;Explanation;Verbal Demonstration;Reinforcement Needed;Action Demonstration   Role of SLP Patient " Response Patient;Acceptance;Explanation;Verbal Demonstration;Reinforcement Needed;Action Demonstration   Additional Comments pt agreeable with FEES but will need reinforcement   Problem List   Problem List Dysphagia;Voice Quality Deficit   Interdisciplinary Plan of Care Collaboration   IDT Collaboration with  Nursing;Speech Therapist;Physician   Collaboration Comments RN, MD updated

## 2023-02-13 NOTE — PROGRESS NOTES
Dr. Shepard at bedside. Discussed with him that patient is having level 10 left sided flank pain but only had oral pain medications available. Dr. Shepard assessed patient. Patient is constantly asking for water; ice chips provided while waiting for a swallow evaluation.

## 2023-02-13 NOTE — PROGRESS NOTES
"Received report from YAEL Gonzales. Assumed care of patient. Lines and gtts verified. Per report received, patient coughed when taking his medications this AM and his lungs sound \"junky.\"     Dr. Jorge at bedside. Discussed with him that per report received, patient coughed when receiving his medications this AM. Orders received for a swallow evaluation; ok for ice chips and swabs until swallow evaluation can be completed. Orders verified and implemented. Also discussed with MD that patient is having pain on his left side and has scratches and bruises per NOC RN report. No new orders received.       "

## 2023-02-13 NOTE — PROGRESS NOTES
Hospital Medicine Daily Progress Note    Date of Service  2/13/2023    Chief Complaint  Alcohol intoxication    Hospital Course  Chace Gong is a 58 y.o. male  with alcohol and tobacco use admitted 2/10/2023 with alcohol intoxication and difficulty urinating. He was found to be hypoxic and required 6L oxygen.  A CXR was unremarkable for acute findings. Due to worsening withdrawal from alcohol he was transferred to the ICU for precedex drip. He has had one of two blood cultures from 2/10 positive for Bacillus species, and possible Staphylococcus. He was placed on ceftriaxone initially but switched to Zosyn.  Repeat blood cultures have been obtained.    Interval Problem Update  2/13: I was asked to take over the patinet's care today from the ICU.  He is requesting water but has a very thick speech and I do not believe he will pass his swallow evaluation, for which he didn't. He is quite hypertensive as well.  He request pain medications and sleeping aids but then quickly falls back to sleep per RN. He is down to 1-2L via NC.    I have discussed this patient's plan of care and discharge plan at IDT rounds today with Case Management, Nursing, Nursing leadership, and other members of the IDT team.    Consultants/Specialty  critical care    Code Status  Full Code    Disposition  Patient is not medically cleared for discharge.   Anticipate discharge to to home with close outpatient follow-up.  I have placed the appropriate orders for post-discharge needs.    Review of Systems  Review of Systems   Unable to perform ROS: Mental acuity   Constitutional:  Positive for malaise/fatigue. Negative for fever.   Gastrointestinal:  Negative for abdominal pain and nausea.   Genitourinary:  Positive for dysuria.   Neurological:  Positive for speech change.   Psychiatric/Behavioral:  Positive for substance abuse.       Physical Exam  Temp:  [36.6 °C (97.8 °F)-37 °C (98.6 °F)] 36.7 °C (98.1 °F)  Pulse:  [71-96] 84  Resp:  [17-43]  27  BP: (122-206)/() 206/131  SpO2:  [88 %-96 %] 92 %    Physical Exam  Vitals reviewed.   Constitutional:       Appearance: Normal appearance. He is overweight. He is not diaphoretic.      Comments: dishevelled   HENT:      Head: Normocephalic and atraumatic.      Nose: Nose normal.      Mouth/Throat:      Mouth: Mucous membranes are moist.      Pharynx: No oropharyngeal exudate.   Eyes:      General: No scleral icterus.        Right eye: No discharge.         Left eye: No discharge.      Extraocular Movements: Extraocular movements intact.      Conjunctiva/sclera: Conjunctivae normal.   Cardiovascular:      Rate and Rhythm: Normal rate and regular rhythm.      Pulses:           Radial pulses are 2+ on the right side and 2+ on the left side.        Dorsalis pedis pulses are 2+ on the right side and 2+ on the left side.      Heart sounds: No murmur heard.  Pulmonary:      Effort: Pulmonary effort is normal. No respiratory distress.      Breath sounds: Decreased breath sounds present. No wheezing or rales.   Abdominal:      General: Bowel sounds are normal. There is no distension.      Palpations: Abdomen is soft.   Musculoskeletal:         General: No swelling or tenderness.      Cervical back: No tenderness. No muscular tenderness.      Right lower leg: No edema.      Left lower leg: No edema.   Lymphadenopathy:      Cervical: No cervical adenopathy.   Skin:     Coloration: Skin is not jaundiced or pale.   Neurological:      Mental Status: He is lethargic and disoriented.      Cranial Nerves: No cranial nerve deficit.   Psychiatric:         Mood and Affect: Mood is anxious.         Speech: Speech is slurred.         Behavior: Behavior is slowed.         Judgment: Judgment is impulsive.       Fluids    Intake/Output Summary (Last 24 hours) at 2/13/2023 2036  Last data filed at 2/13/2023 2000  Gross per 24 hour   Intake 3243.63 ml   Output 2075 ml   Net 1168.63 ml       Laboratory  Recent Labs      02/11/23  0311 02/12/23  0245 02/13/23  0445   WBC 5.3 4.3* 4.7*   RBC 4.17* 4.06* 4.22*   HEMOGLOBIN 13.9* 13.5* 13.8*   HEMATOCRIT 39.2* 38.4* 40.9*   MCV 94.0 94.6 96.9   MCH 33.3* 33.3* 32.7   MCHC 35.5* 35.2 33.7   RDW 48.6 49.5 52.9*   PLATELETCT 32* 39* 55*   MPV 9.7 10.2 10.6     Recent Labs     02/11/23  1831 02/12/23  0245 02/13/23  0445   SODIUM 130* 135 138   POTASSIUM 3.6 3.5* 3.7   CHLORIDE 92* 99 104   CO2 24 24 19*   GLUCOSE 84 85 82   BUN 10 9 12   CREATININE 0.51 0.52 0.62   CALCIUM 8.4* 8.1* 8.3*     Recent Labs     02/11/23  0311 02/12/23  0245 02/13/23  0445   INR 1.08 1.14* 1.21*         Recent Labs     02/11/23 0311   TRIGLYCERIDE 36      LDL 64       Imaging  DX-CHEST-PORTABLE (1 VIEW)   Final Result         1.  No acute cardiopulmonary disease.      CT-CTA CHEST PULMONARY ARTERY W/ RECONS   Final Result         1.  No large central pulmonary embolus is appreciated, evaluation of the subsegmental branches is essentially nondiagnostic due to motion artifacts. Additional imaging would be required for definitive exclusion of small distal pulmonary emboli.   2.  Scattered hazy pulmonary opacities suggests subtle infiltrates.   3.  Hepatomegaly and diffuse hepatic steatosis   4.  Irregular hepatic contour favoring changes of cirrhosis   5.  Atherosclerosis and atherosclerotic coronary artery disease.      DX-CHEST-PORTABLE (1 VIEW)   Final Result      1.  Questionable nonspecific right hilar/perihilar prominence of uncertain etiology or significance.   2.  No definite consolidation or pleural effusion.           Assessment/Plan  * Alcohol withdrawal (HCC)- (present on admission)  Assessment & Plan  [facility] protocol   Scheduled librium  Required precedex during admit  If does okay through 2/13 evening then consider transfer to floor 2/14  Aspiration, seizure, fall precautions    Hypertensive urgency  Assessment & Plan  /131  Started clonidine 0.1mg patch   Has prn vasotec, labetalol, and  hydralazine   Start oral medications once oral or enteral access.    Dysphagia  Assessment & Plan  2/13 failed swallow evaluation and a FEEs was ordered  May need NG tube if not improving  Wean benzos, and narcotics as able    Thrombocytopenia (HCC)- (present on admission)  Assessment & Plan  2/13 Plt:55 < 39< 32  Likely acute bone marrow insult from EtOH and may have chronic liver injury as well.  Following CBC    Rhabdomyolysis- (present on admission)  Assessment & Plan  Per EMS, patient was found lying in bed at home intoxicated, likely for prolonged period of time  2/13 CPK: 686< 1608   No evidence of kidney injury  IVF 125cc/hr wean as able. NPO due to failing swallow evaluation. May need enteral feeding if not improved    Lactic acidosis- (present on admission)  Assessment & Plan  likely secondary to hypoperfusion from hypovolemia due to alcohol intoxication, complicated by cirrhosis  LA 2.8 to 2.3 to 2.0 s/p IVF 100ml/hr  RESOLVED    Bacteremia- (present on admission)  Assessment & Plan  BC x1 preliminary Bacillus and Staph  Started on ceftriaxone (2/10- ) and changed to Zosyn 2/11  F/u final BC susceptibility pending  Repeat blood cultures ordered    Smoker- (present on admission)  Assessment & Plan  Await patient to be more awake and sober before having an indepth discussion on cessation.      BPH (benign prostatic hyperplasia)- (present on admission)  Assessment & Plan  Bladder scan 432ml, post void 130ml  Follow up with urology outpatient  Stat oral flomax once able to take orals   Watch for retention    Elevated liver function tests- (present on admission)  Assessment & Plan  2/2 alcoholic hepatitis. Maddrey score 4.4  Monitor cmp    Alcohol intoxication (HCC)- (present on admission)  Assessment & Plan  Started on ciwa protocol  Alcohol cessation recommended    Respiratory failure (HCC)- (present on admission)  Assessment & Plan  SpO2 80% on arrival, requiring 6L NC for 91%. Improved back down to 90% RA,  which is baseline  Likely secondary to alcohol intoxication causing bradypnea  Hx of smoking and may have COPD, needs outpatient PFTs  Cessation encouragement once more alert  CXR negative acute  CTA negative PE  Remains requiring 1-2 L O2 via NC.  RT per protocol  Aspiration precautions given that he failed his swallow 2/13         VTE prophylaxis: SCDs/TEDs    I have performed a physical exam and reviewed and updated ROS and Plan today (2/13/2023). In review of yesterday's note (2/12/2023), there are no changes except as documented above.

## 2023-02-13 NOTE — CARE PLAN
The patient is Stable - Low risk of patient condition declining or worsening    Shift Goals  Clinical Goals: Safety  Patient Goals: ATUL  Family Goals: ATUL    Progress made toward(s) clinical / shift goals:    Problem: Knowledge Deficit - Standard  Goal: Patient and family/care givers will demonstrate understanding of plan of care, disease process/condition, diagnostic tests and medications  Outcome: Progressing  Note: Patient and patient family updated on POC.      Problem: Safety - Medical Restraint  Goal: Remains free of injury from restraints (Restraint for Interference with Medical Device)  Outcome: Progressing  Note: Patient free from injury.   Goal: Free from restraint(s) (Restraint for Interference with Medical Device)  Outcome: Progressing  Note: Patient became free restraints during shift.      Problem: Pain - Standard  Goal: Alleviation of pain or a reduction in pain to the patient’s comfort goal  Outcome: Progressing  Note: Patient is free of pain during shift.

## 2023-02-13 NOTE — CARE PLAN
Patient oriented to self. Diaphoretic and nauseated at times. Positive for visual hallucinations. Tele sitter in place. Bed alarm on and non-skid socks on      Problem: Knowledge Deficit - Standard  Goal: Patient and family/care givers will demonstrate understanding of plan of care, disease process/condition, diagnostic tests and medications  Outcome: Not Progressing     Problem: Optimal Care for Alcohol Withdrawal  Goal: Optimal Care for the alcohol withdrawal patient  Outcome: Progressing     Problem: Seizure Precautions  Goal: Implementation of seizure precautions  Outcome: Progressing     Problem: Fall Risk  Goal: Patient will remain free from falls  Outcome: Progressing     Problem: Pain - Standard  Goal: Alleviation of pain or a reduction in pain to the patient’s comfort goal  Outcome: Progressing

## 2023-02-13 NOTE — PROGRESS NOTES
Due to prioritizing critical care assignment, head to toe assessment not completed until 0945 for this patient.

## 2023-02-13 NOTE — PROGRESS NOTES
2030- Wade AUSTIN notified of latest swan numbers - CI 1.6, CO 3.5, SVR 1764- UO 75ml for last hour, BP sys 's.  Per Rodo AUSTIN, Maribell said to follow signs of perfusion instead through UO and BP. No need to report swan numbers through shift.

## 2023-02-13 NOTE — THERAPY
Speech Language Pathology   Fiberoptic Endoscopic Evaluation of Swallow     Patient Name: Chace Gong  AGE:  58 y.o., SEX:  male  Medical Record #: [id number]  Today's Date: 2/13/2023     Precautions  Precautions: Fall Risk, Swallow Precautions ( See Comments)    Assessment    Current Method of Nutrition   NPO until cleared by speech pathology    Pertinent Information:  Affect/Behavior: Appropriate  Oxygen Requirements:  2 L Nasal Cannula  Feeding Tube: None  Dentition: Good  Factor(s) Affecting Performance: Impaired endurance, Impaired mental status    Discussed the risks, benefits, and alternatives of the FEES procedure. Patient/family acknowledged and agreed to proceed.     Assessment  Flexible Endoscopic Evaluation of Swallowing (FEES) completed at bedside today. The endoscope was passed transnasally via right nare to evaluate the anatomy and physiology of swallowing. Pt tolerated the procedure with no apparent distress.    Anatomic Findings: Unremarkable  Vocal Fold Motion: Bilateral movement  Secretion Management: WNL  PO trials: ice chips, thin liquids, mildly thick liquids, liquidized, puree    Consistency PAS Score Timing Comments   Ice Chips 1 N/A    Thin Liquid 8 During swallow (inferred)    Mildly Thick Liquid 8 During swallow (inferred) Moderate vallecular residue    Liquidized 5 N/A    Puree 5 During swallow (inferred)      Penetration-Aspiration Scale (PAS)  1     No contrast enters airway  2     Contrast enters the airway, remains above the vocal folds, and is ejected from the airway (not seen in the airway at the end of the swallow).  3     Contrast enters the airway, remains above the vocal folds, and is not ejected from the airway (is seen in the airway after the swallow).  4     Contrast enters the airway, contacts the vocal folds, and is ejected from the airway.  5     Contrast enters the airway, contacts the vocal folds, and is not ejected from the airway  6     Contrast enters the airway,  crosses the plane of the vocal folds, and is ejected from the airway.  7     Contrast enters the airway, crosses the plane of the vocal folds, and is not ejected from the airway despite effort.  8     Contrast enters the airway, crosses the plane of the vocal folds, is not ejected from the airway and there is no response to aspiration.    Oral phase:    The patient presented with appropriate oral acceptance and containment. Presumed complete oral clearance. Query impaired bolus control, attempted 3 second prep x2 and patient stated he swallowed without holding because he was too thirsty to hold.     Pharyngeal phase:    Patient presented with impaired laryngeal vestibular closure and impaired base of tongue retraction. Deficits resulted in consistent aspiration and penetration. Thin liquids and mildly thick liquids observed in the tracheal with aspiration inferred during the swallow. The patient was not sensate to aspiration events. With cued cough, he does appear to clear aspirated material. Moderate vallecular residue appreciated with mildly thick liquids, clears with a cleansing swallow.     Clinical Impressions  The pt presents with a moderate-severe oropharyngeal dysphagia, likely acute related to alcohol withdrawal. Patient with impaired swallow safety resulting in consistent penetration to the VF and aspiration. He would benefit from non-oral nutrition source. He has a good swallow prognosis and is a good candidate for exercise based rehabilitation.     Recommendations  NPO with consideration for TF if within patient wishes. He is OK to have single ice chips following oral care to mediate xerostomia and maintain swallow integrity.   2.  Swallowing Instructions & Precautions:   Medication: Non Oral   Oral Care: Q4h    Plan    Recommend Speech Therapy 3 times per week until therapy goals are met for the following treatments:  Dysphagia Training.    Discharge Recommendations: Recommend post-acute placement for  "additional speech therapy services prior to discharge home     Objective       02/13/23 1425   Precautions   Precautions Fall Risk;Swallow Precautions ( See Comments)   Vitals   O2 (LPM) 2   O2 Delivery Device Silicone Nasal Cannula   Patient / Family Goals   Patient / Family Goal #1 \"I have been waiting for that water\"   Goal #1 Outcome Progressing slower than expected   Short Term Goals   Short Term Goal # 1 Pt will complete FEES with SLP to further evaluate swallow and inform POC.   Goal Outcome # 1 Goal met   Short Term Goal # 2 Pt will consume prefeeding trials without any overt s/sx of aspiration   Short Term Goal # 3 Pt will complete LVC and BOT exercises with good accuracy given min verbal cues   Education Group   Education Provided Dysphagia   Dysphagia Patient Response Patient;Acceptance;Explanation;Verbal Demonstration;Reinforcement Needed;Action Demonstration   Anticipated Discharge Needs   Discharge Recommendations Recommend post-acute placement for additional speech therapy services prior to discharge home   Therapy Recommendations Upon DC Dysphagia Training;Patient / Family / Caregiver Education       "

## 2023-02-14 LAB
ALBUMIN SERPL BCP-MCNC: 3.6 G/DL (ref 3.2–4.9)
ALBUMIN/GLOB SERPL: 1.1 G/DL
ALDOLASE SERPL-CCNC: 12.9 U/L (ref 1.2–7.6)
ALP SERPL-CCNC: 85 U/L (ref 30–99)
ALT SERPL-CCNC: 108 U/L (ref 2–50)
ANION GAP SERPL CALC-SCNC: 15 MMOL/L (ref 7–16)
AST SERPL-CCNC: 165 U/L (ref 12–45)
BACTERIA UR CULT: NORMAL
BASOPHILS # BLD AUTO: 0.4 % (ref 0–1.8)
BASOPHILS # BLD: 0.02 K/UL (ref 0–0.12)
BILIRUB SERPL-MCNC: 1 MG/DL (ref 0.1–1.5)
BUN SERPL-MCNC: 9 MG/DL (ref 8–22)
CALCIUM ALBUM COR SERPL-MCNC: 8.4 MG/DL (ref 8.5–10.5)
CALCIUM SERPL-MCNC: 8.1 MG/DL (ref 8.5–10.5)
CHLORIDE SERPL-SCNC: 102 MMOL/L (ref 96–112)
CO2 SERPL-SCNC: 19 MMOL/L (ref 20–33)
CREAT SERPL-MCNC: 0.5 MG/DL (ref 0.5–1.4)
EKG IMPRESSION: NORMAL
EOSINOPHIL # BLD AUTO: 0.11 K/UL (ref 0–0.51)
EOSINOPHIL NFR BLD: 2.1 % (ref 0–6.9)
ERYTHROCYTE [DISTWIDTH] IN BLOOD BY AUTOMATED COUNT: 53.4 FL (ref 35.9–50)
GFR SERPLBLD CREATININE-BSD FMLA CKD-EPI: 119 ML/MIN/1.73 M 2
GLOBULIN SER CALC-MCNC: 3.3 G/DL (ref 1.9–3.5)
GLUCOSE SERPL-MCNC: 80 MG/DL (ref 65–99)
HCT VFR BLD AUTO: 41.9 % (ref 42–52)
HCV RNA SERPL NAA+PROBE-ACNC: NOT DETECTED IU/ML
HCV RNA SERPL NAA+PROBE-LOG IU: NOT DETECTED LOG IU/ML
HCV RNA SERPL QL NAA+PROBE: NOT DETECTED
HGB BLD-MCNC: 14.3 G/DL (ref 14–18)
IMM GRANULOCYTES # BLD AUTO: 0.06 K/UL (ref 0–0.11)
IMM GRANULOCYTES NFR BLD AUTO: 1.2 % (ref 0–0.9)
INR PPP: 1.13 (ref 0.87–1.13)
LYMPHOCYTES # BLD AUTO: 1.33 K/UL (ref 1–4.8)
LYMPHOCYTES NFR BLD: 25.7 % (ref 22–41)
MAGNESIUM SERPL-MCNC: 1.9 MG/DL (ref 1.5–2.5)
MCH RBC QN AUTO: 33 PG (ref 27–33)
MCHC RBC AUTO-ENTMCNC: 34.1 G/DL (ref 33.7–35.3)
MCV RBC AUTO: 96.8 FL (ref 81.4–97.8)
MONOCYTES # BLD AUTO: 0.57 K/UL (ref 0–0.85)
MONOCYTES NFR BLD AUTO: 11 % (ref 0–13.4)
NEUTROPHILS # BLD AUTO: 3.08 K/UL (ref 1.82–7.42)
NEUTROPHILS NFR BLD: 59.6 % (ref 44–72)
NRBC # BLD AUTO: 0 K/UL
NRBC BLD-RTO: 0 /100 WBC
PHOSPHATE SERPL-MCNC: 3 MG/DL (ref 2.5–4.5)
PLATELET # BLD AUTO: 73 K/UL (ref 164–446)
PMV BLD AUTO: 9.9 FL (ref 9–12.9)
POTASSIUM SERPL-SCNC: 3.5 MMOL/L (ref 3.6–5.5)
PROT SERPL-MCNC: 6.9 G/DL (ref 6–8.2)
PROTHROMBIN TIME: 14.3 SEC (ref 12–14.6)
RBC # BLD AUTO: 4.33 M/UL (ref 4.7–6.1)
RPR SER QL: NON REACTIVE
SIGNIFICANT IND 70042: NORMAL
SITE SITE: NORMAL
SODIUM SERPL-SCNC: 136 MMOL/L (ref 135–145)
SOURCE SOURCE: NORMAL
WBC # BLD AUTO: 5.2 K/UL (ref 4.8–10.8)

## 2023-02-14 PROCEDURE — A9270 NON-COVERED ITEM OR SERVICE: HCPCS | Performed by: HOSPITALIST

## 2023-02-14 PROCEDURE — 700105 HCHG RX REV CODE 258: Performed by: INTERNAL MEDICINE

## 2023-02-14 PROCEDURE — 84100 ASSAY OF PHOSPHORUS: CPT

## 2023-02-14 PROCEDURE — 700102 HCHG RX REV CODE 250 W/ 637 OVERRIDE(OP): Performed by: HOSPITALIST

## 2023-02-14 PROCEDURE — 700105 HCHG RX REV CODE 258: Performed by: HOSPITALIST

## 2023-02-14 PROCEDURE — 700111 HCHG RX REV CODE 636 W/ 250 OVERRIDE (IP): Performed by: INTERNAL MEDICINE

## 2023-02-14 PROCEDURE — 770006 HCHG ROOM/CARE - MED/SURG/GYN SEMI*

## 2023-02-14 PROCEDURE — 99406 BEHAV CHNG SMOKING 3-10 MIN: CPT | Performed by: HOSPITALIST

## 2023-02-14 PROCEDURE — 700111 HCHG RX REV CODE 636 W/ 250 OVERRIDE (IP): Performed by: HOSPITALIST

## 2023-02-14 PROCEDURE — 83735 ASSAY OF MAGNESIUM: CPT

## 2023-02-14 PROCEDURE — 80053 COMPREHEN METABOLIC PANEL: CPT

## 2023-02-14 PROCEDURE — 85025 COMPLETE CBC W/AUTO DIFF WBC: CPT

## 2023-02-14 PROCEDURE — 93010 ELECTROCARDIOGRAM REPORT: CPT | Performed by: INTERNAL MEDICINE

## 2023-02-14 PROCEDURE — 92526 ORAL FUNCTION THERAPY: CPT

## 2023-02-14 PROCEDURE — 99232 SBSQ HOSP IP/OBS MODERATE 35: CPT | Mod: 25 | Performed by: HOSPITALIST

## 2023-02-14 PROCEDURE — 85610 PROTHROMBIN TIME: CPT

## 2023-02-14 PROCEDURE — 700111 HCHG RX REV CODE 636 W/ 250 OVERRIDE (IP): Performed by: STUDENT IN AN ORGANIZED HEALTH CARE EDUCATION/TRAINING PROGRAM

## 2023-02-14 RX ORDER — DIAZEPAM 2 MG/1
2 TABLET ORAL EVERY 6 HOURS
Status: DISCONTINUED | OUTPATIENT
Start: 2023-02-14 | End: 2023-02-16

## 2023-02-14 RX ORDER — CLONIDINE HYDROCHLORIDE 0.1 MG/1
0.1 TABLET ORAL TWICE DAILY
Status: DISCONTINUED | OUTPATIENT
Start: 2023-02-14 | End: 2023-02-16 | Stop reason: HOSPADM

## 2023-02-14 RX ORDER — POTASSIUM CHLORIDE 20 MEQ/1
40 TABLET, EXTENDED RELEASE ORAL ONCE
Status: DISCONTINUED | OUTPATIENT
Start: 2023-02-14 | End: 2023-02-14

## 2023-02-14 RX ORDER — GABAPENTIN 100 MG/1
100 CAPSULE ORAL 3 TIMES DAILY
Status: DISCONTINUED | OUTPATIENT
Start: 2023-02-14 | End: 2023-02-16 | Stop reason: HOSPADM

## 2023-02-14 RX ORDER — MAGNESIUM SULFATE HEPTAHYDRATE 40 MG/ML
2 INJECTION, SOLUTION INTRAVENOUS ONCE
Status: COMPLETED | OUTPATIENT
Start: 2023-02-14 | End: 2023-02-14

## 2023-02-14 RX ORDER — POTASSIUM CHLORIDE 7.45 MG/ML
10 INJECTION INTRAVENOUS
Status: DISPENSED | OUTPATIENT
Start: 2023-02-14 | End: 2023-02-14

## 2023-02-14 RX ADMIN — THIAMINE HYDROCHLORIDE 500 MG: 100 INJECTION, SOLUTION INTRAMUSCULAR; INTRAVENOUS at 05:26

## 2023-02-14 RX ADMIN — SODIUM CHLORIDE: 9 INJECTION, SOLUTION INTRAVENOUS at 10:45

## 2023-02-14 RX ADMIN — DIAZEPAM 2 MG: 2 TABLET ORAL at 10:35

## 2023-02-14 RX ADMIN — GABAPENTIN 100 MG: 100 CAPSULE ORAL at 18:55

## 2023-02-14 RX ADMIN — LORAZEPAM 2 MG: 2 INJECTION INTRAMUSCULAR; INTRAVENOUS at 03:09

## 2023-02-14 RX ADMIN — MAGNESIUM SULFATE HEPTAHYDRATE 2 G: 40 INJECTION, SOLUTION INTRAVENOUS at 11:41

## 2023-02-14 RX ADMIN — DIAZEPAM 2 MG: 2 TABLET ORAL at 18:55

## 2023-02-14 RX ADMIN — CLONIDINE HYDROCHLORIDE 0.1 MG: 0.1 TABLET ORAL at 14:54

## 2023-02-14 RX ADMIN — POTASSIUM CHLORIDE 10 MEQ: 7.46 INJECTION, SOLUTION INTRAVENOUS at 05:09

## 2023-02-14 RX ADMIN — GABAPENTIN 100 MG: 100 CAPSULE ORAL at 14:54

## 2023-02-14 RX ADMIN — PIPERACILLIN AND TAZOBACTAM 4.5 G: 4; .5 INJECTION, POWDER, LYOPHILIZED, FOR SOLUTION INTRAVENOUS; PARENTERAL at 06:10

## 2023-02-14 ASSESSMENT — LIFESTYLE VARIABLES
HAVE PEOPLE ANNOYED YOU BY CRITICIZING YOUR DRINKING: YES
HAVE YOU EVER FELT YOU SHOULD CUT DOWN ON YOUR DRINKING: YES
ON A TYPICAL DAY WHEN YOU DRINK ALCOHOL HOW MANY DRINKS DO YOU HAVE: 5
PAROXYSMAL SWEATS: NO SWEAT VISIBLE
SUBSTANCE_ABUSE: 1
TOTAL SCORE: 0
TOTAL SCORE: 4
ALCOHOL_USE: YES
TREMOR: NO TREMOR
VISUAL DISTURBANCES: NOT PRESENT
CONSUMPTION TOTAL: POSITIVE
TOTAL SCORE: 4
NAUSEA AND VOMITING: NO NAUSEA AND NO VOMITING
ANXIETY: NO ANXIETY (AT EASE)
TOTAL SCORE: 4
DOES PATIENT WANT TO TALK TO SOMEONE ABOUT QUITTING: NO
EVER FELT BAD OR GUILTY ABOUT YOUR DRINKING: YES
AGITATION: NORMAL ACTIVITY
AVERAGE NUMBER OF DAYS PER WEEK YOU HAVE A DRINK CONTAINING ALCOHOL: 3
HOW MANY TIMES IN THE PAST YEAR HAVE YOU HAD 5 OR MORE DRINKS IN A DAY: 5
DOES PATIENT WANT TO STOP DRINKING: YES
ORIENTATION AND CLOUDING OF SENSORIUM: ORIENTED AND CAN DO SERIAL ADDITIONS
HEADACHE, FULLNESS IN HEAD: NOT PRESENT
EVER HAD A DRINK FIRST THING IN THE MORNING TO STEADY YOUR NERVES TO GET RID OF A HANGOVER: YES
AUDITORY DISTURBANCES: NOT PRESENT

## 2023-02-14 ASSESSMENT — PAIN DESCRIPTION - PAIN TYPE
TYPE: ACUTE PAIN

## 2023-02-14 ASSESSMENT — ENCOUNTER SYMPTOMS
CARDIOVASCULAR NEGATIVE: 1
NERVOUS/ANXIOUS: 1
FEVER: 0
BACK PAIN: 1
COUGH: 1
GASTROINTESTINAL NEGATIVE: 1
SPUTUM PRODUCTION: 0
HEMOPTYSIS: 0
EYES NEGATIVE: 1
SPEECH CHANGE: 1
NAUSEA: 0
SHORTNESS OF BREATH: 0
ABDOMINAL PAIN: 0

## 2023-02-14 ASSESSMENT — PATIENT HEALTH QUESTIONNAIRE - PHQ9
8. MOVING OR SPEAKING SO SLOWLY THAT OTHER PEOPLE COULD HAVE NOTICED. OR THE OPPOSITE, BEING SO FIGETY OR RESTLESS THAT YOU HAVE BEEN MOVING AROUND A LOT MORE THAN USUAL: NOT AT ALL
7. TROUBLE CONCENTRATING ON THINGS, SUCH AS READING THE NEWSPAPER OR WATCHING TELEVISION: NOT AT ALL
3. TROUBLE FALLING OR STAYING ASLEEP OR SLEEPING TOO MUCH: SEVERAL DAYS
SUM OF ALL RESPONSES TO PHQ9 QUESTIONS 1 AND 2: 1
5. POOR APPETITE OR OVEREATING: NOT AT ALL
9. THOUGHTS THAT YOU WOULD BE BETTER OFF DEAD, OR OF HURTING YOURSELF: NOT AT ALL
4. FEELING TIRED OR HAVING LITTLE ENERGY: NOT AT ALL
SUM OF ALL RESPONSES TO PHQ QUESTIONS 1-9: 3
1. LITTLE INTEREST OR PLEASURE IN DOING THINGS: NOT AT ALL
2. FEELING DOWN, DEPRESSED, IRRITABLE, OR HOPELESS: SEVERAL DAYS
6. FEELING BAD ABOUT YOURSELF - OR THAT YOU ARE A FAILURE OR HAVE LET YOURSELF OR YOUR FAMILY DOWN: SEVERAL DAYS

## 2023-02-14 NOTE — CARE PLAN
The patient is Watcher - Medium risk of patient condition declining or worsening    Shift Goals  Clinical Goals: orientation, advance swallow capability  Patient Goals: eat and drink  Family Goals: ATUL    Progress made toward(s) clinical / shift goals: Pt is alert and oriented this morning    Patient is not progressing towards the following goals: Pt admits to having knowledge of a baseline swallowing deficiency, will discuss with speech

## 2023-02-14 NOTE — CARE PLAN
The patient is Watcher - Medium risk of patient condition declining or worsening    Shift Goals  Clinical Goals: BP management, RASS  Patient Goals: Drink water  Family Goals: ATUL    Progress made toward(s) clinical / shift goals:    Problem: Knowledge Deficit - Standard  Goal: Patient and family/care givers will demonstrate understanding of plan of care, disease process/condition, diagnostic tests and medications  Outcome: Progressing     Problem: Optimal Care for Alcohol Withdrawal  Goal: Optimal Care for the alcohol withdrawal patient  Outcome: Progressing     Problem: Seizure Precautions  Goal: Implementation of seizure precautions  Outcome: Progressing     Problem: Lifestyle Changes  Goal: Patient's ability to identify lifestyle changes and available resources to help reduce recurrence of condition will improve  Outcome: Progressing     Problem: Psychosocial  Goal: Patient's level of anxiety will decrease  Outcome: Progressing     Problem: Skin Integrity  Goal: Skin integrity is maintained or improved  Outcome: Progressing     Problem: Fall Risk  Goal: Patient will remain free from falls  Outcome: Progressing       Patient is not progressing towards the following goals:

## 2023-02-14 NOTE — PROGRESS NOTES
"Speech Language Pathology   Contact Note     Patient Name: Chace Gong  AGE:  58 y.o., SEX:  male  Medical Record #: [id number]  Today's Date: 2/14/2023      YAEL Billings reached out to this SLP to update her on MD's decision to put pt on NPO except sips w/ medications. Per RN, pt is much more alert/aware/appropriate this date compared to yesterday. She reports that pt states that \"has always had a bad epiglottis.\" Discussed ongoing risk for silent aspiration as demonstrated on FEES with sips following medications and discussed methods to maximize outcomes in the presence of possible aspiration, namely providing thorough oral care frequently and especially before any sips with medication administration. RN verbalized agreement.     SLP will continue to follow for dysphagia management.  "

## 2023-02-14 NOTE — ASSESSMENT & PLAN NOTE
The patient is refusing further evaluation, he accepts risk of aspiration with modified diet  Patient follow-up

## 2023-02-14 NOTE — PROGRESS NOTES
Patient's SBP was greater than 200. Hydralazine administered (this RN had not administered it before now because she was told in report that hydralazine did not work for patient last night). Dr. Shepard notified that patient's SBP was greater than 200 and that hydralazine was administered. No new orders received.    Report given to YALE Avilez. Patient brought to T601 via reclining chair with his personal belongings.

## 2023-02-14 NOTE — PROGRESS NOTES
[facility] Medicine Daily Progress Note    Date of Service  2/14/2023    Chief Complaint  Alcohol intoxication    Hospital Course  Chace oGng is a 58 y.o. male  with alcohol and tobacco use admitted 2/10/2023 with alcohol intoxication and difficulty urinating. He was found to be hypoxic and required 6L oxygen.  A CXR was unremarkable for acute findings. Due to worsening withdrawal from alcohol he was transferred to the ICU for precedex drip. He has had one of two blood cultures from 2/10 positive for Bacillus species, and possible Staphylococcus. He was placed on ceftriaxone initially but switched to Zosyn.  Repeat blood cultures have been obtained.    Interval Problem Update  Patient seen and examined today.  Data, Medication data reviewed.  Case discussed with nursing as available.  Plan of Care reviewed with patient and notified of changes.     2/13: I was asked to take over the patinet's care today from the ICU.  He is requesting water but has a very thick speech and I do not believe he will pass his swallow evaluation, for which he didn't. He is quite hypertensive as well.  He request pain medications and sleeping aids but then quickly falls back to sleep per RN. He is down to 1-2L via NC.  2/14 the patient appears significantly improved, he is fully awake and alert x4, nonfocal, off Precedex, off scheduled benzodiazepines, currently afebrile, heart rate in the 90s, respiration unlabored, 1 L nasal cannula oxygen, normotensive, laboratory data is reviewed, potassium 3.5, still elevated LFTs, , , patient stating that he wants to eat, he is thirsty and hungry.  Blood culture flagging for staph, likely contaminant, the patient without infectious signs.  The patient seen by SLP, not interested in further evaluation, states that he always had bad epiglottitis, the patient accepts risk of aspiration and resultant difficulties.  The patient asked for anxiolytics.  I have discussed this patient's plan of  care and discharge plan at IDT rounds today with Case Management, Nursing, Nursing leadership, and other members of the IDT team.    Consultants/Specialty  critical care    Code Status  Full Code    Disposition  Patient is not medically cleared for discharge.   Anticipate discharge to to home with close outpatient follow-up.  I have placed the appropriate orders for post-discharge needs.    Review of Systems  Review of Systems   Constitutional:  Positive for malaise/fatigue. Negative for fever.   HENT: Negative.     Eyes: Negative.    Respiratory:  Positive for cough. Negative for hemoptysis, sputum production and shortness of breath.    Cardiovascular: Negative.    Gastrointestinal: Negative.  Negative for abdominal pain and nausea.   Musculoskeletal:  Positive for back pain.   Skin: Negative.    Neurological:  Positive for speech change.   Endo/Heme/Allergies: Negative.    Psychiatric/Behavioral:  Positive for substance abuse. The patient is nervous/anxious.    All other systems reviewed and are negative.     Physical Exam  Temp:  [36.6 °C (97.8 °F)-37 °C (98.6 °F)] 36.9 °C (98.4 °F)  Pulse:  [] 99  Resp:  [20-42] 24  BP: (134-206)/() 137/82  SpO2:  [88 %-97 %] 90 %    Physical Exam  Vitals reviewed.   Constitutional:       Appearance: Normal appearance. He is overweight. He is not diaphoretic.      Comments: dishevelled   HENT:      Head: Normocephalic and atraumatic.      Nose: Nose normal.      Mouth/Throat:      Mouth: Mucous membranes are moist.      Pharynx: No oropharyngeal exudate.   Eyes:      General: No scleral icterus.        Right eye: No discharge.         Left eye: No discharge.      Extraocular Movements: Extraocular movements intact.      Conjunctiva/sclera: Conjunctivae normal.   Cardiovascular:      Rate and Rhythm: Normal rate and regular rhythm.      Pulses:           Radial pulses are 2+ on the right side and 2+ on the left side.        Dorsalis pedis pulses are 2+ on the right  side and 2+ on the left side.      Heart sounds: No murmur heard.  Pulmonary:      Effort: Pulmonary effort is normal. No respiratory distress.      Breath sounds: Decreased breath sounds present. No wheezing or rales.   Abdominal:      General: Bowel sounds are normal. There is no distension.      Palpations: Abdomen is soft.   Musculoskeletal:         General: No swelling or tenderness.      Cervical back: No tenderness. No muscular tenderness.      Right lower leg: No edema.      Left lower leg: No edema.   Lymphadenopathy:      Cervical: No cervical adenopathy.   Skin:     Coloration: Skin is not jaundiced or pale.   Neurological:      Mental Status: He is alert. Mental status is at baseline.      Cranial Nerves: No cranial nerve deficit.   Psychiatric:         Mood and Affect: Mood is anxious.         Judgment: Judgment is impulsive.       Fluids    Intake/Output Summary (Last 24 hours) at 2/14/2023 0749  Last data filed at 2/14/2023 0600  Gross per 24 hour   Intake 3021.27 ml   Output 1825 ml   Net 1196.27 ml         Laboratory  Recent Labs     02/12/23 0245 02/13/23 0445 02/14/23  0300   WBC 4.3* 4.7* 5.2   RBC 4.06* 4.22* 4.33*   HEMOGLOBIN 13.5* 13.8* 14.3   HEMATOCRIT 38.4* 40.9* 41.9*   MCV 94.6 96.9 96.8   MCH 33.3* 32.7 33.0   MCHC 35.2 33.7 34.1   RDW 49.5 52.9* 53.4*   PLATELETCT 39* 55* 73*   MPV 10.2 10.6 9.9       Recent Labs     02/12/23 0245 02/13/23 0445 02/14/23  0300   SODIUM 135 138 136   POTASSIUM 3.5* 3.7 3.5*   CHLORIDE 99 104 102   CO2 24 19* 19*   GLUCOSE 85 82 80   BUN 9 12 9   CREATININE 0.52 0.62 0.50   CALCIUM 8.1* 8.3* 8.1*       Recent Labs     02/12/23 0245 02/13/23 0445 02/14/23  0300   INR 1.14* 1.21* 1.13                   Imaging  DX-CHEST-PORTABLE (1 VIEW)   Final Result         1.  No acute cardiopulmonary disease.      CT-CTA CHEST PULMONARY ARTERY W/ RECONS   Final Result         1.  No large central pulmonary embolus is appreciated, evaluation of the subsegmental  branches is essentially nondiagnostic due to motion artifacts. Additional imaging would be required for definitive exclusion of small distal pulmonary emboli.   2.  Scattered hazy pulmonary opacities suggests subtle infiltrates.   3.  Hepatomegaly and diffuse hepatic steatosis   4.  Irregular hepatic contour favoring changes of cirrhosis   5.  Atherosclerosis and atherosclerotic coronary artery disease.      DX-CHEST-PORTABLE (1 VIEW)   Final Result      1.  Questionable nonspecific right hilar/perihilar prominence of uncertain etiology or significance.   2.  No definite consolidation or pleural effusion.             Assessment/Plan  * Alcohol withdrawal (HCC)- (present on admission)  Assessment & Plan  CIWA protocol ,Scheduled librium, was not effective  Required precedex during admit  Aspiration, seizure, fall precautions    Hypertensive urgency  Assessment & Plan  Improved on medication, transition to oral meds  Has prn vasotec, labetalol, and hydralazine       Dysphagia  Assessment & Plan  The patient is refusing further evaluation, he accepts risk of aspiration with modified diet  Patient follow-up    Thrombocytopenia (HCC)- (present on admission)  Assessment & Plan    Likely acute bone marrow insult from EtOH and may have chronic liver injury as well.  Following CBC    Rhabdomyolysis- (present on admission)  Assessment & Plan  Per EMS, patient was found lying in bed at home intoxicated, likely for prolonged period of time  Improved after hydration,    Lactic acidosis- (present on admission)  Assessment & Plan  likely secondary to hypoperfusion from hypovolemia due to alcohol intoxication, complicated by cirrhosis  LA 2.8 to 2.3 to 2.0 s/p IVF 100ml/hr  RESOLVED    Bacteremia- (present on admission)  Assessment & Plan  BC x1 preliminary Bacillus and Staph  Appears to be a contaminant, monitor off antibiotics    Smoker- (present on admission)  Assessment & Plan  Smoking cessation discussed for 5 minutes      BPH  (benign prostatic hyperplasia)- (present on admission)  Assessment & Plan  Bladder scan 432ml, post void 130ml  Follow up with urology outpatient  Stat oral flomax once able to take orals   Watch for retention    Elevated liver function tests- (present on admission)  Assessment & Plan  2/2 alcoholic hepatitis. Maddrey score 4.4  Monitor cmp    Alcohol intoxication (HCC)- (present on admission)  Assessment & Plan  Initially on CIWA protocol, then escalated to ICU care with Precedex  Alcohol cessation recommended  Currently improved, vitamin support, low-dose benzodiazepines support    Respiratory failure (HCC)- (present on admission)  Assessment & Plan  SpO2 80% on arrival, requiring 6L NC for 91%. Improved back down to 90% RA, which is baseline  Likely secondary to alcohol intoxication causing bradypnea  Hx of smoking and may have COPD, needs outpatient PFTs  Cessation encouragement once more alert  CXR negative acute  CTA negative PE  Remains requiring 1-2 L O2 via NC.  RT per protocol  Aspiration precautions given that he failed his swallow 2/13    Plan  Blood pressure control,  Scheduled low-dose benzodiazepines  DC antibiotics and monitor  Reduce IV fluids, monitor p.o. intake and adjust as the patient gets further hydration  Close laboratory follow-up  Smoking cessation discussed  See orders  Medically complex high-risk patient        VTE prophylaxis: SCDs/TEDs    I have performed a physical exam and reviewed and updated ROS and Plan today (2/14/2023). In review of yesterday's note (2/13/2023), there are no changes except as documented above.      Please note that this dictation was created using voice recognition software. I have made every reasonable attempt to correct obvious errors, but I expect that there are errors of grammar and possibly context that I did not discover before finalizing the note.

## 2023-02-14 NOTE — PROGRESS NOTES
Pt this AM is alert and oriented x4, appropriate and follows commands. CIWA of 0, held AM scheduled Ativan in hopes pt will cooperate with team and be more successful in a swallow evaluation.

## 2023-02-14 NOTE — THERAPY
"Speech Language Pathology  Daily Treatment     Patient Name: Chace Gong  Age:  58 y.o., Sex:  male  Medical Record #: [id number]  Today's Date: 2/14/2023     Precautions  Precautions: Fall Risk, Swallow Precautions ( See Comments)  Comments: eating despite risk    HPI: 58 male who presented 2/10/2012 for difficult urinating and acute alcohol intoxication. FEES 2/13 w/ silent aspiration of multiple consistencies.     Subjective  RN requested that SLP see pt this date as \"he is doing much better\" and had been given sips with meds (see contact note from this AM). Pt agreeable and cooperative with all SLP tx tasks, requesting food and water.    Assessment  Pt seen for dysphagia management. Recalled FEES - \"Things go down the wrong tube\" and \"I could get PNA.\" Following independent verbalization of these concerns, pt voiced preference to eat despite risk. Stated \"I can cough to clear it\" which is consistent with report of effective cued cough on FEES (spontaneous cough not consistently present). SLP discussed this strategy and techniques for maximizing swallowing outcomes when eating despite risk including good oral care and frequent mobility. Swallow precautions sign provided to pt who provided readback and verbalized understanding.     PO trials administered: TN0, LQ3, RG7. Oral phase broadly WFL; pt continues to have congested cough with all PO. This is now consistent with compensatory strategy but lack of cough does NOT demonstrate lack of aspiration given recent hx of silent aspiration on FEES. Discussed IDDSI levels with pt, who verbalized preference for RG7 (\"I have teeth; don't I?\") Following PO trials, pt provided with indep oral care kit and demonstrated ability to independently brush teeth/clear oral cavity.     Clinical Impressions  Pt was able to accurately and independently verbalize presence of aspiration and associated risks. With this in mind, pt is appropriate to eat despite risk with use of strategies " "as posted/discussed and use of good oral care throughout the day and after meals. Would also recommend meals in the chair and mobility as often as pt is able. SLP will continue to follow at this time for dysphagia education and exercises. RN, MD aware.      Recommendations  1.  Reg solids with thin liquids DESPITE RISK  2.  Swallowing Instructions & Precautions:   Supervision: Distant supervision - check on patient 2-3 times per meal  Positioning: Meals sitting upright in a chair, as tolerated  Medication: As tolerated  Strategies: Small bites/sips, Slow rate of intake, Encourage periodic cough/throat clear, Reduce environmental distractions  Oral Care: After meals   3.  Mobilize as able  4.  Frequent and thorough oral care !!      Plan    Continue current treatment plan.    Discharge Recommendations: Recommend home health for continued speech therapy services    Objective   02/14/23 1202   Vitals   Pulse Oximetry 94 %   O2 (LPM) 1   O2 Delivery Device Silicone Nasal Cannula   Cognitive-Linguistic   Level of Consciousness Alert   Dysphagia    Diet / Liquid Recommendation Other (Comments)  (DESPITE RISK RG/TN vs. NPO)   Nutritional Liquid Intake Rating Scale Non thickened beverages   Nutritional Food Intake Rating Scale Total oral diet with no restrictions   Nursing Communication Swallow Precaution Sign Posted at Head of Bed   Recommended Route of Medication Administration   Medication Administration  Other (See Comments)  (as tolerated)   Patient / Family Goals   Patient / Family Goal #1 \"I have been waiting for that water\"   Goal #1 Outcome Progressing as expected   Short Term Goals   Short Term Goal # 1 Pt will complete FEES with SLP to further evaluate swallow and inform POC.   Goal Outcome # 1 Goal met   Short Term Goal # 2 Pt will consume prefeeding trials without any overt s/sx of aspiration   Goal Outcome # 2  Goal not met   Short Term Goal # 3 Pt will complete LVC and BOT exercises with good accuracy given " min verbal cues   Goal Outcome  # 3 Progressing as expected   Short Term Goal # 4 Pt will eat RG/TN DESPITE RISK with good independent use of swallow strategies to minimize risks.   Short Term Goal # 5 Pt will independently verbalize or demonstrate 5 precautions/strategies related to eating despite risk and managing said risk.   Education Group   Education Provided Dysphagia   Dysphagia Patient Response Patient;Acceptance;Explanation;Teach Back;Verbal Demonstration;Action Demonstration   Additional Comments Pt verbalized understanding of aspiration and risks, demonstrated strategy use   Interdisciplinary Plan of Care Collaboration   IDT Collaboration with  Nursing;Physician   Collaboration Comments RN, MD updated. Swallow precautions hung

## 2023-02-14 NOTE — PROGRESS NOTES
"Patient asking for something to eat and drink. According to the notes it looks like he failed his FEES. This was explained to the patient. He said \"then give me something to sleep so that I can forget about this.\" This RN explained to him that we don't have anything to \"put him to sleep.\" He then said that his pain is a level 10/10 in his neck and back. Dr. Shepard notified and will place orders in Epic.  "

## 2023-02-15 LAB
ALBUMIN SERPL BCP-MCNC: 3.8 G/DL (ref 3.2–4.9)
ALBUMIN/GLOB SERPL: 1.1 G/DL
ALP SERPL-CCNC: 95 U/L (ref 30–99)
ALT SERPL-CCNC: 120 U/L (ref 2–50)
ANION GAP SERPL CALC-SCNC: 11 MMOL/L (ref 7–16)
AST SERPL-CCNC: 139 U/L (ref 12–45)
BACTERIA BLD CULT: ABNORMAL
BACTERIA BLD CULT: ABNORMAL
BACTERIA BLD CULT: NORMAL
BASOPHILS # BLD AUTO: 0.6 % (ref 0–1.8)
BASOPHILS # BLD: 0.03 K/UL (ref 0–0.12)
BILIRUB SERPL-MCNC: 0.8 MG/DL (ref 0.1–1.5)
BUN SERPL-MCNC: 14 MG/DL (ref 8–22)
CALCIUM ALBUM COR SERPL-MCNC: 9 MG/DL (ref 8.5–10.5)
CALCIUM SERPL-MCNC: 8.8 MG/DL (ref 8.5–10.5)
CHLORIDE SERPL-SCNC: 103 MMOL/L (ref 96–112)
CO2 SERPL-SCNC: 24 MMOL/L (ref 20–33)
CREAT SERPL-MCNC: 0.77 MG/DL (ref 0.5–1.4)
EOSINOPHIL # BLD AUTO: 0.11 K/UL (ref 0–0.51)
EOSINOPHIL NFR BLD: 2.2 % (ref 0–6.9)
ERYTHROCYTE [DISTWIDTH] IN BLOOD BY AUTOMATED COUNT: 56.2 FL (ref 35.9–50)
GFR SERPLBLD CREATININE-BSD FMLA CKD-EPI: 104 ML/MIN/1.73 M 2
GLOBULIN SER CALC-MCNC: 3.6 G/DL (ref 1.9–3.5)
GLUCOSE SERPL-MCNC: 112 MG/DL (ref 65–99)
HCT VFR BLD AUTO: 45.8 % (ref 42–52)
HGB BLD-MCNC: 15.2 G/DL (ref 14–18)
IMM GRANULOCYTES # BLD AUTO: 0.1 K/UL (ref 0–0.11)
IMM GRANULOCYTES NFR BLD AUTO: 2 % (ref 0–0.9)
INR PPP: 1.05 (ref 0.87–1.13)
LYMPHOCYTES # BLD AUTO: 1.56 K/UL (ref 1–4.8)
LYMPHOCYTES NFR BLD: 31.6 % (ref 22–41)
MAGNESIUM SERPL-MCNC: 2.2 MG/DL (ref 1.5–2.5)
MCH RBC QN AUTO: 33 PG (ref 27–33)
MCHC RBC AUTO-ENTMCNC: 33.2 G/DL (ref 33.7–35.3)
MCV RBC AUTO: 99.6 FL (ref 81.4–97.8)
MONOCYTES # BLD AUTO: 0.78 K/UL (ref 0–0.85)
MONOCYTES NFR BLD AUTO: 15.8 % (ref 0–13.4)
NEUTROPHILS # BLD AUTO: 2.35 K/UL (ref 1.82–7.42)
NEUTROPHILS NFR BLD: 47.8 % (ref 44–72)
NRBC # BLD AUTO: 0 K/UL
NRBC BLD-RTO: 0 /100 WBC
PHOSPHATE SERPL-MCNC: 3.7 MG/DL (ref 2.5–4.5)
PLATELET # BLD AUTO: 103 K/UL (ref 164–446)
PMV BLD AUTO: 10.3 FL (ref 9–12.9)
POTASSIUM SERPL-SCNC: 3.8 MMOL/L (ref 3.6–5.5)
PROT SERPL-MCNC: 7.4 G/DL (ref 6–8.2)
PROTHROMBIN TIME: 13.6 SEC (ref 12–14.6)
RBC # BLD AUTO: 4.6 M/UL (ref 4.7–6.1)
SIGNIFICANT IND 70042: ABNORMAL
SIGNIFICANT IND 70042: NORMAL
SITE SITE: ABNORMAL
SITE SITE: NORMAL
SODIUM SERPL-SCNC: 138 MMOL/L (ref 135–145)
SOURCE SOURCE: ABNORMAL
SOURCE SOURCE: NORMAL
T PALLIDUM AB SER QL AGGL: REACTIVE
WBC # BLD AUTO: 4.9 K/UL (ref 4.8–10.8)

## 2023-02-15 PROCEDURE — 99222 1ST HOSP IP/OBS MODERATE 55: CPT | Performed by: INTERNAL MEDICINE

## 2023-02-15 PROCEDURE — 99232 SBSQ HOSP IP/OBS MODERATE 35: CPT | Performed by: HOSPITALIST

## 2023-02-15 PROCEDURE — 87040 BLOOD CULTURE FOR BACTERIA: CPT

## 2023-02-15 PROCEDURE — 83735 ASSAY OF MAGNESIUM: CPT

## 2023-02-15 PROCEDURE — A9270 NON-COVERED ITEM OR SERVICE: HCPCS | Performed by: HOSPITALIST

## 2023-02-15 PROCEDURE — 85610 PROTHROMBIN TIME: CPT

## 2023-02-15 PROCEDURE — 700111 HCHG RX REV CODE 636 W/ 250 OVERRIDE (IP)

## 2023-02-15 PROCEDURE — 700102 HCHG RX REV CODE 250 W/ 637 OVERRIDE(OP): Performed by: INTERNAL MEDICINE

## 2023-02-15 PROCEDURE — 700102 HCHG RX REV CODE 250 W/ 637 OVERRIDE(OP)

## 2023-02-15 PROCEDURE — 36415 COLL VENOUS BLD VENIPUNCTURE: CPT

## 2023-02-15 PROCEDURE — 80053 COMPREHEN METABOLIC PANEL: CPT

## 2023-02-15 PROCEDURE — 700111 HCHG RX REV CODE 636 W/ 250 OVERRIDE (IP): Performed by: HOSPITALIST

## 2023-02-15 PROCEDURE — 700102 HCHG RX REV CODE 250 W/ 637 OVERRIDE(OP): Performed by: HOSPITALIST

## 2023-02-15 PROCEDURE — A9270 NON-COVERED ITEM OR SERVICE: HCPCS | Performed by: INTERNAL MEDICINE

## 2023-02-15 PROCEDURE — 700105 HCHG RX REV CODE 258: Performed by: HOSPITALIST

## 2023-02-15 PROCEDURE — 85025 COMPLETE CBC W/AUTO DIFF WBC: CPT

## 2023-02-15 PROCEDURE — 770006 HCHG ROOM/CARE - MED/SURG/GYN SEMI*

## 2023-02-15 PROCEDURE — A9270 NON-COVERED ITEM OR SERVICE: HCPCS

## 2023-02-15 PROCEDURE — 84100 ASSAY OF PHOSPHORUS: CPT

## 2023-02-15 RX ORDER — LORAZEPAM 2 MG/ML
1 INJECTION INTRAMUSCULAR
Status: DISCONTINUED | OUTPATIENT
Start: 2023-02-15 | End: 2023-02-16 | Stop reason: HOSPADM

## 2023-02-15 RX ADMIN — DIAZEPAM 2 MG: 2 TABLET ORAL at 00:00

## 2023-02-15 RX ADMIN — LORAZEPAM 1 MG: 2 INJECTION INTRAMUSCULAR; INTRAVENOUS at 20:43

## 2023-02-15 RX ADMIN — AMPICILLIN AND SULBACTAM 3 G: 1; 2 INJECTION, POWDER, FOR SOLUTION INTRAMUSCULAR; INTRAVENOUS at 12:06

## 2023-02-15 RX ADMIN — GABAPENTIN 100 MG: 100 CAPSULE ORAL at 11:57

## 2023-02-15 RX ADMIN — CLONIDINE HYDROCHLORIDE 0.1 MG: 0.1 TABLET ORAL at 17:13

## 2023-02-15 RX ADMIN — LORAZEPAM 1 MG: 2 INJECTION INTRAMUSCULAR; INTRAVENOUS at 15:58

## 2023-02-15 RX ADMIN — DIAZEPAM 2 MG: 2 TABLET ORAL at 11:57

## 2023-02-15 RX ADMIN — LORAZEPAM 1 MG: 2 INJECTION INTRAMUSCULAR; INTRAVENOUS at 04:11

## 2023-02-15 RX ADMIN — DIAZEPAM 2 MG: 2 TABLET ORAL at 05:32

## 2023-02-15 RX ADMIN — CLONIDINE HYDROCHLORIDE 0.1 MG: 0.1 TABLET ORAL at 04:46

## 2023-02-15 RX ADMIN — DIAZEPAM 2 MG: 2 TABLET ORAL at 23:48

## 2023-02-15 RX ADMIN — SODIUM CHLORIDE: 9 INJECTION, SOLUTION INTRAVENOUS at 02:48

## 2023-02-15 RX ADMIN — DIAZEPAM 2 MG: 2 TABLET ORAL at 17:13

## 2023-02-15 RX ADMIN — GABAPENTIN 100 MG: 100 CAPSULE ORAL at 17:13

## 2023-02-15 RX ADMIN — GABAPENTIN 100 MG: 100 CAPSULE ORAL at 04:47

## 2023-02-15 RX ADMIN — THIAMINE HCL TAB 100 MG 100 MG: 100 TAB at 05:32

## 2023-02-15 RX ADMIN — LISINOPRIL 20 MG: 20 TABLET ORAL at 04:47

## 2023-02-15 ASSESSMENT — PAIN DESCRIPTION - PAIN TYPE
TYPE: ACUTE PAIN

## 2023-02-15 ASSESSMENT — COGNITIVE AND FUNCTIONAL STATUS - GENERAL
WALKING IN HOSPITAL ROOM: A LITTLE
SUGGESTED CMS G CODE MODIFIER MOBILITY: CJ
SUGGESTED CMS G CODE MODIFIER DAILY ACTIVITY: CH
MOVING FROM LYING ON BACK TO SITTING ON SIDE OF FLAT BED: A LITTLE
MOBILITY SCORE: 21
CLIMB 3 TO 5 STEPS WITH RAILING: A LITTLE
DAILY ACTIVITIY SCORE: 24

## 2023-02-15 ASSESSMENT — LIFESTYLE VARIABLES
SUBSTANCE_ABUSE: 1
AGITATION: NORMAL ACTIVITY
VISUAL DISTURBANCES: NOT PRESENT
ANXIETY: NO ANXIETY (AT EASE)
PAROXYSMAL SWEATS: NO SWEAT VISIBLE
ORIENTATION AND CLOUDING OF SENSORIUM: ORIENTED AND CAN DO SERIAL ADDITIONS
NAUSEA AND VOMITING: NO NAUSEA AND NO VOMITING
AUDITORY DISTURBANCES: NOT PRESENT
HEADACHE, FULLNESS IN HEAD: NOT PRESENT
TOTAL SCORE: 0
TREMOR: NO TREMOR

## 2023-02-15 ASSESSMENT — ENCOUNTER SYMPTOMS
NAUSEA: 0
EYES NEGATIVE: 1
BACK PAIN: 1
FEVER: 0
SPUTUM PRODUCTION: 0
ABDOMINAL PAIN: 0
HEMOPTYSIS: 0
NERVOUS/ANXIOUS: 1
GASTROINTESTINAL NEGATIVE: 1
SHORTNESS OF BREATH: 0
SPEECH CHANGE: 1
COUGH: 1
CARDIOVASCULAR NEGATIVE: 1

## 2023-02-15 NOTE — PROGRESS NOTES
Rec'd report from YAEL Nagy at 1951. Pt arrived with transport via wheelchair. Assumed pt care. Pt assessment completed and pt is AA&Ox4. No complaints of pain or discomfort at this time. Pt on RA and vss. Pt expresses no further needs at this time. Bed locked, bed in lowest position, call light and personal belongings within reach, treaded socks and tele-sitter in place for safety. Hourly rounding in place.

## 2023-02-15 NOTE — CONSULTS
INFECTIOUS DISEASES INPATIENT CONSULT NOTE     Date of Service:2/15/2023    Consult Requested By: Jluis Zeng M.D.    Reason for Consultation: Bacteremia    History of Present Illness:   Chace Gong is a 58 y.o. male admitted 2/10/2023 in alcohol withdrawal  Transferred out of ICU  so ID consulted for +blood culture   Admitted 2/10/2023 with a complaint of difficulty urinating and profound intoxication with a blood alcohol level of 0.453.  On CIWA protocol but required ICU.   due to escalating need for benzos (required 16 mg of lorazepam in just the last 2-1/2 hours) Initial blood cxs Bacillus and 2 different staph epi.  On 2/13 one blood cx with CNS  Infectious Diseases consulted for antibiotic recommendation and management  No leukocytosis    Review Of Systems:  No fever    PMH:   No past medical history on file.  Prostate cancer  HTN  SYphilis  Hep C  PSH:  No past surgical history on file.    FAMILY HX:  No family history on file.    SOCIAL HX:  Social History     Socioeconomic History    Marital status: Single     Spouse name: Not on file    Number of children: Not on file    Years of education: Not on file    Highest education level: Not on file   Occupational History    Not on file   Tobacco Use    Smoking status: Every Day     Packs/day: 0.50     Years: 47.00     Pack years: 23.50     Types: Cigarettes    Smokeless tobacco: Not on file   Substance and Sexual Activity    Alcohol use: Yes    Drug use: Not on file    Sexual activity: Not on file   Other Topics Concern    Not on file   Social History Narrative    Not on file     Social Determinants of Health     Financial Resource Strain: Not on file   Food Insecurity: Not on file   Transportation Needs: Not on file   Physical Activity: Not on file   Stress: Not on file   Social Connections: Not on file   Intimate Partner Violence: Not on file   Housing Stability: Not on file     Social History     Tobacco Use   Smoking Status Every Day    Packs/day: 0.50     Years: 47.00    Pack years: 23.50    Types: Cigarettes   Smokeless Tobacco Not on file     Social History     Substance and Sexual Activity   Alcohol Use Yes       Allergies/Intolerances:  Allergies   Allergen Reactions    Sulfa Drugs Unspecified     unknown         Other Current Medications:    Current Facility-Administered Medications:     LORazepam (ATIVAN) injection 1 mg, 1 mg, Intravenous, Q2HRS PRN, FRED Abraham, 1 mg at 02/15/23 0411    ampicillin/sulbactam (UNASYN) 3 g in  mL IVPB, 3 g, Intravenous, Q6HRS, Jluis Zeng M.D., Last Rate: 200 mL/hr at 02/15/23 1206, 3 g at 02/15/23 1206    diazePAM (VALIUM) tablet 2 mg, 2 mg, Oral, Q6HRS, Alejandro Viramontes M.D., 2 mg at 02/15/23 1157    cloNIDine (CATAPRES) tablet 0.1 mg, 0.1 mg, Oral, TWICE DAILY, Alejandro Viramontes M.D., 0.1 mg at 02/15/23 0446    gabapentin (NEURONTIN) capsule 100 mg, 100 mg, Oral, TID, Alejandro Viramontes M.D., 100 mg at 02/15/23 1157    acetaminophen (Tylenol) tablet 650 mg, 650 mg, Oral, Q4HRS PRN, Raul Hollis M.D., 650 mg at 02/13/23 0625    enalaprilat (Vasotec) injection 1.25 mg 1 mL, 1.25 mg, Intravenous, Q6HRS PRN, Carlos Shepard D.O., 1.25 mg at 02/13/23 1935    [COMPLETED] thiamine (B-1) 500 mg in dextrose 5% 100 mL IVPB, 500 mg, Intravenous, DAILY, Last Rate: 200 mL/hr at 02/14/23 0526, 500 mg at 02/14/23 0526 **FOLLOWED BY** thiamine (Vitamin B-1) tablet 100 mg, 100 mg, Oral, DAILY, Carlos Cassidy M.D., 100 mg at 02/15/23 0532    MD Alert...ICU Electrolyte Replacement per Pharmacy, , Other, PHARMACY TO DOSE, Keerthi Silveira M.D.    labetalol (NORMODYNE/TRANDATE) injection 10 mg, 10 mg, Intravenous, Q10 MIN PRN, Raul Hollis M.D., 10 mg at 02/13/23 2051    lisinopril (PRINIVIL) tablet 20 mg, 20 mg, Oral, Q DAY, NADIRA Villarreal.ANGY, 20 mg at 02/15/23 5137    Notify MD and PharmD if FSBG level is less than or equal to 70 mg/dL or patient is showing signs/symptoms of hypoglycemia (tachycardia,  "palpitations, diaphoresis, clammy, tremulousness, nausea, confused), , , Once **AND** Administer 20 grams of glucose (approximately 8 ounces of fruit juice) every 15 minutes PRN FSBS less than 70 mg/dL, , , PRN **AND** dextrose 10 % BOLUS 25 g, 25 g, Intravenous, Q15 MIN PRN, Karan Baum M.D.    Respiratory Therapy Consult, , Nebulization, Continuous RT, Johnson Reynolds M.D.    NS infusion, , Intravenous, Continuous, Carlos Shepard D.O., Last Rate: 83 mL/hr at 02/15/23 0248, New Bag at 02/15/23 0248    hydrALAZINE (APRESOLINE) injection 10 mg, 10 mg, Intravenous, Q4HRS PRN, Johnson Reynolds M.D., 10 mg at 23 230      Most Recent Vital Signs:  /88   Pulse 93   Temp 36.5 °C (97.7 °F) (Temporal)   Resp (!) 21   Ht 1.88 m (6' 2\")   Wt 104 kg (229 lb 0.9 oz)   SpO2 97%   BMI 29.41 kg/m²   Temp  Av.1 °C (98.7 °F)  Min: 36.5 °C (97.7 °F)  Max: 37.8 °C (100 °F)    Physical Exam:  General: well-appearing, well nourished no acute distress  HEENT: NCAT, PERRLA, sclera anicteric, PERRL, EOMI,  no oral lesions Dentition  Neck: supple, no lymphadenopathy  Chest: CTAB, unlabored.  Cardiac: RRR,  no m/r/g   Abdomen: + bowel sounds, soft, non-tender, non-distended  Extremities: No cyanosis, clubbing. no edema, 2+ pulses  Skin: no rashes   Neuro: Alert and oriented times 3, Speech fluent CN intact WINTER  Psych: Mood normal Affect normal    Pertinent Lab Results:  Recent Labs     23  0445 23  0300 02/15/23  0241   WBC 4.7* 5.2 4.9      Recent Labs     23  0445 23  0300 02/15/23  0241   HEMOGLOBIN 13.8* 14.3 15.2   HEMATOCRIT 40.9* 41.9* 45.8   MCV 96.9 96.8 99.6*   MCH 32.7 33.0 33.0   PLATELETCT 55* 73* 103*         Recent Labs     23  0445 23  0300 02/15/23  0241   SODIUM 138 136 138   POTASSIUM 3.7 3.5* 3.8   CHLORIDE 104 102 103   CO2 19* 19* 24   CREATININE 0.62 0.50 0.77        Recent Labs     23  0445 23  0300 02/15/23  0241   ALBUMIN 3.5 " "3.6 3.8        Pertinent Micro:  Results       Procedure Component Value Units Date/Time    BLOOD CULTURE [[id number]] Collected: 02/15/23 1037    Order Status: Sent Specimen: Blood from Peripheral Updated: 02/15/23 1105    Narrative:      Per Hospital Policy: Only change Specimen Src: to \"Line\" if  specified by physician order.    BLOOD CULTURE [[id number]] Collected: 02/15/23 1037    Order Status: Sent Specimen: Blood from Peripheral Updated: 02/15/23 1105    Narrative:      Per Hospital Policy: Only change Specimen Src: to \"Line\" if  specified by physician order.    BLOOD CULTURE [[id number]]  (Abnormal) Collected: 02/13/23 1135    Order Status: Completed Specimen: Blood from Peripheral Updated: 02/15/23 0910     Significant Indicator POS     Source BLD     Site PERIPHERAL     Culture Result Growth detected by Bactec instrument. 02/14/2023  08:31      Coagulase-negative Staphylococcus species  Possible Contaminant  Isolated from one set only, please correlate with clinical  condition. Contact the Microbiology department within 48 hr  if identification and susceptibility are needed.      Narrative:      CALL  [place]  [facility] tel. [phone],  CALLED  [facility] tel. [phone] 02/14/2023, 08:33, RB PERF. RESULTS CALLED TO:  RN [id number]  Per Hospital Policy: Only change Specimen Src: to \"Line\" if  specified by physician order.  Left Wrist    URINE CULTURE(NEW) [[id number]] Collected: 02/11/23 1828    Order Status: Completed Specimen: Urine Updated: 02/14/23 1320     Significant Indicator NEG     Source UR     Site -     Culture Result Usual skin angela <10,000 cfu/mL    Narrative:      Indication for culture:->Emergency Room Patient  Indication for culture:->Emergency Room Patient    BLOOD CULTURE [[id number]] Collected: 02/13/23 1135    Order Status: Completed Specimen: Blood from Peripheral Updated: 02/14/23 0756     Significant Indicator NEG     Source BLD     Site PERIPHERAL     Culture Result No Growth  Note: Blood cultures are " "incubated for 5 days and  are monitored continuously.Positive blood cultures  are called to the RN and reported as soon as  they are identified.      Narrative:      Per Hospital Policy: Only change Specimen Src: to \"Line\" if  specified by physician order.  Right Wrist    BLOOD CULTURE [[id number]]  (Abnormal) Collected: 02/10/23 1246    Order Status: Completed Specimen: Blood from Peripheral Updated: 02/12/23 1344     Significant Indicator POS     Source BLD     Site PERIPHERAL     Culture Result Growth detected by Bactec instrument. 02/11/2023  05:30  Gram Stain: (One Bottle) Possible Staphylococcus sp.  Negative for Staphylococcus aureus and MRSA by PCR. Correlate  ongoing need for antibiotics with clinical condition.        Bacillus species  Possible Contaminant  Isolated from one set only, please correlate with clinical  condition. Contact the Microbiology department within 48 hr  if identification and susceptibility are needed.        Coagulase-negative Staphylococcus species  Possible Contaminant  Isolated from one set only, please correlate with clinical  condition. Contact the Microbiology department within 48 hr  if identification and susceptibility are needed.      Narrative:      CALL  [place]  [facility] tel. [phone],  CALLED  [facility] tel. [phone] 02/11/2023, 05:30, RB PERF. RESULTS CALLED TO:RN:  [phone]  Per Hospital Policy: Only change Specimen Src: to \"Line\" if  specified by physician order.  Right AC    URINALYSIS [[id number]]  (Abnormal) Collected: 02/12/23 0300    Order Status: Completed Specimen: Urine, Cath Updated: 02/12/23 0337     Color Yellow     Character Clear     Specific Gravity 1.012     Ph 8.0     Glucose Negative mg/dL      Ketones Negative mg/dL      Protein Negative mg/dL      Bilirubin Negative     Urobilinogen, Urine 1.0     Nitrite Negative     Leukocyte Esterase Negative     Occult Blood Small     Micro Urine Req Microscopic    Narrative:      Collected By: [id number] TRAVIS PHAM" "URINALYSIS [[id number]]  (Abnormal) Collected: 02/11/23 1828    Order Status: Completed Specimen: Urine Updated: 02/11/23 2117     Color Yellow     Character Clear     Specific Gravity 1.017     Ph 8.0     Glucose Negative mg/dL      Ketones Negative mg/dL      Protein Negative mg/dL      Bilirubin Negative     Urobilinogen, Urine 2.0     Nitrite Negative     Leukocyte Esterase Negative     Occult Blood Trace     Micro Urine Req Microscopic    Narrative:      Indication for culture:->Emergency Room Patient    BLOOD CULTURE [[id number]] Collected: 02/10/23 1418    Order Status: Completed Specimen: Blood from Peripheral Updated: 02/11/23 0734     Significant Indicator NEG     Source BLD     Site PERIPHERAL     Culture Result No Growth  Note: Blood cultures are incubated for 5 days and  are monitored continuously.Positive blood cultures  are called to the RN and reported as soon as  they are identified.      Narrative:      Per Hospital Policy: Only change Specimen Src: to \"Line\" if  specified by physician order.  Right Hand    CoV-2, FLU A/B, and RSV by PCR (2-4 Hours CEPHEID) : Collect NP swab in VTM [[id number]] Collected: 02/10/23 1246    Order Status: Completed Specimen: Respirate Updated: 02/10/23 1410     Influenza virus A RNA Negative     Influenza virus B, PCR Negative     RSV, PCR Negative     SARS-CoV-2 by PCR NotDetected     Comment: [facility] providers: PLEASE REFER TO DE-ESCALATION AND RETESTING PROTOCOL  on [email or web address]    **The [employer] GeneXpert Xpress SARS-CoV-2 RT-PCR Test has been made  available for use under the Emergency Use Authorization (EUA) only.          SARS-CoV-2 Source NP Swab          No results found for: BLOODCULTU, BLDCULT, BCHOLD     Studies:  CXR negative by my read  IMPRESSION:   EtOH abuse  H/o meth abuse  Hep C with  thrombocytopenia  Pseudobacteremia-blood culture 2/10 grossly contaminated with 3 different skin organisms  Repeat Bcx 1/2 CNS-no indwelling pacemaker or artificial " valve        PLAN:   Repeat blood cultures-please ensure sterile technique  No abx recommended at this time  FU outpatient for treatment Hep C if indicated    Plan of care discussed with DIANE Zeng M.D.. Will follow if needed    Effie Valles M.D.

## 2023-02-15 NOTE — PROGRESS NOTES
Hospital Medicine Daily Progress Note    Date of Service  2/15/2023    Chief Complaint  Alcohol intoxication    Hospital Course  Chace Gong is a 58 y.o. male  with alcohol and tobacco use admitted 2/10/2023 with alcohol intoxication and difficulty urinating. He was found to be hypoxic and required 6L oxygen.  A CXR was unremarkable for acute findings. Due to worsening withdrawal from alcohol he was transferred to the ICU for precedex drip. He has had one of two blood cultures from 2/10 positive for Bacillus species, and possible Staphylococcus. He was placed on ceftriaxone initially but switched to Zosyn.  Repeat blood cultures from 2/13 again one of two growing GPC.     Interval Problem Update  RAHUL overnight  Finalization of BCx pending  Echo ordered  I have consulted ID    I have discussed this patient's plan of care and discharge plan at IDT rounds today with Case Management, Nursing, Nursing leadership, and other members of the IDT team.    Consultants/Specialty  critical care and infectious disease    Code Status  Full Code    Disposition  Patient is not medically cleared for discharge.   Anticipate discharge to to home with close outpatient follow-up.  I have placed the appropriate orders for post-discharge needs.    Review of Systems  Review of Systems   Constitutional:  Positive for malaise/fatigue. Negative for fever.   HENT: Negative.     Eyes: Negative.    Respiratory:  Positive for cough. Negative for hemoptysis, sputum production and shortness of breath.    Cardiovascular: Negative.    Gastrointestinal: Negative.  Negative for abdominal pain and nausea.   Musculoskeletal:  Positive for back pain.   Skin: Negative.    Neurological:  Positive for speech change.   Endo/Heme/Allergies: Negative.    Psychiatric/Behavioral:  Positive for substance abuse. The patient is nervous/anxious.    All other systems reviewed and are negative.     Physical Exam  Temp:  [36.5 °C (97.7 °F)-37.3 °C (99.2 °F)] 37.3 °C  (99.2 °F)  Pulse:  [] 91  Resp:  [16-21] 16  BP: (129-165)/() 165/98  SpO2:  [90 %-97 %] 90 %    Physical Exam  Vitals reviewed.   Constitutional:       Appearance: Normal appearance. He is overweight. He is not diaphoretic.      Comments: dishevelled   HENT:      Head: Normocephalic and atraumatic.      Nose: Nose normal.      Mouth/Throat:      Mouth: Mucous membranes are moist.      Pharynx: No oropharyngeal exudate.   Eyes:      General: No scleral icterus.        Right eye: No discharge.         Left eye: No discharge.      Extraocular Movements: Extraocular movements intact.      Conjunctiva/sclera: Conjunctivae normal.   Cardiovascular:      Rate and Rhythm: Normal rate and regular rhythm.      Pulses:           Radial pulses are 2+ on the right side and 2+ on the left side.        Dorsalis pedis pulses are 2+ on the right side and 2+ on the left side.      Heart sounds: No murmur heard.  Pulmonary:      Effort: Pulmonary effort is normal. No respiratory distress.      Breath sounds: Decreased breath sounds present. No wheezing or rales.   Abdominal:      General: Bowel sounds are normal. There is no distension.      Palpations: Abdomen is soft.   Musculoskeletal:         General: No swelling or tenderness.      Cervical back: No tenderness. No muscular tenderness.      Right lower leg: No edema.      Left lower leg: No edema.   Lymphadenopathy:      Cervical: No cervical adenopathy.   Skin:     Coloration: Skin is not jaundiced or pale.   Neurological:      Mental Status: He is alert. Mental status is at baseline.      Cranial Nerves: No cranial nerve deficit.   Psychiatric:         Mood and Affect: Mood is anxious.         Judgment: Judgment is impulsive.       Fluids    Intake/Output Summary (Last 24 hours) at 2/15/2023 2509  Last data filed at 2/15/2023 1300  Gross per 24 hour   Intake 1778.78 ml   Output 1250 ml   Net 528.78 ml       Laboratory  Recent Labs     02/13/23  0445 02/14/23  0300  02/15/23  0241   WBC 4.7* 5.2 4.9   RBC 4.22* 4.33* 4.60*   HEMOGLOBIN 13.8* 14.3 15.2   HEMATOCRIT 40.9* 41.9* 45.8   MCV 96.9 96.8 99.6*   MCH 32.7 33.0 33.0   MCHC 33.7 34.1 33.2*   RDW 52.9* 53.4* 56.2*   PLATELETCT 55* 73* 103*   MPV 10.6 9.9 10.3     Recent Labs     02/13/23 0445 02/14/23  0300 02/15/23  0241   SODIUM 138 136 138   POTASSIUM 3.7 3.5* 3.8   CHLORIDE 104 102 103   CO2 19* 19* 24   GLUCOSE 82 80 112*   BUN 12 9 14   CREATININE 0.62 0.50 0.77   CALCIUM 8.3* 8.1* 8.8     Recent Labs     02/13/23 0445 02/14/23  0300 02/15/23  0241   INR 1.21* 1.13 1.05                 Imaging  DX-CHEST-PORTABLE (1 VIEW)   Final Result         1.  No acute cardiopulmonary disease.      CT-CTA CHEST PULMONARY ARTERY W/ RECONS   Final Result         1.  No large central pulmonary embolus is appreciated, evaluation of the subsegmental branches is essentially nondiagnostic due to motion artifacts. Additional imaging would be required for definitive exclusion of small distal pulmonary emboli.   2.  Scattered hazy pulmonary opacities suggests subtle infiltrates.   3.  Hepatomegaly and diffuse hepatic steatosis   4.  Irregular hepatic contour favoring changes of cirrhosis   5.  Atherosclerosis and atherosclerotic coronary artery disease.      DX-CHEST-PORTABLE (1 VIEW)   Final Result      1.  Questionable nonspecific right hilar/perihilar prominence of uncertain etiology or significance.   2.  No definite consolidation or pleural effusion.      EC-ECHOCARDIOGRAM COMPLETE W/O CONT    (Results Pending)          Assessment/Plan  * Alcohol withdrawal (HCC)- (present on admission)  Assessment & Plan  CIWA protocol ,Scheduled librium, was not effective  Required precedex during admit  Aspiration, seizure, fall precautions    Hypertensive urgency  Assessment & Plan  Improved on medication, transition to oral meds  Has prn vasotec, labetalol, and hydralazine       Dysphagia  Assessment & Plan  The patient is refusing further  evaluation, he accepts risk of aspiration with modified diet  Patient follow-up    Thrombocytopenia (HCC)- (present on admission)  Assessment & Plan    Likely acute bone marrow insult from EtOH and may have chronic liver injury as well.  Following CBC    Rhabdomyolysis- (present on admission)  Assessment & Plan  Per EMS, patient was found lying in bed at home intoxicated, likely for prolonged period of time  Improved after hydration,    Lactic acidosis- (present on admission)  Assessment & Plan  likely secondary to hypoperfusion from hypovolemia due to alcohol intoxication, complicated by cirrhosis  LA 2.8 to 2.3 to 2.0 s/p IVF 100ml/hr  RESOLVED    Bacteremia- (present on admission)  Assessment & Plan  BC x1 preliminary Bacillus and Staph  Appears to be a contaminant, monitor off antibiotics    Smoker- (present on admission)  Assessment & Plan  Smoking cessation discussed for 5 minutes      BPH (benign prostatic hyperplasia)- (present on admission)  Assessment & Plan  Bladder scan 432ml, post void 130ml  Follow up with urology outpatient  Stat oral flomax once able to take orals   Watch for retention    Elevated liver function tests- (present on admission)  Assessment & Plan  2/2 alcoholic hepatitis. Maddrey score 4.4  Monitor cmp    Alcohol intoxication (HCC)- (present on admission)  Assessment & Plan  Initially on CIWA protocol, then escalated to ICU care with Precedex  Alcohol cessation recommended  Currently improved, vitamin support, low-dose benzodiazepines support    Respiratory failure (HCC)- (present on admission)  Assessment & Plan  SpO2 80% on arrival, requiring 6L NC for 91%. Improved back down to 90% RA, which is baseline  Likely secondary to alcohol intoxication causing bradypnea  Hx of smoking and may have COPD, needs outpatient PFTs  Cessation encouragement once more alert  CXR negative acute  CTA negative PE  Remains requiring 1-2 L O2 via NC.  RT per protocol  Aspiration precautions given that he  failed his swallow 2/13    Plan  Blood pressure control,  Scheduled low-dose benzodiazepines  DC antibiotics and monitor  Reduce IV fluids, monitor p.o. intake and adjust as the patient gets further hydration  Close laboratory follow-up  Smoking cessation discussed  See orders  Medically complex high-risk patient        VTE prophylaxis: SCDs/TEDs    I have performed a physical exam and reviewed and updated ROS and Plan today (2/15/2023). In review of yesterday's note (2/14/2023), there are no changes except as documented above.      Please note that this dictation was created using voice recognition software. I have made every reasonable attempt to correct obvious errors, but I expect that there are errors of grammar and possibly context that I did not discover before finalizing the note.

## 2023-02-15 NOTE — PROGRESS NOTES
Gave report to Maritza CONLEY. Patient brought to [facility] with transport with telesitter. All belongings with patient.

## 2023-02-15 NOTE — PROGRESS NOTES
Assumed care of patient 0700. Received Report from [facility] nurse. Patient A&OX4, on RA, Reporting a pain level of 0/10. Call light within reach, belongings within reach, Fall precautions in place, and bed alarm is on and bed in lowest position. Patient does not have any other needs at this time.     POC was discussed with patient. Monitoring Liver enzymes, and any S/S of anxiety. All questions were answered. Patient verbalized understanding.

## 2023-02-15 NOTE — DISCHARGE SUMMARY
"Discharge Summary    CHIEF COMPLAINT ON ADMISSION  Chief Complaint   Patient presents with    Alcohol Intoxication     BIB EMS from home in [place]. Per EMS \"patient has been lying in bed and people have been bringing him drugs and alcohol.\" Reports drinking 5 oz vodka daily x 5 days. AAO x 3, GCS 14. Denies SI.     Difficulty Urinating     Recent prostate cancer diagnosis. States \"I have to push on my taint to pee.\" EMS reports patient was incontinent of urine in ambulance.       Reason for Admission  ems     Admission Date  2/10/2023    CODE STATUS  Full Code    HPI & HOSPITAL COURSE  Chace Gong is a 58 y.o. male  with alcohol and tobacco use admitted 2/10/2023 with alcohol intoxication and difficulty urinating. He was found to be hypoxic and required 6L oxygen.  A CXR was unremarkable for acute findings. Due to worsening withdrawal from alcohol he was transferred to the ICU for precedex drip. He has had one of two blood cultures from 2/10 positive for Bacillus species, and possible Staphylococcus. He was placed on ceftriaxone initially but switched to Zosyn.  Repeat blood cultures from 2/13 again one of two growing GPC.  I have consulted ID and echocardiogram was ordered which did not show any vegetations.. Per ID, poor samples growing contaminants and 3rd set of BCx with NGTD. His BP is better controlled with lisinopril.     Therefore, he is discharged in good and stable condition to home with close outpatient follow-up.    The patient met 2-midnight criteria for an inpatient stay at the time of discharge.    Discharge Date  2/16/2023    FOLLOW UP ITEMS POST DISCHARGE  F/u with PCP for BP follow up and optimization    DISCHARGE DIAGNOSES  Principal Problem:    Alcohol withdrawal (HCC) POA: Yes  Active Problems:    Respiratory failure (HCC) POA: Yes    Alcohol intoxication (HCC) POA: Yes    Elevated liver function tests POA: Yes    BPH (benign prostatic hyperplasia) POA: Yes    Smoker POA: Yes    Bacteremia " POA: Yes    Lactic acidosis POA: Yes    Rhabdomyolysis POA: Yes    Thrombocytopenia (HCC) POA: Yes    Alcohol dependence with withdrawal delirium (HCC) POA: Unknown    Dysphagia POA: Unknown    Hypertensive urgency POA: Unknown  Resolved Problems:    Hypokalemia POA: Yes      FOLLOW UP  No future appointments.  No follow-up provider specified.    MEDICATIONS ON DISCHARGE     Medication List      You have not been prescribed any medications.         Allergies  Allergies   Allergen Reactions    Sulfa Drugs Unspecified     unknown       DIET  Orders Placed This Encounter   Procedures    Diet Order Diet: Regular (DESPITE RISK. meds as tolerated, oral care after meals)     Standing Status:   Standing     Number of Occurrences:   1     Order Specific Question:   Diet:     Answer:   Regular [1]     Comments:   DESPITE RISK. meds as tolerated, oral care after meals       ACTIVITY  As tolerated.  Weight bearing as tolerated    CONSULTATIONS  None    PROCEDURES  None    LABORATORY  Lab Results   Component Value Date    SODIUM 138 02/15/2023    POTASSIUM 3.8 02/15/2023    CHLORIDE 103 02/15/2023    CO2 24 02/15/2023    GLUCOSE 112 (H) 02/15/2023    BUN 14 02/15/2023    CREATININE 0.77 02/15/2023        Lab Results   Component Value Date    WBC 4.9 02/15/2023    HEMOGLOBIN 15.2 02/15/2023    HEMATOCRIT 45.8 02/15/2023    PLATELETCT 103 (L) 02/15/2023        Total time of the discharge process exceeds 34 minutes.

## 2023-02-15 NOTE — PROGRESS NOTES
4 Eyes Skin Assessment Completed by YAEL Salas and YAEL Uribe.    Head WDL  Ears WDL  Nose WDL  Mouth WDL  Neck WDL  Breast/Chest WDL  Shoulder Blades WDL  Spine WDL  (R) Arm/Elbow/Hand Bruising, scab  (L) Arm/Elbow/Hand Bruising  Abdomen WDL  Groin WDL  Scrotum/Coccyx/Buttocks Redness and Blanching  (R) Leg Bruising  (L) Leg Bruising  (R) Heel/Foot/Toe laceration on great toe, calluses to great toe, 2nd and 3rd  (L) Heel/Foot/Toe callus to foot          Devices In Places Blood Pressure Cuff      Interventions In Place Pillows    Possible Skin Injury Yes    Pictures Uploaded Into Epic Yes  Wound Consult Placed Yes  RN Wound Prevention Protocol Ordered No

## 2023-02-15 NOTE — CARE PLAN
The patient is Stable - Low risk of patient condition declining or worsening    Shift Goals  Clinical Goals: safety and comfort  Patient Goals: rest  Family Goals: N/A    Progress made toward(s) clinical / shift goals:  Pt freed from falls and tele-sitter in place for safety.     Problem: Knowledge Deficit - Standard  Goal: Patient and family/care givers will demonstrate understanding of plan of care, disease process/condition, diagnostic tests and medications  Outcome: Progressing     Problem: Psychosocial  Goal: Patient's level of anxiety will decrease  Outcome: Progressing     Problem: Fall Risk  Goal: Patient will remain free from falls  Outcome: Progressing       Patient is not progressing towards the following goals:

## 2023-02-16 ENCOUNTER — APPOINTMENT (OUTPATIENT)
Dept: CARDIOLOGY | Facility: MEDICAL CENTER | Age: 58
DRG: 189 | End: 2023-02-16
Attending: HOSPITALIST
Payer: MEDICARE

## 2023-02-16 ENCOUNTER — PATIENT OUTREACH (OUTPATIENT)
Dept: SCHEDULING | Facility: IMAGING CENTER | Age: 58
End: 2023-02-16
Payer: MEDICARE

## 2023-02-16 VITALS
HEART RATE: 89 BPM | HEIGHT: 74 IN | BODY MASS INDEX: 29.4 KG/M2 | OXYGEN SATURATION: 95 % | TEMPERATURE: 97.6 F | SYSTOLIC BLOOD PRESSURE: 141 MMHG | RESPIRATION RATE: 18 BRPM | WEIGHT: 229.06 LBS | DIASTOLIC BLOOD PRESSURE: 99 MMHG

## 2023-02-16 LAB
ALBUMIN SERPL BCP-MCNC: 3.4 G/DL (ref 3.2–4.9)
ALBUMIN/GLOB SERPL: 1.1 G/DL
ALP SERPL-CCNC: 81 U/L (ref 30–99)
ALT SERPL-CCNC: 128 U/L (ref 2–50)
ANION GAP SERPL CALC-SCNC: 10 MMOL/L (ref 7–16)
AST SERPL-CCNC: 115 U/L (ref 12–45)
BASOPHILS # BLD AUTO: 0.7 % (ref 0–1.8)
BASOPHILS # BLD: 0.03 K/UL (ref 0–0.12)
BILIRUB SERPL-MCNC: 0.7 MG/DL (ref 0.1–1.5)
BUN SERPL-MCNC: 9 MG/DL (ref 8–22)
CALCIUM ALBUM COR SERPL-MCNC: 8.9 MG/DL (ref 8.5–10.5)
CALCIUM SERPL-MCNC: 8.4 MG/DL (ref 8.5–10.5)
CHLORIDE SERPL-SCNC: 106 MMOL/L (ref 96–112)
CO2 SERPL-SCNC: 22 MMOL/L (ref 20–33)
CREAT SERPL-MCNC: 0.52 MG/DL (ref 0.5–1.4)
EOSINOPHIL # BLD AUTO: 0.13 K/UL (ref 0–0.51)
EOSINOPHIL NFR BLD: 3.2 % (ref 0–6.9)
ERYTHROCYTE [DISTWIDTH] IN BLOOD BY AUTOMATED COUNT: 55.5 FL (ref 35.9–50)
GFR SERPLBLD CREATININE-BSD FMLA CKD-EPI: 117 ML/MIN/1.73 M 2
GLOBULIN SER CALC-MCNC: 3.2 G/DL (ref 1.9–3.5)
GLUCOSE SERPL-MCNC: 97 MG/DL (ref 65–99)
HCT VFR BLD AUTO: 39.3 % (ref 42–52)
HGB BLD-MCNC: 13.2 G/DL (ref 14–18)
IMM GRANULOCYTES # BLD AUTO: 0.08 K/UL (ref 0–0.11)
IMM GRANULOCYTES NFR BLD AUTO: 2 % (ref 0–0.9)
INR PPP: 1.14 (ref 0.87–1.13)
LV EJECT FRACT  99904: 45
LV EJECT FRACT MOD 2C 99903: 43.91
LV EJECT FRACT MOD 4C 99902: 35.48
LV EJECT FRACT MOD BP 99901: 40.82
LYMPHOCYTES # BLD AUTO: 1.47 K/UL (ref 1–4.8)
LYMPHOCYTES NFR BLD: 35.9 % (ref 22–41)
MAGNESIUM SERPL-MCNC: 1.8 MG/DL (ref 1.5–2.5)
MCH RBC QN AUTO: 33.5 PG (ref 27–33)
MCHC RBC AUTO-ENTMCNC: 33.6 G/DL (ref 33.7–35.3)
MCV RBC AUTO: 99.7 FL (ref 81.4–97.8)
MONOCYTES # BLD AUTO: 0.7 K/UL (ref 0–0.85)
MONOCYTES NFR BLD AUTO: 17.1 % (ref 0–13.4)
NEUTROPHILS # BLD AUTO: 1.68 K/UL (ref 1.82–7.42)
NEUTROPHILS NFR BLD: 41.1 % (ref 44–72)
NRBC # BLD AUTO: 0 K/UL
NRBC BLD-RTO: 0 /100 WBC
PHOSPHATE SERPL-MCNC: 3 MG/DL (ref 2.5–4.5)
PLATELET # BLD AUTO: 110 K/UL (ref 164–446)
PMV BLD AUTO: 9.8 FL (ref 9–12.9)
POTASSIUM SERPL-SCNC: 3.7 MMOL/L (ref 3.6–5.5)
PROT SERPL-MCNC: 6.6 G/DL (ref 6–8.2)
PROTHROMBIN TIME: 14.5 SEC (ref 12–14.6)
RBC # BLD AUTO: 3.94 M/UL (ref 4.7–6.1)
SODIUM SERPL-SCNC: 138 MMOL/L (ref 135–145)
WBC # BLD AUTO: 4.1 K/UL (ref 4.8–10.8)

## 2023-02-16 PROCEDURE — 84100 ASSAY OF PHOSPHORUS: CPT

## 2023-02-16 PROCEDURE — 99239 HOSP IP/OBS DSCHRG MGMT >30: CPT | Performed by: HOSPITALIST

## 2023-02-16 PROCEDURE — A9270 NON-COVERED ITEM OR SERVICE: HCPCS | Performed by: HOSPITALIST

## 2023-02-16 PROCEDURE — 700111 HCHG RX REV CODE 636 W/ 250 OVERRIDE (IP)

## 2023-02-16 PROCEDURE — 700102 HCHG RX REV CODE 250 W/ 637 OVERRIDE(OP): Performed by: HOSPITALIST

## 2023-02-16 PROCEDURE — 93306 TTE W/DOPPLER COMPLETE: CPT

## 2023-02-16 PROCEDURE — 700102 HCHG RX REV CODE 250 W/ 637 OVERRIDE(OP): Performed by: INTERNAL MEDICINE

## 2023-02-16 PROCEDURE — 36415 COLL VENOUS BLD VENIPUNCTURE: CPT

## 2023-02-16 PROCEDURE — 85025 COMPLETE CBC W/AUTO DIFF WBC: CPT

## 2023-02-16 PROCEDURE — A9270 NON-COVERED ITEM OR SERVICE: HCPCS

## 2023-02-16 PROCEDURE — A9270 NON-COVERED ITEM OR SERVICE: HCPCS | Performed by: INTERNAL MEDICINE

## 2023-02-16 PROCEDURE — 83735 ASSAY OF MAGNESIUM: CPT

## 2023-02-16 PROCEDURE — 93306 TTE W/DOPPLER COMPLETE: CPT | Mod: 26 | Performed by: INTERNAL MEDICINE

## 2023-02-16 PROCEDURE — 80053 COMPREHEN METABOLIC PANEL: CPT

## 2023-02-16 PROCEDURE — 85610 PROTHROMBIN TIME: CPT

## 2023-02-16 PROCEDURE — 700102 HCHG RX REV CODE 250 W/ 637 OVERRIDE(OP)

## 2023-02-16 RX ORDER — LISINOPRIL 20 MG/1
20 TABLET ORAL DAILY
Qty: 90 TABLET | Refills: 0 | Status: ON HOLD | OUTPATIENT
Start: 2023-02-17 | End: 2024-03-16

## 2023-02-16 RX ORDER — LORAZEPAM 0.5 MG/1
0.5 TABLET ORAL ONCE
Status: COMPLETED | OUTPATIENT
Start: 2023-02-16 | End: 2023-02-16

## 2023-02-16 RX ADMIN — CLONIDINE HYDROCHLORIDE 0.1 MG: 0.1 TABLET ORAL at 04:55

## 2023-02-16 RX ADMIN — LORAZEPAM 0.5 MG: 0.5 TABLET ORAL at 10:08

## 2023-02-16 RX ADMIN — LISINOPRIL 20 MG: 20 TABLET ORAL at 04:55

## 2023-02-16 RX ADMIN — GABAPENTIN 100 MG: 100 CAPSULE ORAL at 04:55

## 2023-02-16 RX ADMIN — LORAZEPAM 1 MG: 2 INJECTION INTRAMUSCULAR; INTRAVENOUS at 01:09

## 2023-02-16 RX ADMIN — THIAMINE HCL TAB 100 MG 100 MG: 100 TAB at 04:55

## 2023-02-16 RX ADMIN — DIAZEPAM 2 MG: 2 TABLET ORAL at 04:56

## 2023-02-16 RX ADMIN — GABAPENTIN 100 MG: 100 CAPSULE ORAL at 12:02

## 2023-02-16 ASSESSMENT — PAIN DESCRIPTION - PAIN TYPE
TYPE: ACUTE PAIN
TYPE: ACUTE PAIN

## 2023-02-16 NOTE — DISCHARGE PLANNING
Care Transition Team Final Discharge Disposition    Actual Discharge Information  Discharge Disposition: Left against medical advice or discontinued care (07)    CM notified during rounds that patient will discharge after he gets his echo and results are reviewed by provider.     CM met with Yoni at bedside, IMM explained, Yoni signed, he was provided a copy and original given to DPA for scanning.     CM notified that patient is going AMA.     No other CM needs at this time.

## 2023-02-16 NOTE — PROGRESS NOTES
Assumed care of patient 0700. Received Report from [facility] nurse. Patient A&O4, on RA, Reporting a pain level of 0/10. Call light within reach, belongings within reach, Fall precautions in place, and bed alarm is on and bed in lowest position. Patient does not have any other needs at this time.     POC was discussed with patient. Discharge is pending ECHO results which will be done this am. All questions were answered. Patient verbalized understanding.

## 2023-02-16 NOTE — PROGRESS NOTES
Patient left AMA.   Echo was done at 10am, discharge was pending ECHO results. Cardiologist was messaged, no response. Patient lost his patience waiting for results and left AMA. Hospitalist was notified, patients niece was notified as well.

## 2023-02-16 NOTE — CARE PLAN
The patient is Stable - Low risk of patient condition declining or worsening    Shift Goals  Clinical Goals: control and anxiety and comfort  Patient Goals: comfort  Family Goals: N/A    Progress made toward(s) clinical / shift goals:  Pt medicated per MAR PRN orders for anxiety. Upon reassessment, pt verbalizes he feels better and appears calm.    Problem: Psychosocial  Goal: Patient's level of anxiety will decrease  Outcome: Progressing     Problem: Fall Risk  Goal: Patient will remain free from falls  Outcome: Progressing       Patient is not progressing towards the following goals:

## 2023-02-16 NOTE — WOUND TEAM
Wound team consulted for right foot great toe and second toe. Upon assessment, patient with dry fissure along great toe and small area of discoloration along second toe. No signs of infection along wound and periwound. Betadine applied. No advanced wound care needed. Consult completed.

## 2023-02-16 NOTE — PROGRESS NOTES
Rec'd report from day shift RN. Assumed pt care. Pt assessment completed and pt is AA&Ox4. Upon entering room, pt is tearful, appears anxious and restless. Pt medicated per MAR PRN orders for anxiety. I did some breathing exercises and spoke with pt to help calm him down. After comforting pt, he verbalizes he is feeling better. Pt on RA. All needs met. Bed locked, bed in lowest position, call light and personal belongings within reach, treaded socks and tele-sitter in place for safety. Pt refuses bed alarm. Hourly rounding in place.

## 2023-02-16 NOTE — CARE PLAN
The patient is Stable - Low risk of patient condition declining or worsening    Shift Goals  Clinical Goals: Echo, Monitor anxiety  Patient Goals: Discharge  Family Goals: N/A    Progress made toward(s) clinical / shift goals:  Patient left AMA    Patient is not progressing towards the following goals:

## 2023-02-16 NOTE — PROGRESS NOTES
Patient had wanted to leave AMA today, Niece visited patient at 330pm and told her uncle taht he cannot go home until the Dr authorizes his release.  was made aware, ECHO will be done tomorrow 2/16/23

## 2023-02-16 NOTE — CARE PLAN
The patient is Stable - Low risk of patient condition declining or worsening    Shift Goals  Clinical Goals: Control anxiety, comfort  Patient Goals: Rest  Family Goals: N/A    Progress made toward(s) clinical / shift goals:  Patient able to relax comfortably during shift. He is agreeable to treatment    Patient is not progressing towards the following goals:

## 2023-02-16 NOTE — PROGRESS NOTES
Chace Gong has chosen to leave the hospital against medical advice. The attending physician has not discharged the patient. The provider is aware that the patient is leaving against medical advice. Patient expresses understanding of the risks of leaving the hospital and benefits of admission including but not limited to, the availability and proximity of nurses, physicians, monitoring, diagnostic testing, treatment, and a safe discharge plan. The patient had the opportunity to ask questions about their medical condition and recommended treatment.  Patient is aware that they may return for care at any time.

## 2023-02-18 LAB
BACTERIA BLD CULT: NORMAL
SIGNIFICANT IND 70042: NORMAL
SITE SITE: NORMAL
SOURCE SOURCE: NORMAL

## 2023-08-17 ENCOUNTER — HOSPITAL ENCOUNTER (INPATIENT)
Facility: MEDICAL CENTER | Age: 58
LOS: 2 days | DRG: 896 | End: 2023-08-19
Attending: EMERGENCY MEDICINE | Admitting: STUDENT IN AN ORGANIZED HEALTH CARE EDUCATION/TRAINING PROGRAM
Payer: COMMERCIAL

## 2023-08-17 ENCOUNTER — APPOINTMENT (OUTPATIENT)
Dept: RADIOLOGY | Facility: MEDICAL CENTER | Age: 58
DRG: 896 | End: 2023-08-17
Attending: STUDENT IN AN ORGANIZED HEALTH CARE EDUCATION/TRAINING PROGRAM
Payer: COMMERCIAL

## 2023-08-17 DIAGNOSIS — E86.0 DEHYDRATION: ICD-10-CM

## 2023-08-17 DIAGNOSIS — F10.930 ALCOHOL WITHDRAWAL SYNDROME WITHOUT COMPLICATION (HCC): ICD-10-CM

## 2023-08-17 PROBLEM — R00.0 TACHYCARDIA: Status: ACTIVE | Noted: 2023-08-17

## 2023-08-17 PROBLEM — J96.01 ACUTE RESPIRATORY FAILURE WITH HYPOXIA (HCC): Status: ACTIVE | Noted: 2023-02-10

## 2023-08-17 PROBLEM — F10.130 ALCOHOL ABUSE WITH WITHDRAWAL WITHOUT COMPLICATION (HCC): Status: ACTIVE | Noted: 2023-08-17

## 2023-08-17 PROBLEM — I10 PRIMARY HYPERTENSION: Status: ACTIVE | Noted: 2023-08-17

## 2023-08-17 LAB
ALBUMIN SERPL BCP-MCNC: 4.7 G/DL (ref 3.2–4.9)
ALBUMIN/GLOB SERPL: 1.4 G/DL
ALP SERPL-CCNC: 100 U/L (ref 30–99)
ALT SERPL-CCNC: 40 U/L (ref 2–50)
ANION GAP SERPL CALC-SCNC: 17 MMOL/L (ref 7–16)
AST SERPL-CCNC: 53 U/L (ref 12–45)
BASOPHILS # BLD AUTO: 0.5 % (ref 0–1.8)
BASOPHILS # BLD: 0.04 K/UL (ref 0–0.12)
BILIRUB SERPL-MCNC: 0.6 MG/DL (ref 0.1–1.5)
BUN SERPL-MCNC: 15 MG/DL (ref 8–22)
CALCIUM ALBUM COR SERPL-MCNC: 8.2 MG/DL (ref 8.5–10.5)
CALCIUM SERPL-MCNC: 8.8 MG/DL (ref 8.5–10.5)
CHLORIDE SERPL-SCNC: 100 MMOL/L (ref 96–112)
CO2 SERPL-SCNC: 24 MMOL/L (ref 20–33)
CREAT SERPL-MCNC: 0.89 MG/DL (ref 0.5–1.4)
EOSINOPHIL # BLD AUTO: 0.08 K/UL (ref 0–0.51)
EOSINOPHIL NFR BLD: 0.9 % (ref 0–6.9)
ERYTHROCYTE [DISTWIDTH] IN BLOOD BY AUTOMATED COUNT: 51.6 FL (ref 35.9–50)
GFR SERPLBLD CREATININE-BSD FMLA CKD-EPI: 100 ML/MIN/1.73 M 2
GLOBULIN SER CALC-MCNC: 3.3 G/DL (ref 1.9–3.5)
GLUCOSE SERPL-MCNC: 103 MG/DL (ref 65–99)
HCT VFR BLD AUTO: 50.8 % (ref 42–52)
HGB BLD-MCNC: 17.5 G/DL (ref 14–18)
IMM GRANULOCYTES # BLD AUTO: 0.04 K/UL (ref 0–0.11)
IMM GRANULOCYTES NFR BLD AUTO: 0.5 % (ref 0–0.9)
LYMPHOCYTES # BLD AUTO: 2.35 K/UL (ref 1–4.8)
LYMPHOCYTES NFR BLD: 27.8 % (ref 22–41)
MAGNESIUM SERPL-MCNC: 2.3 MG/DL (ref 1.5–2.5)
MCH RBC QN AUTO: 33.4 PG (ref 27–33)
MCHC RBC AUTO-ENTMCNC: 34.4 G/DL (ref 32.3–36.5)
MCV RBC AUTO: 96.9 FL (ref 81.4–97.8)
MONOCYTES # BLD AUTO: 0.5 K/UL (ref 0–0.85)
MONOCYTES NFR BLD AUTO: 5.9 % (ref 0–13.4)
NEUTROPHILS # BLD AUTO: 5.43 K/UL (ref 1.82–7.42)
NEUTROPHILS NFR BLD: 64.4 % (ref 44–72)
NRBC # BLD AUTO: 0 K/UL
NRBC BLD-RTO: 0 /100 WBC (ref 0–0.2)
PLATELET # BLD AUTO: 173 K/UL (ref 164–446)
PMV BLD AUTO: 8.9 FL (ref 9–12.9)
POTASSIUM SERPL-SCNC: 4.2 MMOL/L (ref 3.6–5.5)
PROT SERPL-MCNC: 8 G/DL (ref 6–8.2)
RBC # BLD AUTO: 5.24 M/UL (ref 4.7–6.1)
SODIUM SERPL-SCNC: 141 MMOL/L (ref 135–145)
WBC # BLD AUTO: 8.4 K/UL (ref 4.8–10.8)

## 2023-08-17 PROCEDURE — 700111 HCHG RX REV CODE 636 W/ 250 OVERRIDE (IP)

## 2023-08-17 PROCEDURE — 700102 HCHG RX REV CODE 250 W/ 637 OVERRIDE(OP): Performed by: STUDENT IN AN ORGANIZED HEALTH CARE EDUCATION/TRAINING PROGRAM

## 2023-08-17 PROCEDURE — 83735 ASSAY OF MAGNESIUM: CPT

## 2023-08-17 PROCEDURE — A9270 NON-COVERED ITEM OR SERVICE: HCPCS | Performed by: STUDENT IN AN ORGANIZED HEALTH CARE EDUCATION/TRAINING PROGRAM

## 2023-08-17 PROCEDURE — 700111 HCHG RX REV CODE 636 W/ 250 OVERRIDE (IP): Mod: JZ | Performed by: STUDENT IN AN ORGANIZED HEALTH CARE EDUCATION/TRAINING PROGRAM

## 2023-08-17 PROCEDURE — 96365 THER/PROPH/DIAG IV INF INIT: CPT

## 2023-08-17 PROCEDURE — 85025 COMPLETE CBC W/AUTO DIFF WBC: CPT

## 2023-08-17 PROCEDURE — 71045 X-RAY EXAM CHEST 1 VIEW: CPT

## 2023-08-17 PROCEDURE — 770020 HCHG ROOM/CARE - TELE (206)

## 2023-08-17 PROCEDURE — 99285 EMERGENCY DEPT VISIT HI MDM: CPT

## 2023-08-17 PROCEDURE — 96376 TX/PRO/DX INJ SAME DRUG ADON: CPT

## 2023-08-17 PROCEDURE — 700105 HCHG RX REV CODE 258: Performed by: EMERGENCY MEDICINE

## 2023-08-17 PROCEDURE — HZ2ZZZZ DETOXIFICATION SERVICES FOR SUBSTANCE ABUSE TREATMENT: ICD-10-PCS | Performed by: STUDENT IN AN ORGANIZED HEALTH CARE EDUCATION/TRAINING PROGRAM

## 2023-08-17 PROCEDURE — 700111 HCHG RX REV CODE 636 W/ 250 OVERRIDE (IP): Mod: JZ | Performed by: EMERGENCY MEDICINE

## 2023-08-17 PROCEDURE — 99223 1ST HOSP IP/OBS HIGH 75: CPT | Mod: AI | Performed by: STUDENT IN AN ORGANIZED HEALTH CARE EDUCATION/TRAINING PROGRAM

## 2023-08-17 PROCEDURE — 96375 TX/PRO/DX INJ NEW DRUG ADDON: CPT

## 2023-08-17 PROCEDURE — 700101 HCHG RX REV CODE 250: Performed by: EMERGENCY MEDICINE

## 2023-08-17 PROCEDURE — 96366 THER/PROPH/DIAG IV INF ADDON: CPT

## 2023-08-17 PROCEDURE — 80053 COMPREHEN METABOLIC PANEL: CPT

## 2023-08-17 RX ORDER — LORAZEPAM 1 MG/1
1 TABLET ORAL EVERY 4 HOURS PRN
Status: DISCONTINUED | OUTPATIENT
Start: 2023-08-17 | End: 2023-08-19 | Stop reason: HOSPADM

## 2023-08-17 RX ORDER — LORAZEPAM 2 MG/ML
1 INJECTION INTRAMUSCULAR ONCE
Status: COMPLETED | OUTPATIENT
Start: 2023-08-17 | End: 2023-08-17

## 2023-08-17 RX ORDER — ONDANSETRON 4 MG/1
4 TABLET, ORALLY DISINTEGRATING ORAL EVERY 4 HOURS PRN
Status: DISCONTINUED | OUTPATIENT
Start: 2023-08-17 | End: 2023-08-19 | Stop reason: HOSPADM

## 2023-08-17 RX ORDER — OXYCODONE HYDROCHLORIDE 5 MG/1
2.5 TABLET ORAL
Status: DISCONTINUED | OUTPATIENT
Start: 2023-08-17 | End: 2023-08-18

## 2023-08-17 RX ORDER — PROMETHAZINE HYDROCHLORIDE 25 MG/1
12.5-25 SUPPOSITORY RECTAL EVERY 4 HOURS PRN
Status: DISCONTINUED | OUTPATIENT
Start: 2023-08-17 | End: 2023-08-19 | Stop reason: HOSPADM

## 2023-08-17 RX ORDER — LORAZEPAM 2 MG/ML
1 INJECTION INTRAMUSCULAR
Status: DISCONTINUED | OUTPATIENT
Start: 2023-08-17 | End: 2023-08-19 | Stop reason: HOSPADM

## 2023-08-17 RX ORDER — ENOXAPARIN SODIUM 100 MG/ML
40 INJECTION SUBCUTANEOUS DAILY
Status: DISCONTINUED | OUTPATIENT
Start: 2023-08-17 | End: 2023-08-19 | Stop reason: HOSPADM

## 2023-08-17 RX ORDER — FOLIC ACID 1 MG/1
1 TABLET ORAL DAILY
Status: DISCONTINUED | OUTPATIENT
Start: 2023-08-18 | End: 2023-08-19 | Stop reason: HOSPADM

## 2023-08-17 RX ORDER — LISINOPRIL 20 MG/1
20 TABLET ORAL DAILY
Status: DISCONTINUED | OUTPATIENT
Start: 2023-08-17 | End: 2023-08-19 | Stop reason: HOSPADM

## 2023-08-17 RX ORDER — LORAZEPAM 1 MG/1
0.5 TABLET ORAL EVERY 4 HOURS PRN
Status: DISCONTINUED | OUTPATIENT
Start: 2023-08-17 | End: 2023-08-19 | Stop reason: HOSPADM

## 2023-08-17 RX ORDER — LORAZEPAM 2 MG/ML
2 INJECTION INTRAMUSCULAR ONCE
Status: COMPLETED | OUTPATIENT
Start: 2023-08-17 | End: 2023-08-17

## 2023-08-17 RX ORDER — SODIUM CHLORIDE 9 MG/ML
1000 INJECTION, SOLUTION INTRAVENOUS ONCE
Status: COMPLETED | OUTPATIENT
Start: 2023-08-17 | End: 2023-08-17

## 2023-08-17 RX ORDER — LORAZEPAM 2 MG/ML
1.5 INJECTION INTRAMUSCULAR
Status: DISCONTINUED | OUTPATIENT
Start: 2023-08-17 | End: 2023-08-19 | Stop reason: HOSPADM

## 2023-08-17 RX ORDER — LORAZEPAM 2 MG/ML
0.5 INJECTION INTRAMUSCULAR EVERY 4 HOURS PRN
Status: DISCONTINUED | OUTPATIENT
Start: 2023-08-17 | End: 2023-08-19 | Stop reason: HOSPADM

## 2023-08-17 RX ORDER — POLYETHYLENE GLYCOL 3350 17 G/17G
1 POWDER, FOR SOLUTION ORAL
Status: DISCONTINUED | OUTPATIENT
Start: 2023-08-17 | End: 2023-08-19 | Stop reason: HOSPADM

## 2023-08-17 RX ORDER — ONDANSETRON 2 MG/ML
4 INJECTION INTRAMUSCULAR; INTRAVENOUS EVERY 4 HOURS PRN
Status: DISCONTINUED | OUTPATIENT
Start: 2023-08-17 | End: 2023-08-19 | Stop reason: HOSPADM

## 2023-08-17 RX ORDER — GAUZE BANDAGE 2" X 2"
100 BANDAGE TOPICAL DAILY
Status: DISCONTINUED | OUTPATIENT
Start: 2023-08-18 | End: 2023-08-19 | Stop reason: HOSPADM

## 2023-08-17 RX ORDER — PROMETHAZINE HYDROCHLORIDE 25 MG/1
12.5-25 TABLET ORAL EVERY 4 HOURS PRN
Status: DISCONTINUED | OUTPATIENT
Start: 2023-08-17 | End: 2023-08-19 | Stop reason: HOSPADM

## 2023-08-17 RX ORDER — BISACODYL 10 MG
10 SUPPOSITORY, RECTAL RECTAL
Status: DISCONTINUED | OUTPATIENT
Start: 2023-08-17 | End: 2023-08-19 | Stop reason: HOSPADM

## 2023-08-17 RX ORDER — TAMSULOSIN HYDROCHLORIDE 0.4 MG/1
0.4 CAPSULE ORAL
COMMUNITY

## 2023-08-17 RX ORDER — OXYCODONE HYDROCHLORIDE 5 MG/1
5 TABLET ORAL
Status: DISCONTINUED | OUTPATIENT
Start: 2023-08-17 | End: 2023-08-18

## 2023-08-17 RX ORDER — LORAZEPAM 2 MG/1
2 TABLET ORAL
Status: DISCONTINUED | OUTPATIENT
Start: 2023-08-17 | End: 2023-08-19 | Stop reason: HOSPADM

## 2023-08-17 RX ORDER — LORAZEPAM 2 MG/1
4 TABLET ORAL
Status: DISCONTINUED | OUTPATIENT
Start: 2023-08-17 | End: 2023-08-19 | Stop reason: HOSPADM

## 2023-08-17 RX ORDER — PROCHLORPERAZINE EDISYLATE 5 MG/ML
5-10 INJECTION INTRAMUSCULAR; INTRAVENOUS EVERY 4 HOURS PRN
Status: DISCONTINUED | OUTPATIENT
Start: 2023-08-17 | End: 2023-08-19 | Stop reason: HOSPADM

## 2023-08-17 RX ORDER — AMOXICILLIN 250 MG
2 CAPSULE ORAL 2 TIMES DAILY
Status: DISCONTINUED | OUTPATIENT
Start: 2023-08-17 | End: 2023-08-19 | Stop reason: HOSPADM

## 2023-08-17 RX ORDER — LABETALOL HYDROCHLORIDE 5 MG/ML
10 INJECTION, SOLUTION INTRAVENOUS EVERY 4 HOURS PRN
Status: DISCONTINUED | OUTPATIENT
Start: 2023-08-17 | End: 2023-08-19 | Stop reason: HOSPADM

## 2023-08-17 RX ORDER — LORAZEPAM 2 MG/ML
2 INJECTION INTRAMUSCULAR
Status: DISCONTINUED | OUTPATIENT
Start: 2023-08-17 | End: 2023-08-19 | Stop reason: HOSPADM

## 2023-08-17 RX ORDER — HYDROMORPHONE HYDROCHLORIDE 1 MG/ML
0.25 INJECTION, SOLUTION INTRAMUSCULAR; INTRAVENOUS; SUBCUTANEOUS
Status: DISCONTINUED | OUTPATIENT
Start: 2023-08-17 | End: 2023-08-18

## 2023-08-17 RX ORDER — ACETAMINOPHEN 325 MG/1
650 TABLET ORAL EVERY 6 HOURS PRN
Status: DISCONTINUED | OUTPATIENT
Start: 2023-08-17 | End: 2023-08-19 | Stop reason: HOSPADM

## 2023-08-17 RX ADMIN — LORAZEPAM 2 MG: 2 INJECTION INTRAMUSCULAR; INTRAVENOUS at 17:03

## 2023-08-17 RX ADMIN — ONDANSETRON 4 MG: 2 INJECTION INTRAMUSCULAR; INTRAVENOUS at 23:12

## 2023-08-17 RX ADMIN — MAGNESIUM SULFATE HEPTAHYDRATE: 500 INJECTION, SOLUTION INTRAMUSCULAR; INTRAVENOUS at 13:32

## 2023-08-17 RX ADMIN — LORAZEPAM 2 MG: 2 TABLET ORAL at 18:40

## 2023-08-17 RX ADMIN — PROMETHAZINE HYDROCHLORIDE 25 MG: 25 TABLET ORAL at 20:38

## 2023-08-17 RX ADMIN — ENOXAPARIN SODIUM 40 MG: 100 INJECTION SUBCUTANEOUS at 18:40

## 2023-08-17 RX ADMIN — SODIUM CHLORIDE 1000 ML: 9 INJECTION, SOLUTION INTRAVENOUS at 15:20

## 2023-08-17 RX ADMIN — LORAZEPAM 2 MG: 2 TABLET ORAL at 20:40

## 2023-08-17 RX ADMIN — ONDANSETRON 4 MG: 2 INJECTION INTRAMUSCULAR; INTRAVENOUS at 18:42

## 2023-08-17 RX ADMIN — LORAZEPAM 1 MG: 2 INJECTION INTRAMUSCULAR; INTRAVENOUS at 13:32

## 2023-08-17 RX ADMIN — LORAZEPAM 3 MG: 2 TABLET ORAL at 23:12

## 2023-08-17 RX ADMIN — LISINOPRIL 20 MG: 20 TABLET ORAL at 18:46

## 2023-08-17 ASSESSMENT — LIFESTYLE VARIABLES
AGITATION: NORMAL ACTIVITY
TOTAL SCORE: 12
ORIENTATION AND CLOUDING OF SENSORIUM: DATE DISORIENTATION BY MORE THAN TWO CALENDAR DAYS
NAUSEA AND VOMITING: INTERMITTENT NAUSEA WITH DRY HEAVES
HEADACHE, FULLNESS IN HEAD: MILD
ANXIETY: MILDLY ANXIOUS
AUDITORY DISTURBANCES: NOT PRESENT
VISUAL DISTURBANCES: NOT PRESENT
NAUSEA AND VOMITING: INTERMITTENT NAUSEA WITH DRY HEAVES
VISUAL DISTURBANCES: MODERATE SENSITIVITY
AUDITORY DISTURBANCES: NOT PRESENT
TREMOR: TREMOR NOT VISIBLE BUT CAN BE FELT, FINGERTIP TO FINGERTIP
ORIENTATION AND CLOUDING OF SENSORIUM: CANNOT DO SERIAL ADDITIONS OR IS UNCERTAIN ABOUT DATE
PAROXYSMAL SWEATS: BEADS OF SWEAT OBVIOUS ON FOREHEAD
NAUSEA AND VOMITING: *
ORIENTATION AND CLOUDING OF SENSORIUM: ORIENTED AND CAN DO SERIAL ADDITIONS
PAROXYSMAL SWEATS: *
TOTAL SCORE: 17
TREMOR: *
AUDITORY DISTURBANCES: NOT PRESENT
VISUAL DISTURBANCES: NOT PRESENT
TOTAL SCORE: 12
PAROXYSMAL SWEATS: BEADS OF SWEAT OBVIOUS ON FOREHEAD
TREMOR: *
HEADACHE, FULLNESS IN HEAD: MODERATELY SEVERE
HEADACHE, FULLNESS IN HEAD: NOT PRESENT
AGITATION: NORMAL ACTIVITY
AGITATION: NORMAL ACTIVITY
ANXIETY: NO ANXIETY (AT EASE)
ANXIETY: MILDLY ANXIOUS

## 2023-08-17 ASSESSMENT — FIBROSIS 4 INDEX
FIB4 SCORE: 2.76
FIB4 SCORE: 5.27

## 2023-08-17 ASSESSMENT — PAIN DESCRIPTION - PAIN TYPE: TYPE: ACUTE PAIN

## 2023-08-17 NOTE — ED TRIAGE NOTES
"Chief Complaint   Patient presents with    Alcohol Intoxication     BIB EMS. Bystander called as pt was asleep on the side of the road in [place].   Reports he got \"pissed off\" and drank for 5 days straight.   Reports his last drink was yesterday approx 1500.   BS: 160.    Feels like he was poisoned.      Pt to triage via w/c for above. Very argumentative with RN.   Returned to lobby. Awaiting room.     BP (!) 135/100   Pulse (!) 104   Temp 36.2 °C (97.2 °F) (Temporal)   Resp 15   Ht 1.88 m (6' 2\")   Wt 99.8 kg (220 lb)   SpO2 98%   BMI 28.25 kg/m²     "

## 2023-08-17 NOTE — ED PROVIDER NOTES
"ED Provider Note    CHIEF COMPLAINT  Chief Complaint   Patient presents with    Alcohol Intoxication     BIB EMS. Bystander called as pt was asleep on the side of the road in [place].   Reports he got \"pissed off\" and drank for 5 days straight.   Reports his last drink was yesterday approx 1500.   BS: 160.    Feels like he was poisoned.      EXTERNAL RECORDS REVIEWED  Review of records show that he was last admitted to the ICU in February 2023 for alcohol withdrawal.     HPI/ROS  LIMITATION TO HISTORY   Select: : None  OUTSIDE HISTORIAN(S):  None    Chace Gong is a 58 y.o. male who presents to the Emergency Department via EMS for alcohol intoxication. The patient reported that he as drinking heavily for the past few weeks and his last drink was two nights ago. He drank for 5 days straight.  In He was found on the side of the road in [place] and bystanders called EMS. He has been vomiting and feels very anxious. Reports he feels very dehydrated.  History of withdrawal in the past. The patient adds that he has testicular cancer, CHF and COPD. His blood sugar was 160 in triage.     PAST MEDICAL HISTORY  No past medical history noted    SURGICAL HISTORY  No past surgical history noted.     FAMILY HISTORY  No family history noted.    SOCIAL HISTORY   reports that he has been smoking cigarettes. He has a 23.50 pack-year smoking history. He does not have any smokeless tobacco history on file. He reports current alcohol use.    CURRENT MEDICATIONS  Previous Medications    LISINOPRIL (PRINIVIL) 20 MG TAB    Take 1 Tablet by mouth every day.       ALLERGIES  Sulfa drugs    PHYSICAL EXAM  BP (!) 135/100   Pulse (!) 104   Temp 36.2 °C (97.2 °F) (Temporal)   Resp 15   Ht 1.88 m (6' 2\")   Wt 99.8 kg (220 lb)   SpO2 98%      Constitutional: Nontoxic appearing. Alert in no apparent distress.  HENT: Normocephalic, Atraumatic. Bilateral external ears normal. Nose normal.  Moist mucous membranes.  Oropharynx " clear.  Eyes: Pupils are equal and reactive. Conjunctiva normal.   Neck: Supple, full range of motion  Heart: Tachycardic rate and rhythm.  No murmurs.    Lungs: No respiratory distress, normal work of breathing. Lungs clear to auscultation bilaterally.  Abdomen Soft, no distention.  No tenderness to palpation.  Musculoskeletal: Atraumatic. No obvious deformities noted.  No lower extremity edema.  Skin: Warm, Dry.  No erythema, No rash.   Neurologic: Alert and oriented x3. Moving all extremities spontaneously without focal deficits.  Mild hand tremors noted.  Psychiatric: Affect normal, Mood normal, Appears appropriate and not intoxicated.     DIAGNOSTIC STUDIES / PROCEDURES    LABS  Labs Reviewed   CBC WITH DIFFERENTIAL - Abnormal; Notable for the following components:       Result Value    MCH 33.4 (*)     RDW 51.6 (*)     MPV 8.9 (*)     All other components within normal limits   COMP METABOLIC PANEL - Abnormal; Notable for the following components:    Anion Gap 17.0 (*)     Glucose 103 (*)     Correct Calcium 8.2 (*)     AST(SGOT) 53 (*)     Alkaline Phosphatase 100 (*)     All other components within normal limits   MAGNESIUM   ESTIMATED GFR   MAGNESIUM          COURSE & MEDICAL DECISION MAKING  1:14 PM - Patient seen and examined at bedside. Discussed plan of care, including fluids and a diagnostic work up. Patient agrees to the plan of care. The patient will be resuscitated with detox IV 1000 ML and medicated with Ativan 1 mg injection. Ordered for CMP, CBC w/ Diff, Magnesium to evaluate his symptoms.      ED Observation Status? Yes; I am placing the patient in to an observation status due to a diagnostic uncertainty as well as therapeutic intensity. Patient placed in observation status at 1:19 PM, 8/17/2023.     Observation plan is as follows: Labs, fluids, Ativan to treat alcohol withdrawal followed by reassessment    3:15 PM - Patient is sleeping but remains tachycardic, will give further IV  fluids    5:00 PM - On reassessment, patient is resting more comfortably however remains tachycardic and hypertensive, will treat with further Ativan and plan for admission    Upon Reevaluation, the patient's condition has: not improved; and will be escalated to hospitalization.    Patient discharged from ED Observation status at 5:35 PM (Time) 8/17/2023  (Date).     INITIAL ASSESSMENT, COURSE AND PLAN  Care Narrative: Patient with history of alcohol abuse and withdrawal who presents with anxiety and vomiting, last drink 2 days ago.  He is hypertensive and tachycardic on arrival with otherwise reassuring vital signs.  There is no signs of trauma on exam.  Abdominal exam is benign.  Labs show mild acidosis however no significant electrolyte abnormalities or renal dysfunction.  No leukocytosis or signs of infectious process.  He was treated with fluids and Ativan with improvement of symptoms however he remains hypertensive and tachycardic therefore will admit for further treatment of alcohol withdrawal.      ADDITIONAL PROBLEM LIST  Problem #1: Acute alcohol withdrawal -given fluids and Ativan, vital signs remain abnormal therefore will admit for further monitoring and treatment    Problem #2: Dehydration - given IV fluids      DISPOSITION AND DISCUSSIONS  I have discussed management of the patient with the following physicians and SANNA's:    5:35 PM - Dr. Devries (Hospitalist) who agrees to evaluate the patient for hospitalization.          DISPOSITION:  Patient will be hospitalized by Dr. Devries in guarded condition.     FINAL DIAGNOSIS  1. Alcohol withdrawal syndrome without complication (HCC)    2. Dehydration        The note accurately reflects work and decisions made by me.  Jyoti Klein M.D.  8/17/2023  8:04 PM     IMeredith), am scribing for, and in the presence of, Jyoti Klein M.D..    Electronically signed by: Meredith Meier), 8/17/2023    Jyoti DA SILVA M.D. personally performed the  services described in this documentation, as scribed by Meredith Betancourt in my presence, and it is both accurate and complete.

## 2023-08-17 NOTE — ED NOTES
IV access placed. Blood drawn and sent to lab. Medicated patient per MAR. Patient denies further needs. Call light within reach. Bed alarm initiated.    Patient noted to have oxygen saturation 86% on room air. Patient reports using 3 L home O2, placed on 3 L nasal cannula. Oxygen saturation 98% at this time.

## 2023-08-17 NOTE — ED NOTES
Patient to room from lobby in wheelchair. Connected to monitor. Agree with triage note. Charted for ERP. Call light within reach.

## 2023-08-18 LAB
ALBUMIN SERPL BCP-MCNC: 4 G/DL (ref 3.2–4.9)
ALBUMIN/GLOB SERPL: 1.3 G/DL
ALP SERPL-CCNC: 78 U/L (ref 30–99)
ALT SERPL-CCNC: 35 U/L (ref 2–50)
ANION GAP SERPL CALC-SCNC: 10 MMOL/L (ref 7–16)
AST SERPL-CCNC: 45 U/L (ref 12–45)
BILIRUB SERPL-MCNC: 1 MG/DL (ref 0.1–1.5)
BUN SERPL-MCNC: 14 MG/DL (ref 8–22)
CALCIUM ALBUM COR SERPL-MCNC: 8.5 MG/DL (ref 8.5–10.5)
CALCIUM SERPL-MCNC: 8.5 MG/DL (ref 8.5–10.5)
CHLORIDE SERPL-SCNC: 101 MMOL/L (ref 96–112)
CO2 SERPL-SCNC: 27 MMOL/L (ref 20–33)
CREAT SERPL-MCNC: 0.89 MG/DL (ref 0.5–1.4)
EKG IMPRESSION: NORMAL
ERYTHROCYTE [DISTWIDTH] IN BLOOD BY AUTOMATED COUNT: 50.9 FL (ref 35.9–50)
GFR SERPLBLD CREATININE-BSD FMLA CKD-EPI: 100 ML/MIN/1.73 M 2
GLOBULIN SER CALC-MCNC: 3 G/DL (ref 1.9–3.5)
GLUCOSE SERPL-MCNC: 107 MG/DL (ref 65–99)
HCT VFR BLD AUTO: 45.3 % (ref 42–52)
HGB BLD-MCNC: 15.5 G/DL (ref 14–18)
MAGNESIUM SERPL-MCNC: 2.1 MG/DL (ref 1.5–2.5)
MCH RBC QN AUTO: 33 PG (ref 27–33)
MCHC RBC AUTO-ENTMCNC: 34.2 G/DL (ref 32.3–36.5)
MCV RBC AUTO: 96.4 FL (ref 81.4–97.8)
PHOSPHATE SERPL-MCNC: 2.6 MG/DL (ref 2.5–4.5)
PLATELET # BLD AUTO: 133 K/UL (ref 164–446)
PMV BLD AUTO: 9.2 FL (ref 9–12.9)
POTASSIUM SERPL-SCNC: 3.9 MMOL/L (ref 3.6–5.5)
PROT SERPL-MCNC: 7 G/DL (ref 6–8.2)
RBC # BLD AUTO: 4.7 M/UL (ref 4.7–6.1)
SODIUM SERPL-SCNC: 138 MMOL/L (ref 135–145)
WBC # BLD AUTO: 7.6 K/UL (ref 4.8–10.8)

## 2023-08-18 PROCEDURE — 93010 ELECTROCARDIOGRAM REPORT: CPT | Performed by: INTERNAL MEDICINE

## 2023-08-18 PROCEDURE — 770020 HCHG ROOM/CARE - TELE (206)

## 2023-08-18 PROCEDURE — 83735 ASSAY OF MAGNESIUM: CPT

## 2023-08-18 PROCEDURE — 700111 HCHG RX REV CODE 636 W/ 250 OVERRIDE (IP): Performed by: STUDENT IN AN ORGANIZED HEALTH CARE EDUCATION/TRAINING PROGRAM

## 2023-08-18 PROCEDURE — 85027 COMPLETE CBC AUTOMATED: CPT

## 2023-08-18 PROCEDURE — 93005 ELECTROCARDIOGRAM TRACING: CPT

## 2023-08-18 PROCEDURE — A9270 NON-COVERED ITEM OR SERVICE: HCPCS | Performed by: STUDENT IN AN ORGANIZED HEALTH CARE EDUCATION/TRAINING PROGRAM

## 2023-08-18 PROCEDURE — 99233 SBSQ HOSP IP/OBS HIGH 50: CPT | Mod: GC | Performed by: INTERNAL MEDICINE

## 2023-08-18 PROCEDURE — 700102 HCHG RX REV CODE 250 W/ 637 OVERRIDE(OP): Performed by: STUDENT IN AN ORGANIZED HEALTH CARE EDUCATION/TRAINING PROGRAM

## 2023-08-18 PROCEDURE — 80053 COMPREHEN METABOLIC PANEL: CPT

## 2023-08-18 PROCEDURE — 51798 US URINE CAPACITY MEASURE: CPT

## 2023-08-18 PROCEDURE — 84100 ASSAY OF PHOSPHORUS: CPT

## 2023-08-18 RX ADMIN — Medication 100 MG: at 05:27

## 2023-08-18 RX ADMIN — THERA TABS 1 TABLET: TAB at 05:27

## 2023-08-18 RX ADMIN — SENNOSIDES AND DOCUSATE SODIUM 2 TABLET: 50; 8.6 TABLET ORAL at 05:27

## 2023-08-18 RX ADMIN — LISINOPRIL 20 MG: 20 TABLET ORAL at 05:27

## 2023-08-18 RX ADMIN — LORAZEPAM 2 MG: 2 TABLET ORAL at 03:13

## 2023-08-18 RX ADMIN — ONDANSETRON 4 MG: 2 INJECTION INTRAMUSCULAR; INTRAVENOUS at 03:13

## 2023-08-18 RX ADMIN — ENOXAPARIN SODIUM 40 MG: 100 INJECTION SUBCUTANEOUS at 16:59

## 2023-08-18 RX ADMIN — FOLIC ACID 1 MG: 1 TABLET ORAL at 05:27

## 2023-08-18 RX ADMIN — PROMETHAZINE HYDROCHLORIDE 25 MG: 25 TABLET ORAL at 01:07

## 2023-08-18 RX ADMIN — LORAZEPAM 0.5 MG: 2 INJECTION INTRAMUSCULAR; INTRAVENOUS at 20:53

## 2023-08-18 RX ADMIN — SENNOSIDES AND DOCUSATE SODIUM 2 TABLET: 50; 8.6 TABLET ORAL at 16:59

## 2023-08-18 RX ADMIN — LORAZEPAM 3 MG: 2 TABLET ORAL at 01:07

## 2023-08-18 RX ADMIN — LORAZEPAM 3 MG: 2 TABLET ORAL at 02:04

## 2023-08-18 ASSESSMENT — LIFESTYLE VARIABLES
HEADACHE, FULLNESS IN HEAD: VERY MILD
NAUSEA AND VOMITING: *
HEADACHE, FULLNESS IN HEAD: NOT PRESENT
VISUAL DISTURBANCES: NOT PRESENT
AUDITORY DISTURBANCES: NOT PRESENT
ORIENTATION AND CLOUDING OF SENSORIUM: ORIENTED AND CAN DO SERIAL ADDITIONS
TREMOR: TREMOR NOT VISIBLE BUT CAN BE FELT, FINGERTIP TO FINGERTIP
AGITATION: NORMAL ACTIVITY
NAUSEA AND VOMITING: NO NAUSEA AND NO VOMITING
TOTAL SCORE: 15
TREMOR: *
ORIENTATION AND CLOUDING OF SENSORIUM: DATE DISORIENTATION BY MORE THAN TWO CALENDAR DAYS
TREMOR: TREMOR NOT VISIBLE BUT CAN BE FELT, FINGERTIP TO FINGERTIP
AGITATION: NORMAL ACTIVITY
AUDITORY DISTURBANCES: NOT PRESENT
NAUSEA AND VOMITING: NO NAUSEA AND NO VOMITING
ANXIETY: NO ANXIETY (AT EASE)
NAUSEA AND VOMITING: NO NAUSEA AND NO VOMITING
ANXIETY: NO ANXIETY (AT EASE)
AUDITORY DISTURBANCES: NOT PRESENT
PAROXYSMAL SWEATS: BARELY PERCEPTIBLE SWEATING. PALMS MOIST
TREMOR: NO TREMOR
HEADACHE, FULLNESS IN HEAD: NOT PRESENT
ANXIETY: MILDLY ANXIOUS
NAUSEA AND VOMITING: *
AGITATION: NORMAL ACTIVITY
HEADACHE, FULLNESS IN HEAD: NOT PRESENT
PAROXYSMAL SWEATS: BARELY PERCEPTIBLE SWEATING. PALMS MOIST
TOTAL SCORE: 4
ORIENTATION AND CLOUDING OF SENSORIUM: ORIENTED AND CAN DO SERIAL ADDITIONS
ANXIETY: MILDLY ANXIOUS
AGITATION: NORMAL ACTIVITY
TOTAL SCORE: 0
HEADACHE, FULLNESS IN HEAD: NOT PRESENT
VISUAL DISTURBANCES: VERY MILD SENSITIVITY
TOTAL SCORE: 17
SUBSTANCE_ABUSE: 0
VISUAL DISTURBANCES: NOT PRESENT
PAROXYSMAL SWEATS: BARELY PERCEPTIBLE SWEATING. PALMS MOIST
PAROXYSMAL SWEATS: *
AUDITORY DISTURBANCES: NOT PRESENT
NAUSEA AND VOMITING: NO NAUSEA AND NO VOMITING
HEADACHE, FULLNESS IN HEAD: NOT PRESENT
ORIENTATION AND CLOUDING OF SENSORIUM: DATE DISORIENTATION BY MORE THAN TWO CALENDAR DAYS
ANXIETY: NO ANXIETY (AT EASE)
AUDITORY DISTURBANCES: NOT PRESENT
VISUAL DISTURBANCES: MILD SENSITIVITY
ANXIETY: NO ANXIETY (AT EASE)
PAROXYSMAL SWEATS: NO SWEAT VISIBLE
ORIENTATION AND CLOUDING OF SENSORIUM: ORIENTED AND CAN DO SERIAL ADDITIONS
VISUAL DISTURBANCES: NOT PRESENT
NAUSEA AND VOMITING: NO NAUSEA AND NO VOMITING
AUDITORY DISTURBANCES: NOT PRESENT
VISUAL DISTURBANCES: NOT PRESENT
TREMOR: TREMOR NOT VISIBLE BUT CAN BE FELT, FINGERTIP TO FINGERTIP
NAUSEA AND VOMITING: INTERMITTENT NAUSEA WITH DRY HEAVES
ORIENTATION AND CLOUDING OF SENSORIUM: ORIENTED AND CAN DO SERIAL ADDITIONS
VISUAL DISTURBANCES: NOT PRESENT
PAROXYSMAL SWEATS: *
ANXIETY: NO ANXIETY (AT EASE)
TREMOR: TREMOR NOT VISIBLE BUT CAN BE FELT, FINGERTIP TO FINGERTIP
HEADACHE, FULLNESS IN HEAD: NOT PRESENT
TREMOR: NO TREMOR
AUDITORY DISTURBANCES: NOT PRESENT
TOTAL SCORE: 11
HEADACHE, FULLNESS IN HEAD: SEVERE
TREMOR: TREMOR NOT VISIBLE BUT CAN BE FELT, FINGERTIP TO FINGERTIP
AGITATION: NORMAL ACTIVITY
TOTAL SCORE: 5
AGITATION: NORMAL ACTIVITY
AGITATION: NORMAL ACTIVITY
TOTAL SCORE: 3
NAUSEA AND VOMITING: NO NAUSEA AND NO VOMITING
AGITATION: NORMAL ACTIVITY
TOTAL SCORE: 4
AUDITORY DISTURBANCES: NOT PRESENT
TOTAL SCORE: 1
ANXIETY: MILDLY ANXIOUS
VISUAL DISTURBANCES: MODERATELY SEVERE HALLUCINATIONS
AGITATION: NORMAL ACTIVITY
HEADACHE, FULLNESS IN HEAD: MILD
ANXIETY: NO ANXIETY (AT EASE)
PAROXYSMAL SWEATS: BARELY PERCEPTIBLE SWEATING. PALMS MOIST
ORIENTATION AND CLOUDING OF SENSORIUM: ORIENTED AND CAN DO SERIAL ADDITIONS
VISUAL DISTURBANCES: MODERATE SENSITIVITY
ORIENTATION AND CLOUDING OF SENSORIUM: DATE DISORIENTATION BY MORE THAN TWO CALENDAR DAYS
TOTAL SCORE: VERY MILD ITCHING, PINS AND NEEDLES SENSATION, BURNING OR NUMBNESS
PAROXYSMAL SWEATS: BARELY PERCEPTIBLE SWEATING. PALMS MOIST
TREMOR: NO TREMOR
PAROXYSMAL SWEATS: BARELY PERCEPTIBLE SWEATING. PALMS MOIST
ORIENTATION AND CLOUDING OF SENSORIUM: DATE DISORIENTATION BY MORE THAN TWO CALENDAR DAYS
AUDITORY DISTURBANCES: NOT PRESENT

## 2023-08-18 ASSESSMENT — ENCOUNTER SYMPTOMS
CHILLS: 0
WEIGHT LOSS: 0
FEVER: 0
PALPITATIONS: 0
SPUTUM PRODUCTION: 0
DIZZINESS: 0
SHORTNESS OF BREATH: 0
SORE THROAT: 0
BACK PAIN: 0
WEAKNESS: 1
TREMORS: 1
HEADACHES: 0
MYALGIAS: 0
VOMITING: 0
NERVOUS/ANXIOUS: 0
ABDOMINAL PAIN: 0
CONSTIPATION: 0
DOUBLE VISION: 0
NAUSEA: 0
DIARRHEA: 0
BLURRED VISION: 0
COUGH: 0

## 2023-08-18 ASSESSMENT — PAIN DESCRIPTION - PAIN TYPE
TYPE: ACUTE PAIN

## 2023-08-18 ASSESSMENT — FIBROSIS 4 INDEX: FIB4 SCORE: 3.26

## 2023-08-18 ASSESSMENT — PATIENT HEALTH QUESTIONNAIRE - PHQ9
1. LITTLE INTEREST OR PLEASURE IN DOING THINGS: NOT AT ALL
2. FEELING DOWN, DEPRESSED, IRRITABLE, OR HOPELESS: NOT AT ALL
SUM OF ALL RESPONSES TO PHQ9 QUESTIONS 1 AND 2: 0

## 2023-08-18 NOTE — PROGRESS NOTES
[facility] Internal Medicine Daily Progress Note    Date of Service  8/18/2023    [employer] Team: R IM Purple Team   Attending: Mary Madison M.d.  Senior Resident: Dr. MICHELET Callahan  Intern:  Dr. CHARITO Aviles  Contact Number: [phone]    Chief Complaint  Chace Gong is a 58 y.o. male admitted 8/17/2023 with alcohol intoxication.    Hospital Course  Chace Gong is a 58 y.o. male with hx of testicular cancer, CHF and COPD, who presented 8/17/2023 with alcohol withdrawal. Patient reported heavy drink for a week and last drink was 2 nights ago. He was found on the side of road in [facility]. He reported nausea, vomiting, tremor, anxious. Denies sob, chest pain, abd pain.     At ER, patient has been tachycardia, hypertensive, desat to 86% on RA. Placed on 2L. Patient received 3 doses of iv ativan, along with IVF.          Interval Problem Update  Overnight patient received 8 mg of Ativan. Patient was seen and evaluated at bedside this AM, sleepy but able to answer questions. Reports no abdominal pain, nauseas or vomiting. Refers slightly tremor in upper extremities, otherwise felling well. CIWA score this morning of 4. Will continue CIWA protocol.    I have discussed this patient's plan of care and discharge plan at IDT rounds today with Case Management, Nursing, Nursing leadership, and other members of the IDT team.    Consultants/Specialty  None    Code Status  Full Code    Disposition  The patient is not medically cleared for discharge to home or a post-acute facility.      I have placed the appropriate orders for post-discharge needs.    Review of Systems  Review of Systems   Constitutional:  Negative for chills, fever, malaise/fatigue and weight loss.   HENT:  Negative for congestion and sore throat.    Eyes:  Negative for blurred vision and double vision.   Respiratory:  Negative for cough, sputum production and shortness of breath.    Cardiovascular:  Negative for chest pain, palpitations and leg swelling.   Gastrointestinal:   Negative for abdominal pain, constipation, diarrhea, nausea and vomiting.   Genitourinary:  Negative for dysuria.   Musculoskeletal:  Negative for back pain, joint pain and myalgias.   Neurological:  Positive for tremors and weakness. Negative for dizziness and headaches.   Psychiatric/Behavioral:  Negative for substance abuse. The patient is not nervous/anxious.         Physical Exam  Temp:  [36.3 °C (97.3 °F)-37 °C (98.6 °F)] 36.7 °C (98 °F)  Pulse:  [] 100  Resp:  [14-25] 18  BP: (122-165)/() 135/95  SpO2:  [89 %-98 %] 96 %    Physical Exam  Constitutional:       General: He is not in acute distress.     Appearance: Normal appearance. He is not ill-appearing.   HENT:      Head: Normocephalic and atraumatic.      Nose: Nose normal.      Mouth/Throat:      Mouth: Mucous membranes are moist.   Eyes:      Extraocular Movements: Extraocular movements intact.      Conjunctiva/sclera: Conjunctivae normal.      Pupils: Pupils are equal, round, and reactive to light.   Cardiovascular:      Rate and Rhythm: Normal rate and regular rhythm.      Pulses: Normal pulses.      Heart sounds: Normal heart sounds.   Pulmonary:      Effort: Pulmonary effort is normal. No respiratory distress.      Breath sounds: Normal breath sounds.   Chest:      Chest wall: No tenderness.   Abdominal:      General: Bowel sounds are normal. There is no distension.      Palpations: Abdomen is soft.      Tenderness: There is no abdominal tenderness. There is no right CVA tenderness or left CVA tenderness.   Musculoskeletal:         General: No swelling or tenderness. Normal range of motion.      Cervical back: Normal range of motion and neck supple.      Right lower leg: No edema.      Left lower leg: No edema.   Skin:     General: Skin is warm.      Capillary Refill: Capillary refill takes less than 2 seconds.      Coloration: Skin is not jaundiced or pale.   Neurological:      General: No focal deficit present.      Mental Status: He  is alert and oriented to person, place, and time.      Cranial Nerves: No cranial nerve deficit.      Sensory: No sensory deficit.      Motor: No weakness.      Comments: Slightly tremor in upper extremities.   Psychiatric:         Mood and Affect: Mood normal.         Fluids    Intake/Output Summary (Last 24 hours) at 8/18/2023 1510  Last data filed at 8/18/2023 1000  Gross per 24 hour   Intake 3200 ml   Output 1300 ml   Net 1900 ml       Laboratory  Recent Labs     08/17/23  1335 08/18/23  0123   WBC 8.4 7.6   RBC 5.24 4.70   HEMOGLOBIN 17.5 15.5   HEMATOCRIT 50.8 45.3   MCV 96.9 96.4   MCH 33.4* 33.0   MCHC 34.4 34.2   RDW 51.6* 50.9*   PLATELETCT 173 133*   MPV 8.9* 9.2     Recent Labs     08/17/23  1335 08/18/23  0123   SODIUM 141 138   POTASSIUM 4.2 3.9   CHLORIDE 100 101   CO2 24 27   GLUCOSE 103* 107*   BUN 15 14   CREATININE 0.89 0.89   CALCIUM 8.8 8.5           Imaging  DX-CHEST-PORTABLE (1 VIEW)   Final Result         1.  No focal infiltrates.   2.  Linear density in the left midlung favoring atelectasis or scarring           Assessment/Plan  Problem Representation:    * Alcohol abuse with withdrawal without complication (HCC)- (present on admission)  Assessment & Plan  Heavy drink for a week and last drink was 2 nights ago. He was found on the side of road in [facility]. He reported nausea, vomiting, tremor,     - Odered CIWA protocol  - On tele  - IV ativan PRN. Monitor respiratory status  - IV B1 vitamin    Tachycardia  Assessment & Plan  Likely due to alcohol withdrawal   EKG: QTc 477, Sinus Tachycardia  - Will keep monitoring vitals    Primary hypertension  Assessment & Plan  Chronic Condition  - BP range: 133//95   - On lisinopril home dose    Acute respiratory failure with hypoxia (HCC)- (present on admission)  Assessment & Plan  On admission SpO2  86% on RA. Placed on 2L with oxymask, SpO2 96%.     CXR showed no abnormalities  EKG: QTc 477, Sinus Tachycardia  Monitor respiratory while on iv  ativan          VTE prophylaxis: enoxaparin ppx    I have performed a physical exam and reviewed and updated ROS and Plan today (8/18/2023). In review of yesterday's note (8/17/2023), there are no changes except as documented above.    Gin Aviles MD  Internal Medicine PGY-1

## 2023-08-18 NOTE — ASSESSMENT & PLAN NOTE
Heavy drink for a week and last drink was 2 nights ago. He was found on the side of road in [facility]. He reported nausea, vomiting, tremor,     - Odered CIWA protocol  - On tele  - IV ativan PRN. Monitor respiratory status  - IV B1 vitamin

## 2023-08-18 NOTE — ED NOTES
Patient transported to [facility] on [employer] monitor. Chart and belongings with patient. Patient care transferred.

## 2023-08-18 NOTE — ED NOTES
MD at bedside.     Phone report to [facility] YAEL Bryson. Patient to be transported on tele monitoring.

## 2023-08-18 NOTE — CARE PLAN
Patient is more alert and oriented this shift. Patient is more awake and consumed his lunch. Encouraged to increase fluid intake. Patient sleeps most of the time. Call light within reach.       Problem: Optimal Care for Alcohol Withdrawal  Goal: Optimal Care for the alcohol withdrawal patient  Outcome: Progressing     Problem: Seizure Precautions  Goal: Implementation of seizure precautions  Outcome: Progressing      Shift Goals  Clinical Goals: CIWA, Safety, VS  Patient Goals: Rest  Family Goals: ATUL

## 2023-08-18 NOTE — ASSESSMENT & PLAN NOTE
On admission SpO2  86% on RA. Placed on 2L with oxymask, SpO2 96%.     CXR showed no abnormalities  EKG: QTc 477, Sinus Tachycardia  Monitor respiratory while on iv ativan

## 2023-08-18 NOTE — HOSPITAL COURSE
Chace Gong is a 58 y.o. male with hx of testicular cancer, CHF and COPD, who presented on 8/17/2023 with alcohol withdrawal. Patient reported heavy drink for a week and last drink was 2 nights ago. He was found on the side of road in [facility]. He reported nausea, vomiting, tremor, anxious. Denies SOB, chest pain or abdominal pain. At ER, patient has been tachycardia, hypertensive, SpO2 86% on RA. Placed on 2L. Patient received 3 doses of IV ativan, along with IV fluids. During his stay in the hospital in the first night he received 8 mg of IV ativan. Patient symptoms improved, no tremors or any other involuntary movements, nausea, vomiting or anxiety. Patient had a CIWA score <10 for 2 consecutive days. Improvement in overall condition. On day of discharge, patient clinically and hemodynamically stable. He is being discharge with multivitamins and Vitamin B1 replacement. Recommend follow-up with PCP within th next weeks regarding recent hospitalization. Patient in agreement with plan. Will discharge him home.

## 2023-08-18 NOTE — CARE PLAN
The patient is Watcher - Medium risk of patient condition declining or worsening    Shift Goals  Clinical Goals: CIWA, safety, VSS, Lab monitoring  Patient Goals: Rest  Family Goals: ATUL    Progress made toward(s) clinical / shift goals:      Problem: Optimal Care for Alcohol Withdrawal  Goal: Optimal Care for the alcohol withdrawal patient  Outcome: Progressing  Note:   CIWA-AR score assessment (includes assessment of nausea/vomiting, tremor, sweats, anxiety, agitation, tactile, visual and auditory disturbances, headache and orientation/sensorium). Document on CIWA flowsheet.   Frequent reorientation   Monitor for fluid and electrolyte imbalance.   Assess for respiratory depression due to sedation (pulse ox)   Continuous cardiac monitoring  Administer thiamine, multivitamins, folic acid and magnesium sulfate per provider order        Problem: Seizure Precautions  Goal: Implementation of seizure precautions  Outcome: Progressing  Note:   Padded side rails up at all times   Suction equipment and oxygen delivery system at bedside   Continuous pulse oximeter in use   Fall precautions, bed alarm on, bed in lowest position   IV access: 18 G RAC  Low stimulus environment.   Instructed patient to use call light button if having warning signs of impending seizure       Problem: Fall Risk  Goal: Patient will remain free from falls  8/18/2023 0247 by Diane Hartley R.NAnam  Outcome: Progressing  Note: Assessed fall risk factors.  Fall precautions in place (BA on, call light within reach, bed is locked and in lowest position, pt close to the nurses station, and appropriate signs at the door).       Problem: Respiratory  Goal: Patient will achieve/maintain optimum respiratory ventilation and gas exchange  Outcome: Progressing  Flowsheets (Taken 8/18/2023 0255)  O2 Delivery Device: Oxymask  Note:   Assessed respiratory rate, rhythm, depth and effort of respiration Continuous pulse oximeter in place. 3L Oxymask;  mouth breather.      Problem: Risk for Aspiration  Goal: Patient's risk for aspiration will be absent or decrease  Flowsheets (Taken 8/18/2023 8776)  Head Of Bed: Greater than 30 degrees. Suction setup at bedside.

## 2023-08-18 NOTE — PROGRESS NOTES
Unable to complete admit profile after multiple attempts. Pt is lethargic and keep dozing off. Pt is ANO x3, disoriented to time (month/day/date). Visual hallucination, tremors, diaphoresis, headache, nausea were consistently reported by pt. Seizure and aspiration precaution, BA on, and continuous pulse oximeter are in place.

## 2023-08-18 NOTE — PROGRESS NOTES
Pt has not voided since arrival in the unit. Offered a urinal. Pt is arousable, but pt keeps drifting off to sleep. Bladder scan #: 725 mL. MD notified.

## 2023-08-18 NOTE — PROGRESS NOTES
4 Eyes Skin Assessment Completed by Diane RN and YAEL Haro.    Head WDL  Ears WDL  Nose Redness  Mouth WDL  Neck Redness  Breast/Chest Redness and Blanching  Shoulder Blades WDL  Spine Redness and Blanching  (R) Arm/Elbow/Hand Redness and Blanching  (L) Arm/Elbow/Hand Redness and Blanching  Abdomen WDL  Groin WDL  Scrotum/Coccyx/Buttocks WDL  (R) Leg Scar and Bruising  (L) Leg WDL  (R) Heel/Foot/Toe Scab Calloused      (L) Heel/Foot/Toe Scab Calloused            Devices In Places Tele Box, Blood Pressure Cuff, and Oxy Mask      Interventions In Place Pillows    Possible Skin Injury No    Pictures Uploaded Into Epic Yes  Wound Consult Placed N/A  RN Wound Prevention Protocol Ordered No

## 2023-08-18 NOTE — H&P
"Hospital Medicine History & Physical Note    Date of Service  8/17/2023    Primary Care Physician  Mohsen Tamasaby, M.D.    Consultants      Code Status  Full Code    Chief Complaint  Chief Complaint   Patient presents with    Alcohol Intoxication     BIB EMS. Bystander called as pt was asleep on the side of the road in [place].   Reports he got \"pissed off\" and drank for 5 days straight.   Reports his last drink was yesterday approx 1500.   BS: 160.    Feels like he was poisoned.        History of Presenting Illness  Chace Gong is a 58 y.o. male with hx of testicular cancer, CHF and COPD, who presented 8/17/2023 with alcohol withdrawal. Patient reported heavy drink for a week and last drink was 2 nights ago. He was found on the side of road in [facility]. He reported nausea, vomiting, tremor, anxious. Denies sob, chest pain, abd pain.    At ER, patient has been tachycardia, hypertensive, desat to 86% on RA. Placed on 2L. Patient received 3 doses of iv ativan, along with IVF    I discussed the plan of care with patient and ERP .    Review of Systems  All 12 systems were reviewed and negative except as mentioned above      Past Medical History  testicular cancer, CHF and COPD    Surgical History   has no past surgical history on file.     Family History  family history is not on file.   Family history reviewed with patient. There is no family history that is pertinent to the chief complaint.     Social History   reports that he has been smoking cigarettes. He has a 23.50 pack-year smoking history. He does not have any smokeless tobacco history on file. He reports current alcohol use.    Allergies  Allergies   Allergen Reactions    Sulfa Drugs Unspecified     Patient unsure of reaction       Medications  Prior to Admission Medications   Prescriptions Last Dose Informant Patient Reported? Taking?   lisinopril (PRINIVIL) 20 MG Tab   No No   Sig: Take 1 Tablet by mouth every day.      Facility-Administered " "Medications: None       Physical Exam  Temp:  [36.2 °C (97.2 °F)-37 °C (98.6 °F)] 36.8 °C (98.3 °F)  Pulse:  [101-127] 115  Resp:  [14-25] 25  BP: (122-165)/() 122/79  SpO2:  [86 %-98 %] 89 %  Blood Pressure: (!) 150/97   Temperature: 36.2 °C (97.2 °F)   Pulse: (!) 117   Respiration: 14   Pulse Oximetry: 92 %       Physical Exam    Laboratory:  Recent Labs     08/17/23  1335   WBC 8.4   RBC 5.24   HEMOGLOBIN 17.5   HEMATOCRIT 50.8   MCV 96.9   MCH 33.4*   MCHC 34.4   RDW 51.6*   PLATELETCT 173   MPV 8.9*     Recent Labs     08/17/23  1335   SODIUM 141   POTASSIUM 4.2   CHLORIDE 100   CO2 24   GLUCOSE 103*   BUN 15   CREATININE 0.89   CALCIUM 8.8     Recent Labs     08/17/23  1335   ALTSGPT 40   ASTSGOT 53*   ALKPHOSPHAT 100*   TBILIRUBIN 0.6   GLUCOSE 103*         No results for input(s): \"NTPROBNP\" in the last 72 hours.      No results for input(s): \"TROPONINT\" in the last 72 hours.    Imaging:  DX-CHEST-PORTABLE (1 VIEW)    (Results Pending)           Assessment/Plan:  Justification for Admission Status  I anticipate this patient will require at least two midnights for appropriate medical management, necessitating inpatient admission because alcohol withdrawal     Patient will need a Telemetry bed on EMERGENCY service .  The need is secondary to needs iv ativan.    * Alcohol abuse with withdrawal without complication (HCC)- (present on admission)  Assessment & Plan  heavy drink for a week and last drink was 2 nights ago. He was found on the side of road in [facility]. He reported nausea, vomiting, tremor,     Ordered CIWA protocl  Admitted to tele  Iv ativan prn. Monitor respiratory status  Iv vitamin    Tachycardia  Assessment & Plan  Ordered EKG  Likely due to alcohol withdrawal   monitor    Primary hypertension  Assessment & Plan  Resumed home lisinopril    Acute respiratory failure with hypoxia (HCC)- (present on admission)  Assessment & Plan  desat to 86% on RA. Placed on 2L    Ordered cxr and ekg  Need " to r/o aspiration.   Monitor respiratory while on iv ativan          VTE prophylaxis: enoxaparin ppx

## 2023-08-19 VITALS
DIASTOLIC BLOOD PRESSURE: 97 MMHG | WEIGHT: 235.67 LBS | SYSTOLIC BLOOD PRESSURE: 135 MMHG | OXYGEN SATURATION: 92 % | HEART RATE: 100 BPM | RESPIRATION RATE: 18 BRPM | TEMPERATURE: 98.9 F | BODY MASS INDEX: 30.25 KG/M2 | HEIGHT: 74 IN

## 2023-08-19 LAB
ANION GAP SERPL CALC-SCNC: 11 MMOL/L (ref 7–16)
BASOPHILS # BLD AUTO: 0.3 % (ref 0–1.8)
BASOPHILS # BLD: 0.02 K/UL (ref 0–0.12)
BUN SERPL-MCNC: 12 MG/DL (ref 8–22)
CALCIUM SERPL-MCNC: 8.5 MG/DL (ref 8.5–10.5)
CHLORIDE SERPL-SCNC: 101 MMOL/L (ref 96–112)
CO2 SERPL-SCNC: 27 MMOL/L (ref 20–33)
CREAT SERPL-MCNC: 0.67 MG/DL (ref 0.5–1.4)
EOSINOPHIL # BLD AUTO: 0.2 K/UL (ref 0–0.51)
EOSINOPHIL NFR BLD: 3.1 % (ref 0–6.9)
ERYTHROCYTE [DISTWIDTH] IN BLOOD BY AUTOMATED COUNT: 51 FL (ref 35.9–50)
GFR SERPLBLD CREATININE-BSD FMLA CKD-EPI: 109 ML/MIN/1.73 M 2
GLUCOSE SERPL-MCNC: 95 MG/DL (ref 65–99)
HCT VFR BLD AUTO: 42.1 % (ref 42–52)
HGB BLD-MCNC: 14 G/DL (ref 14–18)
IMM GRANULOCYTES # BLD AUTO: 0.02 K/UL (ref 0–0.11)
IMM GRANULOCYTES NFR BLD AUTO: 0.3 % (ref 0–0.9)
LYMPHOCYTES # BLD AUTO: 1.92 K/UL (ref 1–4.8)
LYMPHOCYTES NFR BLD: 29.9 % (ref 22–41)
MCH RBC QN AUTO: 33.1 PG (ref 27–33)
MCHC RBC AUTO-ENTMCNC: 33.3 G/DL (ref 32.3–36.5)
MCV RBC AUTO: 99.5 FL (ref 81.4–97.8)
MONOCYTES # BLD AUTO: 0.33 K/UL (ref 0–0.85)
MONOCYTES NFR BLD AUTO: 5.1 % (ref 0–13.4)
NEUTROPHILS # BLD AUTO: 3.93 K/UL (ref 1.82–7.42)
NEUTROPHILS NFR BLD: 61.3 % (ref 44–72)
NRBC # BLD AUTO: 0 K/UL
NRBC BLD-RTO: 0 /100 WBC (ref 0–0.2)
PLATELET # BLD AUTO: 129 K/UL (ref 164–446)
PMV BLD AUTO: 9.7 FL (ref 9–12.9)
POTASSIUM SERPL-SCNC: 3.9 MMOL/L (ref 3.6–5.5)
RBC # BLD AUTO: 4.23 M/UL (ref 4.7–6.1)
SODIUM SERPL-SCNC: 139 MMOL/L (ref 135–145)
WBC # BLD AUTO: 6.4 K/UL (ref 4.8–10.8)

## 2023-08-19 PROCEDURE — 700102 HCHG RX REV CODE 250 W/ 637 OVERRIDE(OP): Performed by: STUDENT IN AN ORGANIZED HEALTH CARE EDUCATION/TRAINING PROGRAM

## 2023-08-19 PROCEDURE — 85025 COMPLETE CBC W/AUTO DIFF WBC: CPT

## 2023-08-19 PROCEDURE — A9270 NON-COVERED ITEM OR SERVICE: HCPCS | Performed by: STUDENT IN AN ORGANIZED HEALTH CARE EDUCATION/TRAINING PROGRAM

## 2023-08-19 PROCEDURE — 80048 BASIC METABOLIC PNL TOTAL CA: CPT

## 2023-08-19 PROCEDURE — 99239 HOSP IP/OBS DSCHRG MGMT >30: CPT | Mod: GC | Performed by: INTERNAL MEDICINE

## 2023-08-19 RX ORDER — LANOLIN ALCOHOL/MO/W.PET/CERES
100 CREAM (GRAM) TOPICAL DAILY
Qty: 30 TABLET | Refills: 0 | Status: SHIPPED | OUTPATIENT
Start: 2023-08-20 | End: 2023-09-19

## 2023-08-19 RX ORDER — FOLIC ACID 1 MG/1
1 TABLET ORAL DAILY
Qty: 30 TABLET | Refills: 0 | Status: SHIPPED | OUTPATIENT
Start: 2023-08-20 | End: 2024-03-14

## 2023-08-19 RX ADMIN — LISINOPRIL 20 MG: 20 TABLET ORAL at 04:33

## 2023-08-19 RX ADMIN — THERA TABS 1 TABLET: TAB at 04:33

## 2023-08-19 RX ADMIN — Medication 100 MG: at 04:33

## 2023-08-19 RX ADMIN — FOLIC ACID 1 MG: 1 TABLET ORAL at 04:35

## 2023-08-19 ASSESSMENT — LIFESTYLE VARIABLES
HEADACHE, FULLNESS IN HEAD: NOT PRESENT
TOTAL SCORE: 4
AGITATION: NORMAL ACTIVITY
HEADACHE, FULLNESS IN HEAD: NOT PRESENT
NAUSEA AND VOMITING: NO NAUSEA AND NO VOMITING
TOTAL SCORE: 4
HAVE PEOPLE ANNOYED YOU BY CRITICIZING YOUR DRINKING: YES
ANXIETY: MILDLY ANXIOUS
TOTAL SCORE: 3
TREMOR: TREMOR NOT VISIBLE BUT CAN BE FELT, FINGERTIP TO FINGERTIP
AGITATION: SOMEWHAT MORE THAN NORMAL ACTIVITY
VISUAL DISTURBANCES: NOT PRESENT
NAUSEA AND VOMITING: NO NAUSEA AND NO VOMITING
NAUSEA AND VOMITING: MILD NAUSEA WITH NO VOMITING
AUDITORY DISTURBANCES: NOT PRESENT
VISUAL DISTURBANCES: NOT PRESENT
TREMOR: TREMOR NOT VISIBLE BUT CAN BE FELT, FINGERTIP TO FINGERTIP
PAROXYSMAL SWEATS: BARELY PERCEPTIBLE SWEATING. PALMS MOIST
AUDITORY DISTURBANCES: NOT PRESENT
ORIENTATION AND CLOUDING OF SENSORIUM: ORIENTED AND CAN DO SERIAL ADDITIONS
ANXIETY: NO ANXIETY (AT EASE)
TOTAL SCORE: 4
ANXIETY: NO ANXIETY (AT EASE)
DOES PATIENT WANT TO TALK TO SOMEONE ABOUT QUITTING: NO
HEADACHE, FULLNESS IN HEAD: NOT PRESENT
PAROXYSMAL SWEATS: *
TREMOR: TREMOR NOT VISIBLE BUT CAN BE FELT, FINGERTIP TO FINGERTIP
ON A TYPICAL DAY WHEN YOU DRINK ALCOHOL HOW MANY DRINKS DO YOU HAVE: 2
AGITATION: NORMAL ACTIVITY
ORIENTATION AND CLOUDING OF SENSORIUM: ORIENTED AND CAN DO SERIAL ADDITIONS
EVER HAD A DRINK FIRST THING IN THE MORNING TO STEADY YOUR NERVES TO GET RID OF A HANGOVER: YES
HOW MANY TIMES IN THE PAST YEAR HAVE YOU HAD 5 OR MORE DRINKS IN A DAY: 1
PAROXYSMAL SWEATS: *
ORIENTATION AND CLOUDING OF SENSORIUM: ORIENTED AND CAN DO SERIAL ADDITIONS
ALCOHOL_USE: YES
AUDITORY DISTURBANCES: NOT PRESENT
TOTAL SCORE: 4
ORIENTATION AND CLOUDING OF SENSORIUM: ORIENTED AND CAN DO SERIAL ADDITIONS
VISUAL DISTURBANCES: NOT PRESENT
VISUAL DISTURBANCES: NOT PRESENT
AGITATION: NORMAL ACTIVITY
AVERAGE NUMBER OF DAYS PER WEEK YOU HAVE A DRINK CONTAINING ALCOHOL: 2
NAUSEA AND VOMITING: MILD NAUSEA WITH NO VOMITING
DOES PATIENT WANT TO STOP DRINKING: YES
TREMOR: TREMOR NOT VISIBLE BUT CAN BE FELT, FINGERTIP TO FINGERTIP
CONSUMPTION TOTAL: POSITIVE
EVER FELT BAD OR GUILTY ABOUT YOUR DRINKING: YES
TOTAL SCORE: 4
HAVE YOU EVER FELT YOU SHOULD CUT DOWN ON YOUR DRINKING: YES
HEADACHE, FULLNESS IN HEAD: NOT PRESENT
AUDITORY DISTURBANCES: NOT PRESENT
TOTAL SCORE: 3
ANXIETY: NO ANXIETY (AT EASE)
PAROXYSMAL SWEATS: BARELY PERCEPTIBLE SWEATING. PALMS MOIST

## 2023-08-19 ASSESSMENT — COGNITIVE AND FUNCTIONAL STATUS - GENERAL
SUGGESTED CMS G CODE MODIFIER DAILY ACTIVITY: CJ
TURNING FROM BACK TO SIDE WHILE IN FLAT BAD: A LITTLE
SUGGESTED CMS G CODE MODIFIER MOBILITY: CK
WALKING IN HOSPITAL ROOM: A LITTLE
MOBILITY SCORE: 18
HELP NEEDED FOR BATHING: A LITTLE
MOVING FROM LYING ON BACK TO SITTING ON SIDE OF FLAT BED: A LITTLE
DRESSING REGULAR LOWER BODY CLOTHING: A LITTLE
TOILETING: A LITTLE
CLIMB 3 TO 5 STEPS WITH RAILING: A LITTLE
DAILY ACTIVITIY SCORE: 21
STANDING UP FROM CHAIR USING ARMS: A LITTLE
MOVING TO AND FROM BED TO CHAIR: A LITTLE

## 2023-08-19 ASSESSMENT — PAIN DESCRIPTION - PAIN TYPE: TYPE: ACUTE PAIN

## 2023-08-19 NOTE — DISCHARGE SUMMARY
"[facility] Medicine Discharge Summary    Attending: Dr. [place]  Senior Resident: Dr. MICHELET Callahan  Intern:  Dr. CHARITO Aviles  Contact Number: [phone]    CHIEF COMPLAINT ON ADMISSION  Chief Complaint   Patient presents with    Alcohol Intoxication     BIB EMS. Bystander called as pt was asleep on the side of the road in [place].   Reports he got \"pissed off\" and drank for 5 days straight.   Reports his last drink was yesterday approx 1500.   BS: 160.    Feels like he was poisoned.        Reason for Admission  DETOX     Admission Date  8/17/2023    CODE STATUS  Full Code    HPI & HOSPITAL COURSE  Chace Gong is a 58 y.o. male with hx of testicular cancer, CHF and COPD, who presented on 8/17/2023 with alcohol withdrawal. Patient reported heavy drink for a week and last drink was 2 nights ago. He was found on the side of road in [facility]. He reported nausea, vomiting, tremor, anxious. Denies SOB, chest pain or abdominal pain. At ER, patient has been tachycardia, hypertensive, SpO2 86% on RA. Placed on 2L. Patient received 3 doses of IV ativan, along with IV fluids. During his stay in the hospital in the first night he received 8 mg of IV ativan. Patient symptoms improved, no tremors or any other involuntary movements, nausea, vomiting or anxiety. Patient had a CIWA score <10 for 2 consecutive days. Improvement in overall condition. On day of discharge, patient clinically and hemodynamically stable. He is being discharge with multivitamins and Vitamin B1 replacement. Recommend follow-up with PCP within th next weeks regarding recent hospitalization. Patient in agreement with plan. Will discharge him home.          Therefore, he is discharged in fair and stable condition to home with close outpatient follow-up.    The patient met 2-midnight criteria for an inpatient stay at the time of discharge.    Discharge Date  8/19/2023    Physical Exam on Day of Discharge  Physical Exam  Constitutional:       General: He is not " in acute distress.     Appearance: Normal appearance. He is not ill-appearing.   HENT:      Head: Normocephalic and atraumatic.      Nose: Nose normal.      Mouth/Throat:      Mouth: Mucous membranes are moist.   Eyes:      Extraocular Movements: Extraocular movements intact.      Conjunctiva/sclera: Conjunctivae normal.      Pupils: Pupils are equal, round, and reactive to light.   Cardiovascular:      Rate and Rhythm: Normal rate and regular rhythm.      Pulses: Normal pulses.      Heart sounds: Normal heart sounds.   Pulmonary:      Effort: Pulmonary effort is normal. No respiratory distress.      Breath sounds: Normal breath sounds.   Chest:      Chest wall: No tenderness.   Abdominal:      General: Bowel sounds are normal. There is no distension.      Palpations: Abdomen is soft.      Tenderness: There is no abdominal tenderness. There is no right CVA tenderness or left CVA tenderness.   Musculoskeletal:         General: No swelling or tenderness. Normal range of motion.      Cervical back: Normal range of motion and neck supple.      Right lower leg: No edema.      Left lower leg: No edema.   Skin:     General: Skin is warm.      Capillary Refill: Capillary refill takes less than 2 seconds.      Coloration: Skin is not jaundiced or pale.   Neurological:      General: No focal deficit present.      Mental Status: He is alert and oriented to person, place, and time.      Cranial Nerves: No cranial nerve deficit.      Sensory: No sensory deficit.      Motor: No weakness.   Psychiatric:         Mood and Affect: Mood normal.         FOLLOW UP ITEMS POST DISCHARGE  - Follow-up with your PCP regarding your recent hospitalization.  - We add multivitamins and Vitamin B1 to you medications.  - Continue home medications as prescribed.     DISCHARGE DIAGNOSES  Principal Problem:    Alcohol abuse with withdrawal without complication (HCC) (POA: Yes)  Active Problems:    Acute respiratory failure with hypoxia (HCC) (POA:  Yes)    Primary hypertension (POA: Unknown)    Tachycardia (POA: Unknown)  Resolved Problems:    * No resolved hospital problems. *      FOLLOW UP  No future appointments.  No follow-up provider specified.    MEDICATIONS ON DISCHARGE     Medication List        START taking these medications        Instructions   folic acid 1 MG Tabs  Start taking on: August 20, 2023  Commonly known as: Folvite   Take 1 Tablet by mouth every day.  Dose: 1 mg     multivitamin Tabs  Start taking on: August 20, 2023   Take 1 Tablet by mouth every day.  Dose: 1 Tablet     thiamine 100 MG tablet  Start taking on: August 20, 2023  Commonly known as: Thiamine   Take 1 Tablet by mouth every day for 30 days.  Dose: 100 mg            CONTINUE taking these medications        Instructions   lisinopril 20 MG Tabs  Commonly known as: Prinivil   Take 1 Tablet by mouth every day.  Dose: 20 mg     tamsulosin 0.4 MG capsule  Commonly known as: Flomax   Take 0.4 mg by mouth 1/2 hour after breakfast.  Dose: 0.4 mg              Allergies  Allergies   Allergen Reactions    Sulfa Drugs Unspecified     Patient unsure of reaction       DIET  Orders Placed This Encounter   Procedures    Diet Order Diet: Regular     Standing Status:   Standing     Number of Occurrences:   1     Order Specific Question:   Diet:     Answer:   Regular [1]       ACTIVITY  As tolerated.  Weight bearing as tolerated    CONSULTATIONS  None    PROCEDURES  none    LABORATORY  Lab Results   Component Value Date    SODIUM 139 08/19/2023    POTASSIUM 3.9 08/19/2023    CHLORIDE 101 08/19/2023    CO2 27 08/19/2023    GLUCOSE 95 08/19/2023    BUN 12 08/19/2023    CREATININE 0.67 08/19/2023        Lab Results   Component Value Date    WBC 6.4 08/19/2023    HEMOGLOBIN 14.0 08/19/2023    HEMATOCRIT 42.1 08/19/2023    PLATELETCT 129 (L) 08/19/2023        Total time of the discharge process exceeds 46 minutes.

## 2023-08-19 NOTE — PROGRESS NOTES
Patient has been discharged through discharge lounge at this time. Patient is stable with mild withdrawal symptoms. VSS, RN called Judy and Vanessa per patient request to notify of  time from Hospital. Patient will be picked up at 3 pm by Judy.

## 2023-08-19 NOTE — DISCHARGE INSTRUCTIONS
- Follow-up with your PCP regarding your recent hospitalization.  - We add multivitamins and Vitamin B1 to you medications.  - Continue home medications as prescribed.       Discharge Instructions    Discharged to home by car with relative. Discharged via wheelchair, hospital escort: Yes.  Special equipment needed: Not Applicable    Be sure to schedule a follow-up appointment with your primary care doctor or any specialists as instructed.     Discharge Plan:        I understand that a diet low in cholesterol, fat, and sodium is recommended for good health. Unless I have been given specific instructions below for another diet, I accept this instruction as my diet prescription.   Other diet: regular    Special Instructions: None    -Is this patient being discharged with medication to prevent blood clots?  No    Is patient discharged on Warfarin / Coumadin?   No       Alcohol Intoxication  Alcohol intoxication happens when you cannot think clearly or function well after drinking alcohol. This can happen after just one drink. The effect that alcohol has on how you think and function depends on:  How much alcohol you drank.  Your age, your weight, and whether you are a man or a woman.  How often you drink alcohol.  If you have other medical problems.  Alcohol intoxication can range from mild to severe. It can be dangerous, especially if:  You drink a large amount of alcohol in a short time (binge drink).  You drink alcohol and take drugs or medicines.  What are the causes?  This condition is caused by drinking alcohol.  What increases the risk?  Peer pressure in young adults.  Difficulty managing stress.  History of drug or alcohol abuse.  Drinking alcohol and taking drugs.  Family history of drug or alcohol abuse.  Low body weight.  Binge drinking.  What are the signs or symptoms?  Feeling relaxed or sleepy.  Having mild difficulty with coordination, speech, memory, or attention.  Strong anger or extreme  sadness.  Severe difficulty with coordination, speech, memory, or attention.  Other symptoms may include:  Passing out.  Vomiting.  Confusion.  Slow breathing.  Coma.  How is this treated?  This condition may be treated with:  Close monitoring in the hospital.  IV fluids to add water in your body.  Medicine to treat vomiting or to get rid of alcohol in the body.  Counseling about the dangers of alcohol.  Oxygen therapy or a breathing machine (ventilator).  You may also be treated for substance use disorder.  Follow these instructions at home:  Eating and drinking    Do not drink alcohol if:  Your doctor tells you not to drink.  You are pregnant, may be pregnant, or are planning to get pregnant.  You are under the legal drinking age (21 years old in the [place]  You are taking medicines that you should not take with alcohol.  Alcohol causes your medical problem to get worse.  You have to drive or do activities that need you to be alert.  You have substance use disorder. This is when using alcohol again and again causes problems with your health, your relationships, or with what you need to do at work, home, or school.  Ask your doctor if alcohol is safe for you. If you are allowed to drink, limit how much you have to:  0-1 drink a day for women who are not pregnant.  0-2 drinks a day for men.  Know how much alcohol is in your drink. In the [place] one drink equals one 12 oz bottle of beer (355 mL), one 5 oz glass of wine (148 mL), or one 1½ oz glass of hard liquor (44 mL).  Be sure to eat before you drink alcohol.  Alcohol increases peeing. It is important to stay hydrated and avoid caffeine.  Caffeine may be in coffee, tea, and some sodas.  Caffeine can make you thirsty.  Do not drink more than one drink in an hour.  If you are having more than one drink, have a drink without alcohol (such as water) between your drinks.  General instructions    Take over-the-counter and prescription medicines only as told by your  doctor.  Do not drive after drinking any amount of alcohol. Plan for a designated  or another way to go home.  Have someone you trust stay with you while you are intoxicated. Youshould not be left alone.  Contact a doctor if:  You do not feel better after a few days.  You have problems at work, at school, or at home due to drinking.  You have trouble with any of the following:  Movement.  Talking.  Memory.  Paying attention to things.  Get help right away if:  You have trouble staying awake.  You are light-headed or faint.  You feel very confused.  You have trouble staying awake.  You are vomiting blood. The blood may be bright red or look like coffee grounds.  You have been told that you have had jerky movements that you cannot control (seizure).  You have blood in your poop (stool). The blood may:  Be bright red.  Make your poop black and tarry and make it smell bad.  These symptoms may be an emergency. Get help right away. Call 911.  Do not wait to see if the symptoms will go away.  Do not drive yourself to the hospital.  Also, get help right away if:  You have thoughts about hurting yourself or others.  Take one of these steps if you feel like you may hurt yourself or others, or have thoughts about taking your own life:  Call 911.  Call the National Suicide Prevention Lifeline at 1-674.167.1510 or 339. This is open 24 hours a day.  Text the Crisis Text Line at 360817.  Summary  Alcohol intoxication happens when you cannot think clearly or function well after drinking alcohol. This can happen after just one drink.  If your doctor says that alcohol is safe for you, limit how much you drink.  Contact a doctor if you have problems at work, at school, or at home due to drinking.  Get help right away if you have thoughts about hurting yourself or others.  This information is not intended to replace advice given to you by your health care provider. Make sure you discuss any questions you have with your health  care provider.  Document Revised: 03/06/2023 Document Reviewed: 03/06/2023  Elsevier Patient Education © 2023 [employer]    Hypertension, Adult  Hypertension is another name for high blood pressure. High blood pressure forces your heart to work harder to pump blood. This can cause problems over time.  There are two numbers in a blood pressure reading. There is a top number (systolic) over a bottom number (diastolic). It is best to have a blood pressure that is below 120/80.  What are the causes?  The cause of this condition is not known. Some other conditions can lead to high blood pressure.  What increases the risk?  Some lifestyle factors can make you more likely to develop high blood pressure:  Smoking.  Not getting enough exercise or physical activity.  Being overweight.  Having too much fat, sugar, calories, or salt (sodium) in your diet.  Drinking too much alcohol.  Other risk factors include:  Having any of these conditions:  Heart disease.  Diabetes.  High cholesterol.  Kidney disease.  Obstructive sleep apnea.  Having a family history of high blood pressure and high cholesterol.  Age. The risk increases with age.  Stress.  What are the signs or symptoms?  High blood pressure may not cause symptoms. Very high blood pressure (hypertensive crisis) may cause:  Headache.  Fast or uneven heartbeats (palpitations).  Shortness of breath.  Nosebleed.  Vomiting or feeling like you may vomit (nauseous).  Changes in how you see.  Very bad chest pain.  Feeling dizzy.  Seizures.  How is this treated?  This condition is treated by making healthy lifestyle changes, such as:  Eating healthy foods.  Exercising more.  Drinking less alcohol.  Your doctor may prescribe medicine if lifestyle changes do not help enough and if:  Your top number is above 130.  Your bottom number is above 80.  Your personal target blood pressure may vary.  Follow these instructions at home:  Eating and drinking    If told, follow the DASH eating  plan. To follow this plan:  Fill one half of your plate at each meal with fruits and vegetables.  Fill one fourth of your plate at each meal with whole grains. Whole grains include whole-wheat pasta, brown rice, and whole-grain bread.  Eat or drink low-fat dairy products, such as skim milk or low-fat yogurt.  Fill one fourth of your plate at each meal with low-fat (lean) proteins. Low-fat proteins include fish, chicken without skin, eggs, beans, and tofu.  Avoid fatty meat, cured and processed meat, or chicken with skin.  Avoid pre-made or processed food.  Limit the amount of salt in your diet to less than 1,500 mg each day.  Do not drink alcohol if:  Your doctor tells you not to drink.  You are pregnant, may be pregnant, or are planning to become pregnant.  If you drink alcohol:  Limit how much you have to:  0-1 drink a day for women.  0-2 drinks a day for men.  Know how much alcohol is in your drink. In the [place] one drink equals one 12 oz bottle of beer (355 mL), one 5 oz glass of wine (148 mL), or one 1½ oz glass of hard liquor (44 mL).  Lifestyle    Work with your doctor to stay at a healthy weight or to lose weight. Ask your doctor what the best weight is for you.  Get at least 30 minutes of exercise that causes your heart to beat faster (aerobic exercise) most days of the week. This may include walking, swimming, or biking.  Get at least 30 minutes of exercise that strengthens your muscles (resistance exercise) at least 3 days a week. This may include lifting weights or doing Pilates.  Do not smoke or use any products that contain nicotine or tobacco. If you need help quitting, ask your doctor.  Check your blood pressure at home as told by your doctor.  Keep all follow-up visits.  Medicines  Take over-the-counter and prescription medicines only as told by your doctor. Follow directions carefully.  Do not skip doses of blood pressure medicine. The medicine does not work as well if you skip doses. Skipping  doses also puts you at risk for problems.  Ask your doctor about side effects or reactions to medicines that you should watch for.  Contact a doctor if:  You think you are having a reaction to the medicine you are taking.  You have headaches that keep coming back.  You feel dizzy.  You have swelling in your ankles.  You have trouble with your vision.  Get help right away if:  You get a very bad headache.  You start to feel mixed up (confused).  You feel weak or numb.  You feel faint.  You have very bad pain in your:  Chest.  Belly (abdomen).  You vomit more than once.  You have trouble breathing.  These symptoms may be an emergency. Get help right away. Call 911.  Do not wait to see if the symptoms will go away.  Do not drive yourself to the hospital.  Summary  Hypertension is another name for high blood pressure.  High blood pressure forces your heart to work harder to pump blood.  For most people, a normal blood pressure is less than 120/80.  Making healthy choices can help lower blood pressure. If your blood pressure does not get lower with healthy choices, you may need to take medicine.  This information is not intended to replace advice given to you by your health care provider. Make sure you discuss any questions you have with your health care provider.  Document Revised: 10/06/2022 Document Reviewed: 10/06/2022  Elsevier Patient Education © 2023 [employer]

## 2023-08-19 NOTE — CARE PLAN
The patient is Watcher - Medium risk of patient condition declining or worsening    Patient safety has been maintained pt withdrawal symptoms continue to be monitored using CIWA scale. Appropriate medications are given to control withdrawal symptoms in accordance with scale. See EMAR.       Shift Goals  Clinical Goals: CIWA, VS, safety  Problem: Knowledge Deficit - Standard  Goal: Patient and family/care givers will demonstrate understanding of plan of care, disease process/condition, diagnostic tests and medications  Outcome: Progressing     Problem: Optimal Care for Alcohol Withdrawal  Goal: Optimal Care for the alcohol withdrawal patient  Outcome: Progressing     Problem: Seizure Precautions  Goal: Implementation of seizure precautions  Outcome: Progressing     Problem: Fall Risk  Goal: Patient will remain free from falls  Outcome: Progressing     Patient Goals: REst  Family Goals: ATUL    Progress made toward(s) clinical / shift goals:      Patient is not progressing towards the following goals:

## 2023-08-19 NOTE — PROGRESS NOTES
Rhythm: Sinus -Sinus tach  Heart Rate:  Ectopy:PVC coup  SD/QRS/QT=0.14/.08/.32    Per monitor room

## 2023-08-19 NOTE — PROGRESS NOTES
RN received report from night shift nurse, Patient has second IV placed and both are  flushing well at this time. Patient CIWA score is a 4 this morning, Patient reported to this nurse that when he has withdrawn in the past from alcohol he struggled and ended up in ICU. This nurse will monitor patient closely and communicate with Provider changes requiring change in treatment care. Seizure precautions in place and call light is within reach. Bed is in the lowest position.

## 2024-03-14 ENCOUNTER — HOSPITAL ENCOUNTER (INPATIENT)
Facility: MEDICAL CENTER | Age: 59
LOS: 2 days | DRG: 308 | End: 2024-03-16
Attending: EMERGENCY MEDICINE | Admitting: INTERNAL MEDICINE
Payer: MEDICARE

## 2024-03-14 ENCOUNTER — APPOINTMENT (OUTPATIENT)
Dept: RADIOLOGY | Facility: MEDICAL CENTER | Age: 59
DRG: 308 | End: 2024-03-14
Attending: EMERGENCY MEDICINE
Payer: MEDICARE

## 2024-03-14 DIAGNOSIS — I48.91 ATRIAL FIBRILLATION WITH RVR (HCC): ICD-10-CM

## 2024-03-14 DIAGNOSIS — I50.22 CHRONIC SYSTOLIC CONGESTIVE HEART FAILURE (HCC): ICD-10-CM

## 2024-03-14 DIAGNOSIS — I48.92 ATRIAL FLUTTER WITH RAPID VENTRICULAR RESPONSE (HCC): ICD-10-CM

## 2024-03-14 DIAGNOSIS — F15.10 METHAMPHETAMINE USE (HCC): ICD-10-CM

## 2024-03-14 DIAGNOSIS — R06.00 DYSPNEA, UNSPECIFIED TYPE: ICD-10-CM

## 2024-03-14 LAB
ALBUMIN SERPL BCP-MCNC: 4 G/DL (ref 3.2–4.9)
ALBUMIN/GLOB SERPL: 1.1 G/DL
ALP SERPL-CCNC: 71 U/L (ref 30–99)
ALT SERPL-CCNC: 19 U/L (ref 2–50)
AMPHET UR QL SCN: POSITIVE
ANION GAP SERPL CALC-SCNC: 13 MMOL/L (ref 7–16)
AST SERPL-CCNC: 17 U/L (ref 12–45)
BARBITURATES UR QL SCN: NEGATIVE
BASOPHILS # BLD AUTO: 0.4 % (ref 0–1.8)
BASOPHILS # BLD: 0.03 K/UL (ref 0–0.12)
BENZODIAZ UR QL SCN: NEGATIVE
BILIRUB SERPL-MCNC: 0.4 MG/DL (ref 0.1–1.5)
BUN SERPL-MCNC: 11 MG/DL (ref 8–22)
BZE UR QL SCN: NEGATIVE
CALCIUM ALBUM COR SERPL-MCNC: 9 MG/DL (ref 8.5–10.5)
CALCIUM SERPL-MCNC: 9 MG/DL (ref 8.5–10.5)
CANNABINOIDS UR QL SCN: POSITIVE
CHLORIDE SERPL-SCNC: 105 MMOL/L (ref 96–112)
CO2 SERPL-SCNC: 20 MMOL/L (ref 20–33)
CREAT SERPL-MCNC: 0.82 MG/DL (ref 0.5–1.4)
D DIMER PPP IA.FEU-MCNC: 0.63 UG/ML (FEU) (ref 0–0.5)
EKG IMPRESSION: NORMAL
EOSINOPHIL # BLD AUTO: 0.23 K/UL (ref 0–0.51)
EOSINOPHIL NFR BLD: 3 % (ref 0–6.9)
ERYTHROCYTE [DISTWIDTH] IN BLOOD BY AUTOMATED COUNT: 50.2 FL (ref 35.9–50)
ETHANOL BLD-MCNC: <10.1 MG/DL
FENTANYL UR QL: NEGATIVE
GFR SERPLBLD CREATININE-BSD FMLA CKD-EPI: 102 ML/MIN/1.73 M 2
GLOBULIN SER CALC-MCNC: 3.5 G/DL (ref 1.9–3.5)
GLUCOSE SERPL-MCNC: 105 MG/DL (ref 65–99)
HCT VFR BLD AUTO: 44.6 % (ref 42–52)
HGB BLD-MCNC: 15.1 G/DL (ref 14–18)
IMM GRANULOCYTES # BLD AUTO: 0.04 K/UL (ref 0–0.11)
IMM GRANULOCYTES NFR BLD AUTO: 0.5 % (ref 0–0.9)
LIPASE SERPL-CCNC: 18 U/L (ref 11–82)
LYMPHOCYTES # BLD AUTO: 2.83 K/UL (ref 1–4.8)
LYMPHOCYTES NFR BLD: 36.5 % (ref 22–41)
MCH RBC QN AUTO: 32.9 PG (ref 27–33)
MCHC RBC AUTO-ENTMCNC: 33.9 G/DL (ref 32.3–36.5)
MCV RBC AUTO: 97.2 FL (ref 81.4–97.8)
METHADONE UR QL SCN: NEGATIVE
MONOCYTES # BLD AUTO: 0.57 K/UL (ref 0–0.85)
MONOCYTES NFR BLD AUTO: 7.3 % (ref 0–13.4)
NEUTROPHILS # BLD AUTO: 4.06 K/UL (ref 1.82–7.42)
NEUTROPHILS NFR BLD: 52.3 % (ref 44–72)
NRBC # BLD AUTO: 0 K/UL
NRBC BLD-RTO: 0 /100 WBC (ref 0–0.2)
OPIATES UR QL SCN: NEGATIVE
OXYCODONE UR QL SCN: NEGATIVE
PCP UR QL SCN: NEGATIVE
PLATELET # BLD AUTO: 230 K/UL (ref 164–446)
PMV BLD AUTO: 9.6 FL (ref 9–12.9)
POTASSIUM SERPL-SCNC: 4.2 MMOL/L (ref 3.6–5.5)
PROPOXYPH UR QL SCN: NEGATIVE
PROT SERPL-MCNC: 7.5 G/DL (ref 6–8.2)
RBC # BLD AUTO: 4.59 M/UL (ref 4.7–6.1)
SODIUM SERPL-SCNC: 138 MMOL/L (ref 135–145)
TROPONIN T SERPL-MCNC: 23 NG/L (ref 6–19)
TSH SERPL DL<=0.005 MIU/L-ACNC: 2.61 UIU/ML (ref 0.38–5.33)
WBC # BLD AUTO: 7.8 K/UL (ref 4.8–10.8)

## 2024-03-14 PROCEDURE — 93005 ELECTROCARDIOGRAM TRACING: CPT | Performed by: EMERGENCY MEDICINE

## 2024-03-14 PROCEDURE — 99223 1ST HOSP IP/OBS HIGH 75: CPT | Mod: FS | Performed by: INTERNAL MEDICINE

## 2024-03-14 PROCEDURE — 700111 HCHG RX REV CODE 636 W/ 250 OVERRIDE (IP): Mod: JZ | Performed by: EMERGENCY MEDICINE

## 2024-03-14 PROCEDURE — 36415 COLL VENOUS BLD VENIPUNCTURE: CPT

## 2024-03-14 PROCEDURE — 84484 ASSAY OF TROPONIN QUANT: CPT

## 2024-03-14 PROCEDURE — 700111 HCHG RX REV CODE 636 W/ 250 OVERRIDE (IP): Mod: JZ | Performed by: INTERNAL MEDICINE

## 2024-03-14 PROCEDURE — 80307 DRUG TEST PRSMV CHEM ANLYZR: CPT

## 2024-03-14 PROCEDURE — 99285 EMERGENCY DEPT VISIT HI MDM: CPT

## 2024-03-14 PROCEDURE — 92960 CARDIOVERSION ELECTRIC EXT: CPT

## 2024-03-14 PROCEDURE — 84443 ASSAY THYROID STIM HORMONE: CPT

## 2024-03-14 PROCEDURE — 700102 HCHG RX REV CODE 250 W/ 637 OVERRIDE(OP): Performed by: INTERNAL MEDICINE

## 2024-03-14 PROCEDURE — 96374 THER/PROPH/DIAG INJ IV PUSH: CPT

## 2024-03-14 PROCEDURE — 85379 FIBRIN DEGRADATION QUANT: CPT

## 2024-03-14 PROCEDURE — 85025 COMPLETE CBC W/AUTO DIFF WBC: CPT

## 2024-03-14 PROCEDURE — 700101 HCHG RX REV CODE 250: Performed by: EMERGENCY MEDICINE

## 2024-03-14 PROCEDURE — 700111 HCHG RX REV CODE 636 W/ 250 OVERRIDE (IP): Mod: JZ

## 2024-03-14 PROCEDURE — 80053 COMPREHEN METABOLIC PANEL: CPT

## 2024-03-14 PROCEDURE — 700101 HCHG RX REV CODE 250: Performed by: INTERNAL MEDICINE

## 2024-03-14 PROCEDURE — 770020 HCHG ROOM/CARE - TELE (206)

## 2024-03-14 PROCEDURE — 83690 ASSAY OF LIPASE: CPT

## 2024-03-14 PROCEDURE — 71045 X-RAY EXAM CHEST 1 VIEW: CPT

## 2024-03-14 PROCEDURE — 96376 TX/PRO/DX INJ SAME DRUG ADON: CPT

## 2024-03-14 PROCEDURE — 96375 TX/PRO/DX INJ NEW DRUG ADDON: CPT

## 2024-03-14 PROCEDURE — 5A2204Z RESTORATION OF CARDIAC RHYTHM, SINGLE: ICD-10-PCS | Performed by: EMERGENCY MEDICINE

## 2024-03-14 PROCEDURE — 99223 1ST HOSP IP/OBS HIGH 75: CPT | Mod: AI | Performed by: INTERNAL MEDICINE

## 2024-03-14 PROCEDURE — 82077 ASSAY SPEC XCP UR&BREATH IA: CPT

## 2024-03-14 PROCEDURE — A9270 NON-COVERED ITEM OR SERVICE: HCPCS | Performed by: INTERNAL MEDICINE

## 2024-03-14 RX ORDER — FUROSEMIDE 10 MG/ML
40 INJECTION INTRAMUSCULAR; INTRAVENOUS
Status: DISCONTINUED | OUTPATIENT
Start: 2024-03-14 | End: 2024-03-15

## 2024-03-14 RX ORDER — ADENOSINE 3 MG/ML
12 INJECTION, SOLUTION INTRAVENOUS ONCE
Status: COMPLETED | OUTPATIENT
Start: 2024-03-14 | End: 2024-03-14

## 2024-03-14 RX ORDER — LISINOPRIL 20 MG/1
20 TABLET ORAL DAILY
Status: DISCONTINUED | OUTPATIENT
Start: 2024-03-14 | End: 2024-03-15

## 2024-03-14 RX ORDER — DILTIAZEM HYDROCHLORIDE 5 MG/ML
20 INJECTION INTRAVENOUS ONCE
Status: COMPLETED | OUTPATIENT
Start: 2024-03-14 | End: 2024-03-14

## 2024-03-14 RX ORDER — ONDANSETRON 2 MG/ML
4 INJECTION INTRAMUSCULAR; INTRAVENOUS EVERY 4 HOURS PRN
Status: DISCONTINUED | OUTPATIENT
Start: 2024-03-14 | End: 2024-03-16 | Stop reason: HOSPADM

## 2024-03-14 RX ORDER — PROCHLORPERAZINE EDISYLATE 5 MG/ML
5-10 INJECTION INTRAMUSCULAR; INTRAVENOUS EVERY 4 HOURS PRN
Status: DISCONTINUED | OUTPATIENT
Start: 2024-03-14 | End: 2024-03-16 | Stop reason: HOSPADM

## 2024-03-14 RX ORDER — PROMETHAZINE HYDROCHLORIDE 25 MG/1
12.5-25 SUPPOSITORY RECTAL EVERY 4 HOURS PRN
Status: DISCONTINUED | OUTPATIENT
Start: 2024-03-14 | End: 2024-03-16 | Stop reason: HOSPADM

## 2024-03-14 RX ORDER — ADENOSINE 3 MG/ML
6 INJECTION, SOLUTION INTRAVENOUS ONCE
Status: COMPLETED | OUTPATIENT
Start: 2024-03-14 | End: 2024-03-14

## 2024-03-14 RX ORDER — TAMSULOSIN HYDROCHLORIDE 0.4 MG/1
0.4 CAPSULE ORAL
Status: DISCONTINUED | OUTPATIENT
Start: 2024-03-15 | End: 2024-03-16 | Stop reason: HOSPADM

## 2024-03-14 RX ORDER — ADENOSINE 3 MG/ML
INJECTION, SOLUTION INTRAVENOUS
Status: COMPLETED
Start: 2024-03-14 | End: 2024-03-14

## 2024-03-14 RX ORDER — PROMETHAZINE HYDROCHLORIDE 25 MG/1
12.5-25 TABLET ORAL EVERY 4 HOURS PRN
Status: DISCONTINUED | OUTPATIENT
Start: 2024-03-14 | End: 2024-03-16 | Stop reason: HOSPADM

## 2024-03-14 RX ORDER — METOPROLOL TARTRATE 1 MG/ML
5 INJECTION, SOLUTION INTRAVENOUS
Status: COMPLETED | OUTPATIENT
Start: 2024-03-14 | End: 2024-03-14

## 2024-03-14 RX ORDER — METOPROLOL TARTRATE 1 MG/ML
5 INJECTION, SOLUTION INTRAVENOUS
Status: DISCONTINUED | OUTPATIENT
Start: 2024-03-14 | End: 2024-03-16 | Stop reason: HOSPADM

## 2024-03-14 RX ORDER — ONDANSETRON 4 MG/1
4 TABLET, ORALLY DISINTEGRATING ORAL EVERY 4 HOURS PRN
Status: DISCONTINUED | OUTPATIENT
Start: 2024-03-14 | End: 2024-03-16 | Stop reason: HOSPADM

## 2024-03-14 RX ADMIN — METOPROLOL TARTRATE 5 MG: 5 INJECTION INTRAVENOUS at 17:26

## 2024-03-14 RX ADMIN — METOPROLOL TARTRATE 25 MG: 25 TABLET, FILM COATED ORAL at 18:06

## 2024-03-14 RX ADMIN — ADENOSINE 6 MG: 3 INJECTION, SOLUTION INTRAVENOUS at 12:40

## 2024-03-14 RX ADMIN — LISINOPRIL 20 MG: 20 TABLET ORAL at 17:13

## 2024-03-14 RX ADMIN — RIVAROXABAN 20 MG: 20 TABLET, FILM COATED ORAL at 18:06

## 2024-03-14 RX ADMIN — DILTIAZEM HYDROCHLORIDE 20 MG: 5 INJECTION INTRAVENOUS at 12:49

## 2024-03-14 RX ADMIN — ADENOSINE 12 MG: 3 INJECTION INTRAVENOUS at 12:44

## 2024-03-14 RX ADMIN — METOPROLOL TARTRATE 5 MG: 5 INJECTION INTRAVENOUS at 13:28

## 2024-03-14 RX ADMIN — METOPROLOL TARTRATE 5 MG: 5 INJECTION INTRAVENOUS at 15:18

## 2024-03-14 RX ADMIN — FUROSEMIDE 40 MG: 10 INJECTION INTRAMUSCULAR; INTRAVENOUS at 18:07

## 2024-03-14 RX ADMIN — ADENOSINE 12 MG: 3 INJECTION, SOLUTION INTRAVENOUS at 12:44

## 2024-03-14 RX ADMIN — METOPROLOL TARTRATE 5 MG: 5 INJECTION INTRAVENOUS at 13:18

## 2024-03-14 RX ADMIN — ADENOSINE 6 MG: 3 INJECTION INTRAVENOUS at 12:40

## 2024-03-14 ASSESSMENT — LIFESTYLE VARIABLES
TOTAL SCORE: 0
HAVE YOU EVER FELT YOU SHOULD CUT DOWN ON YOUR DRINKING: NO
TOTAL SCORE: 0
HAVE PEOPLE ANNOYED YOU BY CRITICIZING YOUR DRINKING: NO
EVER FELT BAD OR GUILTY ABOUT YOUR DRINKING: NO
ALCOHOL_USE: NO
HOW MANY TIMES IN THE PAST YEAR HAVE YOU HAD 5 OR MORE DRINKS IN A DAY: 0
ON A TYPICAL DAY WHEN YOU DRINK ALCOHOL HOW MANY DRINKS DO YOU HAVE: 0
AVERAGE NUMBER OF DAYS PER WEEK YOU HAVE A DRINK CONTAINING ALCOHOL: 0
DOES PATIENT WANT TO STOP DRINKING: NO
TOTAL SCORE: 0
EVER HAD A DRINK FIRST THING IN THE MORNING TO STEADY YOUR NERVES TO GET RID OF A HANGOVER: NO
CONSUMPTION TOTAL: NEGATIVE

## 2024-03-14 ASSESSMENT — ENCOUNTER SYMPTOMS
WEAKNESS: 0
SPEECH CHANGE: 0
DIZZINESS: 0
DIARRHEA: 0
COUGH: 0
DOUBLE VISION: 0
VOMITING: 0
PALPITATIONS: 0
FEVER: 0
ORTHOPNEA: 0
SHORTNESS OF BREATH: 1
HEMOPTYSIS: 0
MYALGIAS: 0
CHILLS: 0
CONSTIPATION: 0
ABDOMINAL PAIN: 0
WEIGHT LOSS: 0
PHOTOPHOBIA: 0
BLURRED VISION: 0
NECK PAIN: 0
NAUSEA: 0
CLAUDICATION: 0

## 2024-03-14 ASSESSMENT — FIBROSIS 4 INDEX
FIB4 SCORE: 3.42
FIB4 SCORE: 0.98

## 2024-03-14 ASSESSMENT — PAIN DESCRIPTION - PAIN TYPE
TYPE: ACUTE PAIN

## 2024-03-14 ASSESSMENT — COPD QUESTIONNAIRES
HAVE YOU SMOKED AT LEAST 100 CIGARETTES IN YOUR ENTIRE LIFE: NO/DON'T KNOW
DURING THE PAST 4 WEEKS HOW MUCH DID YOU FEEL SHORT OF BREATH: SOME OF THE TIME
DO YOU EVER COUGH UP ANY MUCUS OR PHLEGM?: YES, A FEW DAYS A WEEK OR MONTH
COPD SCREENING SCORE: 4

## 2024-03-14 ASSESSMENT — PATIENT HEALTH QUESTIONNAIRE - PHQ9
SUM OF ALL RESPONSES TO PHQ9 QUESTIONS 1 AND 2: 0
1. LITTLE INTEREST OR PLEASURE IN DOING THINGS: NOT AT ALL
2. FEELING DOWN, DEPRESSED, IRRITABLE, OR HOPELESS: NOT AT ALL

## 2024-03-14 NOTE — ED PROVIDER NOTES
"ED Provider Note    CHIEF COMPLAINT  Chief Complaint   Patient presents with    Shortness of Breath    Chest Pain     X 5 days, pt endorses meth use 4 days ago, states his chest pain feels \"deep\"       EXTERNAL RECORDS REVIEWED  Discharge summary 8/17/2023 and 2/10/2023, alcohol withdrawal    HPI/ROS    Chace Gong is a 59 y.o. male who presents for evaluation of progressive shortness of breath and discomfort in his chest for the past 5 days.  Reports that with any sort of exertion regardless of how minimal he feels very short of breath and this is not normal for him.  He reports that he has been profoundly fatigued.  He smoked a little bit of meth 4 days ago to try to help with the symptoms, he did feel a bit more energetic afterwards.  No abdominal pain acutely, no vomiting or diarrhea over the past couple of days.  He does report periods of unprovoked diaphoresis.  He states that he has a history of hypertension on lisinopril and he is also takes Flomax for his enlarged prostate.  Denies any other known medical problems.  No history of atrial fibrillation or other known heart conditions.  He is a cigarette smoker.  Reports that he used to drink alcohol heavily, reports he has not had any alcohol in months    PAST MEDICAL HISTORY       SURGICAL HISTORY  patient denies any surgical history    FAMILY HISTORY  History reviewed. No pertinent family history.    SOCIAL HISTORY  Social History     Tobacco Use    Smoking status: Every Day     Current packs/day: 0.50     Average packs/day: 0.5 packs/day for 47.0 years (23.5 ttl pk-yrs)     Types: Cigarettes    Smokeless tobacco: Not on file   Substance and Sexual Activity    Alcohol use: Yes    Drug use: Yes     Comment: meth    Sexual activity: Not on file       CURRENT MEDICATIONS  Home Medications    **Home medications have not yet been reviewed for this encounter**         ALLERGIES  Allergies   Allergen Reactions    Sulfa Drugs Unspecified     Patient unsure of " "reaction       PHYSICAL EXAM  VITAL SIGNS: BP (!) 152/99   Pulse (!) 154   Temp 36.9 °C (98.4 °F) (Temporal)   Resp 18   Ht 1.88 m (6' 2\")   Wt 99.8 kg (220 lb)   SpO2 98%   BMI 28.25 kg/m²    Constitutional: Chronically ill-appearing but awake alert without acute distress   HENT: Normocephalic, no obvious evidence of acute trauma.  Eyes: No scleral icterus. Normal conjunctiva   Thorax & Lungs: Normal nonlabored respirations. I appreciate no wheezing, rhonchi or rales. There is normal air movement.  Upon cardiac auscultation he has a regular rhythm with a very tachycardic rate  Abdomen: The abdomen is not visibly distended. Upon palpation, I find it to be without tenderness.  No mass appreciated.  Skin: The exposed portions of skin reveal no obvious rash or other abnormalities.  Extremities/Musculoskeletal: No obvious sign of acute trauma. No asymmetric calf tenderness or edema.   Neurologic: Alert & oriented. No focal deficits observed.      DIAGNOSTIC STUDIES / PROCEDURES    LABS  Results for orders placed or performed during the hospital encounter of 03/14/24   CBC WITH DIFFERENTIAL   Result Value Ref Range    WBC 7.8 4.8 - 10.8 K/uL    RBC 4.59 (L) 4.70 - 6.10 M/uL    Hemoglobin 15.1 14.0 - 18.0 g/dL    Hematocrit 44.6 42.0 - 52.0 %    MCV 97.2 81.4 - 97.8 fL    MCH 32.9 27.0 - 33.0 pg    MCHC 33.9 32.3 - 36.5 g/dL    RDW 50.2 (H) 35.9 - 50.0 fL    Platelet Count 230 164 - 446 K/uL    MPV 9.6 9.0 - 12.9 fL    Neutrophils-Polys 52.30 44.00 - 72.00 %    Lymphocytes 36.50 22.00 - 41.00 %    Monocytes 7.30 0.00 - 13.40 %    Eosinophils 3.00 0.00 - 6.90 %    Basophils 0.40 0.00 - 1.80 %    Immature Granulocytes 0.50 0.00 - 0.90 %    Nucleated RBC 0.00 0.00 - 0.20 /100 WBC    Neutrophils (Absolute) 4.06 1.82 - 7.42 K/uL    Lymphs (Absolute) 2.83 1.00 - 4.80 K/uL    Monos (Absolute) 0.57 0.00 - 0.85 K/uL    Eos (Absolute) 0.23 0.00 - 0.51 K/uL    Baso (Absolute) 0.03 0.00 - 0.12 K/uL    Immature Granulocytes " (abs) 0.04 0.00 - 0.11 K/uL    NRBC (Absolute) 0.00 K/uL   COMP METABOLIC PANEL   Result Value Ref Range    Sodium 138 135 - 145 mmol/L    Potassium 4.2 3.6 - 5.5 mmol/L    Chloride 105 96 - 112 mmol/L    Co2 20 20 - 33 mmol/L    Anion Gap 13.0 7.0 - 16.0    Glucose 105 (H) 65 - 99 mg/dL    Bun 11 8 - 22 mg/dL    Creatinine 0.82 0.50 - 1.40 mg/dL    Calcium 9.0 8.5 - 10.5 mg/dL    Correct Calcium 9.0 8.5 - 10.5 mg/dL    AST(SGOT) 17 12 - 45 U/L    ALT(SGPT) 19 2 - 50 U/L    Alkaline Phosphatase 71 30 - 99 U/L    Total Bilirubin 0.4 0.1 - 1.5 mg/dL    Albumin 4.0 3.2 - 4.9 g/dL    Total Protein 7.5 6.0 - 8.2 g/dL    Globulin 3.5 1.9 - 3.5 g/dL    A-G Ratio 1.1 g/dL   LIPASE   Result Value Ref Range    Lipase 18 11 - 82 U/L   TROPONIN   Result Value Ref Range    Troponin T 23 (H) 6 - 19 ng/L   DIAGNOSTIC ALCOHOL   Result Value Ref Range    Diagnostic Alcohol <10.1 <10.1 mg/dL   ESTIMATED GFR   Result Value Ref Range    GFR (CKD-EPI) 102 >60 mL/min/1.73 m 2     Results for orders placed or performed during the hospital encounter of 24   EKG   Result Value Ref Range    Report       [facility] Emergency Dept.    Test Date:  2024  Pt Name:    SIMON MITCHELL                Department: ER  MRN:        [id number]                      Room:  Gender:     Male                         Technician: [id number]  :        1965                   Requested By:ER TRIAGE PROTOCOL  Order #:    [id number]                    Reading MD: DAKOTA CAMPBELL MD    Measurements  Intervals                                Axis  Rate:       153                          P:          264  MS:         88                           QRS:        36  QRSD:       90                           T:          51  QT:         320  QTc:        511    Interpretive Statements  Rate 153, SVT versus atrial flutter with 2-1 block, T waves are upright, ST  segment deviation noted in V4, V5 and V6.  My impression of this EKG: SVT  versus a  flutter RVR, rate related ischemia  Electronically Signed On 03- 15:42:42 PDT by DAKOTA CAMPBELL MD         RADIOLOGY  I have independently interpreted the diagnostic imaging associated with this visit and am waiting the final reading from the radiologist.   My preliminary interpretation is as follows: No infiltrate, no pneumothorax  Radiologist interpretation:   DX-CHEST-PORTABLE (1 VIEW)   Final Result      1.  There are changes consistent with mild edema.      EC-ECHOCARDIOGRAM COMPLETE W/O CONT    (Results Pending)   EC-EMILIA W/O CONT    (Results Pending)   CL-CARDIOVERSION    (Results Pending)         Cardioversion Procedure Note    Indication: supraventricular tachycardia    Consent: The patient provided verbal consent for this procedure.    Pre-Medication: none    Procedure: The patient was placed in the supine position and the chest area was exposed. The cardioversion pads were applied in the standard manner and configuration.    Attempt #1: 6 mg of adenosine delivered a rapid push resulting in no effect    Attempt #2: 12 mg of adenosine delivered via rapid push, a slowing down of the ventricular rate with underlying atrial flutter identified.  Return to RVR    The patient tolerated the procedure well.    Complications: None         COURSE & MEDICAL DECISION MAKING    ED Observation Status? No; Patient does not meet criteria for ED Observation.     INITIAL ASSESSMENT, COURSE AND PLAN  Care Narrative: This 59-year-old male comes in with shortness of breath and fatigue for 5 days, he is found to be very tachycardic around 150, either SVT or atrial flutter with 2-1 conduction, regardless we will proceed with adenosine administration.  He has describes some diaphoresis, some tightness in his chest, some blood work and chest x-ray will be obtained.  He will be reevaluated    During the adenosine administration it was clear the patient is in atrial flutter.  He did not convert to sinus rhythm, rate control  was then attempted with diltiazem which had no effect on his heart rate.  He was then treated with multiple doses of intravenous metoprolol, he had brief reductions in his heart rate but would repeatedly return back to 150.  At this point, I did consult cardiology.  The plan will be for admission for EMILIA and cardioversion.  In the meantime the APRN with cardiology recommended Eliquis.  Patient is admitted to the hospital in guarded condition      CRITICAL CARE  The very real possibilty of a deterioration of this patient's condition required the highest level of my preparedness for sudden, emergent intervention.  I provided critical care services, which included medication orders, frequent reevaluations of the patient's condition and response to treatment, ordering and reviewing test results, and discussing the case with various consultants.  The critical care time associated with the care of the patient was 39 minutes. Review chart for interventions. This time is exclusive of any other billable procedures.       DISPOSITION AND DISCUSSIONS  I have discussed management of the patient with the following physicians and SANNA's:  [facility]MP[facility] Bell cardiology.  Dr. Plaza, admitting hospitalist      FINAL DIAGNOSIS  1. Atrial flutter with rapid ventricular response (HCC)    2. Dyspnea, unspecified type    3. Methamphetamine use (HCC)           Electronically signed by: Kalin Lomax M.D., 3/14/2024 12:30 PM

## 2024-03-14 NOTE — ED NOTES
Pts -145bpm. Pt anxious and diaphoretic; denies any CP. MD Plaza contacted for additional orders; awaiting response.

## 2024-03-14 NOTE — ED TRIAGE NOTES
"Chief Complaint   Patient presents with    Shortness of Breath    Chest Pain     X 5 days, pt endorses meth use 4 days ago, states his chest pain feels \"deep\"     Pt immediately roomed to RD 8, ERP and RN at bedside    A+O x 4, GCS 15, NAD, answering questions appropriately   "

## 2024-03-14 NOTE — ED NOTES
Medication history reviewed with patient at bedside.   Med rec is complete  Allergies reviewed.   Patient has not had any outpatient antibiotics in the last 30 days.   Anticoagulants: No    Maria A Park

## 2024-03-14 NOTE — PROGRESS NOTES
"Cardiology Consult Note:    FRED Bell  Date & Time note created:    3/14/2024   3:17 PM     Referring MD:  Dr. Lomax    Patient ID:   Name:             Chace Gong     YOB: 1965  Age:                 59 y.o.  male   MRN:               [id number]                                                             Reason for Consult:      Aflutter     History of Present Illness:    59 years old male presented with sob and chest pain for 5 days. Did meth 4 days ago and marijuana user. He stated he was having dyspena on exertion for 3 weeks, noticed there was something was wrong when he was watching \"eliquis commercial\" and found his symptoms was consistent with afib.    Medical history of substance use (marijuana and meth) and history of alcohol abuse. Hypertension on lisinopril. BPH on flomax    He was found to be in atrial tachycardia, given two doses of adenosine 12 and 6 mg, cardizem and metoprolol iv push. Noted underlying rhyhm atrial flutter.     Currently, patient resting comforably. Denies any chest pain, palpitations or sob. Only during exertion. Atrial flutter 120s sustaining.    Review of Systems:      Constitutional: Denies fevers, Denies weight changes  Eyes: Denies changes in vision, no eye pain  Ears/Nose/Throat/Mouth: Denies nasal congestion or sore throat   Cardiovascular: no chest pain, no palpitations   Respiratory: exertion shortness of breath , Denies cough  Gastrointestinal/Hepatic: Denies abdominal pain, nausea, vomiting, diarrhea, constipation or GI bleeding   Genitourinary: Denies dysuria or frequency  Musculoskeletal/Rheum: Denies  joint pain and swelling, no edema  Skin: Denies rash  Neurological: Denies headache, confusion, memory loss or focal weakness/parasthesias  Psychiatric: denies mood disorder       All other systems were reviewed and are negative (AMA/CMS criteria)                Past Medical History:   History reviewed. No pertinent past medical " history.  There are no active hospital problems to display for this patient.      Past Surgical History:  History reviewed. No pertinent surgical history.    Hospital Medications:    Current Facility-Administered Medications:     Metoprolol Tartrate (Lopressor) injection 5 mg, 5 mg, Intravenous, Q5 MIN PRN, Kalin Lomax M.D., 5 mg at 03/14/24 [id number]    Current Outpatient Medications:     multivitamin Tab, Take 1 Tablet by mouth every day., Disp: 30 Tablet, Rfl: 0    folic acid (FOLVITE) 1 MG Tab, Take 1 Tablet by mouth every day., Disp: 30 Tablet, Rfl: 0    tamsulosin (FLOMAX) 0.4 MG capsule, Take 0.4 mg by mouth 1/2 hour after breakfast., Disp: , Rfl:     lisinopril (PRINIVIL) 20 MG Tab, Take 1 Tablet by mouth every day., Disp: 90 Tablet, Rfl: 0    Current Outpatient Medications:  (Not in a hospital admission)      Medication Allergy:  Allergies   Allergen Reactions    Sulfa Drugs Unspecified     Patient unsure of reaction       Family History:  History reviewed. No pertinent family history.    Social History:  Social History     Socioeconomic History    Marital status: Single     Spouse name: Not on file    Number of children: Not on file    Years of education: Not on file    Highest education level: Not on file   Occupational History    Not on file   Tobacco Use    Smoking status: Every Day     Current packs/day: 0.50     Average packs/day: 0.5 packs/day for 47.0 years (23.5 ttl pk-yrs)     Types: Cigarettes    Smokeless tobacco: Not on file   Substance and Sexual Activity    Alcohol use: Yes    Drug use: Yes     Comment: meth    Sexual activity: Not on file   Other Topics Concern    Not on file   Social History Narrative    Not on file     Social Determinants of Health     Financial Resource Strain: Not on file   Food Insecurity: Not on file   Transportation Needs: Not on file   Physical Activity: Not on file   Stress: Not on file   Social Connections: Not on file   Intimate Partner Violence: Not on file  "  Housing Stability: Not on file         Physical Exam:  Vitals/ General Appearance:   Weight/BMI: Body mass index is 28.25 kg/m².  BP (!) 129/97   Pulse (!) 129   Temp 36.9 °C (98.4 °F) (Temporal)   Resp 19   Ht 1.88 m (6' 2\")   Wt 99.8 kg (220 lb)   SpO2 91%   Vitals:    03/14/24 1330 03/14/24 1345 03/14/24 1400 03/14/24 1437   BP: 134/86   (!) 129/97   Pulse: (!) 101 (!) 120 (!) 118 (!) 129   Resp:    19   Temp:       TempSrc:       SpO2: 93%   91%   Weight:       Height:         Oxygen Therapy:  Pulse Oximetry: 91 %, O2 (LPM): 0, O2 Delivery Device: None - Room Air    Constitutional:   No acute distress  HENMT:  Normocephalic, Atraumatic, Oropharynx moist mucous membranes, No oral exudates, Nose normal.  No thyromegaly.  Eyes:  EOMI, Conjunctiva normal, No discharge.  Neck:  Normal range of motion, No cervical tenderness,  no JVD.  Cardiovascular:  irregular tachycardia  Lungs:  Normal breath sounds, breath sounds clear to auscultation bilaterally,  no crackles, no wheezing.   Abdomen: Bowel sounds normal, Soft, No tenderness, No guarding, No rebound, No masses, No hepatosplenomegaly.  Skin: Warm, Dry, No erythema, No rash, no induration.  Neurologic: Alert & oriented x 3,       MDM (Data Review):     Records reviewed and summarized in current documentation    Lab Data Review:  Recent Results (from the past 24 hour(s))   CBC WITH DIFFERENTIAL    Collection Time: 03/14/24 12:04 PM   Result Value Ref Range    WBC 7.8 4.8 - 10.8 K/uL    RBC 4.59 (L) 4.70 - 6.10 M/uL    Hemoglobin 15.1 14.0 - 18.0 g/dL    Hematocrit 44.6 42.0 - 52.0 %    MCV 97.2 81.4 - 97.8 fL    MCH 32.9 27.0 - 33.0 pg    MCHC 33.9 32.3 - 36.5 g/dL    RDW 50.2 (H) 35.9 - 50.0 fL    Platelet Count 230 164 - 446 K/uL    MPV 9.6 9.0 - 12.9 fL    Neutrophils-Polys 52.30 44.00 - 72.00 %    Lymphocytes 36.50 22.00 - 41.00 %    Monocytes 7.30 0.00 - 13.40 %    Eosinophils 3.00 0.00 - 6.90 %    Basophils 0.40 0.00 - 1.80 %    Immature Granulocytes " 0.50 0.00 - 0.90 %    Nucleated RBC 0.00 0.00 - 0.20 /100 WBC    Neutrophils (Absolute) 4.06 1.82 - 7.42 K/uL    Lymphs (Absolute) 2.83 1.00 - 4.80 K/uL    Monos (Absolute) 0.57 0.00 - 0.85 K/uL    Eos (Absolute) 0.23 0.00 - 0.51 K/uL    Baso (Absolute) 0.03 0.00 - 0.12 K/uL    Immature Granulocytes (abs) 0.04 0.00 - 0.11 K/uL    NRBC (Absolute) 0.00 K/uL   COMP METABOLIC PANEL    Collection Time: 24 12:04 PM   Result Value Ref Range    Sodium 138 135 - 145 mmol/L    Potassium 4.2 3.6 - 5.5 mmol/L    Chloride 105 96 - 112 mmol/L    Co2 20 20 - 33 mmol/L    Anion Gap 13.0 7.0 - 16.0    Glucose 105 (H) 65 - 99 mg/dL    Bun 11 8 - 22 mg/dL    Creatinine 0.82 0.50 - 1.40 mg/dL    Calcium 9.0 8.5 - 10.5 mg/dL    Correct Calcium 9.0 8.5 - 10.5 mg/dL    AST(SGOT) 17 12 - 45 U/L    ALT(SGPT) 19 2 - 50 U/L    Alkaline Phosphatase 71 30 - 99 U/L    Total Bilirubin 0.4 0.1 - 1.5 mg/dL    Albumin 4.0 3.2 - 4.9 g/dL    Total Protein 7.5 6.0 - 8.2 g/dL    Globulin 3.5 1.9 - 3.5 g/dL    A-G Ratio 1.1 g/dL   LIPASE    Collection Time: 24 12:04 PM   Result Value Ref Range    Lipase 18 11 - 82 U/L   TROPONIN    Collection Time: 24 12:04 PM   Result Value Ref Range    Troponin T 23 (H) 6 - 19 ng/L   DIAGNOSTIC ALCOHOL    Collection Time: 24 12:04 PM   Result Value Ref Range    Diagnostic Alcohol <10.1 <10.1 mg/dL   ESTIMATED GFR    Collection Time: 24 12:04 PM   Result Value Ref Range    GFR (CKD-EPI) 102 >60 mL/min/1.73 m 2   EKG    Collection Time: 24 12:50 PM   Result Value Ref Range    Report       [facility] Emergency Dept.    Test Date:  2024  Pt Name:    SIMON MITCHELL                Department: ER  MRN:        [id number]                      Room:  Gender:     Male                         Technician: [id number]  :        1965                   Requested By:ER TRIAGE PROTOCOL  Order #:    [id number]                    Reading MD:    Measurements  Intervals                                 Axis  Rate:       153                          P:          264  RI:         88                           QRS:        36  QRSD:       90                           T:          51  QT:         320  QTc:        511    Interpretive Statements  Ectopic atrial tachycardia, unifocal  Repolarization abnormality, prob rate related  Prolonged QT interval  Compared to ECG 08/18/2023 00:00:08  Early repolarization now present  Sinus tachycardia no longer present  ST (T wave) deviation no longer present         Imaging/Procedures Review:    Chest Xray:  Reviewed    EKG:   aflutter    ECHO:  Pending     MDM (Assessment and Plan):     There are no active hospital problems to display for this patient.      Atrial flutter RVR:  - most likely substance abuse   - start metoprolol 25mg BID   - [id number] (CHF/CM, HTN, Age, DM, CVA, Vascular disease, Gender) = 1 start elqiuis 5mg BID   - pending ECHO  - plan for EMILIA/ DCCV tomorrow     2. Multi- substance abuse   - discussed     3. Hypertension   - continue lisinopril 10mg qd      NORMAN Bell   [facility]

## 2024-03-15 ENCOUNTER — APPOINTMENT (OUTPATIENT)
Dept: CARDIOLOGY | Facility: MEDICAL CENTER | Age: 59
DRG: 308 | End: 2024-03-15
Attending: NURSE PRACTITIONER
Payer: MEDICARE

## 2024-03-15 ENCOUNTER — ANESTHESIA (OUTPATIENT)
Dept: CARDIOLOGY | Facility: MEDICAL CENTER | Age: 59
DRG: 308 | End: 2024-03-15
Payer: MEDICARE

## 2024-03-15 ENCOUNTER — ANESTHESIA EVENT (OUTPATIENT)
Dept: CARDIOLOGY | Facility: MEDICAL CENTER | Age: 59
DRG: 308 | End: 2024-03-15
Payer: MEDICARE

## 2024-03-15 PROBLEM — I50.20 SYSTOLIC CONGESTIVE HEART FAILURE (HCC): Status: ACTIVE | Noted: 2024-03-15

## 2024-03-15 PROBLEM — F15.10 METHAMPHETAMINE ABUSE (HCC): Status: ACTIVE | Noted: 2024-03-15

## 2024-03-15 LAB
ALBUMIN SERPL BCP-MCNC: 4 G/DL (ref 3.2–4.9)
ALBUMIN/GLOB SERPL: 1 G/DL
ALP SERPL-CCNC: 74 U/L (ref 30–99)
ALT SERPL-CCNC: 21 U/L (ref 2–50)
ANION GAP SERPL CALC-SCNC: 15 MMOL/L (ref 7–16)
AST SERPL-CCNC: 19 U/L (ref 12–45)
BASOPHILS # BLD AUTO: 0.5 % (ref 0–1.8)
BASOPHILS # BLD: 0.04 K/UL (ref 0–0.12)
BILIRUB SERPL-MCNC: 0.4 MG/DL (ref 0.1–1.5)
BUN SERPL-MCNC: 15 MG/DL (ref 8–22)
CALCIUM ALBUM COR SERPL-MCNC: 9.2 MG/DL (ref 8.5–10.5)
CALCIUM SERPL-MCNC: 9.2 MG/DL (ref 8.5–10.5)
CHLORIDE SERPL-SCNC: 106 MMOL/L (ref 96–112)
CO2 SERPL-SCNC: 20 MMOL/L (ref 20–33)
CREAT SERPL-MCNC: 0.93 MG/DL (ref 0.5–1.4)
EKG IMPRESSION: NORMAL
EKG IMPRESSION: NORMAL
EOSINOPHIL # BLD AUTO: 0.28 K/UL (ref 0–0.51)
EOSINOPHIL NFR BLD: 3.3 % (ref 0–6.9)
ERYTHROCYTE [DISTWIDTH] IN BLOOD BY AUTOMATED COUNT: 50.4 FL (ref 35.9–50)
ETHANOL BLD-MCNC: <10.1 MG/DL
GFR SERPLBLD CREATININE-BSD FMLA CKD-EPI: 95 ML/MIN/1.73 M 2
GLOBULIN SER CALC-MCNC: 3.9 G/DL (ref 1.9–3.5)
GLUCOSE SERPL-MCNC: 107 MG/DL (ref 65–99)
HCT VFR BLD AUTO: 48 % (ref 42–52)
HGB BLD-MCNC: 16.5 G/DL (ref 14–18)
IMM GRANULOCYTES # BLD AUTO: 0.04 K/UL (ref 0–0.11)
IMM GRANULOCYTES NFR BLD AUTO: 0.5 % (ref 0–0.9)
LV EJECT FRACT  99904: 25
LV EJECT FRACT MOD 2C 99903: 32.24
LV EJECT FRACT MOD 4C 99902: 26.39
LV EJECT FRACT MOD BP 99901: 29.93
LYMPHOCYTES # BLD AUTO: 3.09 K/UL (ref 1–4.8)
LYMPHOCYTES NFR BLD: 37 % (ref 22–41)
MAGNESIUM SERPL-MCNC: 2.2 MG/DL (ref 1.5–2.5)
MCH RBC QN AUTO: 33.2 PG (ref 27–33)
MCHC RBC AUTO-ENTMCNC: 34.4 G/DL (ref 32.3–36.5)
MCV RBC AUTO: 96.6 FL (ref 81.4–97.8)
MONOCYTES # BLD AUTO: 0.57 K/UL (ref 0–0.85)
MONOCYTES NFR BLD AUTO: 6.8 % (ref 0–13.4)
NEUTROPHILS # BLD AUTO: 4.34 K/UL (ref 1.82–7.42)
NEUTROPHILS NFR BLD: 51.9 % (ref 44–72)
NRBC # BLD AUTO: 0 K/UL
NRBC BLD-RTO: 0 /100 WBC (ref 0–0.2)
PLATELET # BLD AUTO: 230 K/UL (ref 164–446)
PMV BLD AUTO: 9.6 FL (ref 9–12.9)
POTASSIUM SERPL-SCNC: 4.2 MMOL/L (ref 3.6–5.5)
PROT SERPL-MCNC: 7.9 G/DL (ref 6–8.2)
RBC # BLD AUTO: 4.97 M/UL (ref 4.7–6.1)
SODIUM SERPL-SCNC: 141 MMOL/L (ref 135–145)
TROPONIN T SERPL-MCNC: 22 NG/L (ref 6–19)
WBC # BLD AUTO: 8.4 K/UL (ref 4.8–10.8)

## 2024-03-15 PROCEDURE — 93010 ELECTROCARDIOGRAM REPORT: CPT | Mod: 59 | Performed by: INTERNAL MEDICINE

## 2024-03-15 PROCEDURE — 82077 ASSAY SPEC XCP UR&BREATH IA: CPT

## 2024-03-15 PROCEDURE — 93306 TTE W/DOPPLER COMPLETE: CPT | Mod: 26 | Performed by: INTERNAL MEDICINE

## 2024-03-15 PROCEDURE — 770020 HCHG ROOM/CARE - TELE (206)

## 2024-03-15 PROCEDURE — 700101 HCHG RX REV CODE 250: Performed by: ANESTHESIOLOGY

## 2024-03-15 PROCEDURE — 93312 ECHO TRANSESOPHAGEAL: CPT | Mod: 26 | Performed by: INTERNAL MEDICINE

## 2024-03-15 PROCEDURE — A9270 NON-COVERED ITEM OR SERVICE: HCPCS | Performed by: INTERNAL MEDICINE

## 2024-03-15 PROCEDURE — B245ZZ4 ULTRASONOGRAPHY OF LEFT HEART, TRANSESOPHAGEAL: ICD-10-PCS | Performed by: INTERNAL MEDICINE

## 2024-03-15 PROCEDURE — 160035 HCHG PACU - 1ST 60 MINS PHASE I

## 2024-03-15 PROCEDURE — 700102 HCHG RX REV CODE 250 W/ 637 OVERRIDE(OP): Performed by: INTERNAL MEDICINE

## 2024-03-15 PROCEDURE — 93005 ELECTROCARDIOGRAM TRACING: CPT | Performed by: INTERNAL MEDICINE

## 2024-03-15 PROCEDURE — 80053 COMPREHEN METABOLIC PANEL: CPT

## 2024-03-15 PROCEDURE — 92960 CARDIOVERSION ELECTRIC EXT: CPT | Performed by: INTERNAL MEDICINE

## 2024-03-15 PROCEDURE — 4410588 CL-CARDIOVERSION

## 2024-03-15 PROCEDURE — 99233 SBSQ HOSP IP/OBS HIGH 50: CPT | Performed by: INTERNAL MEDICINE

## 2024-03-15 PROCEDURE — 700111 HCHG RX REV CODE 636 W/ 250 OVERRIDE (IP): Performed by: ANESTHESIOLOGY

## 2024-03-15 PROCEDURE — 83735 ASSAY OF MAGNESIUM: CPT

## 2024-03-15 PROCEDURE — 700102 HCHG RX REV CODE 250 W/ 637 OVERRIDE(OP): Performed by: NURSE PRACTITIONER

## 2024-03-15 PROCEDURE — 93306 TTE W/DOPPLER COMPLETE: CPT

## 2024-03-15 PROCEDURE — 85025 COMPLETE CBC W/AUTO DIFF WBC: CPT

## 2024-03-15 PROCEDURE — 5A2204Z RESTORATION OF CARDIAC RHYTHM, SINGLE: ICD-10-PCS | Performed by: INTERNAL MEDICINE

## 2024-03-15 PROCEDURE — 84484 ASSAY OF TROPONIN QUANT: CPT

## 2024-03-15 PROCEDURE — 700117 HCHG RX CONTRAST REV CODE 255: Performed by: INTERNAL MEDICINE

## 2024-03-15 PROCEDURE — A9270 NON-COVERED ITEM OR SERVICE: HCPCS | Performed by: NURSE PRACTITIONER

## 2024-03-15 PROCEDURE — 700105 HCHG RX REV CODE 258: Performed by: ANESTHESIOLOGY

## 2024-03-15 PROCEDURE — 99233 SBSQ HOSP IP/OBS HIGH 50: CPT | Mod: 25,FS | Performed by: INTERNAL MEDICINE

## 2024-03-15 PROCEDURE — 700111 HCHG RX REV CODE 636 W/ 250 OVERRIDE (IP): Mod: JZ | Performed by: INTERNAL MEDICINE

## 2024-03-15 PROCEDURE — 160002 HCHG RECOVERY MINUTES (STAT)

## 2024-03-15 PROCEDURE — 93325 DOPPLER ECHO COLOR FLOW MAPG: CPT

## 2024-03-15 PROCEDURE — 36415 COLL VENOUS BLD VENIPUNCTURE: CPT

## 2024-03-15 PROCEDURE — 93010 ELECTROCARDIOGRAM REPORT: CPT | Mod: 76,59 | Performed by: INTERNAL MEDICINE

## 2024-03-15 RX ORDER — METOPROLOL SUCCINATE 50 MG/1
50 TABLET, EXTENDED RELEASE ORAL
Status: DISCONTINUED | OUTPATIENT
Start: 2024-03-16 | End: 2024-03-16 | Stop reason: HOSPADM

## 2024-03-15 RX ORDER — SODIUM CHLORIDE, SODIUM LACTATE, POTASSIUM CHLORIDE, CALCIUM CHLORIDE 600; 310; 30; 20 MG/100ML; MG/100ML; MG/100ML; MG/100ML
INJECTION, SOLUTION INTRAVENOUS CONTINUOUS
Status: DISCONTINUED | OUTPATIENT
Start: 2024-03-15 | End: 2024-03-15 | Stop reason: HOSPADM

## 2024-03-15 RX ORDER — FUROSEMIDE 40 MG/1
40 TABLET ORAL ONCE
Status: DISCONTINUED | OUTPATIENT
Start: 2024-03-15 | End: 2024-03-15

## 2024-03-15 RX ORDER — FUROSEMIDE 10 MG/ML
40 INJECTION INTRAMUSCULAR; INTRAVENOUS ONCE
Status: COMPLETED | OUTPATIENT
Start: 2024-03-15 | End: 2024-03-15

## 2024-03-15 RX ORDER — ONDANSETRON 2 MG/ML
4 INJECTION INTRAMUSCULAR; INTRAVENOUS
Status: DISCONTINUED | OUTPATIENT
Start: 2024-03-15 | End: 2024-03-15 | Stop reason: HOSPADM

## 2024-03-15 RX ORDER — LISINOPRIL 10 MG/1
10 TABLET ORAL EVERY EVENING
Status: DISCONTINUED | OUTPATIENT
Start: 2024-03-15 | End: 2024-03-16 | Stop reason: HOSPADM

## 2024-03-15 RX ORDER — SODIUM CHLORIDE, SODIUM LACTATE, POTASSIUM CHLORIDE, CALCIUM CHLORIDE 600; 310; 30; 20 MG/100ML; MG/100ML; MG/100ML; MG/100ML
INJECTION, SOLUTION INTRAVENOUS
Status: DISCONTINUED | OUTPATIENT
Start: 2024-03-15 | End: 2024-03-15 | Stop reason: SURG

## 2024-03-15 RX ORDER — DIPHENHYDRAMINE HYDROCHLORIDE 50 MG/ML
12.5 INJECTION INTRAMUSCULAR; INTRAVENOUS
Status: DISCONTINUED | OUTPATIENT
Start: 2024-03-15 | End: 2024-03-15 | Stop reason: HOSPADM

## 2024-03-15 RX ORDER — HALOPERIDOL 5 MG/ML
1 INJECTION INTRAMUSCULAR
Status: DISCONTINUED | OUTPATIENT
Start: 2024-03-15 | End: 2024-03-15 | Stop reason: HOSPADM

## 2024-03-15 RX ORDER — DAPAGLIFLOZIN 10 MG/1
10 TABLET, FILM COATED ORAL DAILY
Status: DISCONTINUED | OUTPATIENT
Start: 2024-03-15 | End: 2024-03-15

## 2024-03-15 RX ORDER — TORSEMIDE 5 MG/1
5 TABLET ORAL
Status: DISCONTINUED | OUTPATIENT
Start: 2024-03-16 | End: 2024-03-16

## 2024-03-15 RX ORDER — LIDOCAINE HYDROCHLORIDE 20 MG/ML
INJECTION, SOLUTION EPIDURAL; INFILTRATION; INTRACAUDAL; PERINEURAL PRN
Status: DISCONTINUED | OUTPATIENT
Start: 2024-03-15 | End: 2024-03-15 | Stop reason: SURG

## 2024-03-15 RX ORDER — LISINOPRIL 20 MG/1
20 TABLET ORAL EVERY EVENING
Status: DISCONTINUED | OUTPATIENT
Start: 2024-03-15 | End: 2024-03-15

## 2024-03-15 RX ADMIN — METOPROLOL TARTRATE 25 MG: 25 TABLET, FILM COATED ORAL at 05:42

## 2024-03-15 RX ADMIN — LISINOPRIL 10 MG: 10 TABLET ORAL at 17:06

## 2024-03-15 RX ADMIN — PROPOFOL 100 MG: 10 INJECTION, EMULSION INTRAVENOUS at 10:49

## 2024-03-15 RX ADMIN — LISINOPRIL 20 MG: 20 TABLET ORAL at 05:42

## 2024-03-15 RX ADMIN — LIDOCAINE HYDROCHLORIDE 60 MG: 20 INJECTION, SOLUTION EPIDURAL; INFILTRATION; INTRACAUDAL at 10:49

## 2024-03-15 RX ADMIN — SODIUM CHLORIDE, POTASSIUM CHLORIDE, SODIUM LACTATE AND CALCIUM CHLORIDE: 600; 310; 30; 20 INJECTION, SOLUTION INTRAVENOUS at 10:44

## 2024-03-15 RX ADMIN — TAMSULOSIN HYDROCHLORIDE 0.4 MG: 0.4 CAPSULE ORAL at 07:54

## 2024-03-15 RX ADMIN — RIVAROXABAN 20 MG: 20 TABLET, FILM COATED ORAL at 17:05

## 2024-03-15 RX ADMIN — HUMAN ALBUMIN MICROSPHERES AND PERFLUTREN 3 ML: 10; .22 INJECTION, SOLUTION INTRAVENOUS at 12:15

## 2024-03-15 RX ADMIN — PROPOFOL 50 MG: 10 INJECTION, EMULSION INTRAVENOUS at 10:53

## 2024-03-15 RX ADMIN — FUROSEMIDE 40 MG: 10 INJECTION INTRAMUSCULAR; INTRAVENOUS at 05:42

## 2024-03-15 RX ADMIN — FUROSEMIDE 40 MG: 10 INJECTION INTRAMUSCULAR; INTRAVENOUS at 17:06

## 2024-03-15 ASSESSMENT — COGNITIVE AND FUNCTIONAL STATUS - GENERAL
SUGGESTED CMS G CODE MODIFIER MOBILITY: CH
MOBILITY SCORE: 24
DAILY ACTIVITIY SCORE: 24
SUGGESTED CMS G CODE MODIFIER DAILY ACTIVITY: CH

## 2024-03-15 ASSESSMENT — ENCOUNTER SYMPTOMS
FATIGUE: 1
ABDOMINAL PAIN: 0
FEVER: 0
CHILLS: 0
CONFUSION: 0
CHEST TIGHTNESS: 0
COUGH: 0
SHORTNESS OF BREATH: 1
TROUBLE SWALLOWING: 0
NUMBNESS: 0
NERVOUS/ANXIOUS: 1
DIAPHORESIS: 1
SHORTNESS OF BREATH: 0
BLOOD IN STOOL: 0
ABDOMINAL DISTENTION: 0
AGITATION: 0
DIZZINESS: 0
COLOR CHANGE: 0
PALPITATIONS: 1

## 2024-03-15 ASSESSMENT — PAIN DESCRIPTION - PAIN TYPE
TYPE: ACUTE PAIN

## 2024-03-15 ASSESSMENT — FIBROSIS 4 INDEX: FIB4 SCORE: 1.05

## 2024-03-15 NOTE — PROGRESS NOTES
Bedside report received, assumed care of patient. Resting in bed, denied pain. A&O x4. Tele monitoring in place, -150. Educated on fall risk, all fall precautions in place. Call light within reach, bed locked and in lowest position, denied other needs at this time.

## 2024-03-15 NOTE — PROCEDURES
Preprocedural Diagnosis: Atrial flutter persistent  Postprocedural Diagnosis: Sinus rhythm     Procedure performed: External DC Cardioversion and transesophageal echocardiogram     : Myles Austin MD     Assistant: None     Anesthesia: IV propofol per Giles Ruiz MD, anesthesiologist     Description of procedure:  Informed consent had been obtained in which the indications, risks and benefits of the procedure had been discussed with the patient who expressed understanding and wished to proceed.  The patient was properly prepared for the procedure per protocol.  Defibrillator pads were placed in the anterior and posterior position.   A preprocedure timeout was performed. Adequate sedation provided by Dr. Ruiz.  A transesophageal probe was passed into the mid esophagus with findings showing no evidence of left atrial or left atrial appendage thrombus.  The transesophageal probe was removed.  The patient received a single synchronized shock of 200 J with reversion to sinus rhythm.  The patient had no immediate post-procedure complications.     Conclusion: Successful DC cardioversion

## 2024-03-15 NOTE — ASSESSMENT & PLAN NOTE
Patient came with chest pain and worsening shortness of breath  EKG showed SVT, flutter with A-fib with RVR  S/p cardioversion   Started on metoprolol and Xarelto  Echo reveals LVEF 25%  TSH is pending

## 2024-03-15 NOTE — CARE PLAN
The patient is Watcher - Medium risk of patient condition declining or worsening    Shift Goals  Clinical Goals: Monitor HR and monitor vitals  Patient Goals: Sleep  Family Goals: ATUL    Progress made toward(s) clinical / shift goals:    Problem: Knowledge Deficit - Standard  Goal: Patient and family/care givers will demonstrate understanding of plan of care, disease process/condition, diagnostic tests and medications  3/15/2024 0416 by Madhavi Banda R.N.  Outcome: Progressing     Problem: Hemodynamics  Goal: Patient's hemodynamics, fluid balance and neurologic status will be stable or improve  Outcome: Progressing       Patient is not progressing towards the following goals:

## 2024-03-15 NOTE — ASSESSMENT & PLAN NOTE
With LVEF 25%  Urine drug screen positive for methamphetamines and history of alcohol abuse  Started on metoprolol, lisinopril Demadex  Cardiology is consulted

## 2024-03-15 NOTE — PROGRESS NOTES
Monitors notified RN that patient was sustaining HR in the 140s for about 5mins and has been in between 130-140s. MD Roseanne Packer notified of this at 2129. No new orders.

## 2024-03-15 NOTE — ANESTHESIA POSTPROCEDURE EVALUATION
Patient: Chace Gong    Procedure Summary       Date: 03/15/24 Room / Location: [facility] Echocardiology [facility]    Anesthesia Start: 1044 Anesthesia Stop: 1102    Procedures:       EC-EMILIA W/O CONT      CL-CARDIOVERSION Diagnosis:       Atrial fibrillation with RVR (HCC)      Atrial fibrillation with RVR (HCC)      Atrial fibrillation with RVR (HCC)      (See Assoicated Dx)    Scheduled Providers: Giles Ruiz M.D.; Myles Austin M.D. Responsible Provider: Giles Ruiz M.D.    Anesthesia Type: general ASA Status: 3            Final Anesthesia Type: general  Last vitals  BP   Blood Pressure: (!) 130/94 (Rn notified )    Temp   36.5 °C (97.7 °F)    Pulse   (!) 146 (Rn notified )   Resp   19    SpO2   94 %      Anesthesia Post Evaluation    Patient location during evaluation: PACU  Patient participation: complete - patient participated  Level of consciousness: awake and alert    Airway patency: patent  Anesthetic complications: no  Cardiovascular status: hemodynamically stable  Respiratory status: face mask    PONV: none          No notable events documented.     Nurse Pain Score: 0 (NPRS)

## 2024-03-15 NOTE — PROGRESS NOTES
Cardiology Follow Up Progress Note    Date of Service  3/15/2024    Attending Physician  Jonny Branham M.D.    Chief Complaint   Palpitations     HPI  Chace Gong is a 59 y.o. male admitted 3/14/2024 with dyspnea on exertion.     Medical history of substance use (marijuana and meth) and history of alcohol abuse. Hypertension on lisinopril. BPH on flomax     He was found to be in atrial tachycardia, given two doses of adenosine 12 and 6 mg, cardizem and metoprolol iv push. Noted underlying rhyhm atrial flutter.      Currently, patient resting comforably. Denies any chest pain, palpitations or sob. Only during exertion. Atrial flutter 120s sustaining.    Interim Events  Aflutter 120-150  Complaining of palpitations and dyspnea on exertion   Vs stable   Positive for UA toxic   Plan for EMILIA/DCCV today     Review of Systems  Review of Systems   Constitutional:  Positive for diaphoresis and fatigue. Negative for chills and fever.   HENT:  Negative for nosebleeds and trouble swallowing.    Respiratory:  Positive for shortness of breath. Negative for cough and chest tightness.    Cardiovascular:  Positive for palpitations. Negative for chest pain and leg swelling.   Gastrointestinal:  Negative for abdominal distention, abdominal pain and blood in stool.   Genitourinary:  Negative for hematuria.   Skin:  Negative for color change.   Neurological:  Negative for dizziness, syncope and numbness.   Psychiatric/Behavioral:  Negative for agitation and confusion. The patient is nervous/anxious.        Vital signs in last 24 hours  Temp:  [36.3 °C (97.3 °F)-36.9 °C (98.4 °F)] 36.5 °C (97.7 °F)  Pulse:  [] 146  Resp:  [12-57] 19  BP: (114-152)/() 130/94  SpO2:  [88 %-98 %] 94 %    Physical Exam  Physical Exam  Vitals and nursing note reviewed.   Constitutional:       Appearance: Normal appearance. He is ill-appearing.   HENT:      Head: Normocephalic and atraumatic.   Eyes:      Pupils: Pupils are equal, round, and  "reactive to light.   Cardiovascular:      Rate and Rhythm: Tachycardia present. Rhythm irregular.      Heart sounds: Normal heart sounds. No murmur heard.  Pulmonary:      Effort: Pulmonary effort is normal.      Breath sounds: Normal breath sounds.   Abdominal:      General: Abdomen is flat.   Musculoskeletal:      Cervical back: Normal range of motion.      Right lower leg: No edema.      Left lower leg: No edema.   Skin:     General: Skin is warm and dry.   Neurological:      General: No focal deficit present.      Mental Status: He is alert and oriented to person, place, and time.   Psychiatric:         Mood and Affect: Mood normal.         Behavior: Behavior normal.         Thought Content: Thought content normal.         Judgment: Judgment normal.         Lab Review  Lab Results   Component Value Date/Time    WBC 8.4 03/15/2024 02:41 AM    RBC 4.97 03/15/2024 02:41 AM    HEMOGLOBIN 16.5 03/15/2024 02:41 AM    HEMATOCRIT 48.0 03/15/2024 02:41 AM    MCV 96.6 03/15/2024 02:41 AM    MCH 33.2 (H) 03/15/2024 02:41 AM    MCHC 34.4 03/15/2024 02:41 AM    MPV 9.6 03/15/2024 02:41 AM      Lab Results   Component Value Date/Time    SODIUM 141 03/15/2024 02:41 AM    POTASSIUM 4.2 03/15/2024 02:41 AM    CHLORIDE 106 03/15/2024 02:41 AM    CO2 20 03/15/2024 02:41 AM    GLUCOSE 107 (H) 03/15/2024 02:41 AM    BUN 15 03/15/2024 02:41 AM    CREATININE 0.93 03/15/2024 02:41 AM      Lab Results   Component Value Date/Time    ASTSGOT 19 03/15/2024 02:41 AM    ALTSGPT 21 03/15/2024 02:41 AM     Lab Results   Component Value Date/Time    CHOLSTRLTOT 172 02/11/2023 03:11 AM    LDL 64 02/11/2023 03:11 AM     02/11/2023 03:11 AM    TRIGLYCERIDE 36 02/11/2023 03:11 AM    TROPONINT 22 (H) 03/15/2024 02:41 AM       No results for input(s): \"NTPROBNP\" in the last 72 hours.    Cardiac Imaging and Procedures Review  Telemetry showed aflutter 120-140s    Echocardiogram:  pending     CTA chest   2/11/2023  1.  No large central pulmonary " embolus is appreciated, evaluation of the subsegmental branches is essentially nondiagnostic due to motion artifacts. Additional imaging would be required for definitive exclusion of small distal pulmonary emboli.  2.  Scattered hazy pulmonary opacities suggests subtle infiltrates.  3.  Hepatomegaly and diffuse hepatic steatosis  4.  Irregular hepatic contour favoring changes of cirrhosis  5.  Atherosclerosis and atherosclerotic coronary artery disease.    Assessment/Plan  No new Assessment & Plan notes have been filed under this hospital service since the last note was generated.  Service: Cardiology    New onset of Atrial flutter RVR:  - most likely substance abuse   - continue metoprolol 25mg BID   - [id number] (CHF/CM, HTN, Age, DM, CVA, Vascular disease, Gender) = 1 continue xarelto 20mg qd   - pending ECHO  - plan for EMILIA/ DCCV today, NPO since midnight      2. Polysubstance abuse   - discussed   - UA toxic positive amphetamines and cannabinoid      3. Hypertension   - continue lisinopril 10mg qd      I personally spent a total of 15 minutes which includes face-to-face time and non-face-to-face time spent on preparing to see the patient, reviewing hospital notes and tests, obtaining history from the patient, performing a medically appropriate exam, counseling and educating the patient, ordering medications/tests/procedures/referrals as clinically indicated, and documenting information in the electronic medical record.       Thank you for allowing me to participate in the care of this patient.  I will continue to follow this patient    Please contact me with any questions.    HUMPHREY Bell.

## 2024-03-15 NOTE — ASSESSMENT & PLAN NOTE
Patient denied drinking alcohol for months  Close monitoring for any signs of withdrawal   What Type Of Note Output Would You Prefer (Optional)?: Bullet Format negative... Hpi Title: Evaluation of Skin Lesions How Severe Are Your Spot(S)?: moderate Location: R arm Year Removed: 2015

## 2024-03-15 NOTE — H&P
"Hospital Medicine History & Physical Note    Date of Service  3/14/2024    Primary Care Physician  Mohsen Tamasaby, M.D.    Consultants  Cardiology    Code Status  Full Code    Chief Complaint  Chief Complaint   Patient presents with    Shortness of Breath    Chest Pain     X 5 days, pt endorses meth use 4 days ago, states his chest pain feels \"deep\"       History of Presenting Illness    59-year-old male with history of smoking, hypertension, marijuana and alcohol abuse who presented 3/14 with shortness of breath and chest pain.  Patient has had worsening shortness of breath and feeling tired, no significant chest pain however patient has some dizziness.  Denied any palpitation or other symptoms, no urinary or bowel symptoms.  Patient denied any history of fever and no history of heart disease.  Patient quit smoking a month ago also patient was drinking heavily however required months ago, denied using meth however he is using marijuana.  On admission patient was found to have tachycardia, EKG showed supraventricular tachycardia, adenosine was given and converted to flutter/A-fib.  Cardiology was consulted, metoprolol oral and Xarelto was initiated and planning to EMILIA and cardioversion tomorrow.      I discussed the plan of care with patient, bedside RN, and ED physician .    Review of Systems  Review of Systems   Constitutional:  Positive for malaise/fatigue. Negative for chills, fever and weight loss.   HENT:  Negative for ear pain, hearing loss and tinnitus.    Eyes:  Negative for blurred vision, double vision and photophobia.   Respiratory:  Positive for shortness of breath. Negative for cough and hemoptysis.    Cardiovascular:  Positive for chest pain. Negative for palpitations, orthopnea and claudication.   Gastrointestinal:  Negative for abdominal pain, constipation, diarrhea, nausea and vomiting.   Genitourinary:  Negative for dysuria, frequency and urgency.   Musculoskeletal:  Negative for myalgias and " neck pain.   Skin:  Negative for rash.   Neurological:  Negative for dizziness, speech change and weakness.       Past Medical History  Denies any past medical history    Surgical History  Denied and surgical history    Family History  Family history reviewed with patient. There is no family history that is pertinent to the chief complaint.     Social History   reports that he has been smoking cigarettes. He has a 23.5 pack-year smoking history. He does not have any smokeless tobacco history on file. He reports current alcohol use. He reports current drug use.    Allergies  Allergies   Allergen Reactions    Sulfa Drugs Unspecified     Patient unsure of reaction       Medications  Prior to Admission Medications   Prescriptions Last Dose Informant Patient Reported? Taking?   Omega-3 Fatty Acids (FISH OIL PO) 3/14/2024 at am Patient Yes Yes   Sig: Take 2 Capsules by mouth every day.   lisinopril (PRINIVIL) 20 MG Tab 3/14/2024 at am Patient No Yes   Sig: Take 1 Tablet by mouth every day.   tamsulosin (FLOMAX) 0.4 MG capsule 3/14/2024 at am Patient Yes Yes   Sig: Take 0.4 mg by mouth 1/2 hour after breakfast.      Facility-Administered Medications: None       Physical Exam  Temp:  [36.5 °C (97.7 °F)-36.9 °C (98.4 °F)] 36.5 °C (97.7 °F)  Pulse:  [101-155] 149  Resp:  [12-57] 18  BP: (114-152)/() 123/101  SpO2:  [88 %-98 %] 94 %  Blood Pressure: (!) 123/101 (Rn notified )   Temperature: 36.5 °C (97.7 °F)   Pulse: (!) 149 (Rn notified )   Respiration: 18   Pulse Oximetry: 94 %       Physical Exam  Constitutional:       General: He is not in acute distress.     Appearance: He is obese. He is ill-appearing.   Eyes:      General: No scleral icterus.  Cardiovascular:      Rate and Rhythm: Tachycardia present. Rhythm irregular.      Heart sounds: No murmur heard.  Pulmonary:      Effort: No respiratory distress.      Breath sounds: No wheezing.   Abdominal:      General: There is no distension.      Tenderness: There is no  "abdominal tenderness. There is no right CVA tenderness, left CVA tenderness or guarding.   Musculoskeletal:      Right lower leg: No edema.      Left lower leg: No edema.   Lymphadenopathy:      Cervical: No cervical adenopathy.   Skin:     Coloration: Skin is not jaundiced.      Findings: No bruising, lesion or rash.   Neurological:      General: No focal deficit present.      Mental Status: He is alert and oriented to person, place, and time. Mental status is at baseline.      Cranial Nerves: No cranial nerve deficit.      Motor: No weakness.      Gait: Gait normal.         Laboratory:  Recent Labs     03/14/24  1204   WBC 7.8   RBC 4.59*   HEMOGLOBIN 15.1   HEMATOCRIT 44.6   MCV 97.2   MCH 32.9   MCHC 33.9   RDW 50.2*   PLATELETCT 230   MPV 9.6     Recent Labs     03/14/24  1204   SODIUM 138   POTASSIUM 4.2   CHLORIDE 105   CO2 20   GLUCOSE 105*   BUN 11   CREATININE 0.82   CALCIUM 9.0     Recent Labs     03/14/24  1204   ALTSGPT 19   ASTSGOT 17   ALKPHOSPHAT 71   TBILIRUBIN 0.4   LIPASE 18   GLUCOSE 105*         No results for input(s): \"NTPROBNP\" in the last 72 hours.      Recent Labs     03/14/24  1204   TROPONINT 23*       Imaging:  DX-CHEST-PORTABLE (1 VIEW)   Final Result      1.  There are changes consistent with mild edema.      EC-ECHOCARDIOGRAM COMPLETE W/O CONT    (Results Pending)   EC-EMILIA W/O CONT    (Results Pending)   CL-CARDIOVERSION    (Results Pending)       X-Ray:  I have personally reviewed the images and compared with prior images.  EKG:  I have personally reviewed the images and compared with prior images.    Assessment/Plan:  Justification for Admission Status  I anticipate this patient will require at least two midnights for appropriate medical management, necessitating inpatient admission because A-fib with RVR    Patient will need a Telemetry bed on CARDIOLOGY service .  The need is secondary to A-fib with RVR.    * Atrial fibrillation with RVR (HCC)- (present on admission)  Assessment & " Plan  Patient came with chest pain and worsening shortness of breath  EKG showed SVT, flutter with A-fib with RVR  Cardiology on board, planning for cardioversion tomorrow  Start with metoprolol, Xarelto  Echo is pending  TSH is pending  Encourage the patient to quit drinking  And drug screens pending    Primary hypertension- (present on admission)  Assessment & Plan  Lisinopril and metoprolol    Alcohol abuse with withdrawal without complication (HCC)- (present on admission)  Assessment & Plan  Patient denied drinking alcohol for months  Close monitoring for any signs of withdrawal    BPH (benign prostatic hyperplasia)- (present on admission)  Assessment & Plan  Continue Flomax  Bladder scan if needed        VTE prophylaxis: SCDs/TEDs and therapeutic anticoagulation with Xarelto

## 2024-03-15 NOTE — PROGRESS NOTES
4 Eyes Skin Assessment Completed by YAEL Valente and Janet RN.    Head WDL  Ears WDL  Nose WDL  Mouth WDL  Neck WDL  Breast/Chest WDL  Shoulder Blades WDL  Spine WDL  (R) Arm/Elbow/Hand WDL  (L) Arm/Elbow/Hand WDL  Abdomen WDL  Groin WDL  Scrotum/Coccyx/Buttocks WDL  (R) Leg WDL  (L) Leg WDL  (R) Heel/Foot/Toe WDL  (L) Heel/Foot/Toe WDL          Devices In Places Tele Box, Blood Pressure Cuff, and Pulse Ox      Interventions In Place Pillows    Possible Skin Injury No    Pictures Uploaded Into Epic N/A  Wound Consult Placed N/A  RN Wound Prevention Protocol Ordered No

## 2024-03-15 NOTE — CARE PLAN
The patient is Stable - Low risk of patient condition declining or worsening    Shift Goals  Clinical Goals: cardioversion, monitor HR and rhythm  Patient Goals: rest, sleep, eat  Family Goals: ATUL    Progress made toward(s) clinical / shift goals:    Problem: Pain - Standard  Goal: Alleviation of pain or a reduction in pain to the patient’s comfort goal  Description: Target End Date:  Prior to discharge or change in level of care    Document on Vitals flowsheet    1.  Document pain using the appropriate pain scale per order or unit policy  2.  Educate and implement non-pharmacologic comfort measures (i.e. relaxation, distraction, massage, cold/heat therapy, etc.)  3.  Pain management medications as ordered  4.  Reassess pain after pain med administration per policy  5.  If opiods administered assess patient's response to pain medication is appropriate per POSS sedation scale  6.  Follow pain management plan developed in collaboration with patient and interdisciplinary team (including palliative care or pain specialists if applicable)  Outcome: Progressing       Patient is not progressing towards the following goals:

## 2024-03-15 NOTE — DISCHARGE PLANNING
Care Transition Team Assessment  LMSW met with pt at bedside to complete assessment. Pt A&Ox4 and able to verify the information on the face sheet. Pt lives with his girlfriend in a single-story house at [address] [place] [address] that has one step to enter. Prior to this hospitalization, pt reports being independent at home with ADLs however, reports difficulty with IADLs such as cooking and managing medications. Pt reports girlfriend cooks and manages his medication. Pt denies any DME at baseline. Pt reports his girlfriend and his niece as good support for him. Pt receives SSI monthly deposits. Pt denies any substance use or mental health concerns. Pt denies having an advance directive and refused AD packet. Pt confirmed he will have transportation home upon DC.    Information Source  Orientation Level: Oriented X4  Information Given By: Patient  Who is responsible for making decisions for patient? : Patient    Readmission Evaluation  Is this a readmission?: No    Elopement Risk  Legal Hold: No  Ambulatory or Self Mobile in Wheelchair: Yes  Disoriented: No  Psychiatric Symptoms: None  History of Wandering: No  Elopement this Admit: No  Vocalizing Wanting to Leave: No  Displays Behaviors, Body Language Wanting to Leave: No-Not at Risk for Elopement  Elopement Risk: Not at Risk for Elopement    Interdisciplinary Discharge Planning  Patient or legal guardian wants to designate a caregiver: No    Discharge Preparedness  What is your plan after discharge?: Home with help  What are your discharge supports?: Partner, Other (comment) (niece)  Prior Functional Level: Ambulatory, Drives Self, Independent with Activities of Daily Living, Needs Assist with Medication Management  Difficulity with ADLs: None  Difficulity with IADLs: Cooking, Managing medication  Difficulity with IADL Comments:  (pt reports girlfriend cooks and manages his medication)    Functional Assesment  Prior Functional Level: Ambulatory, Drives  Self, Independent with Activities of Daily Living, Needs Assist with Medication Management    Finances  Financial Barriers to Discharge: No  Prescription Coverage: Yes    Vision / Hearing Impairment  Vision Impairment : Yes  Right Eye Vision: Impaired, Wears Glasses  Left Eye Vision: Impaired, Wears Glasses  Hearing Impairment : No    Advance Directive  Advance Directive?: None  Advance Directive offered?: AD Booklet refused    Domestic Abuse  Have you ever been the victim of abuse or violence?: No  Physical Abuse or Sexual Abuse: No  Verbal Abuse or Emotional Abuse: No  Possible Abuse/Neglect Reported to:: Not Applicable    Psychological Assessment  History of Substance Abuse: None  History of Psychiatric Problems: No  Non-compliant with Treatment: No  Newly Diagnosed Illness: No    Discharge Risks or Barriers  Discharge risks or barriers?: No    Anticipated Discharge Information  Discharge Disposition: Discharged to home/self care (01)  Discharge Address: [address] [place] [id number]  Discharge Contact Phone Number: [phone]

## 2024-03-15 NOTE — PROGRESS NOTES
Report received from day shift RN. Assumed patient care. Patient is calmly resting in bed, no signs of distress, even and unlabored breathing noted. Appears diaphoretic. Patient on 2L O2 nasal cannula .A&Ox4. Tele box on and in place. Patient has call light within reach, fall precautions in place. Patient states no needs at this time.

## 2024-03-15 NOTE — ANESTHESIA PREPROCEDURE EVALUATION
" Date/Time: 03/15/24 1030    Scheduled providers: Giles Ruiz M.D.; Myles Austin M.D.    Procedures:       EC-EMILIA W/O CONT      CL-CARDIOVERSION    Diagnosis:       Atrial fibrillation with RVR (HCC) [I48.91]      Atrial fibrillation with RVR (HCC) [I48.91]      Atrial fibrillation with RVR (HCC) [I48.91]    Indications: See Assoicated Dx    Location: [facility] - Echocardiology [facility]            Relevant Problems   CARDIAC   (positive) Atrial fibrillation with RVR (HCC)   (positive) Hypertensive urgency   (positive) Primary hypertension     BP (!) 130/94 Comment: Rn notified   Pulse (!) 146 Comment: Rn notified   Temp 36.5 °C (97.7 °F) (Temporal)   Resp 19   Ht 1.88 m (6' 2.02\")   Wt 113 kg (248 lb 10.9 oz)   SpO2 94%   BMI 31.91 kg/m²     Physical Exam    Airway   Mallampati: II  TM distance: >3 FB  Neck ROM: full       Cardiovascular - normal exam  Rhythm: regular  Rate: normal  (-) murmur     Dental - normal exam           Pulmonary - normal exam  Breath sounds clear to auscultation     Abdominal    Neurological - normal exam                   Anesthesia Plan    ASA 3       Plan - general       Airway plan will be natural airway          Induction: intravenous    Postoperative Plan: Postoperative administration of opioids is intended.    Pertinent diagnostic labs and testing reviewed    Informed Consent:    Anesthetic plan and risks discussed with patient.    Use of blood products discussed with: patient whom consented to blood products.           "

## 2024-03-15 NOTE — PROGRESS NOTES
Hospital Medicine Daily Progress Note    Date of Service  3/15/2024    Chief Complaint  Chace Gong is a 59 y.o. male admitted 3/14/2024 with chest pain and shortness of breath    Hospital Course  59-year-old male with history of smoking, hypertension, marijuana and alcohol abuse who presented 3/14 with shortness of breath and chest pain. Patient has had worsening shortness of breath and feeling tired, no significant chest pain however patient has some dizziness. Denied any palpitation or other symptoms, no urinary or bowel symptoms. Patient denied any history of fever and no history of heart disease. Patient quit smoking a month ago also patient was drinking heavily however required months ago, denied using meth however he is using marijuana. On admission patient was found to have tachycardia, EKG showed supraventricular tachycardia, adenosine was given and converted to flutter/A-fib. Cardiology was consulted, metoprolol oral and Xarelto was initiated and planning to EMILIA and cardioversion.    Interval Problem Update  Patient underwent EMILIA that was negative for left atrial appendage thrombus and underwent external DC cardioversion successfully to sinus rhythm.  Patient reports feeling better denies any active chest pain, shortness of breath or lightheadedness.  2D echo reviewed that reveals LVEF 25%, mild mitral regurgitation, severely dilated left atrium and estimated RVSP 55 mmHg.  UA was positive for amphetamines and cannabinoid metabolites.  Troponins have been flat.  Patient started on metoprolol, Xarelto and lisinopril.  He also received a dose of IV Lasix and has been started on Demadex 5 mg daily.  I discussed the case with cardiology and we will continue to monitor patient closely    I have discussed this patient's plan of care and discharge plan at IDT rounds today with Case Management, Nursing, Nursing leadership, and other members of the IDT team.    Consultants/Specialty  cardiology    Code  Status  Full Code    Disposition  The patient is not medically cleared for discharge to home or a post-acute facility.      I have placed the appropriate orders for post-discharge needs.    Review of Systems  Review of Systems   Respiratory:  Negative for shortness of breath.    Cardiovascular:  Negative for chest pain.   Neurological:  Negative for dizziness.        Physical Exam  Temp:  [36.3 °C (97.3 °F)-36.7 °C (98.1 °F)] 36.5 °C (97.7 °F)  Pulse:  [] 108  Resp:  [17-24] 17  BP: (101-141)/() 117/80  SpO2:  [84 %-96 %] 90 %    Physical Exam  Vitals and nursing note reviewed.   Constitutional:       General: He is not in acute distress.  HENT:      Head: Normocephalic.      Mouth/Throat:      Mouth: Mucous membranes are moist.   Eyes:      Pupils: Pupils are equal, round, and reactive to light.   Cardiovascular:      Rate and Rhythm: Normal rate and regular rhythm.      Pulses: Normal pulses.      Heart sounds: Normal heart sounds.   Pulmonary:      Effort: Pulmonary effort is normal.      Breath sounds: Normal breath sounds.   Abdominal:      Palpations: Abdomen is soft.      Tenderness: There is no abdominal tenderness.   Musculoskeletal:         General: No swelling.      Cervical back: Neck supple.   Skin:     General: Skin is warm.      Coloration: Skin is not jaundiced.   Neurological:      General: No focal deficit present.      Mental Status: He is alert and oriented to person, place, and time.   Psychiatric:         Mood and Affect: Mood normal.         Behavior: Behavior normal.         Fluids    Intake/Output Summary (Last 24 hours) at 3/15/2024 1609  Last data filed at 3/15/2024 1056  Gross per 24 hour   Intake 50 ml   Output 3125 ml   Net -3075 ml       Laboratory  Recent Labs     03/14/24  1204 03/15/24  0241   WBC 7.8 8.4   RBC 4.59* 4.97   HEMOGLOBIN 15.1 16.5   HEMATOCRIT 44.6 48.0   MCV 97.2 96.6   MCH 32.9 33.2*   MCHC 33.9 34.4   RDW 50.2* 50.4*   PLATELETCT 230 230   MPV 9.6 9.6      Recent Labs     03/14/24  1204 03/15/24  0241   SODIUM 138 141   POTASSIUM 4.2 4.2   CHLORIDE 105 106   CO2 20 20   GLUCOSE 105* 107*   BUN 11 15   CREATININE 0.82 0.93   CALCIUM 9.0 9.2                   Imaging  EC-EMILIA W/O CONT         EC-ECHOCARDIOGRAM COMPLETE W/ CONT   Final Result      DX-CHEST-PORTABLE (1 VIEW)   Final Result      1.  There are changes consistent with mild edema.      CL-CARDIOVERSION    (Results Pending)        Assessment/Plan  * Atrial fibrillation with RVR (HCC)- (present on admission)  Assessment & Plan  Patient came with chest pain and worsening shortness of breath  EKG showed SVT, flutter with A-fib with RVR  S/p cardioversion   Started on metoprolol and Xarelto  Echo reveals LVEF 25%  TSH is pending      Systolic congestive heart failure (HCC)  Assessment & Plan  With LVEF 25%  Urine drug screen positive for methamphetamines and history of alcohol abuse  Started on metoprolol, lisinopril Demadex  Cardiology is consulted    Methamphetamine abuse (HCC)  Assessment & Plan  Noted on urine drug screen but patient denied use    Primary hypertension- (present on admission)  Assessment & Plan  Lisinopril and metoprolol    Alcohol abuse with withdrawal without complication (HCC)- (present on admission)  Assessment & Plan  Patient denied drinking alcohol for months  Close monitoring for any signs of withdrawal    BPH (benign prostatic hyperplasia)- (present on admission)  Assessment & Plan  Continue Flomax  Bladder scan if needed         VTE prophylaxis: Xarelto    I have performed a physical exam and reviewed and updated ROS and Plan today (3/15/2024). In review of yesterday's note (3/14/2024), there are no changes except as documented above.    I spent 52 minutes, reviewing the chart, obtaining and/or reviewing separately obtained history. Performing a medically appropriate examination and evaluation.  Counseling and educating the patient. Ordering and reviewing medications, tests, or  procedures.  Discussing the case with Cardiology.  Documenting clinical information in EPIC. Independently interpreting results and communicating results to patient. Discussing future disposition of care with patient, RN and case management.

## 2024-03-15 NOTE — ANESTHESIA TIME REPORT
Anesthesia Start and Stop Event Times       Date Time Event    3/15/2024 1044 Ready for Procedure     1044 Anesthesia Start     1102 Anesthesia Stop          Responsible Staff  03/15/24      Name Role Begin End    Giles Ruiz M.D. Anesth 1044 1102          Overtime Reason:  no overtime (within assigned shift)    Comments:

## 2024-03-16 VITALS
SYSTOLIC BLOOD PRESSURE: 118 MMHG | HEART RATE: 94 BPM | WEIGHT: 251.99 LBS | TEMPERATURE: 98.1 F | RESPIRATION RATE: 18 BRPM | BODY MASS INDEX: 32.34 KG/M2 | HEIGHT: 74 IN | DIASTOLIC BLOOD PRESSURE: 86 MMHG | OXYGEN SATURATION: 90 %

## 2024-03-16 PROBLEM — I48.91 ATRIAL FIBRILLATION WITH RVR (HCC): Status: RESOLVED | Noted: 2024-03-14 | Resolved: 2024-03-16

## 2024-03-16 LAB
ANION GAP SERPL CALC-SCNC: 15 MMOL/L (ref 7–16)
BUN SERPL-MCNC: 22 MG/DL (ref 8–22)
CALCIUM SERPL-MCNC: 8.8 MG/DL (ref 8.5–10.5)
CHLORIDE SERPL-SCNC: 100 MMOL/L (ref 96–112)
CO2 SERPL-SCNC: 22 MMOL/L (ref 20–33)
CREAT SERPL-MCNC: 0.95 MG/DL (ref 0.5–1.4)
ERYTHROCYTE [DISTWIDTH] IN BLOOD BY AUTOMATED COUNT: 49.1 FL (ref 35.9–50)
GFR SERPLBLD CREATININE-BSD FMLA CKD-EPI: 93 ML/MIN/1.73 M 2
GLUCOSE SERPL-MCNC: 96 MG/DL (ref 65–99)
HCT VFR BLD AUTO: 45.1 % (ref 42–52)
HGB BLD-MCNC: 15.4 G/DL (ref 14–18)
MCH RBC QN AUTO: 33 PG (ref 27–33)
MCHC RBC AUTO-ENTMCNC: 34.1 G/DL (ref 32.3–36.5)
MCV RBC AUTO: 96.8 FL (ref 81.4–97.8)
PLATELET # BLD AUTO: 224 K/UL (ref 164–446)
PMV BLD AUTO: 9.6 FL (ref 9–12.9)
POTASSIUM SERPL-SCNC: 3.9 MMOL/L (ref 3.6–5.5)
RBC # BLD AUTO: 4.66 M/UL (ref 4.7–6.1)
SODIUM SERPL-SCNC: 137 MMOL/L (ref 135–145)
WBC # BLD AUTO: 8.5 K/UL (ref 4.8–10.8)

## 2024-03-16 PROCEDURE — 700102 HCHG RX REV CODE 250 W/ 637 OVERRIDE(OP): Performed by: INTERNAL MEDICINE

## 2024-03-16 PROCEDURE — 85027 COMPLETE CBC AUTOMATED: CPT

## 2024-03-16 PROCEDURE — 99232 SBSQ HOSP IP/OBS MODERATE 35: CPT | Mod: FS | Performed by: INTERNAL MEDICINE

## 2024-03-16 PROCEDURE — A9270 NON-COVERED ITEM OR SERVICE: HCPCS | Performed by: INTERNAL MEDICINE

## 2024-03-16 PROCEDURE — 36415 COLL VENOUS BLD VENIPUNCTURE: CPT

## 2024-03-16 PROCEDURE — 99239 HOSP IP/OBS DSCHRG MGMT >30: CPT | Performed by: INTERNAL MEDICINE

## 2024-03-16 PROCEDURE — 80048 BASIC METABOLIC PNL TOTAL CA: CPT

## 2024-03-16 RX ORDER — TORSEMIDE 5 MG/1
5 TABLET ORAL ONCE
Status: DISCONTINUED | OUTPATIENT
Start: 2024-03-16 | End: 2024-03-16

## 2024-03-16 RX ORDER — SPIRONOLACTONE 25 MG/1
25 TABLET ORAL DAILY
Qty: 30 TABLET | Refills: 3 | Status: SHIPPED | OUTPATIENT
Start: 2024-03-17

## 2024-03-16 RX ORDER — SPIRONOLACTONE 25 MG/1
25 TABLET ORAL
Status: DISCONTINUED | OUTPATIENT
Start: 2024-03-16 | End: 2024-03-16 | Stop reason: HOSPADM

## 2024-03-16 RX ORDER — DAPAGLIFLOZIN 10 MG/1
10 TABLET, FILM COATED ORAL DAILY
Status: DISCONTINUED | OUTPATIENT
Start: 2024-03-16 | End: 2024-03-16

## 2024-03-16 RX ORDER — METOPROLOL SUCCINATE 50 MG/1
50 TABLET, EXTENDED RELEASE ORAL DAILY
Qty: 30 TABLET | Refills: 0 | Status: SHIPPED | OUTPATIENT
Start: 2024-03-17

## 2024-03-16 RX ORDER — LISINOPRIL 10 MG/1
10 TABLET ORAL EVERY EVENING
Qty: 30 TABLET | Refills: 0 | Status: SHIPPED | OUTPATIENT
Start: 2024-03-16

## 2024-03-16 RX ORDER — TORSEMIDE 20 MG/1
10 TABLET ORAL
Status: DISCONTINUED | OUTPATIENT
Start: 2024-03-17 | End: 2024-03-16 | Stop reason: HOSPADM

## 2024-03-16 RX ORDER — TORSEMIDE 10 MG/1
10 TABLET ORAL DAILY
Qty: 30 TABLET | Refills: 3 | Status: SHIPPED | OUTPATIENT
Start: 2024-03-17

## 2024-03-16 RX ADMIN — METOPROLOL SUCCINATE 50 MG: 50 TABLET, EXTENDED RELEASE ORAL at 06:00

## 2024-03-16 RX ADMIN — TORSEMIDE 5 MG: 5 TABLET ORAL at 06:00

## 2024-03-16 RX ADMIN — SPIRONOLACTONE 25 MG: 25 TABLET ORAL at 08:06

## 2024-03-16 RX ADMIN — TAMSULOSIN HYDROCHLORIDE 0.4 MG: 0.4 CAPSULE ORAL at 08:06

## 2024-03-16 ASSESSMENT — ENCOUNTER SYMPTOMS
DIZZINESS: 0
FEVER: 0
BLOOD IN STOOL: 0
TROUBLE SWALLOWING: 0
NERVOUS/ANXIOUS: 1
SHORTNESS OF BREATH: 1
ABDOMINAL PAIN: 0
NUMBNESS: 0
DIAPHORESIS: 0
COLOR CHANGE: 0
ABDOMINAL DISTENTION: 0
PALPITATIONS: 0
COUGH: 0
FATIGUE: 1
CHILLS: 0
AGITATION: 0
CHEST TIGHTNESS: 0
CONFUSION: 0

## 2024-03-16 ASSESSMENT — FIBROSIS 4 INDEX: FIB4 SCORE: 1.07

## 2024-03-16 NOTE — PROGRESS NOTES
Extensive education given to patient regarding heart failure and heart failure medications. Heart failure education booklet provided to patient. Pt appears physically relieved at being given the education and is thankful for it.

## 2024-03-16 NOTE — PROGRESS NOTES
Patient discharged. A&O x 4. Discharge instructions, personal belongings in possession of a patient. PIV and tele monitor removed.  Copy of discharge instructions in the patient chart, signed and reviewed. Patient verbalizes the understanding of the discharge instructions. Questions / concerns addressed prior to leaving the unit. Patient is instructed to follow up with PCP and cardiology. Transported via walking, refused transport. Patient is discharged to home. Family is present.

## 2024-03-16 NOTE — DISCHARGE SUMMARY
"Discharge Summary    CHIEF COMPLAINT ON ADMISSION  Chief Complaint   Patient presents with    Shortness of Breath    Chest Pain     X 5 days, pt endorses meth use 4 days ago, states his chest pain feels \"deep\"       Reason for Admission  SOB     Admission Date  3/14/2024    CODE STATUS  Full Code    HPI & HOSPITAL COURSE  59-year-old male with history of smoking, hypertension, marijuana and alcohol abuse who presented 3/14 with shortness of breath and chest pain. Patient has had worsening shortness of breath and feeling tired, no significant chest pain however patient has some dizziness. Denied any palpitation or other symptoms, no urinary or bowel symptoms. Patient denied any history of fever and no history of heart disease.  On admission patient was found to have tachycardia, EKG showed supraventricular tachycardia, adenosine was given and converted to flutter/A-fib. Cardiology was consulted, metoprolol oral and Xarelto was initiated.  Patient underwent EMILIA that was negative for left atrial appendage thrombus and cardioversion back to sinus rhythm.  He underwent a 2D echo that reveals LVEF 25%.  He was started on guideline directed medical therapy with Toprol, lisinopril, Aldactone and Demadex.  Plan to start Farxiga as outpatient.  His urine drug screen was positive for cannabinoids and amphetamines.  He was counseled on cessation of methamphetamine and to avoid alcohol, tobacco and marijuana.  His symptoms improved with diuresis and his vitals were stable on room air.  He is to follow-up with cardiology as outpatient.    Therefore, he is discharged in good and stable condition to home with close outpatient follow-up.    The patient met 2-midnight criteria for an inpatient stay at the time of discharge.    Discharge Date  3/16/24    FOLLOW UP ITEMS POST DISCHARGE  PCP    DISCHARGE DIAGNOSES  Principal Problem (Resolved):    Atrial fibrillation with RVR (HCC) (POA: Yes)  Active Problems:    BPH (benign prostatic " hyperplasia) (POA: Yes)    Alcohol abuse with withdrawal without complication (HCC) (POA: Yes)    Primary hypertension (POA: Yes)    Methamphetamine abuse (HCC) (POA: Yes)    Systolic congestive heart failure (HCC) (POA: Yes)      FOLLOW UP  Future Appointments   Date Time Provider Department Center   3/22/2024  2:30 PM CARMEN Sykes None     Mohsen Tamasaby, M.D.  [address] [address]  [phone]    Follow up  As needed, If symptoms worsen      MEDICATIONS ON DISCHARGE     Medication List        START taking these medications        Instructions   metoprolol SR 50 MG Tb24  Start taking on: March 17, 2024  Commonly known as: Toprol XL   Take 1 Tablet by mouth every day.  Dose: 50 mg     rivaroxaban 20 MG Tabs tablet  Commonly known as: Xarelto   Take 1 Tablet by mouth with dinner.  Dose: 20 mg     spironolactone 25 MG Tabs  Start taking on: March 17, 2024  Commonly known as: Aldactone   Take 1 Tablet by mouth every day.  Dose: 25 mg     torsemide 10 MG tablet  Start taking on: March 17, 2024  Commonly known as: Demadex   Take 1 Tablet by mouth every day.  Dose: 10 mg            CHANGE how you take these medications        Instructions   lisinopril 10 MG Tabs  What changed:   medication strength  how much to take  when to take this  Commonly known as: Prinivil   Take 1 Tablet by mouth every evening.  Dose: 10 mg            CONTINUE taking these medications        Instructions   FISH OIL PO   Take 2 Capsules by mouth every day.  Dose: 2 Capsule     tamsulosin 0.4 MG capsule  Commonly known as: Flomax   Take 0.4 mg by mouth 1/2 hour after breakfast.  Dose: 0.4 mg              Allergies  Allergies   Allergen Reactions    Sulfa Drugs Unspecified     Patient unsure of reaction       DIET  Orders Placed This Encounter   Procedures    Diet Order Diet: Cardiac     Standing Status:   Standing     Number of Occurrences:   1     Order Specific Question:   Diet:     Answer:   Cardiac  [6]       ACTIVITY  As tolerated.  Weight bearing as tolerated    CONSULTATIONS  Cardiology    PROCEDURES  none    LABORATORY  Lab Results   Component Value Date    SODIUM 137 03/16/2024    POTASSIUM 3.9 03/16/2024    CHLORIDE 100 03/16/2024    CO2 22 03/16/2024    GLUCOSE 96 03/16/2024    BUN 22 03/16/2024    CREATININE 0.95 03/16/2024        Lab Results   Component Value Date    WBC 8.5 03/16/2024    HEMOGLOBIN 15.4 03/16/2024    HEMATOCRIT 45.1 03/16/2024    PLATELETCT 224 03/16/2024        Total time of the discharge process exceeds 35 minutes.

## 2024-03-16 NOTE — CARE PLAN
The patient is Stable - Low risk of patient condition declining or worsening    Shift Goals  Clinical Goals: Monitor HR, Safety, wean O2  Patient Goals: Comfort, sleep  Family Goals: ATUL    Progress made toward(s) clinical / shift goals:    Problem: Knowledge Deficit - Standard  Goal: Patient and family/care givers will demonstrate understanding of plan of care, disease process/condition, diagnostic tests and medications  Outcome: Progressing     Problem: Fall Risk  Goal: Patient will remain free from falls  Outcome: Progressing       Patient is not progressing towards the following goals:

## 2024-03-16 NOTE — PROGRESS NOTES
Cardiology Follow Up Progress Note    Date of Service  3/16/2024    Attending Physician  Jonny Branham M.D.    Chief Complaint   Palpitations     HPI  Chace Gong is a 59 y.o. male admitted 3/14/2024 with dyspnea on exertion.     Medical history of substance use (marijuana and meth) and history of alcohol abuse. Hypertension on lisinopril. BPH on flomax     He was found to be in atrial tachycardia, given two doses of adenosine 12 and 6 mg, cardizem and metoprolol iv push. Noted underlying rhyhm atrial flutter.     Interim Events  NSR 90-100s overnight   On 2L of oxygen, diaphoresis resolved after cardioversion   Sbp 130/100  Abdominal distention     Review of Systems  Review of Systems   Constitutional:  Positive for fatigue. Negative for chills, diaphoresis and fever.   HENT:  Negative for nosebleeds and trouble swallowing.    Respiratory:  Positive for shortness of breath. Negative for cough and chest tightness.    Cardiovascular:  Negative for chest pain, palpitations and leg swelling.   Gastrointestinal:  Negative for abdominal distention, abdominal pain and blood in stool.   Genitourinary:  Negative for hematuria.   Skin:  Negative for color change.   Neurological:  Negative for dizziness, syncope and numbness.   Psychiatric/Behavioral:  Negative for agitation and confusion. The patient is nervous/anxious.        Vital signs in last 24 hours  Temp:  [36.4 °C (97.5 °F)-37 °C (98.6 °F)] 36.7 °C (98.1 °F)  Pulse:  [] 103  Resp:  [17-24] 20  BP: (101-139)/() 139/107  SpO2:  [84 %-96 %] 93 %    Physical Exam  Physical Exam  Vitals and nursing note reviewed.   Constitutional:       Appearance: Normal appearance. He is not ill-appearing.   HENT:      Head: Normocephalic and atraumatic.   Eyes:      Pupils: Pupils are equal, round, and reactive to light.   Cardiovascular:      Rate and Rhythm: Normal rate and regular rhythm.      Heart sounds: Normal heart sounds. No murmur heard.  Pulmonary:      Effort:  "Pulmonary effort is normal.      Breath sounds: Normal breath sounds.   Abdominal:      General: Abdomen is flat. There is distension.   Musculoskeletal:      Cervical back: Normal range of motion.      Right lower leg: No edema.      Left lower leg: No edema.   Skin:     General: Skin is warm and dry.   Neurological:      General: No focal deficit present.      Mental Status: He is alert and oriented to person, place, and time.   Psychiatric:         Mood and Affect: Mood normal.         Behavior: Behavior normal.         Thought Content: Thought content normal.         Judgment: Judgment normal.         Lab Review  Lab Results   Component Value Date/Time    WBC 8.5 03/16/2024 01:17 AM    RBC 4.66 (L) 03/16/2024 01:17 AM    HEMOGLOBIN 15.4 03/16/2024 01:17 AM    HEMATOCRIT 45.1 03/16/2024 01:17 AM    MCV 96.8 03/16/2024 01:17 AM    MCH 33.0 03/16/2024 01:17 AM    MCHC 34.1 03/16/2024 01:17 AM    MPV 9.6 03/16/2024 01:17 AM      Lab Results   Component Value Date/Time    SODIUM 137 03/16/2024 01:17 AM    POTASSIUM 3.9 03/16/2024 01:17 AM    CHLORIDE 100 03/16/2024 01:17 AM    CO2 22 03/16/2024 01:17 AM    GLUCOSE 96 03/16/2024 01:17 AM    BUN 22 03/16/2024 01:17 AM    CREATININE 0.95 03/16/2024 01:17 AM      Lab Results   Component Value Date/Time    ASTSGOT 19 03/15/2024 02:41 AM    ALTSGPT 21 03/15/2024 02:41 AM     Lab Results   Component Value Date/Time    CHOLSTRLTOT 172 02/11/2023 03:11 AM    LDL 64 02/11/2023 03:11 AM     02/11/2023 03:11 AM    TRIGLYCERIDE 36 02/11/2023 03:11 AM    TROPONINT 22 (H) 03/15/2024 02:41 AM       No results for input(s): \"NTPROBNP\" in the last 72 hours.    Cardiac Imaging and Procedures Review  Telemetry showed aflutter 120-140s    Echocardiogram:  pending     CTA chest   2/11/2023  1.  No large central pulmonary embolus is appreciated, evaluation of the subsegmental branches is essentially nondiagnostic due to motion artifacts. Additional imaging would be required for " definitive exclusion of small distal pulmonary emboli.  2.  Scattered hazy pulmonary opacities suggests subtle infiltrates.  3.  Hepatomegaly and diffuse hepatic steatosis  4.  Irregular hepatic contour favoring changes of cirrhosis  5.  Atherosclerosis and atherosclerotic coronary artery disease.    Assessment/Plan  No new Assessment & Plan notes have been filed under this hospital service since the last note was generated.  Service: Cardiology    New onset of Atrial flutter RVR:  - s/p successful cardioversion 3/15/2024  - remaining in NSR   - continue metoprolol 50mg qd XL    - [id number] (CHF/CM, HTN, Age, DM, CVA, Vascular disease, Gender) = 1 continue xarelto 20mg qd     2. HFrEF 25% in setting of atrial flutter RVR and polysubstance abuse   - stage C, class 3  -Discussed Heart failure trajectory and prognosis with patient. Will continue to optimize medical therapy as tolerated. Advanced HF treatment, need for remote monitoring consideration at every visit.   -ACE-I/ARB/ARNI: lisinopril 10mg qd   -Evidence Based Beta-blocker: metoprolol 50mg XL   -Aldosterone Antagonist: start aldactone 25mg qd   -Diuretic: increase torsemide 10mg qd   -SGLT2 inhibitor: consider as outpatient.   - ischemic evaluation as outpatient.        3. Polysubstance abuse   - discussed   - UA toxic positive amphetamines and cannabinoid      4. Hypertension   - continue lisinopril 10mg qd      I personally spent a total of 15 minutes which includes face-to-face time and non-face-to-face time spent on preparing to see the patient, reviewing hospital notes and tests, obtaining history from the patient, performing a medically appropriate exam, counseling and educating the patient, ordering medications/tests/procedures/referrals as clinically indicated, and documenting information in the electronic medical record.       Thank you for allowing me to participate in the care of this patient.  I will continue to follow this patient    Please  contact me with any questions.    HUMPHREY Bell.

## 2024-03-16 NOTE — DISCHARGE INSTRUCTIONS
HF Patient Discharge Instructions  Monitor your weight daily, and maintain a weight chart, to track your weight changes.   Activity as tolerated, unless your Doctor has ordered otherwise.   Follow a low fat, low cholesterol, low salt diet unless instructed otherwise by your Doctor. Read the labels on the back of food products and track your intake of fat, cholesterol and salt.   Fluid Restriction No. If a Fluid Restriction has been ordered by your Doctor, measure fluids with a measuring cup to ensure that you are not exceeding the restriction.   No smoking.  Oxygen No. If your Doctor has ordered that you wear Oxygen at home, it is important to wear it as ordered.  Did you receive an explanation from staff on the importance of taking each of your medications and why it is necessary to keep taking them unless your doctor says to stop? Yes  Were all of your questions answered about how to manage your heart failure and what to do if you have increased signs and symptoms after you go home? Yes  Do you feel like your heart failure care team involved you in the care treatment plan and allowed you to make decisions regarding your care while in the hospital and addressed any discharge needs you might have? Yes    See the educational handout provided at discharge for more information on monitoring your daily weight, activity and diet. This also explains more about Heart Failure, symptoms of a flare-up and some of the tests that you have undergone.     Warning Signs of a Flare-Up include:  Swelling in the ankles or lower legs.  Shortness of breath, while at rest, or while doing normal activities.   Shortness of breath at night when in bed, or coughing in bed.   Requiring more pillows to sleep at night, or needing to sit up at night to sleep.  Feeling weak, dizzy or fatigued.     When to call your Doctor:  Call your Primary Care Physician or Cardiologist if:   You experience any pain radiating to your jaw or neck.  You have  any difficulty breathing.  You experience weight gain of 3 lbs in a day or 5 lbs in a week.   You feel any palpitations or irregular heartbeats.  You become dizzy or lose consciousness.   If you have had an angiogram or had a pacemaker or AICD placed, and experience:  Bleeding, drainage or swelling at the surgical / puncture site.  Fever greater than 100.0 F  Shock from internal defibrillator.  Cool and / or numb extremities.     Please access the [facility] My HF Guide/Heart Failure Interactive Workbook:   [email or web address]

## 2024-03-18 ENCOUNTER — TELEPHONE (OUTPATIENT)
Dept: HEALTH INFORMATION MANAGEMENT | Facility: OTHER | Age: 59
End: 2024-03-18
Payer: MEDICARE

## 2024-04-05 ENCOUNTER — HOSPITAL ENCOUNTER (INPATIENT)
Facility: MEDICAL CENTER | Age: 59
LOS: 3 days | DRG: 871 | End: 2024-04-08
Attending: STUDENT IN AN ORGANIZED HEALTH CARE EDUCATION/TRAINING PROGRAM | Admitting: INTERNAL MEDICINE
Payer: MEDICARE

## 2024-04-05 ENCOUNTER — APPOINTMENT (OUTPATIENT)
Dept: CARDIOLOGY | Facility: MEDICAL CENTER | Age: 59
DRG: 871 | End: 2024-04-05
Attending: INTERNAL MEDICINE
Payer: MEDICARE

## 2024-04-05 ENCOUNTER — APPOINTMENT (OUTPATIENT)
Dept: RADIOLOGY | Facility: MEDICAL CENTER | Age: 59
DRG: 871 | End: 2024-04-05
Attending: STUDENT IN AN ORGANIZED HEALTH CARE EDUCATION/TRAINING PROGRAM
Payer: MEDICARE

## 2024-04-05 DIAGNOSIS — R65.20 SEPSIS WITH ACUTE ORGAN DYSFUNCTION WITHOUT SEPTIC SHOCK, DUE TO UNSPECIFIED ORGANISM, UNSPECIFIED ORGAN DYSFUNCTION TYPE (HCC): ICD-10-CM

## 2024-04-05 DIAGNOSIS — F10.931 ALCOHOL WITHDRAWAL DELIRIUM (HCC): ICD-10-CM

## 2024-04-05 DIAGNOSIS — I48.91 ATRIAL FIBRILLATION WITH RVR (HCC): ICD-10-CM

## 2024-04-05 DIAGNOSIS — I48.91 ATRIAL FIBRILLATION WITH RAPID VENTRICULAR RESPONSE (HCC): ICD-10-CM

## 2024-04-05 DIAGNOSIS — F10.939 ALCOHOL WITHDRAWAL SYNDROME WITH COMPLICATION (HCC): ICD-10-CM

## 2024-04-05 DIAGNOSIS — A41.9 SEPSIS WITH ACUTE ORGAN DYSFUNCTION WITHOUT SEPTIC SHOCK, DUE TO UNSPECIFIED ORGANISM, UNSPECIFIED ORGAN DYSFUNCTION TYPE (HCC): ICD-10-CM

## 2024-04-05 DIAGNOSIS — J96.01 ACUTE HYPOXIC RESPIRATORY FAILURE (HCC): ICD-10-CM

## 2024-04-05 LAB
ALBUMIN SERPL BCP-MCNC: 3.9 G/DL (ref 3.2–4.9)
ALBUMIN/GLOB SERPL: 1.2 G/DL
ALP SERPL-CCNC: 65 U/L (ref 30–99)
ALT SERPL-CCNC: 39 U/L (ref 2–50)
AMPHET UR QL SCN: NEGATIVE
ANION GAP SERPL CALC-SCNC: 17 MMOL/L (ref 7–16)
ANION GAP SERPL CALC-SCNC: 24 MMOL/L (ref 7–16)
APPEARANCE UR: CLEAR
AST SERPL-CCNC: 38 U/L (ref 12–45)
BACTERIA #/AREA URNS HPF: NEGATIVE /HPF
BARBITURATES UR QL SCN: NEGATIVE
BASOPHILS # BLD AUTO: 0.3 % (ref 0–1.8)
BASOPHILS # BLD AUTO: 0.8 % (ref 0–1.8)
BASOPHILS # BLD: 0.06 K/UL (ref 0–0.12)
BASOPHILS # BLD: 0.12 K/UL (ref 0–0.12)
BENZODIAZ UR QL SCN: POSITIVE
BILIRUB SERPL-MCNC: 0.9 MG/DL (ref 0.1–1.5)
BILIRUB UR QL STRIP.AUTO: NEGATIVE
BUN SERPL-MCNC: 23 MG/DL (ref 8–22)
BUN SERPL-MCNC: 29 MG/DL (ref 8–22)
BZE UR QL SCN: NEGATIVE
CALCIUM ALBUM COR SERPL-MCNC: 8.1 MG/DL (ref 8.5–10.5)
CALCIUM SERPL-MCNC: 8 MG/DL (ref 8.5–10.5)
CALCIUM SERPL-MCNC: 8.1 MG/DL (ref 8.5–10.5)
CANNABINOIDS UR QL SCN: POSITIVE
CHLORIDE SERPL-SCNC: 91 MMOL/L (ref 96–112)
CHLORIDE SERPL-SCNC: 92 MMOL/L (ref 96–112)
CO2 SERPL-SCNC: 16 MMOL/L (ref 20–33)
CO2 SERPL-SCNC: 21 MMOL/L (ref 20–33)
COLOR UR: YELLOW
CREAT SERPL-MCNC: 0.66 MG/DL (ref 0.5–1.4)
CREAT SERPL-MCNC: 0.85 MG/DL (ref 0.5–1.4)
EKG IMPRESSION: NORMAL
EKG IMPRESSION: NORMAL
EOSINOPHIL # BLD AUTO: 0 K/UL (ref 0–0.51)
EOSINOPHIL # BLD AUTO: 0.16 K/UL (ref 0–0.51)
EOSINOPHIL NFR BLD: 0 % (ref 0–6.9)
EOSINOPHIL NFR BLD: 0.8 % (ref 0–6.9)
EPI CELLS #/AREA URNS HPF: NEGATIVE /HPF
ERYTHROCYTE [DISTWIDTH] IN BLOOD BY AUTOMATED COUNT: 43.8 FL (ref 35.9–50)
ERYTHROCYTE [DISTWIDTH] IN BLOOD BY AUTOMATED COUNT: 45.8 FL (ref 35.9–50)
ETHANOL BLD-MCNC: 208 MG/DL
FENTANYL UR QL: NEGATIVE
FLUAV RNA SPEC QL NAA+PROBE: NEGATIVE
FLUBV RNA SPEC QL NAA+PROBE: NEGATIVE
GFR SERPLBLD CREATININE-BSD FMLA CKD-EPI: 101 ML/MIN/1.73 M 2
GFR SERPLBLD CREATININE-BSD FMLA CKD-EPI: 109 ML/MIN/1.73 M 2
GLOBULIN SER CALC-MCNC: 3.3 G/DL (ref 1.9–3.5)
GLUCOSE SERPL-MCNC: 138 MG/DL (ref 65–99)
GLUCOSE SERPL-MCNC: 182 MG/DL (ref 65–99)
GLUCOSE UR STRIP.AUTO-MCNC: NEGATIVE MG/DL
HCT VFR BLD AUTO: 45.2 % (ref 42–52)
HCT VFR BLD AUTO: 49 % (ref 42–52)
HGB BLD-MCNC: 16.3 G/DL (ref 14–18)
HGB BLD-MCNC: 17.8 G/DL (ref 14–18)
HYALINE CASTS #/AREA URNS LPF: ABNORMAL /LPF
IMM GRANULOCYTES # BLD AUTO: 0.12 K/UL (ref 0–0.11)
IMM GRANULOCYTES # BLD AUTO: 0.2 K/UL (ref 0–0.11)
IMM GRANULOCYTES NFR BLD AUTO: 0.8 % (ref 0–0.9)
IMM GRANULOCYTES NFR BLD AUTO: 1 % (ref 0–0.9)
INR PPP: 1.38 (ref 0.87–1.13)
KETONES UR STRIP.AUTO-MCNC: NEGATIVE MG/DL
LACTATE SERPL-SCNC: 10 MMOL/L (ref 0.5–2)
LACTATE SERPL-SCNC: 3.7 MMOL/L (ref 0.5–2)
LACTATE SERPL-SCNC: 6.8 MMOL/L (ref 0.5–2)
LEUKOCYTE ESTERASE UR QL STRIP.AUTO: NEGATIVE
LIPASE SERPL-CCNC: 20 U/L (ref 11–82)
LV EJECT FRACT  99904: 35
LV EJECT FRACT MOD 2C 99903: 35.49
LV EJECT FRACT MOD 4C 99902: 28.53
LV EJECT FRACT MOD BP 99901: 31.31
LYMPHOCYTES # BLD AUTO: 1.61 K/UL (ref 1–4.8)
LYMPHOCYTES # BLD AUTO: 1.93 K/UL (ref 1–4.8)
LYMPHOCYTES NFR BLD: 10.6 % (ref 22–41)
LYMPHOCYTES NFR BLD: 9.8 % (ref 22–41)
MAGNESIUM SERPL-MCNC: 1.8 MG/DL (ref 1.5–2.5)
MAGNESIUM SERPL-MCNC: 2.6 MG/DL (ref 1.5–2.5)
MCH RBC QN AUTO: 32.5 PG (ref 27–33)
MCH RBC QN AUTO: 33.5 PG (ref 27–33)
MCHC RBC AUTO-ENTMCNC: 36.1 G/DL (ref 32.3–36.5)
MCHC RBC AUTO-ENTMCNC: 36.3 G/DL (ref 32.3–36.5)
MCV RBC AUTO: 90.2 FL (ref 81.4–97.8)
MCV RBC AUTO: 92.1 FL (ref 81.4–97.8)
METHADONE UR QL SCN: NEGATIVE
MICRO URNS: ABNORMAL
MONOCYTES # BLD AUTO: 0.64 K/UL (ref 0–0.85)
MONOCYTES # BLD AUTO: 1.02 K/UL (ref 0–0.85)
MONOCYTES NFR BLD AUTO: 4.2 % (ref 0–13.4)
MONOCYTES NFR BLD AUTO: 5.2 % (ref 0–13.4)
NEUTROPHILS # BLD AUTO: 12.7 K/UL (ref 1.82–7.42)
NEUTROPHILS # BLD AUTO: 16.35 K/UL (ref 1.82–7.42)
NEUTROPHILS NFR BLD: 82.9 % (ref 44–72)
NEUTROPHILS NFR BLD: 83.6 % (ref 44–72)
NITRITE UR QL STRIP.AUTO: NEGATIVE
NRBC # BLD AUTO: 0 K/UL
NRBC # BLD AUTO: 0 K/UL
NRBC BLD-RTO: 0 /100 WBC (ref 0–0.2)
NRBC BLD-RTO: 0 /100 WBC (ref 0–0.2)
NT-PROBNP SERPL IA-MCNC: 291 PG/ML (ref 0–125)
OPIATES UR QL SCN: NEGATIVE
OXYCODONE UR QL SCN: NEGATIVE
PCP UR QL SCN: NEGATIVE
PH UR STRIP.AUTO: 5.5 [PH] (ref 5–8)
PHOSPHATE SERPL-MCNC: 3.3 MG/DL (ref 2.5–4.5)
PLATELET # BLD AUTO: 153 K/UL (ref 164–446)
PLATELET # BLD AUTO: 186 K/UL (ref 164–446)
PMV BLD AUTO: 10.6 FL (ref 9–12.9)
PMV BLD AUTO: 9.5 FL (ref 9–12.9)
POTASSIUM SERPL-SCNC: 4 MMOL/L (ref 3.6–5.5)
POTASSIUM SERPL-SCNC: 4.1 MMOL/L (ref 3.6–5.5)
PROCALCITONIN SERPL-MCNC: 0.55 NG/ML
PROPOXYPH UR QL SCN: NEGATIVE
PROT SERPL-MCNC: 7.2 G/DL (ref 6–8.2)
PROT UR QL STRIP: 30 MG/DL
PROTHROMBIN TIME: 17.2 SEC (ref 12–14.6)
RBC # BLD AUTO: 5.01 M/UL (ref 4.7–6.1)
RBC # BLD AUTO: 5.32 M/UL (ref 4.7–6.1)
RBC # URNS HPF: ABNORMAL /HPF
RBC UR QL AUTO: ABNORMAL
RSV RNA SPEC QL NAA+PROBE: NEGATIVE
SARS-COV-2 RNA RESP QL NAA+PROBE: NOTDETECTED
SCCMEC + MECA PNL NOSE NAA+PROBE: NEGATIVE
SODIUM SERPL-SCNC: 130 MMOL/L (ref 135–145)
SODIUM SERPL-SCNC: 131 MMOL/L (ref 135–145)
SP GR UR STRIP.AUTO: 1.02
TROPONIN T SERPL-MCNC: 27 NG/L (ref 6–19)
UFH PPP CHRO-ACNC: 0.74 IU/ML
UROBILINOGEN UR STRIP.AUTO-MCNC: 1 MG/DL
WBC # BLD AUTO: 15.2 K/UL (ref 4.8–10.8)
WBC # BLD AUTO: 19.7 K/UL (ref 4.8–10.8)
WBC #/AREA URNS HPF: ABNORMAL /HPF

## 2024-04-05 PROCEDURE — 85025 COMPLETE CBC W/AUTO DIFF WBC: CPT

## 2024-04-05 PROCEDURE — 84484 ASSAY OF TROPONIN QUANT: CPT

## 2024-04-05 PROCEDURE — 85520 HEPARIN ASSAY: CPT

## 2024-04-05 PROCEDURE — 700102 HCHG RX REV CODE 250 W/ 637 OVERRIDE(OP): Performed by: STUDENT IN AN ORGANIZED HEALTH CARE EDUCATION/TRAINING PROGRAM

## 2024-04-05 PROCEDURE — 96365 THER/PROPH/DIAG IV INF INIT: CPT

## 2024-04-05 PROCEDURE — 99291 CRITICAL CARE FIRST HOUR: CPT

## 2024-04-05 PROCEDURE — 0241U HCHG SARS-COV-2 COVID-19 NFCT DS RESP RNA 4 TRGT ED POC: CPT

## 2024-04-05 PROCEDURE — 80307 DRUG TEST PRSMV CHEM ANLYZR: CPT

## 2024-04-05 PROCEDURE — 96375 TX/PRO/DX INJ NEW DRUG ADDON: CPT

## 2024-04-05 PROCEDURE — 99291 CRITICAL CARE FIRST HOUR: CPT | Performed by: INTERNAL MEDICINE

## 2024-04-05 PROCEDURE — 700111 HCHG RX REV CODE 636 W/ 250 OVERRIDE (IP): Performed by: INTERNAL MEDICINE

## 2024-04-05 PROCEDURE — 96376 TX/PRO/DX INJ SAME DRUG ADON: CPT

## 2024-04-05 PROCEDURE — 80053 COMPREHEN METABOLIC PANEL: CPT

## 2024-04-05 PROCEDURE — 700101 HCHG RX REV CODE 250: Performed by: INTERNAL MEDICINE

## 2024-04-05 PROCEDURE — 36415 COLL VENOUS BLD VENIPUNCTURE: CPT

## 2024-04-05 PROCEDURE — 96368 THER/DIAG CONCURRENT INF: CPT

## 2024-04-05 PROCEDURE — 82077 ASSAY SPEC XCP UR&BREATH IA: CPT

## 2024-04-05 PROCEDURE — 84145 PROCALCITONIN (PCT): CPT

## 2024-04-05 PROCEDURE — 83880 ASSAY OF NATRIURETIC PEPTIDE: CPT

## 2024-04-05 PROCEDURE — 96366 THER/PROPH/DIAG IV INF ADDON: CPT

## 2024-04-05 PROCEDURE — 74177 CT ABD & PELVIS W/CONTRAST: CPT

## 2024-04-05 PROCEDURE — 93005 ELECTROCARDIOGRAM TRACING: CPT | Performed by: STUDENT IN AN ORGANIZED HEALTH CARE EDUCATION/TRAINING PROGRAM

## 2024-04-05 PROCEDURE — 96367 TX/PROPH/DG ADDL SEQ IV INF: CPT

## 2024-04-05 PROCEDURE — 71045 X-RAY EXAM CHEST 1 VIEW: CPT

## 2024-04-05 PROCEDURE — 93306 TTE W/DOPPLER COMPLETE: CPT | Mod: 26 | Performed by: INTERNAL MEDICINE

## 2024-04-05 PROCEDURE — 87040 BLOOD CULTURE FOR BACTERIA: CPT | Mod: 91

## 2024-04-05 PROCEDURE — 770022 HCHG ROOM/CARE - ICU (200)

## 2024-04-05 PROCEDURE — 700111 HCHG RX REV CODE 636 W/ 250 OVERRIDE (IP): Mod: JZ,JG | Performed by: STUDENT IN AN ORGANIZED HEALTH CARE EDUCATION/TRAINING PROGRAM

## 2024-04-05 PROCEDURE — 80048 BASIC METABOLIC PNL TOTAL CA: CPT

## 2024-04-05 PROCEDURE — 93306 TTE W/DOPPLER COMPLETE: CPT

## 2024-04-05 PROCEDURE — A9270 NON-COVERED ITEM OR SERVICE: HCPCS | Performed by: STUDENT IN AN ORGANIZED HEALTH CARE EDUCATION/TRAINING PROGRAM

## 2024-04-05 PROCEDURE — 700117 HCHG RX CONTRAST REV CODE 255: Performed by: STUDENT IN AN ORGANIZED HEALTH CARE EDUCATION/TRAINING PROGRAM

## 2024-04-05 PROCEDURE — 307059 PAD,EAR PROTECTOR: Performed by: INTERNAL MEDICINE

## 2024-04-05 PROCEDURE — 700105 HCHG RX REV CODE 258: Performed by: STUDENT IN AN ORGANIZED HEALTH CARE EDUCATION/TRAINING PROGRAM

## 2024-04-05 PROCEDURE — 83735 ASSAY OF MAGNESIUM: CPT

## 2024-04-05 PROCEDURE — 83690 ASSAY OF LIPASE: CPT

## 2024-04-05 PROCEDURE — 87641 MR-STAPH DNA AMP PROBE: CPT

## 2024-04-05 PROCEDURE — 84100 ASSAY OF PHOSPHORUS: CPT

## 2024-04-05 PROCEDURE — 700105 HCHG RX REV CODE 258: Performed by: INTERNAL MEDICINE

## 2024-04-05 PROCEDURE — 81001 URINALYSIS AUTO W/SCOPE: CPT

## 2024-04-05 PROCEDURE — 700101 HCHG RX REV CODE 250: Performed by: STUDENT IN AN ORGANIZED HEALTH CARE EDUCATION/TRAINING PROGRAM

## 2024-04-05 PROCEDURE — 85610 PROTHROMBIN TIME: CPT

## 2024-04-05 PROCEDURE — 83605 ASSAY OF LACTIC ACID: CPT

## 2024-04-05 RX ORDER — ACETAMINOPHEN 10 MG/ML
1000 INJECTION, SOLUTION INTRAVENOUS ONCE
Status: COMPLETED | OUTPATIENT
Start: 2024-04-05 | End: 2024-04-05

## 2024-04-05 RX ORDER — HEPARIN SODIUM 1000 [USP'U]/ML
40 INJECTION, SOLUTION INTRAVENOUS; SUBCUTANEOUS PRN
Status: DISCONTINUED | OUTPATIENT
Start: 2024-04-05 | End: 2024-04-05

## 2024-04-05 RX ORDER — LORAZEPAM 2 MG/ML
2 INJECTION INTRAMUSCULAR
Status: DISCONTINUED | OUTPATIENT
Start: 2024-04-05 | End: 2024-04-05

## 2024-04-05 RX ORDER — MAGNESIUM SULFATE HEPTAHYDRATE 40 MG/ML
4 INJECTION, SOLUTION INTRAVENOUS ONCE
Status: COMPLETED | OUTPATIENT
Start: 2024-04-05 | End: 2024-04-05

## 2024-04-05 RX ORDER — SPIRONOLACTONE 25 MG/1
25 TABLET ORAL DAILY
COMMUNITY

## 2024-04-05 RX ORDER — ONDANSETRON 4 MG/1
4 TABLET, ORALLY DISINTEGRATING ORAL EVERY 4 HOURS PRN
Status: DISCONTINUED | OUTPATIENT
Start: 2024-04-05 | End: 2024-04-08 | Stop reason: HOSPADM

## 2024-04-05 RX ORDER — LORAZEPAM 2 MG/ML
4 INJECTION INTRAMUSCULAR
Status: DISCONTINUED | OUTPATIENT
Start: 2024-04-05 | End: 2024-04-05

## 2024-04-05 RX ORDER — AMOXICILLIN 250 MG
2 CAPSULE ORAL EVERY EVENING
Status: DISCONTINUED | OUTPATIENT
Start: 2024-04-05 | End: 2024-04-08 | Stop reason: HOSPADM

## 2024-04-05 RX ORDER — GAUZE BANDAGE 2" X 2"
100 BANDAGE TOPICAL DAILY
Status: DISCONTINUED | OUTPATIENT
Start: 2024-04-05 | End: 2024-04-05

## 2024-04-05 RX ORDER — LORAZEPAM 2 MG/ML
1 INJECTION INTRAMUSCULAR
Status: DISCONTINUED | OUTPATIENT
Start: 2024-04-05 | End: 2024-04-05

## 2024-04-05 RX ORDER — LORAZEPAM 2 MG/ML
1 INJECTION INTRAMUSCULAR ONCE
Status: COMPLETED | OUTPATIENT
Start: 2024-04-05 | End: 2024-04-05

## 2024-04-05 RX ORDER — LISINOPRIL 10 MG/1
10 TABLET ORAL DAILY
COMMUNITY

## 2024-04-05 RX ORDER — PROCHLORPERAZINE EDISYLATE 5 MG/ML
5-10 INJECTION INTRAMUSCULAR; INTRAVENOUS EVERY 4 HOURS PRN
Status: DISCONTINUED | OUTPATIENT
Start: 2024-04-05 | End: 2024-04-06

## 2024-04-05 RX ORDER — LORAZEPAM 2 MG/ML
3 INJECTION INTRAMUSCULAR
Status: DISCONTINUED | OUTPATIENT
Start: 2024-04-05 | End: 2024-04-05

## 2024-04-05 RX ORDER — ENOXAPARIN SODIUM 100 MG/ML
40 INJECTION SUBCUTANEOUS DAILY
Status: DISCONTINUED | OUTPATIENT
Start: 2024-04-05 | End: 2024-04-05

## 2024-04-05 RX ORDER — LORAZEPAM 2 MG/ML
2 INJECTION INTRAMUSCULAR EVERY 4 HOURS
Status: DISCONTINUED | OUTPATIENT
Start: 2024-04-05 | End: 2024-04-06

## 2024-04-05 RX ORDER — HEPARIN SODIUM 5000 [USP'U]/100ML
0-30 INJECTION, SOLUTION INTRAVENOUS CONTINUOUS
Status: DISCONTINUED | OUTPATIENT
Start: 2024-04-05 | End: 2024-04-05

## 2024-04-05 RX ORDER — DEXTROSE MONOHYDRATE 50 MG/ML
INJECTION, SOLUTION INTRAVENOUS CONTINUOUS
Status: DISCONTINUED | OUTPATIENT
Start: 2024-04-05 | End: 2024-04-08

## 2024-04-05 RX ORDER — GAUZE BANDAGE 2" X 2"
100 BANDAGE TOPICAL DAILY
Qty: 4 TABLET | Refills: 0 | Status: DISCONTINUED | OUTPATIENT
Start: 2024-04-08 | End: 2024-04-08 | Stop reason: HOSPADM

## 2024-04-05 RX ORDER — POLYETHYLENE GLYCOL 3350 17 G/17G
1 POWDER, FOR SOLUTION ORAL
Status: DISCONTINUED | OUTPATIENT
Start: 2024-04-05 | End: 2024-04-08 | Stop reason: HOSPADM

## 2024-04-05 RX ORDER — ONDANSETRON 2 MG/ML
4 INJECTION INTRAMUSCULAR; INTRAVENOUS EVERY 4 HOURS PRN
Status: DISCONTINUED | OUTPATIENT
Start: 2024-04-05 | End: 2024-04-05

## 2024-04-05 RX ORDER — FOLIC ACID 1 MG/1
1 TABLET ORAL DAILY
Qty: 4 TABLET | Refills: 0 | Status: DISCONTINUED | OUTPATIENT
Start: 2024-04-06 | End: 2024-04-08 | Stop reason: HOSPADM

## 2024-04-05 RX ORDER — SODIUM CHLORIDE, SODIUM LACTATE, POTASSIUM CHLORIDE, AND CALCIUM CHLORIDE .6; .31; .03; .02 G/100ML; G/100ML; G/100ML; G/100ML
1500 INJECTION, SOLUTION INTRAVENOUS ONCE
Status: COMPLETED | OUTPATIENT
Start: 2024-04-05 | End: 2024-04-05

## 2024-04-05 RX ORDER — METOPROLOL SUCCINATE 50 MG/1
50 TABLET, EXTENDED RELEASE ORAL DAILY
COMMUNITY

## 2024-04-05 RX ORDER — FOLIC ACID 1 MG/1
1 TABLET ORAL DAILY
Status: DISCONTINUED | OUTPATIENT
Start: 2024-04-05 | End: 2024-04-05

## 2024-04-05 RX ORDER — PROMETHAZINE HYDROCHLORIDE 12.5 MG/1
12.5-25 SUPPOSITORY RECTAL EVERY 4 HOURS PRN
Status: DISCONTINUED | OUTPATIENT
Start: 2024-04-05 | End: 2024-04-06

## 2024-04-05 RX ORDER — HEPARIN SODIUM 1000 [USP'U]/ML
80 INJECTION, SOLUTION INTRAVENOUS; SUBCUTANEOUS ONCE
Status: DISCONTINUED | OUTPATIENT
Start: 2024-04-05 | End: 2024-04-05

## 2024-04-05 RX ORDER — ONDANSETRON 2 MG/ML
4 INJECTION INTRAMUSCULAR; INTRAVENOUS EVERY 4 HOURS PRN
Status: DISCONTINUED | OUTPATIENT
Start: 2024-04-05 | End: 2024-04-08 | Stop reason: HOSPADM

## 2024-04-05 RX ORDER — PROMETHAZINE HYDROCHLORIDE 25 MG/1
12.5-25 TABLET ORAL EVERY 4 HOURS PRN
Status: DISCONTINUED | OUTPATIENT
Start: 2024-04-05 | End: 2024-04-06

## 2024-04-05 RX ORDER — DEXMEDETOMIDINE HYDROCHLORIDE 4 UG/ML
.1-1 INJECTION, SOLUTION INTRAVENOUS CONTINUOUS
Status: DISCONTINUED | OUTPATIENT
Start: 2024-04-05 | End: 2024-04-08

## 2024-04-05 RX ORDER — AZITHROMYCIN 250 MG/1
500 TABLET, FILM COATED ORAL ONCE
Status: COMPLETED | OUTPATIENT
Start: 2024-04-05 | End: 2024-04-05

## 2024-04-05 RX ORDER — LORAZEPAM 2 MG/ML
1-2 INJECTION INTRAMUSCULAR
Status: DISCONTINUED | OUTPATIENT
Start: 2024-04-05 | End: 2024-04-08 | Stop reason: HOSPADM

## 2024-04-05 RX ORDER — TORSEMIDE 10 MG/1
10 TABLET ORAL DAILY
COMMUNITY

## 2024-04-05 RX ADMIN — LORAZEPAM 2 MG: 2 INJECTION INTRAMUSCULAR; INTRAVENOUS at 13:48

## 2024-04-05 RX ADMIN — ACETAMINOPHEN 1000 MG: 1000 INJECTION INTRAVENOUS at 07:22

## 2024-04-05 RX ADMIN — DEXTROSE MONOHYDRATE: 50 INJECTION, SOLUTION INTRAVENOUS at 07:33

## 2024-04-05 RX ADMIN — PIPERACILLIN AND TAZOBACTAM 4.5 G: 4; .5 INJECTION, POWDER, FOR SOLUTION INTRAVENOUS at 13:47

## 2024-04-05 RX ADMIN — AZITHROMYCIN DIHYDRATE 500 MG: 250 TABLET, FILM COATED ORAL at 05:32

## 2024-04-05 RX ADMIN — LORAZEPAM 2 MG: 2 INJECTION INTRAMUSCULAR; INTRAVENOUS at 21:00

## 2024-04-05 RX ADMIN — PIPERACILLIN AND TAZOBACTAM 4.5 G: 4; .5 INJECTION, POWDER, FOR SOLUTION INTRAVENOUS at 21:05

## 2024-04-05 RX ADMIN — MAGNESIUM SULFATE HEPTAHYDRATE 4 G: 4 INJECTION, SOLUTION INTRAVENOUS at 08:23

## 2024-04-05 RX ADMIN — LORAZEPAM 1 MG: 2 INJECTION INTRAMUSCULAR; INTRAVENOUS at 05:45

## 2024-04-05 RX ADMIN — LORAZEPAM 2 MG: 2 INJECTION INTRAMUSCULAR; INTRAVENOUS at 09:14

## 2024-04-05 RX ADMIN — CEFTRIAXONE SODIUM 2000 MG: 10 INJECTION, POWDER, FOR SOLUTION INTRAVENOUS at 06:53

## 2024-04-05 RX ADMIN — LORAZEPAM 2 MG: 2 INJECTION INTRAMUSCULAR; INTRAVENOUS at 17:30

## 2024-04-05 RX ADMIN — AMIODARONE HYDROCHLORIDE 150 MG: 1.5 INJECTION, SOLUTION INTRAVENOUS at 08:10

## 2024-04-05 RX ADMIN — AMIODARONE HYDROCHLORIDE 0.5 MG/MIN: 1.8 INJECTION, SOLUTION INTRAVENOUS at 13:42

## 2024-04-05 RX ADMIN — PIPERACILLIN AND TAZOBACTAM 4.5 G: 4; .5 INJECTION, POWDER, FOR SOLUTION INTRAVENOUS at 11:39

## 2024-04-05 RX ADMIN — LORAZEPAM 2 MG: 2 INJECTION INTRAMUSCULAR; INTRAVENOUS at 11:32

## 2024-04-05 RX ADMIN — AMIODARONE HYDROCHLORIDE 150 MG: 1.5 INJECTION, SOLUTION INTRAVENOUS at 07:36

## 2024-04-05 RX ADMIN — DEXMEDETOMIDINE HYDROCHLORIDE 0.2 MCG/KG/HR: 100 INJECTION, SOLUTION INTRAVENOUS at 10:03

## 2024-04-05 RX ADMIN — THIAMINE HYDROCHLORIDE 500 MG: 100 INJECTION, SOLUTION INTRAMUSCULAR; INTRAVENOUS at 17:30

## 2024-04-05 RX ADMIN — MAGNESIUM SULFATE HEPTAHYDRATE: 500 INJECTION, SOLUTION INTRAMUSCULAR; INTRAVENOUS at 04:29

## 2024-04-05 RX ADMIN — AMIODARONE HYDROCHLORIDE 1 MG/MIN: 1.8 INJECTION, SOLUTION INTRAVENOUS at 07:56

## 2024-04-05 RX ADMIN — SODIUM CHLORIDE, POTASSIUM CHLORIDE, SODIUM LACTATE AND CALCIUM CHLORIDE 1500 ML: 600; 310; 30; 20 INJECTION, SOLUTION INTRAVENOUS at 05:45

## 2024-04-05 RX ADMIN — ONDANSETRON 4 MG: 2 INJECTION INTRAMUSCULAR; INTRAVENOUS at 21:00

## 2024-04-05 RX ADMIN — LORAZEPAM 2 MG: 2 INJECTION INTRAMUSCULAR; INTRAVENOUS at 23:35

## 2024-04-05 RX ADMIN — IOHEXOL 100 ML: 350 INJECTION, SOLUTION INTRAVENOUS at 06:26

## 2024-04-05 ASSESSMENT — LIFESTYLE VARIABLES
TOTAL SCORE: 4
VISUAL DISTURBANCES: MILD SENSITIVITY
TREMOR: *
TOTAL SCORE: MILD ITCHING, PINS AND NEEDLES SENSATION, BURNING OR NUMBNESS
NAUSEA AND VOMITING: *
TOTAL SCORE: 12
TREMOR: *
TACTILE DISTURBANCES: MILD ITCHING, PINS AND NEEDLES SENSATION, BURNING OR NUMBNESS
AUDITORY DISTURBANCES: NOT PRESENT
HEADACHE, FULLNESS IN HEAD: SEVERE
AUDITORY DISTURBANCES: NOT PRESENT
ANXIETY: MILDLY ANXIOUS
ANXIETY: MILDLY ANXIOUS
HEADACHE, FULLNESS IN HEAD: MODERATELY SEVERE
AUDITORY DISTURBANCES: MILD HARSHNESS OR ABILITY TO FRIGHTEN
TREMOR: TREMOR NOT VISIBLE BUT CAN BE FELT, FINGERTIP TO FINGERTIP
NAUSEA AND VOMITING: NO NAUSEA AND NO VOMITING
TOTAL SCORE: 15
HEADACHE, FULLNESS IN HEAD: NOT PRESENT
PAROXYSMAL SWEATS: *
ORIENTATION AND CLOUDING OF SENSORIUM: ORIENTED AND CAN DO SERIAL ADDITIONS
TOTAL SCORE: 4
ANXIETY: MILDLY ANXIOUS
ORIENTATION AND CLOUDING OF SENSORIUM: ORIENTED AND CAN DO SERIAL ADDITIONS
ON A TYPICAL DAY WHEN YOU DRINK ALCOHOL HOW MANY DRINKS DO YOU HAVE: 5
TOTAL SCORE: 15
HOW MANY TIMES IN THE PAST YEAR HAVE YOU HAD 5 OR MORE DRINKS IN A DAY: 5
AGITATION: NORMAL ACTIVITY
HAVE YOU EVER FELT YOU SHOULD CUT DOWN ON YOUR DRINKING: YES
ANXIETY: MILDLY ANXIOUS
VISUAL DISTURBANCES: NOT PRESENT
DO YOU DRINK ALCOHOL: YES
AGITATION: SOMEWHAT MORE THAN NORMAL ACTIVITY
TREMOR: *
EVER FELT BAD OR GUILTY ABOUT YOUR DRINKING: YES
ORIENTATION AND CLOUDING OF SENSORIUM: ORIENTED AND CAN DO SERIAL ADDITIONS
TREMOR: *
HAVE PEOPLE ANNOYED YOU BY CRITICIZING YOUR DRINKING: YES
HEADACHE, FULLNESS IN HEAD: MODERATE
CONSUMPTION TOTAL: POSITIVE
PAROXYSMAL SWEATS: BEADS OF SWEAT OBVIOUS ON FOREHEAD
ORIENTATION AND CLOUDING OF SENSORIUM: ORIENTED AND CAN DO SERIAL ADDITIONS
PAROXYSMAL SWEATS: BEADS OF SWEAT OBVIOUS ON FOREHEAD
TOTAL SCORE: 25
PAROXYSMAL SWEATS: BEADS OF SWEAT OBVIOUS ON FOREHEAD
VISUAL DISTURBANCES: MODERATE SENSITIVITY
ORIENTATION AND CLOUDING OF SENSORIUM: ORIENTED AND CAN DO SERIAL ADDITIONS
NAUSEA AND VOMITING: MILD NAUSEA WITH NO VOMITING
HEADACHE, FULLNESS IN HEAD: MODERATELY SEVERE
NAUSEA AND VOMITING: MILD NAUSEA WITH NO VOMITING
ANXIETY: *
TOTAL SCORE: 4
VISUAL DISTURBANCES: NOT PRESENT
TOTAL SCORE: 15
NAUSEA AND VOMITING: *
AUDITORY DISTURBANCES: NOT PRESENT
AGITATION: SOMEWHAT MORE THAN NORMAL ACTIVITY
AUDITORY DISTURBANCES: NOT PRESENT
AGITATION: NORMAL ACTIVITY
VISUAL DISTURBANCES: MODERATE SENSITIVITY
AVERAGE NUMBER OF DAYS PER WEEK YOU HAVE A DRINK CONTAINING ALCOHOL: 5
EVER HAD A DRINK FIRST THING IN THE MORNING TO STEADY YOUR NERVES TO GET RID OF A HANGOVER: YES
AGITATION: NORMAL ACTIVITY
PAROXYSMAL SWEATS: BEADS OF SWEAT OBVIOUS ON FOREHEAD

## 2024-04-05 ASSESSMENT — ENCOUNTER SYMPTOMS
FEVER: 0
DIARRHEA: 0
ABDOMINAL PAIN: 0
NERVOUS/ANXIOUS: 0
VOMITING: 0
BACK PAIN: 0
HEADACHES: 0
SHORTNESS OF BREATH: 0
WEAKNESS: 0
NAUSEA: 0

## 2024-04-05 ASSESSMENT — PAIN DESCRIPTION - PAIN TYPE
TYPE: ACUTE PAIN

## 2024-04-05 ASSESSMENT — FIBROSIS 4 INDEX: FIB4 SCORE: 2.31

## 2024-04-05 NOTE — ED NOTES
Pt observed to have afib rvr with Hrs in the upper 190s. EKG done and signed by ERP. ERP , trauma RN at bedside. Pt hooked to the portable cardiac monitor. Charge RN informed.      Pt roomed to Iggy 13, bedside report to Stephon RN. Crash cart at bedside

## 2024-04-05 NOTE — ASSESSMENT & PLAN NOTE
Hx of with ablation and recent cardioversion  S/p amiodarone 300mg IV x1 and continue infusion at 1mg/hr  Cont home Apixaban 5mg BID   Optimize K >4 and Mg >2  Cont metoprolol 50mg XL PO daily  **quite refractory today after multiple doses of IV metoprolol, 2 boluses of amiodarone, and 1 dose of digoxin.  Starting diuresis today.

## 2024-04-05 NOTE — ED NOTES
Vito from Lab called with critical result of lactic 6.8 at 27802. Critical lab result read back to Vito.   Dr. Rutherford notified of critical lab result at 0524.

## 2024-04-05 NOTE — ED NOTES
Bedside report received from Stephon RN.  Patient on continuous monitor, sepsis bolus infusing, O2 sats down to 87%, switched to an oxymask at 7L.  Admitting physician and ERP at the bedside.  HR afib 's.  Patient groggy, rouses to voice.  Will continue to closely assess and monitor.

## 2024-04-05 NOTE — ASSESSMENT & PLAN NOTE
Drinks one gallon of vodka daily in the past 5 days  S/p Rally bag on 4/5  Cont MVI, thiamine, and folate  Precedex infusion for RASS of -1 to +1 if needed  Continue Librium 50mg every 8 hours  Ativan as needed  Replete electrolytes as needed

## 2024-04-05 NOTE — H&P
"Critical Care History & Physical Note    Date of Service  4/5/2024    Primary Care Physician  Mohsen Tamasaby, M.D.      Code Status  Full Code    Chief Complaint  Chief Complaint   Patient presents with    Pain     Pt reports pain on his penis. Denies discharges. +dysuria    ETOH Withdrawal     Pt reports drinking vodka today. Pt reports last drink was 10 hrs ago. Alert and oriented. Pt diaphoretic       History of Presenting Illness  Chace Gong is a 59 y.o. male with hx of HTN, ?afib, active alcohol use, BMI 34 who presented to ED after drinking a gallon of vodka daily and per ERP note, pt reported \"his wife had all of his alcohol\". Pt c/o mild chest pain, \"not feeling good\".     At ED, pt became hypotensive in 70s and HR in 180s. On 7L Oxymask. Went into afib with RVR. Pt was given 2.5L fluid boluses, cultures obtained. Received ceftriaxone and azithromycin. Pt drowsy, but arousable, following commands. Lactate 6.8, HCO3 16, creatinine 0.85. Pt was given amio bolus 300 then followed with amio gtt at 1mg/min. Mag 4gr IV x1 given. Alcohol level 208. K 4.0.   ECG c/w afib with RVR, no obvious ST elevated. Troponin 27  CT A/P negative.   My bedside echo is difficult to interpret, rapid afib, RV appears dilated but grossly normal function. Same as LVEF    Review of Systems  Review of Systems   Constitutional:  Negative for fever and malaise/fatigue.   Respiratory:  Negative for shortness of breath.    Cardiovascular:  Negative for chest pain and leg swelling.   Gastrointestinal:  Negative for abdominal pain, diarrhea, nausea and vomiting.   Musculoskeletal:  Negative for back pain.   Neurological:  Negative for weakness and headaches.   Psychiatric/Behavioral:  The patient is not nervous/anxious.    All other systems reviewed and are negative.      Past Medical History   has a past medical history of Achilles tendinitis (6/24/2009), Arthralgia (6/24/2009), Bronchitis (6/24/2009), Dyslipidemia (6/24/2009), " "GERD (gastroesophageal reflux disease) (6/24/2009), Hepatitis C, HTN (hypertension) (6/24/2009), Hypertension, Low back pain, and Psychiatric disorder.    Surgical History   has a past surgical history that includes neurolysis (1/21/2009); other orthopedic surgery (2001); carpal tunnel release (1/21/2009); hardware removal ortho (1/21/2009); synovectomy (1/21/2009); arthrotomy (2011); arthroscopy, knee (2013); elbow arthrotomy (5/17/2013); and loose body removal (5/17/2013).     Family History  family history includes Alcohol/Drug in his brother, brother, father, and sister; Cancer in his brother, mother, and sister; Diabetes in his sister; Hyperlipidemia in his father; Hypertension in his brother, father, mother, and sister; Stroke in his brother and father.   Family history reviewed with patient. There is no family history that is pertinent to the chief complaint.     Social History   reports that he has been smoking cigarettes. He started smoking about 8 years ago. He has a 15 pack-year smoking history. He has never used smokeless tobacco. He reports current alcohol use. He reports current drug use. Drugs: Inhaled and Marijuana.    Allergies  Allergies   Allergen Reactions    Penicillins Unspecified     Pt states that his mom told him he is allergic to penicillins.    Hydrocodone Unspecified     Pt states \"I'm not sure\".    Sulfa Drugs      \"my mom told me I was allergic to it when i was a kid\"       Medications  Prior to Admission Medications   Prescriptions Last Dose Informant Patient Reported? Taking?   ARIPiprazole (ABILIFY) 10 MG Tab   No No   Sig: Take 1 Tab by mouth every day.   benzonatate (TESSALON) 100 MG Cap   No No   Sig: Take 1 Cap by mouth 3 times a day as needed for Cough.   diphenhydrAMINE (BENADRYL) 25 MG capsule   No No   Sig: Take 1 Cap by mouth at bedtime as needed for Sleep.   folic acid (FOLVITE) 1 MG Tab   No No   Sig: Take 1 Tab by mouth every day.   gabapentin (NEURONTIN) 300 MG Cap   No " No   Sig: Take 1 tablet 4 times a day on the first day, 1 tablet 3 times a day on the second day, 1tablet twice on the third day, and 1 tablet on the fourth day   guaiFENesin dextromethorphan (ROBITUSSIN DM) 100-10 MG/5ML Syrup syrup   No No   Sig: Take 10 mL by mouth every 6 hours as needed for Cough.   lisinopril (PRINIVIL) 20 MG Tab  Patient Yes No   Sig: Take 20 mg by mouth every evening.   multivitamin (THERAGRAN) Tab   No No   Sig: Take 1 Tab by mouth every day.   nicotine (NICODERM) 21 MG/24HR PATCH 24 HR   No No   Sig: Apply 1 Patch to skin as directed every 24 hours.   tamsulosin (FLOMAX) 0.4 MG capsule  Patient No No   Sig: Take 1 Cap by mouth every day.   thiamine (THIAMINE) 100 MG tablet   No No   Sig: Take 1 Tab by mouth every day.      Facility-Administered Medications: None       Physical Exam  Temp:  [36.7 °C (98 °F)-37.6 °C (99.7 °F)] 37.6 °C (99.7 °F)  Pulse:  [105-202] 186  Resp:  [15-44] 44  BP: ()/(53-80) 104/63  SpO2:  [89 %-98 %] 92 %  Blood Pressure: 104/63   Temperature: 37.6 °C (99.7 °F)   Pulse: (!) 186   Respiration: (!) 44   Pulse Oximetry: 92 %       Physical Exam  Vitals and nursing note reviewed.   Constitutional:       General: He is not in acute distress.     Appearance: He is ill-appearing and toxic-appearing. He is not diaphoretic.   HENT:      Head: Normocephalic.   Cardiovascular:      Rate and Rhythm: Normal rate and regular rhythm.      Pulses: Normal pulses.      Heart sounds: Normal heart sounds. No murmur heard.  Pulmonary:      Effort: Pulmonary effort is normal. No respiratory distress.      Breath sounds: Normal breath sounds. No wheezing, rhonchi or rales.   Abdominal:      General: Bowel sounds are normal. There is no distension.      Palpations: Abdomen is soft.      Tenderness: There is no abdominal tenderness. There is no guarding.   Musculoskeletal:         General: No swelling or tenderness.      Cervical back: Neck supple.      Right lower leg: Edema  present.      Left lower leg: Edema present.   Skin:     General: Skin is warm and dry.      Capillary Refill: Capillary refill takes 2 to 3 seconds.      Coloration: Skin is not jaundiced.   Neurological:      General: No focal deficit present.      Mental Status: He is alert.      Cranial Nerves: No cranial nerve deficit.      Comments: Moving extremiteis, nonfocal         Laboratory:  Recent Labs     04/05/24  0354   WBC 19.7*   RBC 5.32   HEMOGLOBIN 17.8   HEMATOCRIT 49.0   MCV 92.1   MCH 33.5*   MCHC 36.3   RDW 45.8   PLATELETCT 186   MPV 10.6     Recent Labs     04/05/24  0354   SODIUM 131*   POTASSIUM 4.0   CHLORIDE 91*   CO2 16*   GLUCOSE 182*   BUN 29*   CREATININE 0.85   CALCIUM 8.0*     Recent Labs     04/05/24  0354   ALTSGPT 39   ASTSGOT 38   ALKPHOSPHAT 65   TBILIRUBIN 0.9   LIPASE 20   GLUCOSE 182*         Recent Labs     04/05/24  0354   NTPROBNP 291*         Recent Labs     04/05/24  0354   TROPONINT 27*       Imaging:  CT-ABDOMEN-PELVIS WITH   Final Result         1.  Minimal patchy opacities in bilateral lung bases, appearance suggests subtle infiltrate   2.  Irregular hepatic contour compatible with cirrhosis   3.  Changes of hepatic steatosis   4.  Atherosclerosis and atherosclerotic coronary artery disease   5.  Fat-containing bilateral inguinal hernias   6.  Fat-containing umbilical hernia      DX-CHEST-PORTABLE (1 VIEW)   Final Result         1.  No focal infiltrates.   2.  Perihilar interstitial prominence and bronchial wall cuffing, appearance suggests changes of underlying bronchial inflammation, consider bronchitis.      EC-ECHOCARDIOGRAM COMPLETE W/O CONT    (Results Pending)       X-Ray:  I have personally reviewed the images and compared with prior images.    Assessment/Plan:      * Sepsis (HCC)- (present on admission)  Assessment & Plan  This is Sepsis Present on admission  SIRS criteria identified on my evaluation include: Tachycardia, with heart rate greater than 90 BPM and  Leukocytosis, with WBC greater than 12,000  Clinical indicators of end organ dysfunction include Hypotension with systolic blood pressure less than 90 or MAP less than 65 and Lactic Acid greater than 2  Source is undifferentiated  Sepsis protocol initiated  Crystalloid Fluid Administration: Fluid resuscitation ordered per standard protocol - 30 mL/kg per current or ideal body weight  IV antibiotics as appropriate for source of sepsis  Reassessment: I have reassessed the patient's hemodynamic status    Pt was given 2.5L fluid in ED.   Blood cultures obtained.   Start piptazo  COVID/flu/RSV  MRSA nasal screen   Procal 0.55  TTE  Monitor UOP    Atrial fibrillation (HCC)  Assessment & Plan  Afib with RVR  Pt reported he had cardioversion last week at [facility] and is not on any anticoagulation. I couldn't find the chart.   Pt was given amiodarone 300mg IV x1 and followed with gtt.   Mag 4grm IV x1 given.   Start heparin gtt.   Keep K >4 and mg >2    Alcohol withdrawal (HCC)- (present on admission)  Assessment & Plan  Drinks one gallon of vodka daily in the past 5 days  Pt is having some tremors, though alert and appropriate at this time.   Will start precedex/ativan ICU protocol, titrate to RASS goal   Cheo bag and multivitmains        VTE prophylaxis: therapeutic anticoagulation with heparin gtt    The patient remains critically ill. Critical care time = 65 mins in directly providing and coordinating critical care and extensive data review. No time overlap and excludes procedures.

## 2024-04-05 NOTE — PROGRESS NOTES
4 Eyes Skin Assessment Completed by YAEL Perdomo and YAEL Carbajal.    Head right cheek abrasion    Ears Redness and Blanching  Nose Redness  Mouth WDL  Neck WDL  Breast/Chest Redness  Shoulder Blades Redness  Spine Redness  (R) Arm/Elbow/Hand Redness, Blanching, Bruising, and Abrasion, redness but blanching from BP cuff  (L) Arm/Elbow/Hand Redness, Blanching, Bruising, and Abrasion  Abdomen WDL  Groin WDL  Scrotum/Coccyx/Buttocks Redness and Blanching  (R) Leg Scab and Abrasion  (L) Leg Scab and Abrasion    (R) Heel/Foot/Toe Redness, Blanching, Boggy, and Ulcer(s), right 2nd toe ulcer  (L) Heel/Foot/Toe Redness, Blanching, Boggy, Ulcer(s), and Bruising                      Devices In Places ECG, Tele Box, Blood Pressure Cuff, Pulse Ox, and Oxy Mask      Interventions In Place Gray Ear Foams, Heel Mepilex, Q2 Turns, Low Air Loss Mattress, and Heels Loaded W/Pillows    Possible Skin Injury Yes    Pictures Uploaded Into Epic Yes  Wound Consult Placed Yes  RN Wound Prevention Protocol Ordered Yes

## 2024-04-05 NOTE — CARE PLAN
The patient is Watcher - Medium risk of patient condition declining or worsening    Shift Goals  Clinical Goals: Stable Hemodynamics, control HR, and withdrawl symptoms  Patient Goals: Sleep throught the detox  Family Goals: ATUL    Progress made toward(s) clinical / shift goals:    Problem: Pain - Standard  Goal: Alleviation of pain or a reduction in pain to the patient’s comfort goal  Outcome: Progressing     Problem: Optimal Care for Alcohol Withdrawal  Goal: Optimal Care for the alcohol withdrawal patient  Outcome: Progressing     Problem: Seizure Precautions  Goal: Implementation of seizure precautions  Outcome: Progressing     Problem: Risk for Aspiration  Goal: Patient's risk for aspiration will be absent or decrease  Outcome: Progressing     Problem: Skin Integrity  Goal: Skin integrity is maintained or improved  Outcome: Progressing       Patient is not progressing towards the following goals:

## 2024-04-05 NOTE — PROGRESS NOTES
Med Rec complete per pharmacy   Allergies reviewed  Antibiotics in the past 30 days:no  Anticoagulant in past 14 days:yes  Anticoagulant:Xarelto 20 mg Last dose:04/04/24  Pharmacy patient utilizes:[facility] in [place]    Pt was unable to verify names of medications he takes. States he takes 5 medications. States he took them yesterday.    Verified with pharmacy current medications.     Notified [facility] in regards to Xarelto

## 2024-04-05 NOTE — ED NOTES
HR remains afib -190's.  CIWA 25.  Patient admits to drinking 5 gallons of vodka for the last week.  Dr. Shukla notified.

## 2024-04-05 NOTE — ASSESSMENT & PLAN NOTE
This is Sepsis Present on admission  SIRS criteria identified on my evaluation include: Tachycardia, with heart rate greater than 90 BPM and Leukocytosis, with WBC greater than 12,000  Clinical indicators of end organ dysfunction include Hypotension with systolic blood pressure less than 90 or MAP less than 65 and Lactic Acid greater than 2  Source is undifferentiated  Sepsis protocol initiated  Crystalloid Fluid Administration: Fluid resuscitation ordered per standard protocol - 30 mL/kg per current or ideal body weight  IV antibiotics as appropriate for source of sepsis  Reassessment: I have reassessed the patient's hemodynamic status    S/p 2.5L fluids in ED.   Follow cultures  Broad spectrum abx: zosyn x 1 day and then switched to unasyn/azithromycin, cont ab for 5 days  COVID/flu/RSV negative  MRSA nasal screen negative  Trend lactic acid  Improving

## 2024-04-06 PROBLEM — A53.9 SYPHILIS: Status: ACTIVE | Noted: 2024-04-06

## 2024-04-06 PROBLEM — I50.20 HFREF (HEART FAILURE WITH REDUCED EJECTION FRACTION) (HCC): Status: ACTIVE | Noted: 2024-04-06

## 2024-04-06 PROBLEM — J96.01 ACUTE RESPIRATORY FAILURE WITH HYPOXIA (HCC): Status: ACTIVE | Noted: 2024-04-06

## 2024-04-06 PROBLEM — J44.1 ACUTE EXACERBATION OF CHRONIC OBSTRUCTIVE PULMONARY DISEASE (COPD) (HCC): Status: ACTIVE | Noted: 2024-04-06

## 2024-04-06 LAB
ANION GAP SERPL CALC-SCNC: 12 MMOL/L (ref 7–16)
BASOPHILS # BLD AUTO: 0.3 % (ref 0–1.8)
BASOPHILS # BLD: 0.04 K/UL (ref 0–0.12)
BUN SERPL-MCNC: 24 MG/DL (ref 8–22)
C TRACH DNA SPEC QL NAA+PROBE: NEGATIVE
CALCIUM SERPL-MCNC: 8.3 MG/DL (ref 8.5–10.5)
CHLORIDE SERPL-SCNC: 96 MMOL/L (ref 96–112)
CO2 SERPL-SCNC: 25 MMOL/L (ref 20–33)
CREAT SERPL-MCNC: 0.84 MG/DL (ref 0.5–1.4)
EKG IMPRESSION: NORMAL
EOSINOPHIL # BLD AUTO: 0.09 K/UL (ref 0–0.51)
EOSINOPHIL NFR BLD: 0.7 % (ref 0–6.9)
ERYTHROCYTE [DISTWIDTH] IN BLOOD BY AUTOMATED COUNT: 45.7 FL (ref 35.9–50)
GFR SERPLBLD CREATININE-BSD FMLA CKD-EPI: 101 ML/MIN/1.73 M 2
GLUCOSE SERPL-MCNC: 98 MG/DL (ref 65–99)
HCT VFR BLD AUTO: 42.1 % (ref 42–52)
HGB BLD-MCNC: 15.1 G/DL (ref 14–18)
IMM GRANULOCYTES # BLD AUTO: 0.06 K/UL (ref 0–0.11)
IMM GRANULOCYTES NFR BLD AUTO: 0.5 % (ref 0–0.9)
LACTATE SERPL-SCNC: 1 MMOL/L (ref 0.5–2)
LYMPHOCYTES # BLD AUTO: 2.49 K/UL (ref 1–4.8)
LYMPHOCYTES NFR BLD: 19.1 % (ref 22–41)
MAGNESIUM SERPL-MCNC: 2.7 MG/DL (ref 1.5–2.5)
MCH RBC QN AUTO: 33.1 PG (ref 27–33)
MCHC RBC AUTO-ENTMCNC: 35.9 G/DL (ref 32.3–36.5)
MCV RBC AUTO: 92.3 FL (ref 81.4–97.8)
MONOCYTES # BLD AUTO: 0.54 K/UL (ref 0–0.85)
MONOCYTES NFR BLD AUTO: 4.1 % (ref 0–13.4)
N GONORRHOEA DNA SPEC QL NAA+PROBE: NEGATIVE
NEUTROPHILS # BLD AUTO: 9.82 K/UL (ref 1.82–7.42)
NEUTROPHILS NFR BLD: 75.3 % (ref 44–72)
NRBC # BLD AUTO: 0 K/UL
NRBC BLD-RTO: 0 /100 WBC (ref 0–0.2)
PLATELET # BLD AUTO: 125 K/UL (ref 164–446)
PMV BLD AUTO: 9.5 FL (ref 9–12.9)
POTASSIUM SERPL-SCNC: 4.8 MMOL/L (ref 3.6–5.5)
RBC # BLD AUTO: 4.56 M/UL (ref 4.7–6.1)
RPR SER QL: NON REACTIVE
SODIUM SERPL-SCNC: 133 MMOL/L (ref 135–145)
SPECIMEN SOURCE: NORMAL
T PALLIDUM AB SER QL IA: REACTIVE
TSH SERPL DL<=0.005 MIU/L-ACNC: 1.11 UIU/ML (ref 0.38–5.33)
WBC # BLD AUTO: 13 K/UL (ref 4.8–10.8)

## 2024-04-06 PROCEDURE — 700105 HCHG RX REV CODE 258: Performed by: INTERNAL MEDICINE

## 2024-04-06 PROCEDURE — 86592 SYPHILIS TEST NON-TREP QUAL: CPT

## 2024-04-06 PROCEDURE — 700111 HCHG RX REV CODE 636 W/ 250 OVERRIDE (IP): Mod: JZ | Performed by: INTERNAL MEDICINE

## 2024-04-06 PROCEDURE — A9270 NON-COVERED ITEM OR SERVICE: HCPCS | Performed by: INTERNAL MEDICINE

## 2024-04-06 PROCEDURE — 94640 AIRWAY INHALATION TREATMENT: CPT

## 2024-04-06 PROCEDURE — 770022 HCHG ROOM/CARE - ICU (200)

## 2024-04-06 PROCEDURE — 87591 N.GONORRHOEAE DNA AMP PROB: CPT

## 2024-04-06 PROCEDURE — 86780 TREPONEMA PALLIDUM: CPT

## 2024-04-06 PROCEDURE — 93005 ELECTROCARDIOGRAM TRACING: CPT | Performed by: INTERNAL MEDICINE

## 2024-04-06 PROCEDURE — 83605 ASSAY OF LACTIC ACID: CPT

## 2024-04-06 PROCEDURE — 85025 COMPLETE CBC W/AUTO DIFF WBC: CPT

## 2024-04-06 PROCEDURE — 11055 PARING/CUTG B9 HYPRKER LES 1: CPT

## 2024-04-06 PROCEDURE — 84443 ASSAY THYROID STIM HORMONE: CPT

## 2024-04-06 PROCEDURE — 700111 HCHG RX REV CODE 636 W/ 250 OVERRIDE (IP)

## 2024-04-06 PROCEDURE — 700101 HCHG RX REV CODE 250

## 2024-04-06 PROCEDURE — 700102 HCHG RX REV CODE 250 W/ 637 OVERRIDE(OP)

## 2024-04-06 PROCEDURE — 99406 BEHAV CHNG SMOKING 3-10 MIN: CPT

## 2024-04-06 PROCEDURE — 83735 ASSAY OF MAGNESIUM: CPT

## 2024-04-06 PROCEDURE — 93010 ELECTROCARDIOGRAM REPORT: CPT | Performed by: INTERNAL MEDICINE

## 2024-04-06 PROCEDURE — 700102 HCHG RX REV CODE 250 W/ 637 OVERRIDE(OP): Performed by: INTERNAL MEDICINE

## 2024-04-06 PROCEDURE — 99292 CRITICAL CARE ADDL 30 MIN: CPT | Performed by: INTERNAL MEDICINE

## 2024-04-06 PROCEDURE — 94669 MECHANICAL CHEST WALL OSCILL: CPT

## 2024-04-06 PROCEDURE — 93005 ELECTROCARDIOGRAM TRACING: CPT

## 2024-04-06 PROCEDURE — A9270 NON-COVERED ITEM OR SERVICE: HCPCS

## 2024-04-06 PROCEDURE — 99291 CRITICAL CARE FIRST HOUR: CPT | Performed by: INTERNAL MEDICINE

## 2024-04-06 PROCEDURE — 87491 CHLMYD TRACH DNA AMP PROBE: CPT

## 2024-04-06 PROCEDURE — 80048 BASIC METABOLIC PNL TOTAL CA: CPT

## 2024-04-06 PROCEDURE — 700101 HCHG RX REV CODE 250: Performed by: INTERNAL MEDICINE

## 2024-04-06 RX ORDER — CHLORDIAZEPOXIDE HYDROCHLORIDE 25 MG/1
50 CAPSULE, GELATIN COATED ORAL EVERY 8 HOURS
Status: DISCONTINUED | OUTPATIENT
Start: 2024-04-06 | End: 2024-04-08 | Stop reason: HOSPADM

## 2024-04-06 RX ORDER — IPRATROPIUM BROMIDE AND ALBUTEROL SULFATE 2.5; .5 MG/3ML; MG/3ML
3 SOLUTION RESPIRATORY (INHALATION)
Status: DISCONTINUED | OUTPATIENT
Start: 2024-04-06 | End: 2024-04-07

## 2024-04-06 RX ORDER — METOPROLOL SUCCINATE 25 MG/1
50 TABLET, EXTENDED RELEASE ORAL
Status: DISCONTINUED | OUTPATIENT
Start: 2024-04-06 | End: 2024-04-08 | Stop reason: HOSPADM

## 2024-04-06 RX ORDER — AZITHROMYCIN 500 MG/5ML
500 INJECTION, POWDER, LYOPHILIZED, FOR SOLUTION INTRAVENOUS EVERY 24 HOURS
Qty: 500 ML | Refills: 0 | Status: DISCONTINUED | OUTPATIENT
Start: 2024-04-06 | End: 2024-04-06

## 2024-04-06 RX ORDER — AZITHROMYCIN 250 MG/1
500 TABLET, FILM COATED ORAL DAILY
Status: COMPLETED | OUTPATIENT
Start: 2024-04-07 | End: 2024-04-07

## 2024-04-06 RX ORDER — LISINOPRIL 10 MG/1
10 TABLET ORAL
Status: DISCONTINUED | OUTPATIENT
Start: 2024-04-06 | End: 2024-04-08 | Stop reason: HOSPADM

## 2024-04-06 RX ORDER — METOPROLOL TARTRATE 1 MG/ML
5 INJECTION, SOLUTION INTRAVENOUS
Status: COMPLETED | OUTPATIENT
Start: 2024-04-06 | End: 2024-04-07

## 2024-04-06 RX ORDER — METHYLPREDNISOLONE SODIUM SUCCINATE 125 MG/2ML
62.5 INJECTION, POWDER, LYOPHILIZED, FOR SOLUTION INTRAMUSCULAR; INTRAVENOUS EVERY 8 HOURS
Status: DISCONTINUED | OUTPATIENT
Start: 2024-04-06 | End: 2024-04-08

## 2024-04-06 RX ORDER — IPRATROPIUM BROMIDE AND ALBUTEROL SULFATE 2.5; .5 MG/3ML; MG/3ML
3 SOLUTION RESPIRATORY (INHALATION)
Status: DISCONTINUED | OUTPATIENT
Start: 2024-04-06 | End: 2024-04-08 | Stop reason: HOSPADM

## 2024-04-06 RX ADMIN — METHYLPREDNISOLONE SODIUM SUCCINATE 62.5 MG: 125 INJECTION, POWDER, FOR SOLUTION INTRAMUSCULAR; INTRAVENOUS at 21:27

## 2024-04-06 RX ADMIN — METOPROLOL TARTRATE 5 MG: 5 INJECTION INTRAVENOUS at 21:26

## 2024-04-06 RX ADMIN — LORAZEPAM 2 MG: 2 INJECTION INTRAMUSCULAR; INTRAVENOUS at 02:03

## 2024-04-06 RX ADMIN — AMIODARONE HYDROCHLORIDE 150 MG: 1.5 INJECTION, SOLUTION INTRAVENOUS at 20:34

## 2024-04-06 RX ADMIN — AMPICILLIN AND SULBACTAM 3 G: 1; 2 INJECTION, POWDER, FOR SOLUTION INTRAMUSCULAR; INTRAVENOUS at 11:36

## 2024-04-06 RX ADMIN — CHLORDIAZEPOXIDE HYDROCHLORIDE 50 MG: 25 CAPSULE ORAL at 10:02

## 2024-04-06 RX ADMIN — METOPROLOL SUCCINATE 50 MG: 25 TABLET, EXTENDED RELEASE ORAL at 08:52

## 2024-04-06 RX ADMIN — METHYLPREDNISOLONE SODIUM SUCCINATE 62.5 MG: 125 INJECTION, POWDER, FOR SOLUTION INTRAMUSCULAR; INTRAVENOUS at 15:13

## 2024-04-06 RX ADMIN — LORAZEPAM 2 MG: 2 INJECTION INTRAMUSCULAR; INTRAVENOUS at 05:42

## 2024-04-06 RX ADMIN — IPRATROPIUM BROMIDE AND ALBUTEROL SULFATE 3 ML: 2.5; .5 SOLUTION RESPIRATORY (INHALATION) at 18:24

## 2024-04-06 RX ADMIN — RIVAROXABAN 20 MG: 20 TABLET, FILM COATED ORAL at 18:10

## 2024-04-06 RX ADMIN — THERA TABS 1 TABLET: TAB at 05:42

## 2024-04-06 RX ADMIN — LISINOPRIL 10 MG: 10 TABLET ORAL at 08:52

## 2024-04-06 RX ADMIN — AMPICILLIN AND SULBACTAM 3 G: 1; 2 INJECTION, POWDER, FOR SOLUTION INTRAMUSCULAR; INTRAVENOUS at 23:14

## 2024-04-06 RX ADMIN — LORAZEPAM 2 MG: 2 INJECTION INTRAMUSCULAR; INTRAVENOUS at 19:27

## 2024-04-06 RX ADMIN — AMIODARONE HYDROCHLORIDE 0.5 MG/MIN: 1.8 INJECTION, SOLUTION INTRAVENOUS at 01:13

## 2024-04-06 RX ADMIN — LORAZEPAM 2 MG: 2 INJECTION INTRAMUSCULAR; INTRAVENOUS at 15:11

## 2024-04-06 RX ADMIN — DEXTROSE MONOHYDRATE: 50 INJECTION, SOLUTION INTRAVENOUS at 08:53

## 2024-04-06 RX ADMIN — FOLIC ACID 1 MG: 1 TABLET ORAL at 05:41

## 2024-04-06 RX ADMIN — DEXTROSE MONOHYDRATE: 50 INJECTION, SOLUTION INTRAVENOUS at 01:12

## 2024-04-06 RX ADMIN — IPRATROPIUM BROMIDE AND ALBUTEROL SULFATE 3 ML: 2.5; .5 SOLUTION RESPIRATORY (INHALATION) at 21:59

## 2024-04-06 RX ADMIN — AMPICILLIN AND SULBACTAM 3 G: 1; 2 INJECTION, POWDER, FOR SOLUTION INTRAMUSCULAR; INTRAVENOUS at 18:06

## 2024-04-06 RX ADMIN — AMIODARONE HYDROCHLORIDE 0.5 MG/MIN: 1.8 INJECTION, SOLUTION INTRAVENOUS at 13:09

## 2024-04-06 RX ADMIN — AZITHROMYCIN DIHYDRATE 500 MG: 500 INJECTION, POWDER, LYOPHILIZED, FOR SOLUTION INTRAVENOUS at 10:02

## 2024-04-06 RX ADMIN — METOPROLOL TARTRATE 5 MG: 5 INJECTION INTRAVENOUS at 21:56

## 2024-04-06 RX ADMIN — PIPERACILLIN AND TAZOBACTAM 4.5 G: 4; .5 INJECTION, POWDER, FOR SOLUTION INTRAVENOUS at 05:24

## 2024-04-06 RX ADMIN — CHLORDIAZEPOXIDE HYDROCHLORIDE 50 MG: 25 CAPSULE ORAL at 21:26

## 2024-04-06 RX ADMIN — METHYLPREDNISOLONE SODIUM SUCCINATE 62.5 MG: 125 INJECTION, POWDER, FOR SOLUTION INTRAMUSCULAR; INTRAVENOUS at 08:52

## 2024-04-06 RX ADMIN — THIAMINE HYDROCHLORIDE 500 MG: 100 INJECTION, SOLUTION INTRAMUSCULAR; INTRAVENOUS at 18:41

## 2024-04-06 ASSESSMENT — LIFESTYLE VARIABLES
CONSUMPTION TOTAL: POSITIVE
SUBSTANCE_ABUSE: 1
TOTAL SCORE: 4
EVER_SMOKED: YES
DOES PATIENT WANT TO STOP DRINKING: NO
EVER FELT BAD OR GUILTY ABOUT YOUR DRINKING: YES
TOTAL SCORE: 4
HAVE PEOPLE ANNOYED YOU BY CRITICIZING YOUR DRINKING: YES
AVERAGE NUMBER OF DAYS PER WEEK YOU HAVE A DRINK CONTAINING ALCOHOL: 5
TOTAL SCORE: 4
ON A TYPICAL DAY WHEN YOU DRINK ALCOHOL HOW MANY DRINKS DO YOU HAVE: 5
HOW MANY TIMES IN THE PAST YEAR HAVE YOU HAD 5 OR MORE DRINKS IN A DAY: 5
HAVE YOU EVER FELT YOU SHOULD CUT DOWN ON YOUR DRINKING: YES
EVER HAD A DRINK FIRST THING IN THE MORNING TO STEADY YOUR NERVES TO GET RID OF A HANGOVER: YES
ALCOHOL_USE: YES

## 2024-04-06 ASSESSMENT — ENCOUNTER SYMPTOMS
DIZZINESS: 0
CHILLS: 0
VOMITING: 0
WEAKNESS: 0
NECK PAIN: 0
HEADACHES: 0
SORE THROAT: 0
PALPITATIONS: 0
CONSTIPATION: 0
TINGLING: 0
COUGH: 0
ABDOMINAL PAIN: 0
NERVOUS/ANXIOUS: 1
DIARRHEA: 0
FEVER: 0
NAUSEA: 0
BLURRED VISION: 0
DOUBLE VISION: 0
SHORTNESS OF BREATH: 0
HEARTBURN: 0
INSOMNIA: 0
BACK PAIN: 0

## 2024-04-06 ASSESSMENT — CHA2DS2 SCORE
DIABETES: NO
VASCULAR DISEASE: NO
CHA2DS2 VASC SCORE: 2
AGE 75 OR GREATER: NO
SEX: MALE
PRIOR STROKE OR TIA OR THROMBOEMBOLISM: NO
HYPERTENSION: YES
CHF OR LEFT VENTRICULAR DYSFUNCTION: YES
AGE 65 TO 74: NO

## 2024-04-06 ASSESSMENT — PAIN DESCRIPTION - PAIN TYPE
TYPE: ACUTE PAIN

## 2024-04-06 ASSESSMENT — FIBROSIS 4 INDEX: FIB4 SCORE: 2.31

## 2024-04-06 ASSESSMENT — COGNITIVE AND FUNCTIONAL STATUS - GENERAL
MOVING FROM LYING ON BACK TO SITTING ON SIDE OF FLAT BED: A LITTLE
HELP NEEDED FOR BATHING: A LOT
STANDING UP FROM CHAIR USING ARMS: A LOT
DRESSING REGULAR UPPER BODY CLOTHING: A LOT
DAILY ACTIVITIY SCORE: 17
CLIMB 3 TO 5 STEPS WITH RAILING: TOTAL
SUGGESTED CMS G CODE MODIFIER MOBILITY: CL
DRESSING REGULAR LOWER BODY CLOTHING: A LITTLE
SUGGESTED CMS G CODE MODIFIER DAILY ACTIVITY: CK
MOBILITY SCORE: 14
WALKING IN HOSPITAL ROOM: A LOT
TOILETING: A LOT
MOVING TO AND FROM BED TO CHAIR: A LITTLE
TURNING FROM BACK TO SIDE WHILE IN FLAT BAD: A LITTLE

## 2024-04-06 NOTE — PROGRESS NOTES
Monitor Summary    Flutter/Fib RVR , conversion to sinus rhythm around 0815 through rest of the day.  Sinus 70's to 90's.

## 2024-04-06 NOTE — ASSESSMENT & PLAN NOTE
No PFTs on file including this chart and merged chart however the patient reports that he has a history of COPD and does take inhalers as needed  Wheezing auscultated on exam, initiate COPD exacerbation order set  Schedule methylprednisolone 62.5 mg every 8 hours with scheduled and as needed DuoNebs  Start azithromycin, QTc reviewed  Patient will likely need PFT and pulmonary follow-up

## 2024-04-06 NOTE — ASSESSMENT & PLAN NOTE
SBP goals < 140  Cont metoprolol 50mg XL PO daily  Restart lisinopril 10mg PO daily  Prn labetalol/hydralazine  May benefit from clonidine while going through alcohol withdrawals

## 2024-04-06 NOTE — CARE PLAN
The patient is Watcher - Medium risk of patient condition declining or worsening    Shift Goals  Clinical Goals: Monitor for S&S of etoh withdrawl  Patient Goals: Eat food  Family Goals: ATUL    Progress made toward(s) clinical / shift goals:        Problem: Pain - Standard  Goal: Alleviation of pain or a reduction in pain to the patient’s comfort goal  Outcome: Progressing     Problem: Optimal Care for Alcohol Withdrawal  Goal: Optimal Care for the alcohol withdrawal patient  Outcome: Progressing     Problem: Skin Integrity  Goal: Skin integrity is maintained or improved  Outcome: Progressing     Problem: Fall Risk  Goal: Patient will remain free from falls  Outcome: Progressing

## 2024-04-06 NOTE — CARE PLAN
Problem: Bronchoconstriction  Goal: Improve in air movement and diminished wheezing  Description: Target End Date:  2 to 3 days    1.  Implement inhaled treatments  2.  Evaluate and manage medication effects  Outcome: Not Met     Problem: Bronchopulmonary Hygiene  Goal: Increase mobilization of retained secretions  Description: Target End Date:  2 to 3 days    1.  Perform bronchopulmonary therapy as indicated by assessment  2.  Perform airway suctioning  3.  Perform actions to maintain patient airway  Outcome: Not Met   SVN  CPT

## 2024-04-06 NOTE — ASSESSMENT & PLAN NOTE
New diagnosis of COPD  COPD protocols  RT/O2 protocols  Scheduled steroids of methylprednisolone 62.5mg every 8 hours  Scheduled and prn bronchodilators  Unasyn/azithromycin for possible bronchitis

## 2024-04-06 NOTE — ASSESSMENT & PLAN NOTE
History of ablation and recent cardioversion on 3/15/2024  Optimize electrolytes, potassium goal above 4 magnesium goal above 2  Received digoxin  Continue amiodarone infusion, Xarelto 20 mg nightly, and metoprolol 50 mg daily  Continue IV Lasix, noted improvement of heart rate

## 2024-04-06 NOTE — ASSESSMENT & PLAN NOTE
Ongoing testing to confirm that the patient actually has active syphilis as the patient has a history of previously treated syphilis  Gonorrhea/chlamydia was negative  Await additional testing of his syphilis

## 2024-04-06 NOTE — ASSESSMENT & PLAN NOTE
Encouraged smoking cessation.  Patient reports that he stopped smoking just prior to this admission  Nicotine replacement therapy is available if needed  Provided counseling to the patient

## 2024-04-06 NOTE — PROGRESS NOTES
Got a call from monitor tech that patient converted to aflutter. HR now 146, sustaining. Pt resting and not in any distress. MD notified.     [id number]- EKG tech at bedside.

## 2024-04-06 NOTE — PROGRESS NOTES
"Critical Care Progress Note    Date of admission  4/5/2024    Chief Complaint  59 y.o. male admitted 4/5/2024 with severe alcohol withdrawal and atrial fibrillation with RVR    Hospital Course  Mr. Gong is a 59 year old male with the past medical history of alcohol abuse, atrial fibrillation, hypertension, and hepatitis C who presented to the ER on 4/5 with complaints of \"not feeling well\" after getting his alcohol taken away by his wife.  The patient reportedly drinks a \"gallon of vodka daily\".  He was found to be in severe alcohol withdrawal and atrial fibrillation with RVR.  He was admitted to the ICU for ongoing care with Precedex and Amiodarone.    Interval Problem Update  Reviewed last 24 hour events:   - episode of a flutter and AF with RVR now is ST   - a/ox4 this am   - hallucinating overnight    - SR/ST 90-100s   - -160s   - Tmax 98   - O2 at 5 lpm NC   - BM pta   - UOP of 1800cc ovneight, codnon   - amio at 0.5mg/hr   - D5 at 30cc/hr   - edge of bedstanidng   - no cxr today   - xarelto   - day 2 of abx: changed to unasyn/azithromycin   - started steroids   - WBCs 13   - Na 133   - creat 0.84   - Mg 2.7   - lactic acid 1      Review of Systems  Review of Systems   Unable to perform ROS: Critical illness        Vital Signs for last 24 hours   Temp:  [36.1 °C (97 °F)-37.6 °C (99.7 °F)] 36.7 °C (98 °F)  Pulse:  [] 144  Resp:  [20-44] 25  BP: (101-185)/() 150/104  SpO2:  [90 %-99 %] 98 %    Hemodynamic parameters for last 24 hours       Respiratory Information for the last 24 hours       Physical Exam   Physical Exam  Vitals and nursing note reviewed.   Constitutional:       General: He is not in acute distress.     Appearance: He is ill-appearing. He is not toxic-appearing.   HENT:      Head: Normocephalic and atraumatic.      Right Ear: External ear normal.      Left Ear: External ear normal.      Nose: Nose normal. No rhinorrhea.      Mouth/Throat:      Mouth: Mucous membranes are dry. "      Pharynx: Oropharynx is clear. No oropharyngeal exudate.   Eyes:      General: No scleral icterus.     Conjunctiva/sclera: Conjunctivae normal.      Pupils: Pupils are equal, round, and reactive to light.   Cardiovascular:      Rate and Rhythm: Tachycardia present. Rhythm irregularly irregular.      Pulses: Normal pulses.           Radial pulses are 2+ on the right side and 2+ on the left side.        Dorsalis pedis pulses are 2+ on the right side and 2+ on the left side.      Heart sounds: No murmur heard.  Pulmonary:      Breath sounds: Wheezing present.   Chest:      Chest wall: No tenderness.   Abdominal:      Palpations: Abdomen is soft.      Tenderness: There is no abdominal tenderness. There is no guarding or rebound.   Musculoskeletal:         General: Normal range of motion.      Cervical back: Normal range of motion and neck supple.      Right lower leg: No edema.      Left lower leg: No edema.   Lymphadenopathy:      Cervical: No cervical adenopathy.   Skin:     General: Skin is warm and dry.      Capillary Refill: Capillary refill takes 2 to 3 seconds.      Findings: No rash.   Neurological:      Mental Status: He is oriented to person, place, and time.      Cranial Nerves: No cranial nerve deficit.      Sensory: No sensory deficit.      Motor: No weakness.   Psychiatric:         Mood and Affect: Mood normal.         Behavior: Behavior normal.         Thought Content: Thought content normal.      Comments: Sleepy this morning         Medications  Current Facility-Administered Medications   Medication Dose Route Frequency Provider Last Rate Last Admin    dextrose 5% infusion   Intravenous Continuous Woo Shukla D.O. 30 mL/hr at 04/06/24 0112 New Bag at 04/06/24 0112    amiodarone (Nexterone) 360 mg/200 mL infusion  0.5 mg/min Intravenous Continuous Woo Shukla D.O. 16.7 mL/hr at 04/06/24 0113 0.5 mg/min at 04/06/24 0113    thiamine (B-1) 500 mg in dextrose 5% 100 mL IVPB  500 mg Intravenous DAILY AT  1800 Woo Shukla, D.O. 200 mL/hr at 04/05/24 1730 500 mg at 04/05/24 1730    dexmedetomidine (PRECEDEX) 400 mcg/100mL NS premix infusion  0.1-1 mcg/kg/hr (Ideal) Intravenous Continuous Woo Shukla, D.O.   Stopped at 04/05/24 1133    LORazepam (Ativan) injection 2 mg  2 mg Intravenous Q4HRS Woo Shukla, D.O.   2 mg at 04/06/24 0542    LORazepam (Ativan) injection 1-2 mg  1-2 mg Intravenous Q2HRS PRN Woo Shukla, D.O.   2 mg at 04/05/24 2335    [START ON 4/8/2024] thiamine (Vitamin B-1) tablet 100 mg  100 mg Oral DAILY Woo Shukla, D.O.        And    multivitamin tablet 1 Tablet  1 Tablet Oral DAILY Woo Shukla D.O.   1 Tablet at 04/06/24 0542    And    folic acid (Folvite) tablet 1 mg  1 mg Oral DAILY Woo Shukla, D.O.   1 mg at 04/06/24 0541    Respiratory Therapy Consult   Nebulization Continuous RT Woo Shukla D.O.        senna-docusate (Pericolace Or Senokot S) 8.6-50 MG per tablet 2 Tablet  2 Tablet Oral Q EVENING Woo Shukla D.O.        And    polyethylene glycol/lytes (Miralax) Packet 1 Packet  1 Packet Oral QDAY PRN Woo Shukla, D.O.        ondansetron (Zofran) syringe/vial injection 4 mg  4 mg Intravenous Q4HRS PRN Woo Shukla D.O.   4 mg at 04/05/24 2100    ondansetron (Zofran ODT) dispertab 4 mg  4 mg Oral Q4HRS PRN Woo Shukla, D.O.        promethazine (Phenergan) tablet 12.5-25 mg  12.5-25 mg Oral Q4HRS PRN Woo Shukla, D.O.        promethazine (Phenergan) suppository 12.5-25 mg  12.5-25 mg Rectal Q4HRS PRN Woo Shukla, D.O.        prochlorperazine (Compazine) injection 5-10 mg  5-10 mg Intravenous Q4HRS PRN TAMMY VegaOAnam        piperacillin-tazobactam (Zosyn) 4.5 g in  mL IVPB  4.5 g Intravenous Q8HRS TAMMY VegaOAnam 25 mL/hr at 04/06/24 0524 4.5 g at 04/06/24 0524       Fluids    Intake/Output Summary (Last 24 hours) at 4/6/2024 0655  Last data filed at 4/6/2024 0524  Gross per 24 hour   Intake 4058.95 ml   Output 2000 ml   Net 2058.95 ml       Laboratory          Recent Labs     04/05/24  0350  04/05/24 1000 04/06/24  0157   SODIUM 131* 130* 133*   POTASSIUM 4.0 4.1 4.8   CHLORIDE 91* 92* 96   CO2 16* 21 25   BUN 29* 23* 24*   CREATININE 0.85 0.66 0.84   MAGNESIUM 1.8 2.6* 2.7*   PHOSPHORUS 3.3  --   --    CALCIUM 8.0* 8.1* 8.3*     Recent Labs     04/05/24 0354 04/05/24 1000 04/06/24 0157   ALTSGPT 39  --   --    ASTSGOT 38  --   --    ALKPHOSPHAT 65  --   --    TBILIRUBIN 0.9  --   --    LIPASE 20  --   --    GLUCOSE 182* 138* 98     Recent Labs     04/05/24 0354 04/05/24 1000 04/06/24 0157   WBC 19.7* 15.2* 13.0*   NEUTSPOLYS 82.90* 83.60* 75.30*   LYMPHOCYTES 9.80* 10.60* 19.10*   MONOCYTES 5.20 4.20 4.10   EOSINOPHILS 0.80 0.00 0.70   BASOPHILS 0.30 0.80 0.30   ASTSGOT 38  --   --    ALTSGPT 39  --   --    ALKPHOSPHAT 65  --   --    TBILIRUBIN 0.9  --   --      Recent Labs     04/05/24 0354 04/05/24  0955 04/05/24 1000 04/06/24 0157   RBC 5.32  --  5.01 4.56*   HEMOGLOBIN 17.8  --  16.3 15.1   HEMATOCRIT 49.0  --  45.2 42.1   PLATELETCT 186  --  153* 125*   PROTHROMBTM  --  17.2*  --   --    INR  --  1.38*  --   --        Imaging  ECHO:  Compared to prior echocardiogram 11/22/2017, LV systolic function is now reduced  Normal LV size and thickness with reduced systolic function, estimated LVEF 35%  Normal RV size and systolic function  IVC not well-visualized  No significant valvular abnormalities per Doppler assessment  No pericardial effusion  Dr. Shukla notified via Voalte on 4/5/24 at 4:53PM    Assessment/Plan  * Sepsis (HCC)- (present on admission)  Assessment & Plan  This is Sepsis Present on admission  SIRS criteria identified on my evaluation include: Tachycardia, with heart rate greater than 90 BPM and Leukocytosis, with WBC greater than 12,000  Clinical indicators of end organ dysfunction include Hypotension with systolic blood pressure less than 90 or MAP less than 65 and Lactic Acid greater than 2  Source is undifferentiated  Sepsis protocol initiated  Crystalloid Fluid  Administration: Fluid resuscitation ordered per standard protocol - 30 mL/kg per current or ideal body weight  IV antibiotics as appropriate for source of sepsis  Reassessment: I have reassessed the patient's hemodynamic status    S/p 2.5L fluids in ED.   Follow cultures  Broad spectrum abx: zosyn x 1 day and then switched to unasyn/azithromycin  COVID/flu/RSV negative  MRSA nasal screen negative  Trend lactic acid    HFrEF (heart failure with reduced ejection fraction) ([facility])- (present on admission)  Assessment & Plan  New diagnosis noted on ECHO  Judicious IVF management  Will need cardiology follow up and GDT when appropriate  Diuresis as tolerated    Acute exacerbation of chronic obstructive pulmonary disease (COPD) ([facility])- (present on admission)  Assessment & Plan  New diagnosis of COPD  COPD protocols  RT/O2 protocols  Scheduled steroids of methylprednisolone 62.5mg every 8 hours  Scheduled and prn bronchodilators  Unasyn/azithromycin for possible bronchitis    Acute respiratory failure with hypoxia ([facility])- (present on admission)  Assessment & Plan  ?due to COPD exacerbation vs acute pulmonary infection vs pulmonary edema  RT/O2 protocols  COPD protocols initiated today  O2 sat goals > 92%  Broad spectrum abx started  Diuresis as needed    Atrial fibrillation with RVR ([facility])- (present on admission)  Assessment & Plan  Hx of with ablation and recent cardioversion  S/p amiodarone 300mg IV x1 and continue infusion  Resume home Apixaban 5mg BID   Optimize K >4 and Mg >2  Restart metoprolol 50mg XL PO daily    Lactic acidosis- (present on admission)  Assessment & Plan  Due to hypotension with AF with RVR  S/p IVF  Resolved     Thrombocytopenia ([facility])- (present on admission)  Assessment & Plan  Due to alcohol abuse  Monitor closely     Tobacco abuse- (present on admission)  Assessment & Plan  Ongoing  Nicotine replacement protocols if needed    HTN (hypertension)- (present on admission)  Assessment & Plan  SBP goals <  140  Cont metoprolol 50mg XL PO daily  Restart lisinopril 10mg PO daily  Prn labetalol/hydralazine  May benefit from clonidine while going through alcohol withdrawals     Alcohol withdrawal (HCC)- (present on admission)  Assessment & Plan  Drinks one gallon of vodka daily in the past 5 days  S/p Rally bag on 4/5  Cont MVI, thiamine, and folate  Precedex infusion for RASS of -1 to +1, I am actively titrating this  Start Librium 50mg every 8 hours  Ativan as needed  Replete electrolytes as needed         VTE:  NOAC  Ulcer: Not Indicated  Lines: Hutchinson Catheter  Ongoing indication addressed and PIVs    I have performed a physical exam and reviewed and updated ROS and Plan today (4/6/2024). In review of yesterday's note (4/5/2024), there are no changes except as documented above.     Discussed patient condition and risk of morbidity and/or mortality with RN, RT, Pharmacy, [facility] resident, Charge nurse / hot rounds, and Patient    The patient remains critically ill requiring active titration of Precedex infusion for alcohol withdrawal as well as active titration of amiodarone infusion for atrial fibrillation with RVR.  I have assessed and reassessed the patient's hemodynamics, cardiovascular status, and respiratory status.  The patient remains at high risk of clinical deterioration, worsening vital organ dysfunction, and death without the above critical care interventions.    Critical care time = 105 minutes in directly providing and coordinating critical care and extensive data review.  No time overlap and excludes procedures.

## 2024-04-06 NOTE — PROGRESS NOTES
"[facility] ICU Progress Note      Admit Date: 4/5/2024    Resident(s): Clive Schneider M.D.   Attending:  LESVIA BROOKS/ Dr. Silveira    Patient ID:    Name:  Chace Gong   YOB: 1965  Age:  59 y.o.  male   MRN:  [id number]    Hospital Course (carried forward and updated):  Per Dr. Shukla, \"Chace Gong is a 59 y.o. male with hx of HTN, ?afib, active alcohol use, BMI 34 who presented to ED after drinking a gallon of vodka daily and per ERP note, pt reported \"his wife had all of his alcohol\". Pt c/o mild chest pain, \"not feeling good\".      At ED, pt became hypotensive in 70s and HR in 180s. On 7L [place]. Went into afib with RVR. Pt was given 2.5L fluid boluses, cultures obtained. Received ceftriaxone and azithromycin. Pt drowsy, but arousable, following commands. Lactate 6.8, HCO3 16, creatinine 0.85. Pt was given amio bolus 300 then followed with amio gtt at 1mg/min. Mag 4gr IV x1 given. Alcohol level 208. K 4.0.   ECG c/w afib with RVR, no obvious ST elevated. Troponin 27  CT A/P negative.   My bedside echo is difficult to interpret, rapid afib, RV appears dilated but grossly normal function. Same as LVEF\"    Consultants:  Critical Care     Interval Events:  4/6: Overnight, patient reported that he was exposed to syphilis and is requesting testing for that.  Patient reports that he he is a homosexual and only has intercourse with men and has 1 monogamous partner.  Patient reports that he has tested positive and subsequently treated for syphilis in the past.  Patient requirement 17mg of ativan in the last 24 hours.  Of note, patient was initially at a A-fib and subsequently sinus rhythm and now has a flutter compound on EKG.  Will restart patient's metoprolol and Xarelto.  Of note, per chart review, patient underwent an ablation on 3/15/2024.    Vitals Range last 24h:  Temp:  [36.4 °C (97.5 °F)-36.7 °C (98 °F)] 36.7 °C (98 °F)  Pulse:  [] 99  Resp:  [20-41] 28  BP: (983-179)/() " 150/104  SpO2:  [90 %-99 %] 99 %      Intake/Output Summary (Last 24 hours) at 4/6/2024 1233  Last data filed at 4/6/2024 0524  Gross per 24 hour   Intake 1518.02 ml   Output 2000 ml   Net -481.98 ml        Review of Systems   Constitutional:  Positive for malaise/fatigue. Negative for chills and fever.   HENT:  Negative for sore throat.    Eyes:  Negative for blurred vision and double vision.   Respiratory:  Negative for cough and shortness of breath.    Cardiovascular:  Negative for chest pain, palpitations and leg swelling.   Gastrointestinal:  Negative for abdominal pain, constipation, diarrhea, heartburn, nausea and vomiting.   Genitourinary:  Negative for dysuria and hematuria.        Denies any urethral discharge, lymphadenopathy around the groin area, swelling   Musculoskeletal:  Negative for back pain, joint pain and neck pain.   Skin:  Negative for rash.   Neurological:  Negative for dizziness, tingling, weakness and headaches.   Psychiatric/Behavioral:  Positive for substance abuse. The patient is nervous/anxious. The patient does not have insomnia.        PHYSICAL EXAM:  Vitals:    04/06/24 0500 04/06/24 0630 04/06/24 1100 04/06/24 1133   BP: (!) 168/93 (!) 150/104     Pulse: 90 (!) 144 96 99   Resp:  (!) 25 (!) 21 (!) 28   Temp:       TempSrc:       SpO2: 98% 98% 98% 99%   Weight:       Height:        Body mass index is 32.89 kg/m².    O2 therapy: Pulse Oximetry: 99 %, O2 (LPM): 2, O2 Delivery Device: Silicone Nasal Cannula         Physical Exam  Vitals and nursing note reviewed.   Constitutional:       Appearance: He is obese. He is diaphoretic. He is not ill-appearing or toxic-appearing.   HENT:      Head: Normocephalic and atraumatic.      Right Ear: External ear normal.      Left Ear: External ear normal.      Nose: No rhinorrhea.      Mouth/Throat:      Mouth: Mucous membranes are moist.   Eyes:      Extraocular Movements: Extraocular movements intact.      Conjunctiva/sclera: Conjunctivae normal.    Cardiovascular:      Rate and Rhythm: Normal rate and regular rhythm.      Heart sounds: No murmur heard.     No gallop.   Pulmonary:      Effort: Pulmonary effort is normal.      Breath sounds: No stridor. Wheezing present. No rhonchi or rales.   Abdominal:      General: Bowel sounds are normal. There is no distension.      Tenderness: There is no abdominal tenderness.   Musculoskeletal:         General: No swelling.      Cervical back: Normal range of motion and neck supple.      Right lower leg: No edema.      Left lower leg: No edema.   Skin:     General: Skin is warm.      Capillary Refill: Capillary refill takes 2 to 3 seconds.      Coloration: Skin is not jaundiced.   Neurological:      Mental Status: He is alert and oriented to person, place, and time.   Psychiatric:         Mood and Affect: Mood normal.         Behavior: Behavior normal.             Recent Labs     04/05/24 0354 04/05/24 1000 04/06/24 0157   SODIUM 131* 130* 133*   POTASSIUM 4.0 4.1 4.8   CHLORIDE 91* 92* 96   CO2 16* 21 25   BUN 29* 23* 24*   CREATININE 0.85 0.66 0.84   MAGNESIUM 1.8 2.6* 2.7*   PHOSPHORUS 3.3  --   --    CALCIUM 8.0* 8.1* 8.3*     Recent Labs     04/05/24 0354 04/05/24 1000 04/06/24 0157   ALTSGPT 39  --   --    ASTSGOT 38  --   --    ALKPHOSPHAT 65  --   --    TBILIRUBIN 0.9  --   --    LIPASE 20  --   --    GLUCOSE 182* 138* 98     Recent Labs     04/05/24 0354 04/05/24  0955 04/05/24 1000 04/06/24 0157   RBC 5.32  --  5.01 4.56*   HEMOGLOBIN 17.8  --  16.3 15.1   HEMATOCRIT 49.0  --  45.2 42.1   PLATELETCT 186  --  153* 125*   PROTHROMBTM  --  17.2*  --   --    INR  --  1.38*  --   --      Recent Labs     04/05/24 0354 04/05/24 1000 04/06/24 0157   WBC 19.7* 15.2* 13.0*   NEUTSPOLYS 82.90* 83.60* 75.30*   LYMPHOCYTES 9.80* 10.60* 19.10*   MONOCYTES 5.20 4.20 4.10   EOSINOPHILS 0.80 0.00 0.70   BASOPHILS 0.30 0.80 0.30   ASTSGOT 38  --   --    ALTSGPT 39  --   --    ALKPHOSPHAT 65  --   --    TBILIRUBIN  0.9  --   --        Meds:   metoprolol SR  50 mg      lisinopril  10 mg      rivaroxaban  20 mg      chlordiazePOXIDE  50 mg      Respiratory Therapy Consult        ipratropium-albuterol  3 mL      ipratropium-albuterol  3 mL      methylPREDNISolone  62.5 mg      ampicillin-sulbactam (UNASYN) IV  3 g Stopped (04/06/24 1206)    azithromycin (ZITHROMAX) IV  500 mg Stopped (04/06/24 1102)    dextrose 5%   30 mL/hr at 04/06/24 0853    amiodarone infusion  0.5 mg/min 0.5 mg/min (04/06/24 0113)    thiamine  500 mg 500 mg (04/05/24 1730)    dexmedetomidine (Precedex) infusion  0.1-1 mcg/kg/hr (Ideal) Stopped (04/05/24 1133)    LORazepam  1-2 mg      [START ON 4/8/2024] thiamine  100 mg      And    multivitamin  1 Tablet      And    folic acid  1 mg      senna-docusate  2 Tablet      And    polyethylene glycol/lytes  1 Packet      ondansetron  4 mg      ondansetron  4 mg          Procedures:  N/a    Imaging:  EC-ECHOCARDIOGRAM COMPLETE W/O CONT   Final Result      CT-ABDOMEN-PELVIS WITH   Final Result         1.  Minimal patchy opacities in bilateral lung bases, appearance suggests subtle infiltrate   2.  Irregular hepatic contour compatible with cirrhosis   3.  Changes of hepatic steatosis   4.  Atherosclerosis and atherosclerotic coronary artery disease   5.  Fat-containing bilateral inguinal hernias   6.  Fat-containing umbilical hernia      DX-CHEST-PORTABLE (1 VIEW)   Final Result         1.  No focal infiltrates.   2.  Perihilar interstitial prominence and bronchial wall cuffing, appearance suggests changes of underlying bronchial inflammation, consider bronchitis.          ASSESSEMENT and PLAN:    * Sepsis (HCC)- (present on admission)  Assessment & Plan  This is Septic shock Present on admission  SIRS criteria identified on my evaluation include: Tachycardia, with heart rate greater than 90 BPM and Leukocytosis, with WBC greater than 12,000  Clinical indicators of end organ dysfunction include Hypotension with  systolic blood pressure less than 90 or MAP less than 65 and Lactic Acid greater than 2  Indicators of septic shock include: Sepsis present and initial lactate level result is greater than or equal to 4mmol/L   Sources is: undifferentiated   Sepsis protocol initiated  Crystalloid Fluid Administration: Fluid resuscitation ordered per standard protocol - 30 mL/kg per current or ideal body weight  IV antibiotics as appropriate for source of sepsis  Reassessment: I have reassessed the patient's hemodynamic status    COVID/flu/RSV was negative  MRSA nasal screening was negative  Blood cultures negative to date  Lactic acid was trended down to normal  Patient was initially initiated on Zosyn and later antibiotics were de-escalated to Unasyn and azithromycin  Continue to monitor    HFrEF (heart failure with reduced ejection fraction) ([facility])- (present on admission)  Assessment & Plan  New diagnosis as noted on echocardiogram on 4/5/2024  Once patient is clinically improved, will need a cardiology follow-up and goal-directed therapy as appropriate  Will be careful with IV fluids  If respiratory status worsens, will consider diuresis  Patient appears to have been on torsemide 10 mg daily, unclear if the patient was adherent    Acute respiratory failure with hypoxia ([facility])- (present on admission)  Assessment & Plan  Possibly related to COPD exacerbation versus pneumonia  Respiratory therapy protocol  Continue supplemental oxygen  De-escalate Zosyn to Unasyn.  Also initiated on azithromycin for possible COPD exacerbation  Patient is recovering from sepsis, but will consider diuresis as needed    Atrial fibrillation with RVR ([facility])- (present on admission)  Assessment & Plan  History of ablation and recent cardioversion on 3/15/2024  Optimize electrolytes, potassium goal above 4 magnesium goal above 2  Patient is currently on amiodarone, continue infusion  Restart home Xarelto 20 mg nightly and metoprolol 50 mg daily    Alcohol  withdrawal (HCC)- (present on admission)  Assessment & Plan  Significant alcohol consumption per the patient, possibly 1 gallon of vodka daily  Received rally bag  Precedex infusion for RASS goal of -1 to +1, actively titrating  Discontinue scheduled Ativan and initiate scheduled Librium 50 mg every 8 hours with Ativan as needed  Continue multivitamins, thiamine, folate  Closely monitor for possible refeeding syndrome and electrolyte imbalances    Syphilis  Assessment & Plan  Ongoing testing to confirm that the patient actually has active syphilis as the patient has a history of previously treated syphilis  If the patient has active syphilis, will initiate treatment  Gonorrhea/chlamydia was also ordered  Continue to monitor    Acute exacerbation of chronic obstructive pulmonary disease (COPD) (HCC)- (present on admission)  Assessment & Plan  No PFTs on file including this chart and merged chart however the patient reports that he has a history of COPD and does take inhalers as needed  Wheezing auscultated on exam, initiate COPD exacerbation order set  Schedule methylprednisolone 62.5 mg every 8 hours with scheduled and as needed DuoNebs  Start azithromycin, QTc reviewed  Patient will likely need PFT and pulmonary follow-up    Thrombocytopenia (HCC)- (present on admission)  Assessment & Plan  Suspect possibly due to alcohol use and further exacerbated by sepsis  Continue to trend and will consider transfusion if clinically indicated    HTN (hypertension)- (present on admission)  Assessment & Plan  Anticipate that patient's blood pressure may run higher than normal is a patient continues to withdraw from alcohol  SBP goal of normotensive to 140  Continue metoprolol 50 mg, lisinopril 10 mg  As needed medications available  Will consider clonidine if symptoms and alcohol withdrawal worsens    Lactic acidosis- (present on admission)  Assessment & Plan  Possibly due to hypotension and significant alcohol use  Resolved  with intervention    Tobacco abuse- (present on admission)  Assessment & Plan  Patient reports that he stopped smoking just prior to this admission  Nicotine replacement therapy is available if needed  Provided counseling to the patient        DISPO: Continue ICU stay    CODE STATUS: Full    Quality Measures:  Feeding: Cardiac  Analgesia: N/a  Sedation: N/a  Thromboprophylaxis: Xarelto  Head of bed: >30 degrees  Ulcer prophylaxis: N/a  Glycemic control: Correctional: N/a / Basal: N/a  Bowel care: bowel regimen  Indwelling lines: PIV x2, Hutchinson  Deescalation of antibiotics: Yes, deescalated to Unasyn    Clive Schneider M.D.

## 2024-04-06 NOTE — PROGRESS NOTES
4 Eyes Skin Assessment Completed by YAEL Burnham and YAEL Orosco.    Head - abrasion left cheek  Ears WDL  Nose WDL  Mouth WDL  Neck WDL  Breast/Chest WDL  Shoulder Blades WDL  Spine WDL  (R) Arm/Elbow/Hand Redness and Bruising, pressure injury under right arm?     (L) Arm/Elbow/Hand Redness and Bruising  Abdomen WDL  Groin old scab on scrotum  Scrotum/Coccyx/Buttocks Redness and Blanching  (R) Leg Scab, Bruising, and Abrasion  (L) Leg Scab, Bruising, and Abrasion  (R) Heel/Foot/Toe Redness and Ulcer(s), dry, calloused  (L) Heel/Foot/Toe dry, calloused          Devices In Places ECG, Tele Box, Blood Pressure Cuff, Pulse Ox, SCD's, Condom Cath, and Nasal Cannula      Interventions In Place Gray Ear Foams, Pillows, Q2 Turns, and Heels Loaded W/Pillows    Possible Skin Injury No    Pictures Uploaded Into Epic Yes  Wound Consult Placed Yes  RN Wound Prevention Protocol Ordered Yes

## 2024-04-06 NOTE — RESPIRATORY CARE
"  COPD EDUCATION by COPD CLINICAL EDUCATOR  4/6/2024 at 4:34 PM by Dominique Ochoa RRT     Attempted patient interview by COPD education team. Patient declined participation nor expressed any interest in COPD program at this time.    COPD Screen  COPD Risk Screening  Do you have a history of COPD?: Yes    COPD Assessment  COPD Clinical Specialists ONLY  COPD Education Initiated: Yes--Short Intervention (Pt appears to be in  ETOH withdrawl and having some diffculty answering questions. Information drawn form chart review / RN assist. COPD + smoking cessation handouts + class enrollment left at bedside)  Is this a COPD exacerbation patient?: Yes (Pt has normal PFT from 2008 - current admission has COPD exacerbation order set used)  DME Company: None at this time  DME Equipment Type: N/A  Physician Name: Mohsen Tamasaby, M.D.  Pulmonologist Name: None at this time per chart  Referrals Initiated: Yes  Smoking Cessation: Yes  $ Smoking Cessation 3-10 Minutes: Symptomatic (Pt admits to current smoking - declined smoking cessation information at this time. Educational info was left at bedside for when pt is more awake / alert)  Hospice: N/A  Home Health Care: N/A  Mobile Urgent Care Services: N/A  [facility]iatric Special[facility] Group: N/A  Private In-Home Care Agency: N/A  $ Demo/Eval of SVN's, MDI's and Aerosols:  (Pt has no respiratory medications at this time)  (OP) Pulmonary Function Testing: Yes (12/2008 PFT summary \"Spirometry is normal except for mildly decreased FEF 25/75\")  Interdisciplinary Rounds: Attendance at Rounds (30 Min)    PFT Results    12/2008 PFT Results : Spirometry is normal except for mildly decreased FEF 25/75        "

## 2024-04-06 NOTE — ASSESSMENT & PLAN NOTE
?due to COPD exacerbation vs acute pulmonary infection vs pulmonary edema  RT/O2 protocols  COPD protocols initiated today  O2 sat goals > 92%  Broad spectrum abx started  Lasix 40mg IV BID

## 2024-04-06 NOTE — HOSPITAL COURSE
"Mr. Gong is a 59 year old male with the past medical history of alcohol abuse, atrial fibrillation, hypertension, and hepatitis C who presented to the ER on 4/5 with complaints of \"not feeling well\" after getting his alcohol taken away by his wife.  The patient reportedly drinks a \"gallon of vodka daily\".  He was found to be in severe alcohol withdrawal and atrial fibrillation with RVR.  He was admitted to the ICU for ongoing care with Precedex and Amiodarone.  4/6 - in/out of AF or flutter with stable SBPs.  Off precedex.  Started librium with prn ativan.  Restarted home meds  "

## 2024-04-06 NOTE — PROGRESS NOTES
"Stood up at the side of the bed with 2 person assist. Generalized weakness noted. Still complaining of seeing \"hummingbird\" and had nightmare last night. He is a&o x4. +tremors but appears less tremolous this am. Pt on 5L o2 nc cannula.   "

## 2024-04-06 NOTE — ASSESSMENT & PLAN NOTE
New diagnosis   Monitor I/O's, labs, vitals.  Continue IV Lasix 40mg BID, metoprolol, lasix  Once patient is clinically improved, will need a cardiology follow-up and goal-directed therapy as appropriate   4/5/2024 echocardiogram: EF:35%

## 2024-04-06 NOTE — WOUND TEAM
[facility]  Inpatient Services  Wound and Skin Care Brief Evaluation    Admission Date: 4/5/2024     Last order of IP CONSULT TO WOUND CARE was found on 4/5/2024 from Hospital Encounter on 4/5/2024     HPI, PMH, SH: Reviewed    Chief Complaint   Patient presents with    Pain     Pt reports pain on his penis. Denies discharges. +dysuria    ETOH Withdrawal     Pt reports drinking vodka today. Pt reports last drink was 10 hrs ago. Alert and oriented. Pt diaphoretic     Diagnosis: Sepsis (HCC) [A41.9]    Unit where seen by Wound Team: T614/00     Wound consult placed regarding R secomd tpe. Chart and images reviewed. This discussed with bedside RN . Took down callus with dark center on R second distal toe to reveal intact epithelium  No pressure injuries or advanced wound care needs identified. Wound consult completed. No further follow up unless indicated and consulted.          PREVENTATIVE INTERVENTIONS:    Q shift Ángel - performed per nursing policy  Q shift pressure point assessments - performed per nursing policy    Surface/Positioning  ICU Low Airloss - Currently in Place  Reposition q 2 hours - Currently in Place    Offloading/Redistribution  Heel offloading dressing (Silicone dressing) - Ordered

## 2024-04-06 NOTE — ASSESSMENT & PLAN NOTE
New diagnosis noted on ECHO  Judicious IVF management  Will need cardiology follow up and GDT when appropriate  Start lasix 40mg IV BID

## 2024-04-06 NOTE — ASSESSMENT & PLAN NOTE
Significant alcohol consumption per the patient, possibly 1 gallon of vodka daily  Received rally bag, thiamine and folate supplement.  Precedex infusion for RASS goal of -1 to +1, actively titrating monitor in IMCU  Discontinue scheduled Ativan and initiate scheduled Librium 50 mg every 8 hours with Ativan as needed  Continue multivitamins, thiamine, folate  Closely monitor for possible refeeding syndrome and electrolyte imbalances

## 2024-04-06 NOTE — ASSESSMENT & PLAN NOTE
COVID/flu/RSV was negative  MRSA nasal screening was negative  4/5 Blood cultures negative to date  Lactic acid was trended down to normal  Patient was initially initiated on Zosyn and later antibiotics were de-escalated to Unasyn and azithromycin  Continue to monitor  4/7 wbc:10.2 <13<15.2

## 2024-04-06 NOTE — ASSESSMENT & PLAN NOTE
Anticipate that patient's blood pressure may run higher than normal is a patient continues to withdraw from alcohol  SBP goal of normotensive to 140  Continue metoprolol 50 mg, lisinopril 10 mg  As needed medications available  Will consider clonidine if symptoms and alcohol withdrawal worsens

## 2024-04-06 NOTE — ASSESSMENT & PLAN NOTE
Possibly related to COPD exacerbation versus pneumonia  Respiratory therapy protocol  Continue supplemental oxygen  De-escalate Zosyn to Unasyn.  Also initiated on azithromycin for possible COPD exacerbation  Patient is recovering from sepsis, but will consider diuresis as needed

## 2024-04-07 LAB
ANION GAP SERPL CALC-SCNC: 11 MMOL/L (ref 7–16)
BASOPHILS # BLD AUTO: 0.3 % (ref 0–1.8)
BASOPHILS # BLD: 0.03 K/UL (ref 0–0.12)
BUN SERPL-MCNC: 22 MG/DL (ref 8–22)
CALCIUM SERPL-MCNC: 8.2 MG/DL (ref 8.5–10.5)
CHLORIDE SERPL-SCNC: 98 MMOL/L (ref 96–112)
CO2 SERPL-SCNC: 25 MMOL/L (ref 20–33)
CREAT SERPL-MCNC: 0.71 MG/DL (ref 0.5–1.4)
EKG IMPRESSION: NORMAL
EOSINOPHIL # BLD AUTO: 0 K/UL (ref 0–0.51)
EOSINOPHIL NFR BLD: 0 % (ref 0–6.9)
ERYTHROCYTE [DISTWIDTH] IN BLOOD BY AUTOMATED COUNT: 45 FL (ref 35.9–50)
GFR SERPLBLD CREATININE-BSD FMLA CKD-EPI: 106 ML/MIN/1.73 M 2
GLUCOSE SERPL-MCNC: 140 MG/DL (ref 65–99)
HCT VFR BLD AUTO: 40.3 % (ref 42–52)
HGB BLD-MCNC: 14 G/DL (ref 14–18)
IMM GRANULOCYTES # BLD AUTO: 0.06 K/UL (ref 0–0.11)
IMM GRANULOCYTES NFR BLD AUTO: 0.6 % (ref 0–0.9)
LACTATE SERPL-SCNC: 1 MMOL/L (ref 0.5–2)
LYMPHOCYTES # BLD AUTO: 0.8 K/UL (ref 1–4.8)
LYMPHOCYTES NFR BLD: 7.8 % (ref 22–41)
MAGNESIUM SERPL-MCNC: 2.4 MG/DL (ref 1.5–2.5)
MCH RBC QN AUTO: 32.3 PG (ref 27–33)
MCHC RBC AUTO-ENTMCNC: 34.7 G/DL (ref 32.3–36.5)
MCV RBC AUTO: 92.9 FL (ref 81.4–97.8)
MONOCYTES # BLD AUTO: 0.2 K/UL (ref 0–0.85)
MONOCYTES NFR BLD AUTO: 2 % (ref 0–13.4)
NEUTROPHILS # BLD AUTO: 9.13 K/UL (ref 1.82–7.42)
NEUTROPHILS NFR BLD: 89.3 % (ref 44–72)
NRBC # BLD AUTO: 0 K/UL
NRBC BLD-RTO: 0 /100 WBC (ref 0–0.2)
PLATELET # BLD AUTO: 119 K/UL (ref 164–446)
PMV BLD AUTO: 9.5 FL (ref 9–12.9)
POTASSIUM SERPL-SCNC: 4.7 MMOL/L (ref 3.6–5.5)
RBC # BLD AUTO: 4.34 M/UL (ref 4.7–6.1)
RPR SER QL: NON REACTIVE
SODIUM SERPL-SCNC: 134 MMOL/L (ref 135–145)
T PALLIDUM AB SER QL IA: REACTIVE
WBC # BLD AUTO: 10.2 K/UL (ref 4.8–10.8)

## 2024-04-07 PROCEDURE — 99291 CRITICAL CARE FIRST HOUR: CPT | Performed by: INTERNAL MEDICINE

## 2024-04-07 PROCEDURE — 700111 HCHG RX REV CODE 636 W/ 250 OVERRIDE (IP): Mod: JZ | Performed by: INTERNAL MEDICINE

## 2024-04-07 PROCEDURE — 700105 HCHG RX REV CODE 258: Performed by: INTERNAL MEDICINE

## 2024-04-07 PROCEDURE — 80048 BASIC METABOLIC PNL TOTAL CA: CPT

## 2024-04-07 PROCEDURE — 93010 ELECTROCARDIOGRAM REPORT: CPT | Performed by: INTERNAL MEDICINE

## 2024-04-07 PROCEDURE — 700102 HCHG RX REV CODE 250 W/ 637 OVERRIDE(OP)

## 2024-04-07 PROCEDURE — 85025 COMPLETE CBC W/AUTO DIFF WBC: CPT

## 2024-04-07 PROCEDURE — A9270 NON-COVERED ITEM OR SERVICE: HCPCS | Performed by: INTERNAL MEDICINE

## 2024-04-07 PROCEDURE — 700101 HCHG RX REV CODE 250: Performed by: INTERNAL MEDICINE

## 2024-04-07 PROCEDURE — 99292 CRITICAL CARE ADDL 30 MIN: CPT | Performed by: INTERNAL MEDICINE

## 2024-04-07 PROCEDURE — 700101 HCHG RX REV CODE 250

## 2024-04-07 PROCEDURE — 700102 HCHG RX REV CODE 250 W/ 637 OVERRIDE(OP): Performed by: INTERNAL MEDICINE

## 2024-04-07 PROCEDURE — 86780 TREPONEMA PALLIDUM: CPT

## 2024-04-07 PROCEDURE — 86592 SYPHILIS TEST NON-TREP QUAL: CPT

## 2024-04-07 PROCEDURE — 83735 ASSAY OF MAGNESIUM: CPT

## 2024-04-07 PROCEDURE — 99223 1ST HOSP IP/OBS HIGH 75: CPT | Performed by: HOSPITALIST

## 2024-04-07 PROCEDURE — 83605 ASSAY OF LACTIC ACID: CPT

## 2024-04-07 PROCEDURE — 770000 HCHG ROOM/CARE - INTERMEDIATE ICU *

## 2024-04-07 PROCEDURE — A9270 NON-COVERED ITEM OR SERVICE: HCPCS

## 2024-04-07 RX ORDER — FUROSEMIDE 10 MG/ML
40 INJECTION INTRAMUSCULAR; INTRAVENOUS
Status: DISCONTINUED | OUTPATIENT
Start: 2024-04-07 | End: 2024-04-08 | Stop reason: HOSPADM

## 2024-04-07 RX ORDER — FUROSEMIDE 10 MG/ML
40 INJECTION INTRAMUSCULAR; INTRAVENOUS ONCE
Status: COMPLETED | OUTPATIENT
Start: 2024-04-07 | End: 2024-04-07

## 2024-04-07 RX ORDER — DIGOXIN 0.25 MG/ML
250 INJECTION INTRAMUSCULAR; INTRAVENOUS ONCE
Status: COMPLETED | OUTPATIENT
Start: 2024-04-07 | End: 2024-04-07

## 2024-04-07 RX ADMIN — DEXTROSE MONOHYDRATE: 50 INJECTION, SOLUTION INTRAVENOUS at 02:07

## 2024-04-07 RX ADMIN — METOPROLOL SUCCINATE 50 MG: 25 TABLET, EXTENDED RELEASE ORAL at 05:02

## 2024-04-07 RX ADMIN — AZITHROMYCIN DIHYDRATE 500 MG: 250 TABLET, FILM COATED ORAL at 05:03

## 2024-04-07 RX ADMIN — METHYLPREDNISOLONE SODIUM SUCCINATE 62.5 MG: 125 INJECTION, POWDER, FOR SOLUTION INTRAMUSCULAR; INTRAVENOUS at 21:29

## 2024-04-07 RX ADMIN — RIVAROXABAN 20 MG: 20 TABLET, FILM COATED ORAL at 18:02

## 2024-04-07 RX ADMIN — THIAMINE HYDROCHLORIDE 500 MG: 100 INJECTION, SOLUTION INTRAMUSCULAR; INTRAVENOUS at 18:29

## 2024-04-07 RX ADMIN — AMIODARONE HYDROCHLORIDE 0.5 MG/MIN: 1.8 INJECTION, SOLUTION INTRAVENOUS at 00:45

## 2024-04-07 RX ADMIN — AMPICILLIN AND SULBACTAM 3 G: 1; 2 INJECTION, POWDER, FOR SOLUTION INTRAMUSCULAR; INTRAVENOUS at 17:38

## 2024-04-07 RX ADMIN — METHYLPREDNISOLONE SODIUM SUCCINATE 62.5 MG: 125 INJECTION, POWDER, FOR SOLUTION INTRAMUSCULAR; INTRAVENOUS at 14:00

## 2024-04-07 RX ADMIN — AMIODARONE HYDROCHLORIDE 1 MG/MIN: 1.8 INJECTION, SOLUTION INTRAVENOUS at 17:02

## 2024-04-07 RX ADMIN — FUROSEMIDE 40 MG: 10 INJECTION INTRAMUSCULAR; INTRAVENOUS at 07:43

## 2024-04-07 RX ADMIN — IPRATROPIUM BROMIDE AND ALBUTEROL SULFATE 3 ML: 2.5; .5 SOLUTION RESPIRATORY (INHALATION) at 04:32

## 2024-04-07 RX ADMIN — LISINOPRIL 10 MG: 10 TABLET ORAL at 05:03

## 2024-04-07 RX ADMIN — THERA TABS 1 TABLET: TAB at 05:03

## 2024-04-07 RX ADMIN — FOLIC ACID 1 MG: 1 TABLET ORAL at 05:03

## 2024-04-07 RX ADMIN — CHLORDIAZEPOXIDE HYDROCHLORIDE 50 MG: 25 CAPSULE ORAL at 14:00

## 2024-04-07 RX ADMIN — DEXTROSE MONOHYDRATE: 50 INJECTION, SOLUTION INTRAVENOUS at 19:44

## 2024-04-07 RX ADMIN — METOPROLOL TARTRATE 5 MG: 5 INJECTION INTRAVENOUS at 05:52

## 2024-04-07 RX ADMIN — AMPICILLIN AND SULBACTAM 3 G: 1; 2 INJECTION, POWDER, FOR SOLUTION INTRAMUSCULAR; INTRAVENOUS at 05:07

## 2024-04-07 RX ADMIN — METHYLPREDNISOLONE SODIUM SUCCINATE 62.5 MG: 125 INJECTION, POWDER, FOR SOLUTION INTRAMUSCULAR; INTRAVENOUS at 05:05

## 2024-04-07 RX ADMIN — LORAZEPAM 1 MG: 2 INJECTION INTRAMUSCULAR; INTRAVENOUS at 08:38

## 2024-04-07 RX ADMIN — FUROSEMIDE 40 MG: 10 INJECTION INTRAMUSCULAR; INTRAVENOUS at 10:29

## 2024-04-07 RX ADMIN — AMIODARONE HYDROCHLORIDE 1 MG/MIN: 1.8 INJECTION, SOLUTION INTRAVENOUS at 22:30

## 2024-04-07 RX ADMIN — FUROSEMIDE 40 MG: 10 INJECTION INTRAMUSCULAR; INTRAVENOUS at 18:00

## 2024-04-07 RX ADMIN — CHLORDIAZEPOXIDE HYDROCHLORIDE 50 MG: 25 CAPSULE ORAL at 21:29

## 2024-04-07 RX ADMIN — AMIODARONE HYDROCHLORIDE 1 MG/MIN: 1.8 INJECTION, SOLUTION INTRAVENOUS at 11:05

## 2024-04-07 RX ADMIN — AMPICILLIN AND SULBACTAM 3 G: 1; 2 INJECTION, POWDER, FOR SOLUTION INTRAMUSCULAR; INTRAVENOUS at 11:42

## 2024-04-07 RX ADMIN — CHLORDIAZEPOXIDE HYDROCHLORIDE 50 MG: 25 CAPSULE ORAL at 05:03

## 2024-04-07 RX ADMIN — AMPICILLIN AND SULBACTAM 3 G: 1; 2 INJECTION, POWDER, FOR SOLUTION INTRAMUSCULAR; INTRAVENOUS at 23:25

## 2024-04-07 RX ADMIN — DIGOXIN 250 MCG: 250 INJECTION, SOLUTION INTRAMUSCULAR; INTRAVENOUS; PARENTERAL at 08:48

## 2024-04-07 ASSESSMENT — LIFESTYLE VARIABLES
TOTAL SCORE: 4
AUDITORY DISTURBANCES: NOT PRESENT
NAUSEA AND VOMITING: NO NAUSEA AND NO VOMITING
ANXIETY: MILDLY ANXIOUS
HEADACHE, FULLNESS IN HEAD: NOT PRESENT
ORIENTATION AND CLOUDING OF SENSORIUM: ORIENTED AND CAN DO SERIAL ADDITIONS
SUBSTANCE_ABUSE: 1
TREMOR: TREMOR NOT VISIBLE BUT CAN BE FELT, FINGERTIP TO FINGERTIP
PAROXYSMAL SWEATS: BARELY PERCEPTIBLE SWEATING. PALMS MOIST
VISUAL DISTURBANCES: VERY MILD SENSITIVITY
AGITATION: NORMAL ACTIVITY

## 2024-04-07 ASSESSMENT — ENCOUNTER SYMPTOMS
NERVOUS/ANXIOUS: 0
ABDOMINAL PAIN: 0
BACK PAIN: 0
FEVER: 0
INSOMNIA: 0
CONSTIPATION: 0
TREMORS: 1
PALPITATIONS: 0
NECK PAIN: 0
MYALGIAS: 0
TINGLING: 0
HEADACHES: 0
WEAKNESS: 0
COUGH: 0
NAUSEA: 1
BLURRED VISION: 0
SHORTNESS OF BREATH: 0
SPEECH CHANGE: 0
DOUBLE VISION: 0
DIARRHEA: 0
NERVOUS/ANXIOUS: 1
SORE THROAT: 0
VOMITING: 0
CHILLS: 0
DIZZINESS: 0
HEARTBURN: 0
NAUSEA: 0

## 2024-04-07 ASSESSMENT — PAIN DESCRIPTION - PAIN TYPE
TYPE: ACUTE PAIN

## 2024-04-07 ASSESSMENT — FIBROSIS 4 INDEX: FIB4 SCORE: 2.97

## 2024-04-07 NOTE — CARE PLAN
Problem: Bronchoconstriction  Goal: Improve in air movement and diminished wheezing  Description: Target End Date:  2 to 3 days    1.  Implement inhaled treatments  2.  Evaluate and manage medication effects  Outcome: Progressing     Problem: Bronchopulmonary Hygiene  Goal: Increase mobilization of retained secretions  Description: Target End Date:  2 to 3 days    1.  Perform bronchopulmonary therapy as indicated by assessment  2.  Perform airway suctioning  3.  Perform actions to maintain patient airway  Outcome: Progressing   SVN Duo Q4  CPT QID

## 2024-04-07 NOTE — PROGRESS NOTES
Turned patient to change linen and patient's HR sustaining in the 140s. Notified Dr. Treviño. MD ordered to administer last dose of IV metoprolol. Administered per MAR.

## 2024-04-07 NOTE — PROGRESS NOTES
RN  notified me that patient was in Afib around 1900 yesterday. EKG showed Aflutter with possible 2:1 block.  Patient has a known h/o Afib with ablation. Patient was cardioverted on 3/15/2024. Patient was given PRN dose of amiodarone which did not improve rate. Patient had IV metoprolol x 2 which also did not improve HR. BP has been stable. Pt was moved this morning after which noted to have HR 140s after RN moved pt in bed. HR remained in 140s after 15 mins. Potassium and magnesium levels WNL.  Pt received oral metoprolol at 5AM.  No need for cardioversion. RN will give last 5 mg IV metoprolol push.

## 2024-04-07 NOTE — CARE PLAN
The patient is Stable - Low risk of patient condition declining or worsening    Shift Goals  Clinical Goals: Safety  Patient Goals: Eat and go home  Family Goals: ATUL    Progress made toward(s) clinical / shift goals:      Problem: Skin Integrity  Goal: Skin integrity is maintained or improved  Outcome: Progressing  Note: Appropriate interventions in place to maintain/ improve skin integrity.     Problem: Fall Risk  Goal: Patient will remain free from falls  Outcome: Progressing  Note: Appropriate fall precautions in place.

## 2024-04-07 NOTE — PROGRESS NOTES
Notified Dr. Treviño that patient's HR has increased to 130-150s. EKG ordered per protocol.     2028-EKG completed. Dr. eBal confirmed a. Flutter. Amiodarone bolus ordered.     2036--Amiodarone bolus administered per MAR.     2120-- Dr. Treviño inputted PRN Metoprolol IV push doses. Medication administered per MAR. HR sustaining atrial flutter 130s.

## 2024-04-07 NOTE — PROGRESS NOTES
"Critical Care Progress Note    Date of admission  2024    Chief Complaint  59 y.o. male admitted 2024 with severe alcohol withdrawal and atrial fibrillation with RVR    Hospital Course  Mr. Gong is a 59 year old male with the past medical history of alcohol abuse, atrial fibrillation, hypertension, and hepatitis C who presented to the ER on  with complaints of \"not feeling well\" after getting his alcohol taken away by his wife.  The patient reportedly drinks a \"gallon of vodka daily\".  He was found to be in severe alcohol withdrawal and atrial fibrillation with RVR.  He was admitted to the ICU for ongoing care with Precedex and Amiodarone.   - in/out of AF or flutter with stable SBPs.  Off precedex.  Started librium with prn ativan.  Restarted home meds    Interval Problem Update  Reviewed last 24 hour events:   - back into AF overnight with rates in 130s after turning patient with no rate change after amiodarone bolus and metoprolol 5mg IV x 3   - pt tearful and emotional this morning wanting to go home to his \"kids\".  His kids are his 5 chihuahuas: Denny, Criss, Bobo, Kayla, and Dustin.  He stated that Kayla  recently and is pretty sad about it   - a/ox4   - -150s   - amio back to 1mg/hr, s/p digoxin 250mcg IV x 1, gave additional amio 150mg bolus   - start aggressive diuresis with Lasix 40mg IV BID   - -140s   - Tmax 97.8   - regular diet   - UOP of 725cc overnight, Hutchinson   - BM yesterday   - up to chair   - amio at 1mg/hr   - O2 4 lpm NC   - Xarelto   - day 3 of abx:  unasyn/azithromycin   - steroids   - WBCs 10.2   - Hb 14   - K 4.7   - creat 0.71   - Mg 2.4      Yesterday's Events:   - episode of a flutter and AF with RVR now is ST   - a/ox4 this am   - hallucinating overnight    - SR/AT 90-100s   - -160s   - Tmax 98   - O2 at 5 lpm NC   - BM pta   - UOP of 1800cc ovneight, codnon   - amio at 0.5mg/hr   - D5 at 30cc/hr   - edge of bedstanidng   - no cxr today   - " xarelto   - day 2 of abx: changed to unasyn/azithromycin   - started steroids   - WBCs 13   - Na 133   - creat 0.84   - Mg 2.7   - lactic acid 1      Review of Systems  Review of Systems   Unable to perform ROS: Critical illness        Vital Signs for last 24 hours   Temp:  [36.1 °C (97 °F)-36.8 °C (98.2 °F)] 36.4 °C (97.6 °F)  Pulse:  [] 135  Resp:  [12-53] 34  BP: (125-159)/() 139/101  SpO2:  [88 %-99 %] 91 %    Hemodynamic parameters for last 24 hours       Respiratory Information for the last 24 hours       Physical Exam   Physical Exam  Vitals and nursing note reviewed.   Constitutional:       General: He is not in acute distress.     Appearance: He is ill-appearing. He is not toxic-appearing.   HENT:      Head: Normocephalic and atraumatic.      Right Ear: External ear normal.      Left Ear: External ear normal.      Nose: Nose normal. No rhinorrhea.      Mouth/Throat:      Mouth: Mucous membranes are dry.      Pharynx: Oropharynx is clear. No oropharyngeal exudate.   Eyes:      General: No scleral icterus.     Conjunctiva/sclera: Conjunctivae normal.      Pupils: Pupils are equal, round, and reactive to light.   Cardiovascular:      Rate and Rhythm: Tachycardia present. Rhythm irregularly irregular.      Pulses: Normal pulses.           Radial pulses are 2+ on the right side and 2+ on the left side.        Dorsalis pedis pulses are 2+ on the right side and 2+ on the left side.      Heart sounds: No murmur heard.  Pulmonary:      Breath sounds: Wheezing present.   Chest:      Chest wall: No tenderness.   Abdominal:      Palpations: Abdomen is soft.      Tenderness: There is no abdominal tenderness. There is no guarding or rebound.   Musculoskeletal:         General: Normal range of motion.      Cervical back: Normal range of motion and neck supple.      Right lower leg: No edema.      Left lower leg: No edema.   Lymphadenopathy:      Cervical: No cervical adenopathy.   Skin:     General: Skin is  warm and dry.      Capillary Refill: Capillary refill takes 2 to 3 seconds.      Findings: No rash.   Neurological:      Mental Status: He is oriented to person, place, and time.      Cranial Nerves: No cranial nerve deficit.      Sensory: No sensory deficit.      Motor: No weakness.   Psychiatric:         Mood and Affect: Mood normal.         Behavior: Behavior normal.         Thought Content: Thought content normal.      Comments: Emotional and tearful          Medications  Current Facility-Administered Medications   Medication Dose Route Frequency Provider Last Rate Last Admin    metoprolol SR (Toprol XL) tablet 50 mg  50 mg Oral Q DAY Keerthi Silveira M.D.   50 mg at 04/07/24 0502    lisinopril (Prinivil) tablet 10 mg  10 mg Oral Q DAY Keerthi Silveira M.D.   10 mg at 04/07/24 0503    rivaroxaban (Xarelto) tablet 20 mg  20 mg Oral PM MEAL Keerthi Silveira M.D.   20 mg at 04/06/24 1810    chlordiazePOXIDE (Librium) capsule 50 mg  50 mg Oral Q8HRS Clive Schneider M.D.   50 mg at 04/07/24 0503    Respiratory Therapy Consult   Nebulization Continuous RT Keerthi Silveira M.D.        ipratropium-albuterol (DUONEB) nebulizer solution  3 mL Nebulization Q4HRS (RT) Keerthi Silveira M.D.   3 mL at 04/07/24 0432    ipratropium-albuterol (DUONEB) nebulizer solution  3 mL Nebulization Q2HRS PRN (RT) Keerthi Silveira M.D.        methylPREDNISolone sod succ (SOLU-MEDROL) 125 MG injection 62.5 mg  62.5 mg Intravenous Q8HRS Keerthi Silveira M.D.   62.5 mg at 04/07/24 0505    ampicillin/sulbactam (Unasyn) 3 g in  mL IVPB  3 g Intravenous Q6HRS Keerthi Silveira M.D.   Stopped at 04/07/24 0537    dextrose 5% infusion   Intravenous Continuous TAMMY VegaO. 30 mL/hr at 04/07/24 0207 New Bag at 04/07/24 0207    amiodarone (Nexterone) 360 mg/200 mL infusion  0.5 mg/min Intravenous Continuous Woo Tedja, D.O. 16.7 mL/hr at 04/07/24 0045 0.5 mg/min at 04/07/24 0045    thiamine (B-1) 500 mg in dextrose 5% 100 mL IVPB  500  mg Intravenous DAILY AT 1800 TAMMY VegaO. 200 mL/hr at 04/06/24 1841 500 mg at 04/06/24 1841    dexmedetomidine (PRECEDEX) 400 mcg/100mL NS premix infusion  0.1-1 mcg/kg/hr (Ideal) Intravenous Continuous TAMMY VegaO.   Stopped at 04/05/24 1133    LORazepam (Ativan) injection 1-2 mg  1-2 mg Intravenous Q2HRS PRN NADIRA Vega.O.   2 mg at 04/06/24 1927    [START ON 4/8/2024] thiamine (Vitamin B-1) tablet 100 mg  100 mg Oral DAILY TAMMY VegaO.        And    multivitamin tablet 1 Tablet  1 Tablet Oral DAILY TAMMY VegaO.   1 Tablet at 04/07/24 0503    And    folic acid (Folvite) tablet 1 mg  1 mg Oral DAILY NADIRA Vega.O.   1 mg at 04/07/24 0503    senna-docusate (Pericolace Or Senokot S) 8.6-50 MG per tablet 2 Tablet  2 Tablet Oral Q EVENING NADIRA Vega.O.        And    polyethylene glycol/lytes (Miralax) Packet 1 Packet  1 Packet Oral QDAY PRN TAMMY VegaO.        ondansetron (Zofran) syringe/vial injection 4 mg  4 mg Intravenous Q4HRS PRN NADIRA Vega.O.   4 mg at 04/05/24 2100    ondansetron (Zofran ODT) dispertab 4 mg  4 mg Oral Q4HRS PRN NADIRA Vega.O.           Fluids    Intake/Output Summary (Last 24 hours) at 4/7/2024 0634  Last data filed at 4/7/2024 0600  Gross per 24 hour   Intake 2220.09 ml   Output 1925 ml   Net 295.09 ml       Laboratory          Recent Labs     04/05/24  0354 04/05/24  1000 04/06/24  0157 04/07/24  0210   SODIUM 131* 130* 133* 134*   POTASSIUM 4.0 4.1 4.8 4.7   CHLORIDE 91* 92* 96 98   CO2 16* 21 25 25   BUN 29* 23* 24* 22   CREATININE 0.85 0.66 0.84 0.71   MAGNESIUM 1.8 2.6* 2.7* 2.4   PHOSPHORUS 3.3  --   --   --    CALCIUM 8.0* 8.1* 8.3* 8.2*     Recent Labs     04/05/24 0354 04/05/24 1000 04/06/24 0157 04/07/24 0210   ALTSGPT 39  --   --   --    ASTSGOT 38  --   --   --    ALKPHOSPHAT 65  --   --   --    TBILIRUBIN 0.9  --   --   --    LIPASE 20  --   --   --    GLUCOSE 182* 138* 98 140*     Recent Labs     04/05/24 0354 04/05/24 1000 04/06/24 0157  04/07/24  0210   WBC 19.7* 15.2* 13.0* 10.2   NEUTSPOLYS 82.90* 83.60* 75.30* 89.30*   LYMPHOCYTES 9.80* 10.60* 19.10* 7.80*   MONOCYTES 5.20 4.20 4.10 2.00   EOSINOPHILS 0.80 0.00 0.70 0.00   BASOPHILS 0.30 0.80 0.30 0.30   ASTSGOT 38  --   --   --    ALTSGPT 39  --   --   --    ALKPHOSPHAT 65  --   --   --    TBILIRUBIN 0.9  --   --   --      Recent Labs     04/05/24  0955 04/05/24  1000 04/06/24  0157 04/07/24  0210   RBC  --  5.01 4.56* 4.34*   HEMOGLOBIN  --  16.3 15.1 14.0   HEMATOCRIT  --  45.2 42.1 40.3*   PLATELETCT  --  153* 125* 119*   PROTHROMBTM 17.2*  --   --   --    INR 1.38*  --   --   --        Imaging  ECHO:  Compared to prior echocardiogram 11/22/2017, LV systolic function is now reduced  Normal LV size and thickness with reduced systolic function, estimated LVEF 35%  Normal RV size and systolic function  IVC not well-visualized  No significant valvular abnormalities per Doppler assessment  No pericardial effusion  Dr. Shukla notified via Voalte on 4/5/24 at 4:53PM    Assessment/Plan  * Sepsis (HCC)- (present on admission)  Assessment & Plan  This is Sepsis Present on admission  SIRS criteria identified on my evaluation include: Tachycardia, with heart rate greater than 90 BPM and Leukocytosis, with WBC greater than 12,000  Clinical indicators of end organ dysfunction include Hypotension with systolic blood pressure less than 90 or MAP less than 65 and Lactic Acid greater than 2  Source is undifferentiated  Sepsis protocol initiated  Crystalloid Fluid Administration: Fluid resuscitation ordered per standard protocol - 30 mL/kg per current or ideal body weight  IV antibiotics as appropriate for source of sepsis  Reassessment: I have reassessed the patient's hemodynamic status    S/p 2.5L fluids in ED.   Follow cultures  Broad spectrum abx: zosyn x 1 day and then switched to unasyn/azithromycin, cont ab for 5 days  COVID/flu/RSV negative  MRSA nasal screen negative  Trend lactic acid  Improving      Syphilis  Assessment & Plan  Will need treatment again for this     HFrEF (heart failure with reduced ejection fraction) ([facility])- (present on admission)  Assessment & Plan  New diagnosis noted on ECHO  Judicious IVF management  Will need cardiology follow up and GDT when appropriate  Start lasix 40mg IV BID    Acute exacerbation of chronic obstructive pulmonary disease (COPD) ([facility])- (present on admission)  Assessment & Plan  New diagnosis of COPD  COPD protocols  RT/O2 protocols  Scheduled steroids of methylprednisolone 62.5mg every 8 hours  Scheduled and prn bronchodilators  Unasyn/azithromycin for possible bronchitis    Acute respiratory failure with hypoxia ([facility])- (present on admission)  Assessment & Plan  ?due to COPD exacerbation vs acute pulmonary infection vs pulmonary edema  RT/O2 protocols  COPD protocols initiated today  O2 sat goals > 92%  Broad spectrum abx started  Lasix 40mg IV BID    Atrial fibrillation with RVR ([facility])- (present on admission)  Assessment & Plan  Hx of with ablation and recent cardioversion  S/p amiodarone 300mg IV x1 and continue infusion at 1mg/hr  Cont home Apixaban 5mg BID   Optimize K >4 and Mg >2  Cont metoprolol 50mg XL PO daily  **quite refractory today after multiple doses of IV metoprolol, 2 boluses of amiodarone, and 1 dose of digoxin.  Starting diuresis today.     Lactic acidosis- (present on admission)  Assessment & Plan  Due to hypotension with AF with RVR  S/p IVF  Resolved     Thrombocytopenia ([facility])- (present on admission)  Assessment & Plan  Due to alcohol abuse  Monitor closely     Tobacco abuse- (present on admission)  Assessment & Plan  Ongoing  Nicotine replacement protocols if needed    HTN (hypertension)- (present on admission)  Assessment & Plan  SBP goals < 140  Cont metoprolol 50mg XL PO daily  Restart lisinopril 10mg PO daily  Prn labetalol/hydralazine  May benefit from clonidine while going through alcohol withdrawals     Alcohol withdrawal ([facility])- (present  on admission)  Assessment & Plan  Drinks one gallon of vodka daily in the past 5 days  S/p Rally bag on 4/5  Cont MVI, thiamine, and folate  Precedex infusion for RASS of -1 to +1 if needed  Continue Librium 50mg every 8 hours  Ativan as needed  Replete electrolytes as needed         VTE:  NOAC  Ulcer: Not Indicated  Lines: Hutchinson Catheter  Ongoing indication addressed and PIVs    I have performed a physical exam and reviewed and updated ROS and Plan today (4/7/2024). In review of yesterday's note (4/6/2024), there are no changes except as documented above.     Discussed patient condition and risk of morbidity and/or mortality with RN, RT, Pharmacy, [facility] resident, Charge nurse / hot rounds, and Patient    The patient remains critically ill however no longer requiring a Precedex infusion for alcohol withdrawal.  He does have refractory atrial fibrillation/flutter that has required several doses of amiodarone bolus as well as titration up on his amiodarone infusion.  Patient remains hemodynamically stable despite his high heart rate.  He no longer requires the ICU and will be transition to the IMCU.  The case was discussed with the hospitalist team who will take over care at this time.  The critical care team will sign off at this point.    Critical care time = 108 minutes in directly providing and coordinating critical care and extensive data review.  No time overlap and excludes procedures.

## 2024-04-07 NOTE — PROGRESS NOTES
4 Eyes Skin Assessment Completed by YAEL Gamez and YAEL Henderson.    Head WDL  Ears WDL  Nose WDL  Mouth WDL  Neck WDL  Breast/Chest WDL  Shoulder Blades WDL  Spine WDL  (R) Arm/Elbow/Hand Redness and Bruising, and ligature bj  (L) Arm/Elbow/Hand Redness and Bruising  Abdomen WDL  Groin WDL  Scrotum/Coccyx/Buttocks Redness, Blanching, and Scab  (R) Leg Scab, Bruising, and Abrasion  (L) Leg Scab, Bruising, and Abrasion  (R) Heel/Foot/Toe Redness, dryness, calloused, ulcers  (L) Heel/Foot/Toe Redness, dryness, callouses      Devices In Places Tele Box, Blood Pressure Cuff, Pulse Ox, SCD's, Condom Cath, and Nasal Cannula      Interventions In Place Gray Ear Foams, Pillows, and Low Air Loss Mattress    Possible Skin Injury Yes    Pictures Uploaded Into Epic N/A  Wound Consult Placed Yes  RN Wound Prevention Protocol Ordered Yes

## 2024-04-07 NOTE — PROGRESS NOTES
12 hour chart check complete.     Monitoring Summary:  Rhythm: NSR-ST rate of 80-100s bpm--Converted to atrial flutter around 2015 rate of 130-150s bpm  Ectopy: PVC(o)

## 2024-04-07 NOTE — CARE PLAN
The patient is Stable - Low risk of patient condition declining or worsening    Shift Goals  Clinical Goals: Monitor for withdrawal, pain control, administer medications as needed for atrial fibrillation/flutter  Patient Goals: Rest and eat  Family Goals: ATUL    Progress made toward(s) clinical / shift goals:    Problem: Pain - Standard  Goal: Alleviation of pain or a reduction in pain to the patient’s comfort goal  Outcome: Progressing     Problem: Knowledge Deficit - Standard  Goal: Patient and family/care givers will demonstrate understanding of plan of care, disease process/condition, diagnostic tests and medications  Outcome: Progressing     Problem: Optimal Care for Alcohol Withdrawal  Goal: Optimal Care for the alcohol withdrawal patient  Outcome: Progressing     Problem: Psychosocial  Goal: Patient's level of anxiety will decrease  Outcome: Progressing     Problem: Skin Integrity  Goal: Skin integrity is maintained or improved  Outcome: Progressing     Problem: Fall Risk  Goal: Patient will remain free from falls  Outcome: Progressing     Problem: Impaired Gas Exchange  Goal: Patient will demonstrate improved ventilation and adequate oxygenation and participate in treatment regimen within the level of ability/situation.  Outcome: Progressing     Problem: Self Care  Goal: Patient will have the ability to perform ADLs independently or with assistance (bathe, groom, dress, toilet and feed)  Outcome: Progressing       Patient is not progressing towards the following goals:

## 2024-04-07 NOTE — PROGRESS NOTES
"[facility] Progress Note      Admit Date: 4/5/2024    Resident(s): Clive Schneider M.D.   Attending:  LESVIA BROOKS/ Dr. Silveira    Patient ID:    Name:  Chace Gong   YOB: 1965  Age:  59 y.o.  male   MRN:  [id number]    Hospital Course (carried forward and updated):  Per Dr. Shukla, \"Chace Gong is a 59 y.o. male with hx of HTN, ?afib, active alcohol use, BMI 34 who presented to ED after drinking a gallon of vodka daily and per ERP note, pt reported \"his wife had all of his alcohol\". Pt c/o mild chest pain, \"not feeling good\".      At ED, pt became hypotensive in 70s and HR in 180s. On 7L Oxymask. Went into afib with RVR. Pt was given 2.5L fluid boluses, cultures obtained. Received ceftriaxone and azithromycin. Pt drowsy, but arousable, following commands. Lactate 6.8, HCO3 16, creatinine 0.85. Pt was given amio bolus 300 then followed with amio gtt at 1mg/min. Mag 4gr IV x1 given. Alcohol level 208. K 4.0.   ECG c/w afib with RVR, no obvious ST elevated. Troponin 27  CT A/P negative.   My bedside echo is difficult to interpret, rapid afib, RV appears dilated but grossly normal function. Same as LVEF\"    Consultants:  Critical Care     Interval Events:  4/6: Overnight, patient reported that he was exposed to syphilis and is requesting testing for that.  Patient reports that he he is a homosexual and only has intercourse with men and has 1 monogamous partner.  Patient reports that he has tested positive and subsequently treated for syphilis in the past.  Patient requirement 17mg of ativan in the last 24 hours.  Of note, patient was initially at a A-fib and subsequently sinus rhythm and now has a flutter compound on EKG.  Will restart patient's metoprolol and Xarelto.  Of note, per chart review, patient underwent an ablation on 3/15/2024.    4/7: No acute events overnight.  RPR was nonreactive and treponemal syphilis antibody is reactive. Patient's Afib with RVR resolved with Lasix, " amiodarone bolus, and digoxin.     Vitals Range last 24h:  Temp:  [36.1 °C (97 °F)-36.6 °C (97.8 °F)] 36.4 °C (97.6 °F)  Pulse:  [] 90  Resp:  [15-53] 21  BP: (113-167)/() 117/79  SpO2:  [88 %-97 %] 93 %      Intake/Output Summary (Last 24 hours) at 4/7/2024 1339  Last data filed at 4/7/2024 1146  Gross per 24 hour   Intake 1163.1 ml   Output 3325 ml   Net -2161.9 ml        Review of Systems   Constitutional:  Positive for malaise/fatigue. Negative for chills and fever.   HENT:  Negative for sore throat.    Eyes:  Negative for blurred vision and double vision.   Respiratory:  Negative for cough and shortness of breath.    Cardiovascular:  Negative for chest pain, palpitations and leg swelling.   Gastrointestinal:  Negative for abdominal pain, constipation, diarrhea, heartburn, nausea and vomiting.   Genitourinary:  Negative for dysuria and hematuria.        Denies any urethral discharge, lymphadenopathy around the groin area, swelling   Musculoskeletal:  Negative for back pain, joint pain and neck pain.   Skin:  Negative for rash.   Neurological:  Negative for dizziness, tingling, weakness and headaches.   Psychiatric/Behavioral:  Positive for substance abuse. The patient is not nervous/anxious and does not have insomnia.        PHYSICAL EXAM:  Vitals:    04/07/24 0900 04/07/24 1000 04/07/24 1100 04/07/24 1200   BP: 113/84 113/81 134/88 117/79   Pulse: (!) 155 (!) 156 97 90   Resp: (!) 22 (!) 28 (!) 31 (!) 21   Temp:  36.3 °C (97.4 °F)  36.4 °C (97.6 °F)   TempSrc:  Temporal  Temporal   SpO2:  91% 94% 93%   Weight:       Height:        Body mass index is 32.95 kg/m².    O2 therapy: Pulse Oximetry: 93 %, O2 (LPM): 4, O2 Delivery Device: Silicone Nasal Cannula    Date 04/07/24 0700 - 04/08/24 0659   Shift 6916-3343 1731-7130 6275-6733 24 Hour Total   INTAKE   Shift Total       OUTPUT   Urine 1800   1800     Output (mL) (External Urinary Catheter (Condom)) 1800   1800   Stool         Number of Times Stooled  2 x   2 x   Shift Total 1800   1800   NET -1800   -1800        Physical Exam  Vitals and nursing note reviewed.   Constitutional:       Appearance: He is obese. He is not ill-appearing, toxic-appearing or diaphoretic.   HENT:      Head: Normocephalic and atraumatic.      Right Ear: External ear normal.      Left Ear: External ear normal.      Nose: No rhinorrhea.      Mouth/Throat:      Mouth: Mucous membranes are moist.   Eyes:      Extraocular Movements: Extraocular movements intact.      Conjunctiva/sclera: Conjunctivae normal.   Cardiovascular:      Rate and Rhythm: Normal rate and regular rhythm.      Heart sounds: No murmur heard.     No gallop.   Pulmonary:      Effort: Pulmonary effort is normal.      Breath sounds: No stridor. No wheezing, rhonchi or rales.   Abdominal:      General: Bowel sounds are normal. There is no distension.      Tenderness: There is no abdominal tenderness.   Musculoskeletal:         General: No swelling.      Cervical back: Normal range of motion and neck supple.      Right lower leg: No edema.      Left lower leg: No edema.   Skin:     General: Skin is warm.      Capillary Refill: Capillary refill takes 2 to 3 seconds.      Coloration: Skin is not jaundiced.   Neurological:      Mental Status: He is alert and oriented to person, place, and time.   Psychiatric:         Mood and Affect: Mood normal. Affect is labile.         Behavior: Behavior is agitated.             Recent Labs     04/05/24  0354 04/05/24 1000 04/06/24  0157 04/07/24  0210   SODIUM 131* 130* 133* 134*   POTASSIUM 4.0 4.1 4.8 4.7   CHLORIDE 91* 92* 96 98   CO2 16* 21 25 25   BUN 29* 23* 24* 22   CREATININE 0.85 0.66 0.84 0.71   MAGNESIUM 1.8 2.6* 2.7* 2.4   PHOSPHORUS 3.3  --   --   --    CALCIUM 8.0* 8.1* 8.3* 8.2*     Recent Labs     04/05/24  0354 04/05/24  1000 04/06/24  0157 04/07/24  0210   ALTSGPT 39  --   --   --    ASTSGOT 38  --   --   --    ALKPHOSPHAT 65  --   --   --    TBILIRUBIN 0.9  --   --   --     LIPASE 20  --   --   --    GLUCOSE 182* 138* 98 140*     Recent Labs     04/05/24  0955 04/05/24  1000 04/06/24  0157 04/07/24  0210   RBC  --  5.01 4.56* 4.34*   HEMOGLOBIN  --  16.3 15.1 14.0   HEMATOCRIT  --  45.2 42.1 40.3*   PLATELETCT  --  153* 125* 119*   PROTHROMBTM 17.2*  --   --   --    INR 1.38*  --   --   --      Recent Labs     04/05/24  0354 04/05/24  1000 04/06/24  0157 04/07/24  0210   WBC 19.7* 15.2* 13.0* 10.2   NEUTSPOLYS 82.90* 83.60* 75.30* 89.30*   LYMPHOCYTES 9.80* 10.60* 19.10* 7.80*   MONOCYTES 5.20 4.20 4.10 2.00   EOSINOPHILS 0.80 0.00 0.70 0.00   BASOPHILS 0.30 0.80 0.30 0.30   ASTSGOT 38  --   --   --    ALTSGPT 39  --   --   --    ALKPHOSPHAT 65  --   --   --    TBILIRUBIN 0.9  --   --   --        Meds:   amiodarone 150 mg IVPB (LOAD)  150 mg Stopped (04/07/24 1014)    furosemide  40 mg      metoprolol SR  50 mg      lisinopril  10 mg      rivaroxaban  20 mg      chlordiazePOXIDE  50 mg      Respiratory Therapy Consult        ipratropium-albuterol  3 mL      methylPREDNISolone  62.5 mg      ampicillin-sulbactam (UNASYN) IV  3 g Stopped (04/07/24 1212)    dextrose 5%   30 mL/hr at 04/07/24 0207    amiodarone infusion  1 mg/min 1 mg/min (04/07/24 1105)    thiamine  500 mg 500 mg (04/06/24 1841)    dexmedetomidine (Precedex) infusion  0.1-1 mcg/kg/hr (Ideal) Stopped (04/05/24 1133)    LORazepam  1-2 mg      [START ON 4/8/2024] thiamine  100 mg      And    multivitamin  1 Tablet      And    folic acid  1 mg      senna-docusate  2 Tablet      And    polyethylene glycol/lytes  1 Packet      ondansetron  4 mg      ondansetron  4 mg          Procedures:  N/a    Imaging:  EC-ECHOCARDIOGRAM COMPLETE W/O CONT   Final Result      CT-ABDOMEN-PELVIS WITH   Final Result         1.  Minimal patchy opacities in bilateral lung bases, appearance suggests subtle infiltrate   2.  Irregular hepatic contour compatible with cirrhosis   3.  Changes of hepatic steatosis   4.  Atherosclerosis and  atherosclerotic coronary artery disease   5.  Fat-containing bilateral inguinal hernias   6.  Fat-containing umbilical hernia      DX-CHEST-PORTABLE (1 VIEW)   Final Result         1.  No focal infiltrates.   2.  Perihilar interstitial prominence and bronchial wall cuffing, appearance suggests changes of underlying bronchial inflammation, consider bronchitis.          ASSESSEMENT and PLAN:    * Sepsis (HCC)- (present on admission)  Assessment & Plan  This is Septic shock Present on admission  SIRS criteria identified on my evaluation include: Tachycardia, with heart rate greater than 90 BPM and Leukocytosis, with WBC greater than 12,000  Clinical indicators of end organ dysfunction include Hypotension with systolic blood pressure less than 90 or MAP less than 65 and Lactic Acid greater than 2  Indicators of septic shock include: Sepsis present and initial lactate level result is greater than or equal to 4mmol/L   Sources is: undifferentiated   Sepsis protocol initiated  Crystalloid Fluid Administration: Fluid resuscitation ordered per standard protocol - 30 mL/kg per current or ideal body weight  IV antibiotics as appropriate for source of sepsis  Reassessment: I have reassessed the patient's hemodynamic status    COVID/flu/RSV was negative  MRSA nasal screening was negative  Blood cultures negative to date  Lactic acid was trended down to normal  Patient was initially initiated on Zosyn and later antibiotics were de-escalated to Unasyn and azithromycin  Continue to monitor    HFrEF (heart failure with reduced ejection fraction) (HCC)- (present on admission)  Assessment & Plan  New diagnosis as noted on echocardiogram on 4/5/2024  Monitor Is/Os  Will be careful with IV fluids  Continue IV Lasix 40mg BID  Once patient is clinically improved, will need a cardiology follow-up and goal-directed therapy as appropriate    Acute respiratory failure with hypoxia (HCC)- (present on admission)  Assessment & Plan  Possibly  related to COPD exacerbation versus pneumonia  Respiratory therapy protocol  Continue supplemental oxygen  De-escalate Zosyn to Unasyn.  Also initiated on azithromycin for possible COPD exacerbation  Patient is recovering from sepsis, but will consider diuresis as needed    Atrial fibrillation with RVR (HCC)- (present on admission)  Assessment & Plan  History of ablation and recent cardioversion on 3/15/2024  Optimize electrolytes, potassium goal above 4 magnesium goal above 2  Received digoxin  Continue amiodarone infusion, Xarelto 20 mg nightly, and metoprolol 50 mg daily  Continue IV Lasix, noted improvement of heart rate    Alcohol withdrawal (HCC)- (present on admission)  Assessment & Plan  Significant alcohol consumption per the patient, possibly 1 gallon of vodka daily  Received rally bag  Precedex infusion for RASS goal of -1 to +1, actively titrating  Discontinue scheduled Ativan and initiate scheduled Librium 50 mg every 8 hours with Ativan as needed  Continue multivitamins, thiamine, folate  Closely monitor for possible refeeding syndrome and electrolyte imbalances    Syphilis  Assessment & Plan  Ongoing testing to confirm that the patient actually has active syphilis as the patient has a history of previously treated syphilis  Gonorrhea/chlamydia was negative  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 Patient will need treatment prior to discharge and will likely f/u visits arranged for weekly IM injections for total 3 weeks    Acute exacerbation of chronic obstructive pulmonary disease (COPD) (HCC)- (present on admission)  Assessment & Plan  No PFTs on file including this chart and merged chart however the patient reports that he has a history of COPD and does take inhalers as needed  Wheezing auscultated on exam, initiate COPD exacerbation order set  Schedule methylprednisolone 62.5 mg every 8 hours with scheduled and as needed DuoNebs  Start azithromycin, QTc  reviewed  Patient will likely need PFT and pulmonary follow-up    Thrombocytopenia (HCC)- (present on admission)  Assessment & Plan  Suspect possibly due to alcohol use and further exacerbated by sepsis  Continue to trend and will consider transfusion if clinically indicated    HTN (hypertension)- (present on admission)  Assessment & Plan  Anticipate that patient's blood pressure may run higher than normal is a patient continues to withdraw from alcohol  SBP goal of normotensive to 140  Continue metoprolol 50 mg, lisinopril 10 mg  As needed medications available  Will consider clonidine if symptoms and alcohol withdrawal worsens    Lactic acidosis- (present on admission)  Assessment & Plan  Possibly due to hypotension and significant alcohol use  Resolved with intervention    Tobacco abuse- (present on admission)  Assessment & Plan  Patient reports that he stopped smoking just prior to this admission  Nicotine replacement therapy is available if needed  Provided counseling to the patient        DISPO: Transfer from ICU    CODE STATUS: Full    Quality Measures:  Feeding: Cardiac  Analgesia: N/a  Sedation: N/a  Thromboprophylaxis: Xarelto  Head of bed: >30 degrees  Ulcer prophylaxis: N/a  Glycemic control: Correctional: N/a / Basal: N/a  Bowel care: bowel regimen  Indwelling lines: PIV x2, [place]  Deescalation of antibiotics: Yes, deescalated to Unasyn    Clive Schneider M.D.

## 2024-04-08 ENCOUNTER — PHARMACY VISIT (OUTPATIENT)
Dept: PHARMACY | Facility: MEDICAL CENTER | Age: 59
End: 2024-04-08
Payer: COMMERCIAL

## 2024-04-08 VITALS
SYSTOLIC BLOOD PRESSURE: 144 MMHG | HEART RATE: 80 BPM | WEIGHT: 262.13 LBS | DIASTOLIC BLOOD PRESSURE: 89 MMHG | TEMPERATURE: 97 F | RESPIRATION RATE: 25 BRPM | OXYGEN SATURATION: 95 % | BODY MASS INDEX: 33.64 KG/M2 | HEIGHT: 74 IN

## 2024-04-08 PROBLEM — A41.9 SEPSIS (HCC): Status: RESOLVED | Noted: 2024-04-05 | Resolved: 2024-04-08

## 2024-04-08 PROBLEM — F10.931 ALCOHOL WITHDRAWAL DELIRIUM (HCC): Status: ACTIVE | Noted: 2024-04-08

## 2024-04-08 PROBLEM — J96.01 ACUTE RESPIRATORY FAILURE WITH HYPOXIA (HCC): Status: RESOLVED | Noted: 2024-04-06 | Resolved: 2024-04-08

## 2024-04-08 PROBLEM — I48.91 ATRIAL FIBRILLATION WITH RVR (HCC): Status: RESOLVED | Noted: 2024-04-05 | Resolved: 2024-04-08

## 2024-04-08 PROBLEM — E87.20 LACTIC ACIDOSIS: Status: RESOLVED | Noted: 2018-12-12 | Resolved: 2024-04-08

## 2024-04-08 PROBLEM — J44.1 ACUTE EXACERBATION OF CHRONIC OBSTRUCTIVE PULMONARY DISEASE (COPD) (HCC): Status: RESOLVED | Noted: 2024-04-06 | Resolved: 2024-04-08

## 2024-04-08 PROBLEM — F10.931 ALCOHOL WITHDRAWAL DELIRIUM (HCC): Status: RESOLVED | Noted: 2024-04-08 | Resolved: 2024-04-08

## 2024-04-08 LAB
ANION GAP SERPL CALC-SCNC: 11 MMOL/L (ref 7–16)
BASOPHILS # BLD AUTO: 0.3 % (ref 0–1.8)
BASOPHILS # BLD: 0.04 K/UL (ref 0–0.12)
BUN SERPL-MCNC: 26 MG/DL (ref 8–22)
CALCIUM SERPL-MCNC: 8.4 MG/DL (ref 8.5–10.5)
CHLORIDE SERPL-SCNC: 97 MMOL/L (ref 96–112)
CO2 SERPL-SCNC: 26 MMOL/L (ref 20–33)
CREAT SERPL-MCNC: 0.86 MG/DL (ref 0.5–1.4)
EOSINOPHIL # BLD AUTO: 0 K/UL (ref 0–0.51)
EOSINOPHIL NFR BLD: 0 % (ref 0–6.9)
ERYTHROCYTE [DISTWIDTH] IN BLOOD BY AUTOMATED COUNT: 45.1 FL (ref 35.9–50)
GFR SERPLBLD CREATININE-BSD FMLA CKD-EPI: 100 ML/MIN/1.73 M 2
GLUCOSE SERPL-MCNC: 145 MG/DL (ref 65–99)
HCT VFR BLD AUTO: 41.3 % (ref 42–52)
HGB BLD-MCNC: 14.7 G/DL (ref 14–18)
HIV 1+2 AB+HIV1 P24 AG SERPL QL IA: NORMAL
IMM GRANULOCYTES # BLD AUTO: 0.17 K/UL (ref 0–0.11)
IMM GRANULOCYTES NFR BLD AUTO: 1.3 % (ref 0–0.9)
LYMPHOCYTES # BLD AUTO: 1.31 K/UL (ref 1–4.8)
LYMPHOCYTES NFR BLD: 9.8 % (ref 22–41)
MAGNESIUM SERPL-MCNC: 2.1 MG/DL (ref 1.5–2.5)
MCH RBC QN AUTO: 33 PG (ref 27–33)
MCHC RBC AUTO-ENTMCNC: 35.6 G/DL (ref 32.3–36.5)
MCV RBC AUTO: 92.6 FL (ref 81.4–97.8)
MONOCYTES # BLD AUTO: 0.33 K/UL (ref 0–0.85)
MONOCYTES NFR BLD AUTO: 2.5 % (ref 0–13.4)
NEUTROPHILS # BLD AUTO: 11.49 K/UL (ref 1.82–7.42)
NEUTROPHILS NFR BLD: 86.1 % (ref 44–72)
NRBC # BLD AUTO: 0 K/UL
NRBC BLD-RTO: 0 /100 WBC (ref 0–0.2)
PLATELET # BLD AUTO: 145 K/UL (ref 164–446)
PMV BLD AUTO: 9.6 FL (ref 9–12.9)
POTASSIUM SERPL-SCNC: 4.3 MMOL/L (ref 3.6–5.5)
RBC # BLD AUTO: 4.46 M/UL (ref 4.7–6.1)
RPR SER QL: NON REACTIVE
SODIUM SERPL-SCNC: 134 MMOL/L (ref 135–145)
T PALLIDUM AB SER QL IA: REACTIVE
WBC # BLD AUTO: 13.3 K/UL (ref 4.8–10.8)

## 2024-04-08 PROCEDURE — A9270 NON-COVERED ITEM OR SERVICE: HCPCS

## 2024-04-08 PROCEDURE — A9270 NON-COVERED ITEM OR SERVICE: HCPCS | Performed by: INTERNAL MEDICINE

## 2024-04-08 PROCEDURE — 700102 HCHG RX REV CODE 250 W/ 637 OVERRIDE(OP): Performed by: INTERNAL MEDICINE

## 2024-04-08 PROCEDURE — 86780 TREPONEMA PALLIDUM: CPT

## 2024-04-08 PROCEDURE — 700105 HCHG RX REV CODE 258: Performed by: INTERNAL MEDICINE

## 2024-04-08 PROCEDURE — 700102 HCHG RX REV CODE 250 W/ 637 OVERRIDE(OP)

## 2024-04-08 PROCEDURE — 86592 SYPHILIS TEST NON-TREP QUAL: CPT

## 2024-04-08 PROCEDURE — 700102 HCHG RX REV CODE 250 W/ 637 OVERRIDE(OP): Performed by: HOSPITALIST

## 2024-04-08 PROCEDURE — 83735 ASSAY OF MAGNESIUM: CPT

## 2024-04-08 PROCEDURE — G0475 HIV COMBINATION ASSAY: HCPCS

## 2024-04-08 PROCEDURE — RXMED WILLOW AMBULATORY MEDICATION CHARGE: Performed by: HOSPITALIST

## 2024-04-08 PROCEDURE — 99239 HOSP IP/OBS DSCHRG MGMT >30: CPT | Performed by: HOSPITALIST

## 2024-04-08 PROCEDURE — A9270 NON-COVERED ITEM OR SERVICE: HCPCS | Performed by: HOSPITALIST

## 2024-04-08 PROCEDURE — 700111 HCHG RX REV CODE 636 W/ 250 OVERRIDE (IP): Mod: JZ | Performed by: INTERNAL MEDICINE

## 2024-04-08 PROCEDURE — 80048 BASIC METABOLIC PNL TOTAL CA: CPT

## 2024-04-08 PROCEDURE — 85025 COMPLETE CBC W/AUTO DIFF WBC: CPT

## 2024-04-08 RX ORDER — CHLORDIAZEPOXIDE HYDROCHLORIDE 25 MG/1
25 CAPSULE, GELATIN COATED ORAL EVERY 8 HOURS
Qty: 9 CAPSULE | Refills: 0 | Status: SHIPPED | OUTPATIENT
Start: 2024-04-08 | End: 2024-04-11

## 2024-04-08 RX ORDER — AMIODARONE HYDROCHLORIDE 200 MG/1
TABLET ORAL
Qty: 44 TABLET | Refills: 2 | Status: SHIPPED | OUTPATIENT
Start: 2024-04-08 | End: 2024-05-15

## 2024-04-08 RX ORDER — AMIODARONE HYDROCHLORIDE 200 MG/1
200 TABLET ORAL TWICE DAILY
Status: DISCONTINUED | OUTPATIENT
Start: 2024-04-08 | End: 2024-04-08 | Stop reason: HOSPADM

## 2024-04-08 RX ORDER — NALTREXONE HYDROCHLORIDE 50 MG/1
50 TABLET, FILM COATED ORAL DAILY
Qty: 30 TABLET | Refills: 0 | Status: SHIPPED | OUTPATIENT
Start: 2024-04-08 | End: 2024-05-08

## 2024-04-08 RX ADMIN — AMIODARONE HYDROCHLORIDE 200 MG: 200 TABLET ORAL at 11:15

## 2024-04-08 RX ADMIN — METOPROLOL SUCCINATE 50 MG: 25 TABLET, EXTENDED RELEASE ORAL at 05:47

## 2024-04-08 RX ADMIN — LISINOPRIL 10 MG: 10 TABLET ORAL at 05:48

## 2024-04-08 RX ADMIN — Medication 100 MG: at 05:48

## 2024-04-08 RX ADMIN — AMPICILLIN AND SULBACTAM 3 G: 1; 2 INJECTION, POWDER, FOR SOLUTION INTRAMUSCULAR; INTRAVENOUS at 05:59

## 2024-04-08 RX ADMIN — METHYLPREDNISOLONE SODIUM SUCCINATE 62.5 MG: 125 INJECTION, POWDER, FOR SOLUTION INTRAMUSCULAR; INTRAVENOUS at 06:00

## 2024-04-08 RX ADMIN — CHLORDIAZEPOXIDE HYDROCHLORIDE 50 MG: 25 CAPSULE ORAL at 05:47

## 2024-04-08 RX ADMIN — AMPICILLIN AND SULBACTAM 3 G: 1; 2 INJECTION, POWDER, FOR SOLUTION INTRAMUSCULAR; INTRAVENOUS at 11:15

## 2024-04-08 RX ADMIN — THERA TABS 1 TABLET: TAB at 05:48

## 2024-04-08 RX ADMIN — FOLIC ACID 1 MG: 1 TABLET ORAL at 06:00

## 2024-04-08 RX ADMIN — AMIODARONE HYDROCHLORIDE 1 MG/MIN: 1.8 INJECTION, SOLUTION INTRAVENOUS at 04:36

## 2024-04-08 RX ADMIN — FUROSEMIDE 40 MG: 10 INJECTION INTRAMUSCULAR; INTRAVENOUS at 05:49

## 2024-04-08 ASSESSMENT — LIFESTYLE VARIABLES
NAUSEA AND VOMITING: NO NAUSEA AND NO VOMITING
PAROXYSMAL SWEATS: NO SWEAT VISIBLE
ANXIETY: NO ANXIETY (AT EASE)
AUDITORY DISTURBANCES: NOT PRESENT
ORIENTATION AND CLOUDING OF SENSORIUM: ORIENTED AND CAN DO SERIAL ADDITIONS
HEADACHE, FULLNESS IN HEAD: NOT PRESENT
TREMOR: *
AGITATION: NORMAL ACTIVITY
TOTAL SCORE: 2
VISUAL DISTURBANCES: NOT PRESENT

## 2024-04-08 ASSESSMENT — FIBROSIS 4 INDEX: FIB4 SCORE: 2.43

## 2024-04-08 NOTE — PROGRESS NOTES
Assumed care of pt at this time.  Pt A/Ox4.  Pt denies pain.  Respirations even and unlabored.  Pt anxious, but cooperative and pleasant.  Pt stood to bed side commode with hand held assist. Pt slightly unsteady but states he feels stronger than earlier.  Pt positioned for comfort.  Pt given dinner tray. Pt without further needs at this time.  Call light in reach. Bed in locked and low position, bed alarm on.

## 2024-04-08 NOTE — PROGRESS NOTES
Discharge orders received.  Patient arrived to the discharge lounge.  PIV removed.  Instructions given, medications reviewed and general discharge education provided to patient.  Follow up appointments discussed.  Patient verbalized understanding of dc instructions and prescriptions.  Patient signed discharge instructions.  Patient waiting for meds to beds medications.  Patient verbalized he had all belongings with him.  Patient waiting for his ride.Wished patient a speedy recovery.

## 2024-04-08 NOTE — PROGRESS NOTES
Patient transported to discharge lounge with all belongings, IVs removed, meds to beds notified that patient will be in the discharge lounge.

## 2024-04-08 NOTE — PROGRESS NOTES
Approx 1014: Patient converted from afib w 's- 160's to SR 90's. MD Shepard notified. Verbal order from MD Shepard to hold one time bolus of amiodarone and give if patient converts back into afib w RVR.

## 2024-04-08 NOTE — DISCHARGE SUMMARY
Discharge Summary    CHIEF COMPLAINT ON ADMISSION  Chief Complaint   Patient presents with    Pain     Pt reports pain on his penis. Denies discharges. +dysuria    ETOH Withdrawal     Pt reports drinking vodka today. Pt reports last drink was 10 hrs ago. Alert and oriented. Pt diaphoretic       Reason for Admission  Alcohol withdrawal    Admission Date  4/5/2024    CODE STATUS  Full Code    HPI & HOSPITAL COURSE  Chace Gong is a 59 y.o. male with tobacco, alcohol abuse (up to a gallon of vodka a day), obesity, probable COPD, DLD, HTN, prior syphilis and HCV who presented 4/5/2024 with active alcohol and was hypotensive in the ER with SBP in 70's and HR in 180s. He was in atrial fibrillation with RVR. Fluid boluses given and placed on 7L oxymask. He was given amiodarone bolus and started on amiodarone drip. For his EtOH w/d he was placed on precedex drip but able to be weaned off 4/6.  During his hospitalization he was placed on amiodarone IV but went back into sinus rhythm.  On 4/8 the patient was wanting to leave AGAINST MEDICAL ADVICE.  Nursing had called me as the patient was in tears and wanted to leave.  The patient was stable on his feet she stated he is alert without any tremors.  I came up to evaluate the patient he was alert and oriented x 3.  He states he had some business to handle and wanted to take care of his dog.  He states he wanted to stop drinking we had discussions on tips of sobriety.  I alerted him that he was recently in atrial fibrillation will need to be on medications for this and recommend that he follow-up with his primary care.  I had offered medications that he can take to help him with his alcohol withdrawals and I have ordered these for him.  Patient showed me that he was stable on his feet he had no significant tremors.  He was in sinus rhythm.  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   Patient states he has been treated in the past and RPR titer is hopefully pending.  He states he is bisexual states his wife is aware of this he states if he is syphilis positive he will need to let his partner know.    Therefore, he is discharged in good and stable condition to home with close outpatient follow-up.    The patient met 2-midnight criteria for an inpatient stay at the time of discharge.    Discharge Date  4/8/2024    FOLLOW UP ITEMS POST DISCHARGE  None    DISCHARGE DIAGNOSES  Principal Problem (Resolved):    Sepsis (HCC) (POA: Yes)  Active Problems:    Alcohol withdrawal (HCC) (POA: Yes)    HTN (hypertension) (POA: Yes)    Tobacco abuse (POA: Yes)    Thrombocytopenia (HCC) (POA: Yes)    HFrEF (heart failure with reduced ejection fraction) (HCC) (POA: Yes)    Syphilis (POA: Unknown)  Resolved Problems:    Lactic acidosis (POA: Yes)    Atrial fibrillation with RVR (HCC) (POA: Yes)    Acute respiratory failure with hypoxia (HCC) (POA: Yes)    Acute exacerbation of chronic obstructive pulmonary disease (COPD) (HCC) (POA: Yes)    Alcohol withdrawal delirium (HCC) (POA: Yes)      FOLLOW UP  No future appointments.  Mohsen Tamasaby, M.D.  [address] [id number] [phone]    Schedule an appointment as soon as possible for a visit        MEDICATIONS ON DISCHARGE     Medication List        START taking these medications        Instructions   amiodarone 200 MG Tabs  Start taking on: April 8, 2024  Commonly known as: Cordarone   Take 1 Tablet by mouth 2 times a day for 7 days, THEN 1 Tablet every day for 30 days.     chlordiazePOXIDE 25 MG Caps  Commonly known as: Librium   Take 1 Capsule by mouth every 8 hours for 3 days. Indications: Alcohol Withdrawal Syndrome  Dose: 25 mg     naltrexone 50 MG Tabs  Commonly known as: Depade   Take 1 Tablet by mouth every day for 30 days.  Dose: 50 mg            CONTINUE taking these medications        Instructions   lisinopril 10 MG  Tabs  Commonly known as: Prinivil   Take 10 mg by mouth every day.  Dose: 10 mg     metoprolol SR 50 MG Tb24  Commonly known as: Toprol XL   Take 50 mg by mouth every day.  Dose: 50 mg     spironolactone 25 MG Tabs  Commonly known as: Aldactone   Take 25 mg by mouth every day.  Dose: 25 mg     torsemide 10 MG tablet  Commonly known as: Demadex   Take 10 mg by mouth every day.  Dose: 10 mg     Xarelto 20 MG Tabs tablet  Generic drug: rivaroxaban   Take 20 mg by mouth with dinner.  Dose: 20 mg              Allergies  No Known Allergies    DIET  Orders Placed This Encounter   Procedures    Diet Order Diet: Cardiac     Standing Status:   Standing     Number of Occurrences:   1     Order Specific Question:   Diet:     Answer:   Cardiac [6]       ACTIVITY  As tolerated.  Weight bearing as tolerated    CONSULTATIONS  None    PROCEDURES  None    LABORATORY  Lab Results   Component Value Date    SODIUM 134 (L) 04/08/2024    POTASSIUM 4.3 04/08/2024    CHLORIDE 97 04/08/2024    CO2 26 04/08/2024    GLUCOSE 145 (H) 04/08/2024    BUN 26 (H) 04/08/2024    CREATININE 0.86 04/08/2024    CREATININE 1.0 01/21/2009        Lab Results   Component Value Date    WBC 13.3 (H) 04/08/2024    HEMOGLOBIN 14.7 04/08/2024    HEMATOCRIT 41.3 (L) 04/08/2024    PLATELETCT 145 (L) 04/08/2024      EC-ECHOCARDIOGRAM COMPLETE W/O CONT   Final Result      CT-ABDOMEN-PELVIS WITH   Final Result         1.  Minimal patchy opacities in bilateral lung bases, appearance suggests subtle infiltrate   2.  Irregular hepatic contour compatible with cirrhosis   3.  Changes of hepatic steatosis   4.  Atherosclerosis and atherosclerotic coronary artery disease   5.  Fat-containing bilateral inguinal hernias   6.  Fat-containing umbilical hernia      DX-CHEST-PORTABLE (1 VIEW)   Final Result         1.  No focal infiltrates.   2.  Perihilar interstitial prominence and bronchial wall cuffing, appearance suggests changes of underlying bronchial inflammation, consider  bronchitis.        4/5/24 Echocardiogram:  CONCLUSIONS  Compared to prior echocardiogram 11/22/2017, LV systolic function is   now reduced  Normal LV size and thickness with reduced systolic function, estimated   LVEF 35%  Normal RV size and systolic function  IVC not well-visualized  No significant valvular abnormalities per Doppler assessment  No pericardial effusion    Total time of the discharge process exceeds 32 minutes.

## 2024-04-08 NOTE — CONSULTS
Hospital Medicine Consultation    Date of Service  4/7/2024    Referring Physician  Carlso Shepard D.O.    Consulting Physician  Carlos Shepard D.O.    Reason for Consultation  Transfer of care out of ICU to [facility] for alcohol withdrawal and acute respiratory distress    History of Presenting Illness  Chace Gong is a 59 y.o. male with tobacco, alcohol abuse (up to a gallon of vodka a day), obesity, probable COPD, DLD, HTN, prior syphilis and HCV who presented 4/5/2024 with active alcohol and was hypotensive in the ER with SBP in 70's and HR in 180s.  He was in atrial fibrillation with RVR.  Fluid boluses given and placed on 7L oxymask. He was given amiodarone bolus and started on amiodarone drip. For his EtOH w/d he was placed on precedex drip but able to be weaned off 4/6.    Today:  Patient lethargic, tremulous but arousable.  States some nausea.  Mild confusion and states he is hallucinating.    Review of Systems  Review of Systems   Constitutional:  Positive for malaise/fatigue. Negative for fever.   HENT:  Negative for congestion.    Respiratory:  Negative for shortness of breath.    Cardiovascular:  Negative for chest pain and leg swelling.   Gastrointestinal:  Positive for nausea. Negative for abdominal pain and diarrhea.   Genitourinary:  Negative for dysuria.   Musculoskeletal:  Negative for myalgias and neck pain.   Neurological:  Positive for tremors. Negative for dizziness, speech change and headaches.   Psychiatric/Behavioral:  Positive for substance abuse. The patient is nervous/anxious.        Past Medical History   has a past medical history of Achilles tendinitis (6/24/2009), Arthralgia (6/24/2009), Bronchitis (6/24/2009), Dyslipidemia (6/24/2009), GERD (gastroesophageal reflux disease) (6/24/2009), Hepatitis C, HTN (hypertension) (6/24/2009), Hypertension, Low back pain, and Psychiatric disorder.    Surgical History   has a past surgical history that includes neurolysis (1/21/2009); other  orthopedic surgery (2001); carpal tunnel release (1/21/2009); hardware removal ortho (1/21/2009); synovectomy (1/21/2009); arthrotomy (2011); arthroscopy, knee (2013); elbow arthrotomy (5/17/2013); and loose body removal (5/17/2013).    Family History  family history includes Alcohol/Drug in his brother, brother, father, and sister; Cancer in his brother, mother, and sister; Diabetes in his sister; Hyperlipidemia in his father; Hypertension in his brother, father, mother, and sister; Stroke in his brother and father.    Social History   reports that he has been smoking cigarettes. He started smoking about 8 years ago. He has a 15 pack-year smoking history. He has never used smokeless tobacco. He reports current alcohol use. He reports current drug use. Drugs: Inhaled and Marijuana.    Medications  Current Facility-Administered Medications   Medication Dose Route Frequency Provider Last Rate Last Admin    amiodarone (Nexterone) IVPB 150 mg  150 mg Intravenous Once Keerthi Silveira M.D.   Held at 04/07/24 1014    furosemide (Lasix) injection 40 mg  40 mg Intravenous BID DIURETIC Keerthi Silveira M.D.   40 mg at 04/07/24 1800    metoprolol SR (Toprol XL) tablet 50 mg  50 mg Oral Q DAY Keerthi Silveira M.D.   50 mg at 04/07/24 0502    lisinopril (Prinivil) tablet 10 mg  10 mg Oral Q DAY Keerthi Silveira M.D.   10 mg at 04/07/24 0503    rivaroxaban (Xarelto) tablet 20 mg  20 mg Oral PM MEAL Keerthi Silveira M.D.   20 mg at 04/07/24 1802    chlordiazePOXIDE (Librium) capsule 50 mg  50 mg Oral Q8HRS Clive Schneider M.D.   50 mg at 04/07/24 1400    Respiratory Therapy Consult   Nebulization Continuous RT Keerthi Silveira M.D.        ipratropium-albuterol (DUONEB) nebulizer solution  3 mL Nebulization Q2HRS PRN (RT) Keerthi Silveira M.D.        methylPREDNISolone sod succ (SOLU-MEDROL) 125 MG injection 62.5 mg  62.5 mg Intravenous Q8HRS Keerthi Silveira M.D.   62.5 mg at 04/07/24 1400    ampicillin/sulbactam (Unasyn) 3  g in  mL IVPB  3 g Intravenous Q6HRS Keerthi Silveira M.D.   Stopped at 04/07/24 1808    dextrose 5% infusion   Intravenous Continuous TAMMY VegaO. 30 mL/hr at 04/07/24 1944 New Bag at 04/07/24 1944    amiodarone (Nexterone) 360 mg/200 mL infusion  1 mg/min Intravenous Continuous Keerthi Silveira M.D. 33.3 mL/hr at 04/07/24 1702 1 mg/min at 04/07/24 1702    dexmedetomidine (PRECEDEX) 400 mcg/100mL NS premix infusion  0.1-1 mcg/kg/hr (Ideal) Intravenous Continuous TAMMY VegaO.   Stopped at 04/05/24 1133    LORazepam (Ativan) injection 1-2 mg  1-2 mg Intravenous Q2HRS PRN Woo Shukla D.O.   1 mg at 04/07/24 0838    [START ON 4/8/2024] thiamine (Vitamin B-1) tablet 100 mg  100 mg Oral DAILY Woo Shukla D.O.        And    multivitamin tablet 1 Tablet  1 Tablet Oral DAILY Woo Shukla D.O.   1 Tablet at 04/07/24 0503    And    folic acid (Folvite) tablet 1 mg  1 mg Oral DAILY Woo Shukla, D.O.   1 mg at 04/07/24 0503    senna-docusate (Pericolace Or Senokot S) 8.6-50 MG per tablet 2 Tablet  2 Tablet Oral Q EVENING Woo Shukla D.O.        And    polyethylene glycol/lytes (Miralax) Packet 1 Packet  1 Packet Oral QDAY PRN Woo Shukla D.O.        ondansetron (Zofran) syringe/vial injection 4 mg  4 mg Intravenous Q4HRS PRN Woo Shukla D.O.   4 mg at 04/05/24 2100    ondansetron (Zofran ODT) dispertab 4 mg  4 mg Oral Q4HRS PRN Woo Shukla, D.O.           Allergies  No Known Allergies    Physical Exam  Temp:  [36.2 °C (97.2 °F)-36.8 °C (98.2 °F)] 36.7 °C (98.1 °F)  Pulse:  [] 89  Resp:  [18-53] 18  BP: (107-167)/() 135/81  SpO2:  [86 %-95 %] 93 %    Physical Exam  Vitals reviewed.   Constitutional:       Appearance: Normal appearance. He is obese. He is ill-appearing. He is not diaphoretic.   HENT:      Head: Normocephalic and atraumatic.      Nose: Nose normal.      Mouth/Throat:      Mouth: Mucous membranes are moist.      Pharynx: No oropharyngeal exudate.   Eyes:      General: No scleral icterus.         Right eye: No discharge.         Left eye: No discharge.      Extraocular Movements: Extraocular movements intact.      Conjunctiva/sclera: Conjunctivae normal.   Cardiovascular:      Rate and Rhythm: Normal rate and regular rhythm.      Pulses:           Radial pulses are 2+ on the right side and 2+ on the left side.        Dorsalis pedis pulses are 2+ on the right side and 2+ on the left side.      Heart sounds: No murmur heard.  Pulmonary:      Effort: Pulmonary effort is normal. No respiratory distress.      Breath sounds: Decreased breath sounds present. No wheezing.   Abdominal:      General: Bowel sounds are normal. There is no distension.      Palpations: Abdomen is soft.      Tenderness: There is no abdominal tenderness.   Musculoskeletal:         General: No swelling or tenderness.      Cervical back: No muscular tenderness.      Right lower leg: No edema.      Left lower leg: No edema.   Lymphadenopathy:      Cervical: No cervical adenopathy.   Skin:     Coloration: Skin is not jaundiced or pale.   Neurological:      General: No focal deficit present.      Mental Status: He is alert and oriented to person, place, and time. Mental status is at baseline.      Cranial Nerves: No cranial nerve deficit.      Motor: Weakness present.   Psychiatric:         Mood and Affect: Mood normal.         Behavior: Behavior normal.         Fluids  Date 04/07/24 0700 - 04/08/24 0659   Shift 2411-6911 6799-1749 9309-2138 24 Hour Total   INTAKE   P.O.  240  240   Shift Total  240  240   OUTPUT   Urine 2900 825  3725   Shift Total 2900 825  3725   Weight (kg) 116.4 116.4 116.4 116.4       Laboratory  Recent Labs     04/05/24  1000 04/06/24  0157 04/07/24  0210   WBC 15.2* 13.0* 10.2   RBC 5.01 4.56* 4.34*   HEMOGLOBIN 16.3 15.1 14.0   HEMATOCRIT 45.2 42.1 40.3*   MCV 90.2 92.3 92.9   MCH 32.5 33.1* 32.3   MCHC 36.1 35.9 34.7   RDW 43.8 45.7 45.0   PLATELETCT 153* 125* 119*   MPV 9.5 9.5 9.5     Recent Labs      04/05/24  1000 04/06/24  0157 04/07/24  0210   SODIUM 130* 133* 134*   POTASSIUM 4.1 4.8 4.7   CHLORIDE 92* 96 98   CO2 21 25 25   GLUCOSE 138* 98 140*   BUN 23* 24* 22   CREATININE 0.66 0.84 0.71   CALCIUM 8.1* 8.3* 8.2*     Recent Labs     04/05/24  0955   INR 1.38*                 Imaging  EC-ECHOCARDIOGRAM COMPLETE W/O CONT   Final Result      CT-ABDOMEN-PELVIS WITH   Final Result         1.  Minimal patchy opacities in bilateral lung bases, appearance suggests subtle infiltrate   2.  Irregular hepatic contour compatible with cirrhosis   3.  Changes of hepatic steatosis   4.  Atherosclerosis and atherosclerotic coronary artery disease   5.  Fat-containing bilateral inguinal hernias   6.  Fat-containing umbilical hernia      DX-CHEST-PORTABLE (1 VIEW)   Final Result         1.  No focal infiltrates.   2.  Perihilar interstitial prominence and bronchial wall cuffing, appearance suggests changes of underlying bronchial inflammation, consider bronchitis.          Assessment/Plan  * Sepsis (HCC)- (present on admission)  Assessment & Plan  COVID/flu/RSV was negative  MRSA nasal screening was negative  4/5 Blood cultures negative to date  Lactic acid was trended down to normal  Patient was initially initiated on Zosyn and later antibiotics were de-escalated to Unasyn and azithromycin  Continue to monitor  4/7 wbc:10.2 <13<15.2    Syphilis  Assessment & Plan  Ongoing testing to confirm that the patient actually has active syphilis as the patient has a history of previously treated syphilis  Gonorrhea/chlamydia was negative  Await additional testing of his syphilis    HFrEF (heart failure with reduced ejection fraction) ([facility])- (present on admission)  Assessment & Plan  New diagnosis   Monitor I/O's, labs, vitals.  Continue IV Lasix 40mg BID, metoprolol, lasix  Once patient is clinically improved, will need a cardiology follow-up and goal-directed therapy as appropriate   4/5/2024 echocardiogram: EF:35%    Acute  exacerbation of chronic obstructive pulmonary disease (COPD) (HCC)- (present on admission)  Assessment & Plan  No PFTs on file including this chart and merged chart however the patient reports that he has a history of COPD and does take inhalers as needed  Wheezing auscultated on exam, initiate COPD exacerbation order set  Schedule methylprednisolone 62.5 mg every 8 hours with scheduled and as needed DuoNebs  Start azithromycin, QTc reviewed  Patient will likely need PFT and pulmonary follow-up    Acute respiratory failure with hypoxia (HCC)- (present on admission)  Assessment & Plan  Possibly related to COPD exacerbation versus pneumonia  Respiratory therapy protocol  Continue supplemental oxygen  De-escalate Zosyn to Unasyn.  Also initiated on azithromycin for possible COPD exacerbation  Patient is recovering from sepsis, but will consider diuresis as needed    Atrial fibrillation with RVR (HCC)- (present on admission)  Assessment & Plan  History of ablation and recent cardioversion on 3/15/2024  Optimize electrolytes, potassium goal above 4 magnesium goal above 2  Received digoxin  Continue amiodarone infusion, Xarelto 20 mg nightly, and metoprolol 50 mg daily  Continue IV Lasix, noted improvement of heart rate    Lactic acidosis- (present on admission)  Assessment & Plan  Possibly due to hypotension and significant alcohol use  Resolved with intervention    Thrombocytopenia (HCC)- (present on admission)  Assessment & Plan  Alcohol and fatty liver likely of etiology  monitor cbc  4/7 plt:119    Tobacco abuse- (present on admission)  Assessment & Plan  Encouraged smoking cessation.  Patient reports that he stopped smoking just prior to this admission  Nicotine replacement therapy is available if needed  Provided counseling to the patient    HTN (hypertension)- (present on admission)  Assessment & Plan  Anticipate that patient's blood pressure may run higher than normal is a patient continues to withdraw from  alcohol  SBP goal of normotensive to 140  Continue metoprolol 50 mg, lisinopril 10 mg  As needed medications available  Will consider clonidine if symptoms and alcohol withdrawal worsens    Alcohol withdrawal (HCC)- (present on admission)  Assessment & Plan  Significant alcohol consumption per the patient, possibly 1 gallon of vodka daily  Received rally bag, thiamine and folate supplement.  Precedex infusion for RASS goal of -1 to +1, actively titrating monitor in IMCU  Discontinue scheduled Ativan and initiate scheduled Librium 50 mg every 8 hours with Ativan as needed  Continue multivitamins, thiamine, folate  Closely monitor for possible refeeding syndrome and electrolyte imbalances

## 2024-04-08 NOTE — CARE PLAN
Problem: Pain - Standard  Goal: Alleviation of pain or a reduction in pain to the patient’s comfort goal  Outcome: Progressing     Problem: Knowledge Deficit - Standard  Goal: Patient and family/care givers will demonstrate understanding of plan of care, disease process/condition, diagnostic tests and medications  Outcome: Progressing     Problem: Optimal Care for Alcohol Withdrawal  Goal: Optimal Care for the alcohol withdrawal patient  Outcome: Progressing     Problem: Seizure Precautions  Goal: Implementation of seizure precautions  Outcome: Progressing     Problem: Lifestyle Changes  Goal: Patient's ability to identify lifestyle changes and available resources to help reduce recurrence of condition will improve  Outcome: Progressing     Problem: Psychosocial  Goal: Patient's level of anxiety will decrease  Outcome: Progressing  Goal: Spiritual and cultural needs incorporated into hospitalization  Outcome: Progressing     Problem: Risk for Aspiration  Goal: Patient's risk for aspiration will be absent or decrease  Outcome: Progressing     Problem: Skin Integrity  Goal: Skin integrity is maintained or improved  Outcome: Progressing     Problem: Fall Risk  Goal: Patient will remain free from falls  Outcome: Progressing     Problem: Knowledge Deficit - COPD  Goal: Patient/significant other demonstrates understanding of disease process, utilization of the Action Plan, medications and discharge instruction  Outcome: Progressing     Problem: Risk for Infection - COPD  Goal: Patient will remain free from signs and symptoms of infection  Outcome: Progressing     Problem: Nutrition - Advanced  Goal: Patient will display progressive weight gain toward goal have adequate food and fluid intake  Outcome: Progressing     Problem: Ineffective Airway Clearance  Goal: Patient will maintain patent airway with clear/clearing breath sounds  Outcome: Progressing     Problem: Impaired Gas Exchange  Goal: Patient will demonstrate  improved ventilation and adequate oxygenation and participate in treatment regimen within the level of ability/situation.  Outcome: Progressing     Problem: Self Care  Goal: Patient will have the ability to perform ADLs independently or with assistance (bathe, groom, dress, toilet and feed)  Outcome: Progressing   The patient is Stable - Low risk of patient condition declining or worsening    Shift Goals  Clinical Goals: vitals wnl, comfort, safety, montor etoh withdrawal  Patient Goals: get better  Family Goals: yahaira    Progress made toward(s) clinical / shift goals:        Patient is not progressing towards the following goals:

## 2024-04-08 NOTE — DISCHARGE INSTRUCTIONS
Discharge Instructions per Carlos Shepard D.O.      DIET: healthy balanced diet, free of alcohol    ACTIVITY: as tolerates    DIAGNOSIS: alcohol withdrawal with seizure.  Atrial fibrillation with resolve to sinus rhythm.    Return to ER if need          Sepsis, Self Care, Adult  Sepsis is a serious illness that may require intensive care in the hospital. The following information explains what you need to know in order to manage your condition after you are discharged from the hospital.  What are the risks?  After being treated for sepsis and discharged from the hospital, you may be at a higher risk for certain problems. These problems may be physical or mental.  Physical problems:  Weakness and tiredness.  Shortness of breath.  Pain in many areas of the body.  Difficulty walking.  Dry, itchy skin.  Lack of appetite. This may lead to weight loss.  Organ failure.  Mental problems:  Difficulty sleeping.  Depression.  Confusion.  Anxiety and worry caused by having gone through a bad experience (post-traumatic stress disorder,PTSD).  Low self-esteem.  Follow these instructions at home:  Medicines  Take over-the-counter and prescription medicines only as told by your health care provider.  If you were prescribed an antibiotic, antiviral, or antifungal medicine, take it as told by your health care provider. Do not stop taking the medicine even if you start to feel better.  Eating and drinking    Eat a healthy diet that includes plenty of vegetables, fruits, whole grains, low-fat dairy products, and lean protein. Ask your health care provider if you should avoid certain foods.  Drink enough fluid to keep your urine pale yellow.  Alcohol use  Do not drink alcohol if:  Your health care provider tells you not to drink.  You are pregnant, may be pregnant, or are planning to become pregnant.  If you drink alcohol, limit how much you use to:  0-1 drink a day for women.  0-2 drinks a day for men.  Be aware of how much alcohol  is in your drink. In the [place] one drink equals one 12 oz bottle of beer (355 mL), one 5 oz glass of wine (148 mL), or one 1½ oz glass of hard liquor (44 mL).  Activity  Rest as told by your health care provider.  Avoid sitting for a long time without moving. Get up to take short walks every 1-2 hours. This is important to improve blood flow and breathing. Ask for help if you feel weak or unsteady.  Try to set small, achievable goals each week, such as dressing yourself, bathing, or walking up the stairs. It may take a while to rebuild your strength.  Try to exercise regularly if you feel healthy enough to do so. Ask your health care provider what exercises are safe for you.  Return to your normal activities as told by your health care provider. Ask your health care provider what activities are safe for you.  Preventing infection    Keep your vaccinations up to date. Get the flu shot every year.  Wash your hands often for at least 20 seconds using soap and water. If soap and water are not available, use hand .  Practice good hygiene. Keep cuts clean and covered until they heal.  Managing stress  Talk with your health care provider or counselor about ways to reduce stress. He or she may suggest:  Meditation, muscle relaxation, and breathing exercises.  Listening to music.  Talk therapy.  Spending time on hobbies and activities that you enjoy.  General instructions  Get the right amount and quality of sleep. Most adults need 7-9 hours of sleep each night. To help with sleep:  Keep your bedroom cool and dark.  Do not eat a heavy meal within one hour of bedtime.  Do not drink alcohol or caffeinated drinks before bed.  Avoid screen time, such as television, computers, tablets, or cell phones before bed.  Do not use any products that contain nicotine or tobacco. These products include cigarettes, chewing tobacco, and vaping devices, such as e-cigarettes. If you need help quitting, ask your health care  provider.  Talk to trusted family members and friends about your condition. Explain your symptoms to them, and let them know that you are working with a health care provider to treat your condition. This can provide you with one way to get support and guidance.  Keep all follow-up visits. This is important.  Questions to ask your health care provider:  What physical and emotional changes do I need to report?  Do I need to have someone with me all the time?  Is it safe for me to drive?  Contact a health care provider if:  You do not feel like you are getting better or regaining strength.  You have muscle or joint pain.  You frequently feel tired.  You are having trouble coping with your recovery.  You have nightmares, or trouble falling asleep or staying asleep.  You feel sad, down, or depressed more often than not, every day for more than 2 weeks.  You have difficulty concentrating.  You feel irritable or you cry for no reason.  Get help right away if:  You have difficulty breathing.  You have a rapid or skipping heartbeat.  You become confused or disoriented.  You see, hear, or feel things that do not exist (hallucinations).  You have a high fever.  You have an infection that is getting worse or not getting better.  You have thoughts of hurting yourself or others.  These symptoms may represent a serious problem that is an emergency. Do not wait to see if the symptoms will go away. Get medical help right away. Call your local emergency services (197 in the [place] Do not drive yourself to the hospital.  If you ever feel like you may hurt yourself or others, or have thoughts about taking your own life, get help right away. Go to your nearest emergency department or:  Call your local emergency services (720 in the [place]  Call a suicide crisis helpline, such as the National Suicide Prevention Lifeline at 1-947.164.5642 or 166 in the [place] This is open 24 hours a day.  Text the Crisis Text Line at 085329 (in the  [place]  Summary  Sepsis is a serious illness that may require intensive care in a hospital. You may experience long-term health effects after you are discharged from the hospital.  Try to set small, achievable goals each week, such as dressing yourself, bathing, or walking up the stairs. It may take a while to rebuild your strength.  Know what symptoms you should get help right away for.  Keep all follow-up visits. This is important.  This information is not intended to replace advice given to you by your health care provider. Make sure you discuss any questions you have with your health care provider.  Document Revised: 07/13/2022 Document Reviewed: 11/01/2021  Elsevier Patient Education © 2023 [employer]

## 2024-04-09 LAB — T PALLIDUM AB SER QL AGGL: REACTIVE

## 2024-04-10 LAB
BACTERIA BLD CULT: NORMAL
BACTERIA BLD CULT: NORMAL
SIGNIFICANT IND 70042: NORMAL
SIGNIFICANT IND 70042: NORMAL
SITE SITE: NORMAL
SITE SITE: NORMAL
SOURCE SOURCE: NORMAL
SOURCE SOURCE: NORMAL
T PALLIDUM AB SER QL AGGL: REACTIVE
T PALLIDUM AB SER QL AGGL: REACTIVE

## 2024-04-16 NOTE — CARE PLAN
The patient is Watcher - Medium risk of patient condition declining or worsening    Shift Goals  Clinical Goals: ETOH withdrawal, vitals stable, fall precautions, safety  Patient Goals: rest, eat, breathe better  Family Goals: ATUL    Progress made toward(s) clinical / shift goals:    Afebrile.  Ate dinner. No s/sx of aspiration noted.     Patient is not progressing towards the following goals:  Pt on 8L high flow cannula now. Was on 10L oxymask at the beginning of shift. +cough. Has mild shortness of breath with exertion. Yankuer at bedside. Will continue to wean as able.     Problem: Pain - Standard  Goal: Alleviation of pain or a reduction in pain to the patient’s comfort goal  Outcome: Progressing  Note: Denies any pain. Repositions self in bed.      Problem: Optimal Care for Alcohol Withdrawal  Goal: Optimal Care for the alcohol withdrawal patient  Outcome: Progressing  Note: Pt on scheduled Ativan. +sweating, +tremors, +nausea, headache and reports seeing humming bird earlier. Resting in between care. Medicated with PRN breakthrough ativan x1 with good result.      Problem: Seizure Precautions  Goal: Implementation of seizure precautions  Outcome: Progressing  Note: Seizure precautions in place. No seizure activity noted.      Problem: Fall Risk  Goal: Patient will remain free from falls  Outcome: Progressing  Note: High risk for fall. Precautions in place. Bed alarm active.               done done done

## 2024-07-30 ENCOUNTER — HOSPITAL ENCOUNTER (EMERGENCY)
Facility: MEDICAL CENTER | Age: 59
End: 2024-07-30
Attending: EMERGENCY MEDICINE
Payer: MEDICARE

## 2024-07-30 ENCOUNTER — APPOINTMENT (OUTPATIENT)
Dept: RADIOLOGY | Facility: MEDICAL CENTER | Age: 59
End: 2024-07-30
Attending: EMERGENCY MEDICINE
Payer: MEDICARE

## 2024-07-30 VITALS
RESPIRATION RATE: 20 BRPM | TEMPERATURE: 97.9 F | HEIGHT: 74 IN | WEIGHT: 260 LBS | DIASTOLIC BLOOD PRESSURE: 54 MMHG | SYSTOLIC BLOOD PRESSURE: 97 MMHG | HEART RATE: 132 BPM | OXYGEN SATURATION: 91 % | BODY MASS INDEX: 33.37 KG/M2

## 2024-07-30 DIAGNOSIS — N40.1 BENIGN PROSTATIC HYPERPLASIA WITH LOWER URINARY TRACT SYMPTOMS, SYMPTOM DETAILS UNSPECIFIED: ICD-10-CM

## 2024-07-30 DIAGNOSIS — I48.91 ATRIAL FIBRILLATION, UNSPECIFIED TYPE (HCC): ICD-10-CM

## 2024-07-30 DIAGNOSIS — I50.22 CHRONIC SYSTOLIC CONGESTIVE HEART FAILURE (HCC): ICD-10-CM

## 2024-07-30 LAB
ALBUMIN SERPL BCP-MCNC: 4.1 G/DL (ref 3.2–4.9)
ALBUMIN/GLOB SERPL: 1.2 G/DL
ALP SERPL-CCNC: 63 U/L (ref 30–99)
ALT SERPL-CCNC: 24 U/L (ref 2–50)
ANION GAP SERPL CALC-SCNC: 16 MMOL/L (ref 7–16)
AST SERPL-CCNC: 21 U/L (ref 12–45)
BASOPHILS # BLD AUTO: 0.7 % (ref 0–1.8)
BASOPHILS # BLD: 0.05 K/UL (ref 0–0.12)
BILIRUB SERPL-MCNC: 0.2 MG/DL (ref 0.1–1.5)
BUN SERPL-MCNC: 14 MG/DL (ref 8–22)
CALCIUM ALBUM COR SERPL-MCNC: 9.5 MG/DL (ref 8.5–10.5)
CALCIUM SERPL-MCNC: 9.6 MG/DL (ref 8.5–10.5)
CHLORIDE SERPL-SCNC: 104 MMOL/L (ref 96–112)
CO2 SERPL-SCNC: 22 MMOL/L (ref 20–33)
CREAT SERPL-MCNC: 1.15 MG/DL (ref 0.5–1.4)
EKG IMPRESSION: NORMAL
EOSINOPHIL # BLD AUTO: 0.24 K/UL (ref 0–0.51)
EOSINOPHIL NFR BLD: 3.4 % (ref 0–6.9)
ERYTHROCYTE [DISTWIDTH] IN BLOOD BY AUTOMATED COUNT: 48.1 FL (ref 35.9–50)
ETHANOL BLD-MCNC: <10.1 MG/DL
GFR SERPLBLD CREATININE-BSD FMLA CKD-EPI: 74 ML/MIN/1.73 M 2
GLOBULIN SER CALC-MCNC: 3.3 G/DL (ref 1.9–3.5)
GLUCOSE SERPL-MCNC: 102 MG/DL (ref 65–99)
HCT VFR BLD AUTO: 43.1 % (ref 42–52)
HGB BLD-MCNC: 14.4 G/DL (ref 14–18)
IMM GRANULOCYTES # BLD AUTO: 0.03 K/UL (ref 0–0.11)
IMM GRANULOCYTES NFR BLD AUTO: 0.4 % (ref 0–0.9)
LYMPHOCYTES # BLD AUTO: 2.37 K/UL (ref 1–4.8)
LYMPHOCYTES NFR BLD: 34.1 % (ref 22–41)
MCH RBC QN AUTO: 33 PG (ref 27–33)
MCHC RBC AUTO-ENTMCNC: 33.4 G/DL (ref 32.3–36.5)
MCV RBC AUTO: 98.6 FL (ref 81.4–97.8)
MONOCYTES # BLD AUTO: 0.5 K/UL (ref 0–0.85)
MONOCYTES NFR BLD AUTO: 7.2 % (ref 0–13.4)
NEUTROPHILS # BLD AUTO: 3.77 K/UL (ref 1.82–7.42)
NEUTROPHILS NFR BLD: 54.2 % (ref 44–72)
NRBC # BLD AUTO: 0 K/UL
NRBC BLD-RTO: 0 /100 WBC (ref 0–0.2)
PLATELET # BLD AUTO: 202 K/UL (ref 164–446)
PMV BLD AUTO: 9.3 FL (ref 9–12.9)
POTASSIUM SERPL-SCNC: 4.3 MMOL/L (ref 3.6–5.5)
PROT SERPL-MCNC: 7.4 G/DL (ref 6–8.2)
RBC # BLD AUTO: 4.37 M/UL (ref 4.7–6.1)
SODIUM SERPL-SCNC: 142 MMOL/L (ref 135–145)
TROPONIN T SERPL-MCNC: 15 NG/L (ref 6–19)
TROPONIN T SERPL-MCNC: 16 NG/L (ref 6–19)
WBC # BLD AUTO: 7 K/UL (ref 4.8–10.8)

## 2024-07-30 PROCEDURE — 96374 THER/PROPH/DIAG INJ IV PUSH: CPT

## 2024-07-30 PROCEDURE — 700101 HCHG RX REV CODE 250: Performed by: EMERGENCY MEDICINE

## 2024-07-30 PROCEDURE — 93005 ELECTROCARDIOGRAM TRACING: CPT | Performed by: EMERGENCY MEDICINE

## 2024-07-30 PROCEDURE — RXMED WILLOW AMBULATORY MEDICATION CHARGE: Performed by: EMERGENCY MEDICINE

## 2024-07-30 PROCEDURE — 99285 EMERGENCY DEPT VISIT HI MDM: CPT

## 2024-07-30 PROCEDURE — 84484 ASSAY OF TROPONIN QUANT: CPT | Mod: 91

## 2024-07-30 PROCEDURE — 700102 HCHG RX REV CODE 250 W/ 637 OVERRIDE(OP): Performed by: EMERGENCY MEDICINE

## 2024-07-30 PROCEDURE — A9270 NON-COVERED ITEM OR SERVICE: HCPCS | Performed by: EMERGENCY MEDICINE

## 2024-07-30 PROCEDURE — 36415 COLL VENOUS BLD VENIPUNCTURE: CPT

## 2024-07-30 PROCEDURE — 700105 HCHG RX REV CODE 258: Performed by: EMERGENCY MEDICINE

## 2024-07-30 PROCEDURE — 96376 TX/PRO/DX INJ SAME DRUG ADON: CPT

## 2024-07-30 PROCEDURE — 85025 COMPLETE CBC W/AUTO DIFF WBC: CPT

## 2024-07-30 PROCEDURE — 700111 HCHG RX REV CODE 636 W/ 250 OVERRIDE (IP): Mod: JZ | Performed by: EMERGENCY MEDICINE

## 2024-07-30 PROCEDURE — 96375 TX/PRO/DX INJ NEW DRUG ADDON: CPT

## 2024-07-30 PROCEDURE — 82077 ASSAY SPEC XCP UR&BREATH IA: CPT

## 2024-07-30 PROCEDURE — 80053 COMPREHEN METABOLIC PANEL: CPT

## 2024-07-30 PROCEDURE — 93005 ELECTROCARDIOGRAM TRACING: CPT

## 2024-07-30 PROCEDURE — 71045 X-RAY EXAM CHEST 1 VIEW: CPT

## 2024-07-30 RX ORDER — SODIUM CHLORIDE 9 MG/ML
1000 INJECTION, SOLUTION INTRAVENOUS ONCE
Status: COMPLETED | OUTPATIENT
Start: 2024-07-30 | End: 2024-07-30

## 2024-07-30 RX ORDER — METOPROLOL SUCCINATE 50 MG/1
50 TABLET, EXTENDED RELEASE ORAL DAILY
Qty: 30 TABLET | Refills: 1 | Status: SHIPPED | OUTPATIENT
Start: 2024-07-30

## 2024-07-30 RX ORDER — TAMSULOSIN HYDROCHLORIDE 0.4 MG/1
0.4 CAPSULE ORAL
Qty: 30 CAPSULE | Refills: 1 | Status: SHIPPED | OUTPATIENT
Start: 2024-07-30

## 2024-07-30 RX ORDER — DIAZEPAM 5 MG
5 TABLET ORAL ONCE
Status: COMPLETED | OUTPATIENT
Start: 2024-07-30 | End: 2024-07-30

## 2024-07-30 RX ORDER — METOPROLOL TARTRATE 1 MG/ML
5 INJECTION, SOLUTION INTRAVENOUS
Status: COMPLETED | OUTPATIENT
Start: 2024-07-30 | End: 2024-07-30

## 2024-07-30 RX ORDER — LISINOPRIL 10 MG/1
10 TABLET ORAL EVERY EVENING
Qty: 30 TABLET | Refills: 1 | Status: SHIPPED | OUTPATIENT
Start: 2024-07-30

## 2024-07-30 RX ORDER — DILTIAZEM HYDROCHLORIDE 5 MG/ML
25 INJECTION INTRAVENOUS ONCE
Status: COMPLETED | OUTPATIENT
Start: 2024-07-30 | End: 2024-07-30

## 2024-07-30 RX ADMIN — DIAZEPAM 5 MG: 5 TABLET ORAL at 16:52

## 2024-07-30 RX ADMIN — SODIUM CHLORIDE 1000 ML: 9 INJECTION, SOLUTION INTRAVENOUS at 15:42

## 2024-07-30 RX ADMIN — METOPROLOL TARTRATE 5 MG: 5 INJECTION INTRAVENOUS at 15:43

## 2024-07-30 RX ADMIN — DILTIAZEM HYDROCHLORIDE 25 MG: 5 INJECTION, SOLUTION INTRAVENOUS at 17:36

## 2024-07-30 RX ADMIN — METOPROLOL TARTRATE 5 MG: 5 INJECTION INTRAVENOUS at 16:21

## 2024-07-30 RX ADMIN — DILTIAZEM HYDROCHLORIDE 25 MG: 5 INJECTION, SOLUTION INTRAVENOUS at 18:13

## 2024-07-30 RX ADMIN — METOPROLOL TARTRATE 5 MG: 5 INJECTION INTRAVENOUS at 16:52

## 2024-07-30 ASSESSMENT — FIBROSIS 4 INDEX: FIB4 SCORE: 2.43

## 2024-07-30 NOTE — ED NOTES
Assist rn note: Pt medicated as ordered. ERP re-eval complete. Pt ok to eat. [place] and water provided.

## 2024-07-30 NOTE — ED PROVIDER NOTES
CHIEF COMPLAINT  Chief Complaint   Patient presents with    Tachycardia    Syncope              LIMITATION TO HISTORY   None    HPI    Chace Gong is a 59 y.o. male   Who is admitted in April 5 for alcohol withdrawal and tachycardia.  States been clean for several years.  Comes in with tachycardia.  Duration has been going on for about a day.  He states that his syncope.  He presented 3 episodes.  Once he fell in the chair fell asleep for female and woke up being his pants.  At times he was walking down around the corner and he felt lightheaded did not fall and hit his head but slouched on the ground.    Both times he noted to be with just feeling short of breath.  There is no leg swelling.  No chest pain or chest pressure associated with it.  And the patient is here now for further evaluation.  Patient tells me at this point he wants to sign out AGAINST MEDICAL ADVICE, to get his heart rate down as he has a dying dog  To take care of the dog    I did review the chart the patient had been admitted for alcohol withdrawal and he also signed AGAINST MEDICAL ADVICE at that time.  After he was put on amiodarone and converted from atrial fibrillation to sinus rhythm.  Looking at his medications he is on a beta-blocker he is on anticoagulation.    States he did not fill his pillbox at least a couple days ago.  She has been feeling depressed but he denies suicidal ideation    OUTSIDE HISTORIAN(S):  None.    EXTERNAL RECORDS REVIEWED         CHIEF COMPLAINT ON ADMISSION       Chief Complaint   Patient presents with    Pain       Pt reports pain on his penis. Denies discharges. +dysuria    ETOH Withdrawal       Pt reports drinking vodka today. Pt reports last drink was 10 hrs ago. Alert and oriented. Pt diaphoretic         Reason for Admission  Alcohol withdrawal     Admission Date  4/5/2024     CODE STATUS  Full Code     HPI & HOSPITAL COURSE  Chace Gong is a 59 y.o. male with tobacco, alcohol abuse (up to a  gallon of vodka a day), obesity, probable COPD, DLD, HTN, prior syphilis and HCV who presented 4/5/2024 with active alcohol and was hypotensive in the ER with SBP in 70's and HR in 180s. He was in atrial fibrillation with RVR. Fluid boluses given and placed on 7L oxymask. He was given amiodarone bolus and started on amiodarone drip. For his EtOH w/d he was placed on precedex drip but able to be weaned off 4/6.  During his hospitalization he was placed on amiodarone IV but went back into sinus rhythm.  On 4/8 the patient was wanting to leave AGAINST MEDICAL ADVICE.  Nursing had called me as the patient was in tears and wanted to leave.  The patient was stable on his feet she stated he is alert without any tremors.  I came up to evaluate the patient he was alert and oriented x 3.  He states he had some business to handle and wanted to take care of his dog.  He states he wanted to stop drinking we had discussions on tips of sobriety.  I alerted him that he was recently in atrial fibrillation will need to be on medications for this and recommend that he follow-up with his primary care.  I had offered medications that he can take to help him with his alcohol withdrawals and I have ordered these for him.  Patient showed me that he was stable on his feet he had no significant tremors.  He was in sinus rhythm.  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  Patient states he has been treated in the past and RPR titer is hopefully pending.  He states he is bisexual states his wife is aware of this he states if he is syphilis positive he will need to let his partner know.     Therefore, he is discharged in good and stable condition to home with close outpatient follow-up.     The patient met 2-midnight criteria for an inpatient stay at the time of discharge.     Discharge Date  4/8/2024    REVIEW OF SYSTEMS  Noted.    PAST MEDICAL  HISTORY  Past Medical History:   Diagnosis Date    Achilles tendinitis 6/24/2009    Arthralgia 6/24/2009    Bronchitis 6/24/2009    Dyslipidemia 6/24/2009    GERD (gastroesophageal reflux disease) 6/24/2009    Hepatitis C     HTN (hypertension) 6/24/2009    Hypertension     Low back pain     Psychiatric disorder     anxiety       FAMILY HISTORY  Family History   Problem Relation Age of Onset    Cancer Mother         lung / throat / breast    Hypertension Mother     Hypertension Father     Hyperlipidemia Father     Stroke Father     Alcohol/Drug Father     Diabetes Sister     Hypertension Sister     Alcohol/Drug Sister     Cancer Brother     Hypertension Brother     Alcohol/Drug Brother     Stroke Brother     Alcohol/Drug Brother     Cancer Sister         lung    Heart Disease Neg Hx        SOCIAL HISTORY  Social History     Tobacco Use    Smoking status: Every Day     Current packs/day: 0.50     Average packs/day: 0.5 packs/day for 47.0 years (23.5 ttl pk-yrs)     Types: Cigarettes    Smokeless tobacco: Never   Vaping Use    Vaping status: Never Used   Substance Use Topics    Alcohol use: Yes     Comment: vodka /beer    Drug use: Yes     Types: Inhaled, Marijuana     Comment: has med marijuana card; smokes 1 joint every morning, speed     Social History     Substance and Sexual Activity   Drug Use Yes    Types: Inhaled, Marijuana    Comment: has med marijuana card; smokes 1 joint every morning, speed       SURGICAL HISTORY  Past Surgical History:   Procedure Laterality Date    ELBOW ARTHROTOMY  5/17/2013    Performed by Jonathan Maurice M.D. at SURGERY [facility]    LOOSE BODY REMOVAL  5/17/2013    Performed by Jonathan Maurice M.D. at SURGERY [facility]    ARTHROSCOPY, KNEE  2013    right     ARTHROTOMY  2011    right shoulder    NEUROLYSIS  1/21/2009    Performed by JONATHAN MAURCIE at SURGERY [facility]    CARPAL TUNNEL RELEASE  1/21/2009    Performed by JONATHAN MAURICE at SURGERY  [facility]    HARDWARE REMOVAL ORTHO  1/21/2009    Performed by JONATHAN MAURICE at SURGERY [facility]    SYNOVECTOMY  1/21/2009    Performed by JONATHAN MAURICE at SURGERY [facility]    OTHER ORTHOPEDIC SURGERY  2001    lt wrist surg       CURRENT MEDICATIONS  No current facility-administered medications for this encounter.    Current Outpatient Medications:     metoprolol SR (TOPROL XL) 50 MG TABLET SR 24 HR, Take 50 mg by mouth every day., Disp: , Rfl:     spironolactone (ALDACTONE) 25 MG Tab, Take 25 mg by mouth every day., Disp: , Rfl:     torsemide (DEMADEX) 10 MG tablet, Take 10 mg by mouth every day., Disp: , Rfl:     lisinopril (PRINIVIL) 10 MG Tab, Take 10 mg by mouth every day., Disp: , Rfl:     rivaroxaban (XARELTO) 20 MG Tab tablet, Take 20 mg by mouth with dinner., Disp: , Rfl:     metoprolol SR (TOPROL XL) 50 MG TABLET SR 24 HR, Take 1 Tablet by mouth every day., Disp: 30 Tablet, Rfl: 0    rivaroxaban (XARELTO) 20 MG Tab tablet, Take 1 Tablet by mouth with dinner., Disp: 30 Tablet, Rfl: 0    spironolactone (ALDACTONE) 25 MG Tab, Take 1 Tablet by mouth every day., Disp: 30 Tablet, Rfl: 3    torsemide (DEMADEX) 10 MG tablet, Take 1 Tablet by mouth every day., Disp: 30 Tablet, Rfl: 3    lisinopril (PRINIVIL) 10 MG Tab, Take 1 Tablet by mouth every evening., Disp: 30 Tablet, Rfl: 0    tamsulosin (FLOMAX) 0.4 MG capsule, Take 0.4 mg by mouth 1/2 hour after breakfast., Disp: , Rfl:     ALLERGIES  Allergies   Allergen Reactions    Sulfa Drugs Unspecified     Patient unsure of reaction       PHYSICAL EXAM  VITAL SIGNS: BP (!) 140/65   Pulse (!) 151   Temp 36.6 °C (97.9 °F) (Temporal)   Resp 20   Wt 118 kg (260 lb)   SpO2 93%   BMI 33.38 kg/m²   Reviewed and no acute distress tachycardia noted.  Constitutional: Well developed, Well nourished, tachycardia noted..  HENT: Normocephalic, atraumatic, bilateral external ears normal, No intraoral erythema, edema, exudate  Eyes: PERRLA,  conjunctiva pink, no scleral icterus.   Cardiovascular: Regular rate and rhythm. No murmurs, rubs or gallops.  No dependent edema or calf tenderness  Respiratory: Lungs clear to auscultation bilaterally. No wheezes, rales, or rhonchi.  Abdominal:  Abdomen soft, non-tender, non distended. No rebound, or guarding.    Skin: No erythema, no rash. No wounds or bruising.  Genitourinary: No costovertebral angle tenderness.   Musculoskeletal: no deformities.   Neurologic: Alert, no facial droop noted. All extra ocular muscles intact. Moves all extremities with out weakness noted  Psychiatric: Easily mumbling.  Occasional flight of ideas.  Good train of thought.        MEDICAL DECISION MAKING:  PROBLEMS EVALUATED THIS VISIT:  Tachycardia.  Atrial flutter.  Patient has not atrial fibrillation.  Patient has been history of alcohol use.  Is being considered with his story.  In addition the patient has not been taking his medications.  Kali decision making.  Likely this patient is atrial flutter atrial fibrillation second and noncompliance of medication.  Patient also was depressed but has no history of suicidal ideation.  Differential be also atrial fibrillation flutter from infection metabolic hematologic or other abnormalities.         PLAN:  IV Lopressor  Switch IV Cardizem  Troponin  Valium  Lab work  Fluids      RISK:  Patient at high risk and be admitted as patient has atrial fibrillation rapid response was responding to Cardizem.    RESULTS    LABS Ordered and Reviewed by Me:  Results for orders placed or performed during the hospital encounter of 07/30/24   CBC with Differential   Result Value Ref Range    WBC 7.0 4.8 - 10.8 K/uL    RBC 4.37 (L) 4.70 - 6.10 M/uL    Hemoglobin 14.4 14.0 - 18.0 g/dL    Hematocrit 43.1 42.0 - 52.0 %    MCV 98.6 (H) 81.4 - 97.8 fL    MCH 33.0 27.0 - 33.0 pg    MCHC 33.4 32.3 - 36.5 g/dL    RDW 48.1 35.9 - 50.0 fL    Platelet Count 202 164 - 446 K/uL    MPV 9.3 9.0 - 12.9 fL     Neutrophils-Polys 54.20 44.00 - 72.00 %    Lymphocytes 34.10 22.00 - 41.00 %    Monocytes 7.20 0.00 - 13.40 %    Eosinophils 3.40 0.00 - 6.90 %    Basophils 0.70 0.00 - 1.80 %    Immature Granulocytes 0.40 0.00 - 0.90 %    Nucleated RBC 0.00 0.00 - 0.20 /100 WBC    Neutrophils (Absolute) 3.77 1.82 - 7.42 K/uL    Lymphs (Absolute) 2.37 1.00 - 4.80 K/uL    Monos (Absolute) 0.50 0.00 - 0.85 K/uL    Eos (Absolute) 0.24 0.00 - 0.51 K/uL    Baso (Absolute) 0.05 0.00 - 0.12 K/uL    Immature Granulocytes (abs) 0.03 0.00 - 0.11 K/uL    NRBC (Absolute) 0.00 K/uL   Complete Metabolic Panel (CMP)   Result Value Ref Range    Sodium 142 135 - 145 mmol/L    Potassium 4.3 3.6 - 5.5 mmol/L    Chloride 104 96 - 112 mmol/L    Co2 22 20 - 33 mmol/L    Anion Gap 16.0 7.0 - 16.0    Glucose 102 (H) 65 - 99 mg/dL    Bun 14 8 - 22 mg/dL    Creatinine 1.15 0.50 - 1.40 mg/dL    Calcium 9.6 8.5 - 10.5 mg/dL    Correct Calcium 9.5 8.5 - 10.5 mg/dL    AST(SGOT) 21 12 - 45 U/L    ALT(SGPT) 24 2 - 50 U/L    Alkaline Phosphatase 63 30 - 99 U/L    Total Bilirubin 0.2 0.1 - 1.5 mg/dL    Albumin 4.1 3.2 - 4.9 g/dL    Total Protein 7.4 6.0 - 8.2 g/dL    Globulin 3.3 1.9 - 3.5 g/dL    A-G Ratio 1.2 g/dL   Troponins NOW   Result Value Ref Range    Troponin T 16 6 - 19 ng/L   Troponins in two (2) hours   Result Value Ref Range    Troponin T 15 6 - 19 ng/L   ESTIMATED GFR   Result Value Ref Range    GFR (CKD-EPI) 74 >60 mL/min/1.73 m 2   DIAGNOSTIC ALCOHOL   Result Value Ref Range    Diagnostic Alcohol <10.1 <10.1 mg/dL   EKG (NOW)   Result Value Ref Range    Report       [facility] Emergency Dept.    Test Date:  2024  Pt Name:    SIMON MITCHELL                Department: ER  MRN:        [id number]                      Room:       RD 02  Gender:     Male                         Technician: [id number]  :        1965                   Requested By:ER TRIAGE PROTOCOL  Order #:    [id number]                    [place]ñito MD: Myles MCNAIR  MD DYLAN    Measurements  Intervals                                Axis  Rate:       153                          P:          269  ND:         87                           QRS:        28  QRSD:       85                           T:          32  QT:         312  QTc:        498    Interpretive Statements  Rate of 153  Cannot determine P waves so either this is atrial flutter or tachycardia  sinus.  Axis normal  Normal QRS, increase QTc  Axis normal  Some ST segment depressions in the lateral leads which appear to be unchanged  from previous on April 6, 2024  Impression: Rapid atrial tachycardia s uspect A-fib or atrial flutter.  No  obvious signs of acute ischemia  Electronically Signed On 07- 15:15:00 PDT by Myles RICHARDSON MD           RADIOLOGY        Radiologist interpretation:       DX-CHEST-PORTABLE (1 VIEW)   Final Result      No acute cardiopulmonary disease evident.            ED COURSE:    ED Observation Status? No   No noted need for observation for developing issue    INTERVENTIONS BY ME:  Medications   Metoprolol Tartrate (Lopressor) injection 5 mg (5 mg Intravenous Given 7/30/24 1652)   NS (Bolus) 0.9 % infusion 1,000 mL (0 mL Intravenous Stopped 7/30/24 1640)   diazePAM (Valium) tablet 5 mg (5 mg Oral Given 7/30/24 1652)   dilTIAZem (Cardizem) injection 25 mg (25 mg Intravenous Given 7/30/24 1736)   dilTIAZem (Cardizem) injection 25 mg (25 mg Intravenous Given 7/30/24 1813)       Response on recheck:  Patient at this point did not respond to a 3 dose IV metoprolol he did respond to Cardizem 1 to go down.  Reports he did go up the second time.        FINAL DISPO PLAN   Medications were refilled for the patient    Outpatient cardiology referral made      Followup:  [facility], Emergency Dept  [address] [address] [phone]  Go to   For follow up      CONDITION: Guarded    This patient is 59 years old comes in with rapid A-fib.  He had  a history of alcohol withdrawal which may have caused it so despite Ativan and 3 dose Lopressor did not get a Cardizem was responded but the patient at this point did not want to stay.  I discussed with him and discussed with that there is risks of leaving including sudden death.  Arrhythmia.  Patient has rapid A-fib and he states that he will take care of it take his medications states he was feeling anxious today and depressed but feeling much better.  At this point patient is also appears to be alert and oriented.  There appears to be no signs of compounding factors to cloud his judgment.  He understands he is taking risk into his own hands he is welcome back at any time.  He does not take care of his dog.  He was offered other options that is [employer] to see if they would come.  Patient declined.  Patient will be discharged AGAINST MEDICAL ADVICE encouraged to return anytime.    FINAL IMPRESSION  1. Atrial fibrillation, unspecified type (HCC)    2. Chronic systolic congestive heart failure (HCC)    3. Benign prostatic hyperplasia with lower urinary tract symptoms, symptom details unspecified    4.  Medication refill

## 2024-07-30 NOTE — ED TRIAGE NOTES
Pt BIB [facility] with c/c of syncopal event x3 today. EMS was called and pt found to be in rapid a-fib in the 180s-190s. Pt arrives here tachycardiac in the 150s. Pt stating he has not had his medications in 2 days

## 2024-07-31 ENCOUNTER — APPOINTMENT (OUTPATIENT)
Dept: RADIOLOGY | Facility: MEDICAL CENTER | Age: 59
End: 2024-07-31
Attending: EMERGENCY MEDICINE
Payer: MEDICARE

## 2024-07-31 ENCOUNTER — HOSPITAL ENCOUNTER (OUTPATIENT)
Facility: MEDICAL CENTER | Age: 59
End: 2024-08-01
Attending: EMERGENCY MEDICINE | Admitting: INTERNAL MEDICINE
Payer: MEDICARE

## 2024-07-31 DIAGNOSIS — I48.92 ATRIAL FLUTTER, UNSPECIFIED TYPE (HCC): ICD-10-CM

## 2024-07-31 PROBLEM — F41.9 ANXIETY: Status: ACTIVE | Noted: 2024-07-31

## 2024-07-31 LAB
ALBUMIN SERPL BCP-MCNC: 4.1 G/DL (ref 3.2–4.9)
ALBUMIN/GLOB SERPL: 1.3 G/DL
ALP SERPL-CCNC: 70 U/L (ref 30–99)
ALT SERPL-CCNC: 27 U/L (ref 2–50)
ANION GAP SERPL CALC-SCNC: 17 MMOL/L (ref 7–16)
APTT PPP: 28.2 SEC (ref 24.7–36)
AST SERPL-CCNC: 21 U/L (ref 12–45)
BASOPHILS # BLD AUTO: 0.5 % (ref 0–1.8)
BASOPHILS # BLD: 0.04 K/UL (ref 0–0.12)
BILIRUB SERPL-MCNC: 0.2 MG/DL (ref 0.1–1.5)
BUN SERPL-MCNC: 23 MG/DL (ref 8–22)
CALCIUM ALBUM COR SERPL-MCNC: 9.8 MG/DL (ref 8.5–10.5)
CALCIUM SERPL-MCNC: 9.9 MG/DL (ref 8.5–10.5)
CHLORIDE SERPL-SCNC: 100 MMOL/L (ref 96–112)
CO2 SERPL-SCNC: 21 MMOL/L (ref 20–33)
CREAT SERPL-MCNC: 1.25 MG/DL (ref 0.5–1.4)
EKG IMPRESSION: NORMAL
EOSINOPHIL # BLD AUTO: 0.38 K/UL (ref 0–0.51)
EOSINOPHIL NFR BLD: 4.5 % (ref 0–6.9)
ERYTHROCYTE [DISTWIDTH] IN BLOOD BY AUTOMATED COUNT: 48.2 FL (ref 35.9–50)
ETHANOL BLD-MCNC: <10.1 MG/DL
GFR SERPLBLD CREATININE-BSD FMLA CKD-EPI: 67 ML/MIN/1.73 M 2
GLOBULIN SER CALC-MCNC: 3.2 G/DL (ref 1.9–3.5)
GLUCOSE SERPL-MCNC: 148 MG/DL (ref 65–99)
HCT VFR BLD AUTO: 44 % (ref 42–52)
HGB BLD-MCNC: 14.7 G/DL (ref 14–18)
IMM GRANULOCYTES # BLD AUTO: 0.03 K/UL (ref 0–0.11)
IMM GRANULOCYTES NFR BLD AUTO: 0.4 % (ref 0–0.9)
INR PPP: 1.01 (ref 0.87–1.13)
LYMPHOCYTES # BLD AUTO: 3.11 K/UL (ref 1–4.8)
LYMPHOCYTES NFR BLD: 36.5 % (ref 22–41)
MAGNESIUM SERPL-MCNC: 1.9 MG/DL (ref 1.5–2.5)
MCH RBC QN AUTO: 33.1 PG (ref 27–33)
MCHC RBC AUTO-ENTMCNC: 33.4 G/DL (ref 32.3–36.5)
MCV RBC AUTO: 99.1 FL (ref 81.4–97.8)
MONOCYTES # BLD AUTO: 0.55 K/UL (ref 0–0.85)
MONOCYTES NFR BLD AUTO: 6.5 % (ref 0–13.4)
NEUTROPHILS # BLD AUTO: 4.41 K/UL (ref 1.82–7.42)
NEUTROPHILS NFR BLD: 51.6 % (ref 44–72)
NRBC # BLD AUTO: 0 K/UL
NRBC BLD-RTO: 0 /100 WBC (ref 0–0.2)
PLATELET # BLD AUTO: 209 K/UL (ref 164–446)
PMV BLD AUTO: 9.4 FL (ref 9–12.9)
POTASSIUM SERPL-SCNC: 4.3 MMOL/L (ref 3.6–5.5)
PROT SERPL-MCNC: 7.3 G/DL (ref 6–8.2)
PROTHROMBIN TIME: 13.4 SEC (ref 12–14.6)
RBC # BLD AUTO: 4.44 M/UL (ref 4.7–6.1)
SODIUM SERPL-SCNC: 138 MMOL/L (ref 135–145)
TROPONIN T SERPL-MCNC: 14 NG/L (ref 6–19)
TROPONIN T SERPL-MCNC: 16 NG/L (ref 6–19)
TSH SERPL DL<=0.005 MIU/L-ACNC: 3.77 UIU/ML (ref 0.38–5.33)
WBC # BLD AUTO: 8.5 K/UL (ref 4.8–10.8)

## 2024-07-31 PROCEDURE — 85730 THROMBOPLASTIN TIME PARTIAL: CPT

## 2024-07-31 PROCEDURE — 92960 CARDIOVERSION ELECTRIC EXT: CPT

## 2024-07-31 PROCEDURE — 80307 DRUG TEST PRSMV CHEM ANLYZR: CPT

## 2024-07-31 PROCEDURE — 71275 CT ANGIOGRAPHY CHEST: CPT

## 2024-07-31 PROCEDURE — 99285 EMERGENCY DEPT VISIT HI MDM: CPT

## 2024-07-31 PROCEDURE — 84443 ASSAY THYROID STIM HORMONE: CPT

## 2024-07-31 PROCEDURE — 80053 COMPREHEN METABOLIC PANEL: CPT

## 2024-07-31 PROCEDURE — 93005 ELECTROCARDIOGRAM TRACING: CPT

## 2024-07-31 PROCEDURE — 36415 COLL VENOUS BLD VENIPUNCTURE: CPT

## 2024-07-31 PROCEDURE — 770020 HCHG ROOM/CARE - TELE (206)

## 2024-07-31 PROCEDURE — 700105 HCHG RX REV CODE 258: Performed by: EMERGENCY MEDICINE

## 2024-07-31 PROCEDURE — 99222 1ST HOSP IP/OBS MODERATE 55: CPT | Mod: GC | Performed by: INTERNAL MEDICINE

## 2024-07-31 PROCEDURE — 83735 ASSAY OF MAGNESIUM: CPT

## 2024-07-31 PROCEDURE — 96374 THER/PROPH/DIAG INJ IV PUSH: CPT

## 2024-07-31 PROCEDURE — 93005 ELECTROCARDIOGRAM TRACING: CPT | Performed by: EMERGENCY MEDICINE

## 2024-07-31 PROCEDURE — 96375 TX/PRO/DX INJ NEW DRUG ADDON: CPT

## 2024-07-31 PROCEDURE — 96376 TX/PRO/DX INJ SAME DRUG ADON: CPT

## 2024-07-31 PROCEDURE — 700117 HCHG RX CONTRAST REV CODE 255: Performed by: EMERGENCY MEDICINE

## 2024-07-31 PROCEDURE — 700101 HCHG RX REV CODE 250: Performed by: EMERGENCY MEDICINE

## 2024-07-31 PROCEDURE — 71045 X-RAY EXAM CHEST 1 VIEW: CPT

## 2024-07-31 PROCEDURE — 700111 HCHG RX REV CODE 636 W/ 250 OVERRIDE (IP): Performed by: EMERGENCY MEDICINE

## 2024-07-31 PROCEDURE — 84484 ASSAY OF TROPONIN QUANT: CPT

## 2024-07-31 PROCEDURE — 82077 ASSAY SPEC XCP UR&BREATH IA: CPT

## 2024-07-31 PROCEDURE — 85025 COMPLETE CBC W/AUTO DIFF WBC: CPT

## 2024-07-31 PROCEDURE — 700102 HCHG RX REV CODE 250 W/ 637 OVERRIDE(OP): Performed by: EMERGENCY MEDICINE

## 2024-07-31 PROCEDURE — A9270 NON-COVERED ITEM OR SERVICE: HCPCS | Performed by: EMERGENCY MEDICINE

## 2024-07-31 PROCEDURE — 85610 PROTHROMBIN TIME: CPT

## 2024-07-31 RX ORDER — ONDANSETRON 4 MG/1
4 TABLET, ORALLY DISINTEGRATING ORAL EVERY 4 HOURS PRN
Status: DISCONTINUED | OUTPATIENT
Start: 2024-07-31 | End: 2024-08-01 | Stop reason: HOSPADM

## 2024-07-31 RX ORDER — ONDANSETRON 2 MG/ML
4 INJECTION INTRAMUSCULAR; INTRAVENOUS ONCE
Status: COMPLETED | OUTPATIENT
Start: 2024-07-31 | End: 2024-07-31

## 2024-07-31 RX ORDER — METOPROLOL TARTRATE 1 MG/ML
5 INJECTION, SOLUTION INTRAVENOUS ONCE
Status: COMPLETED | OUTPATIENT
Start: 2024-07-31 | End: 2024-07-31

## 2024-07-31 RX ORDER — LORAZEPAM 2 MG/ML
1 INJECTION INTRAMUSCULAR ONCE
Status: COMPLETED | OUTPATIENT
Start: 2024-07-31 | End: 2024-07-31

## 2024-07-31 RX ORDER — SPIRONOLACTONE 25 MG/1
25 TABLET ORAL DAILY
Status: DISCONTINUED | OUTPATIENT
Start: 2024-08-01 | End: 2024-08-01 | Stop reason: HOSPADM

## 2024-07-31 RX ORDER — PROMETHAZINE HYDROCHLORIDE 25 MG/1
12.5-25 SUPPOSITORY RECTAL EVERY 4 HOURS PRN
Status: DISCONTINUED | OUTPATIENT
Start: 2024-07-31 | End: 2024-08-01 | Stop reason: HOSPADM

## 2024-07-31 RX ORDER — METOPROLOL SUCCINATE 50 MG/1
50 TABLET, EXTENDED RELEASE ORAL DAILY
Status: DISCONTINUED | OUTPATIENT
Start: 2024-08-01 | End: 2024-08-01 | Stop reason: HOSPADM

## 2024-07-31 RX ORDER — POLYETHYLENE GLYCOL 3350 17 G/17G
1 POWDER, FOR SOLUTION ORAL
Status: DISCONTINUED | OUTPATIENT
Start: 2024-07-31 | End: 2024-08-01 | Stop reason: HOSPADM

## 2024-07-31 RX ORDER — DIAZEPAM 5 MG
5 TABLET ORAL ONCE
Status: COMPLETED | OUTPATIENT
Start: 2024-07-31 | End: 2024-07-31

## 2024-07-31 RX ORDER — MAGNESIUM SULFATE HEPTAHYDRATE 40 MG/ML
2 INJECTION, SOLUTION INTRAVENOUS ONCE
Status: COMPLETED | OUTPATIENT
Start: 2024-07-31 | End: 2024-08-01

## 2024-07-31 RX ORDER — PROCHLORPERAZINE EDISYLATE 5 MG/ML
5-10 INJECTION INTRAMUSCULAR; INTRAVENOUS EVERY 4 HOURS PRN
Status: DISCONTINUED | OUTPATIENT
Start: 2024-07-31 | End: 2024-08-01 | Stop reason: HOSPADM

## 2024-07-31 RX ORDER — PROMETHAZINE HYDROCHLORIDE 25 MG/1
12.5-25 TABLET ORAL EVERY 4 HOURS PRN
Status: DISCONTINUED | OUTPATIENT
Start: 2024-07-31 | End: 2024-08-01 | Stop reason: HOSPADM

## 2024-07-31 RX ORDER — SODIUM CHLORIDE, SODIUM LACTATE, POTASSIUM CHLORIDE, CALCIUM CHLORIDE 600; 310; 30; 20 MG/100ML; MG/100ML; MG/100ML; MG/100ML
1000 INJECTION, SOLUTION INTRAVENOUS ONCE
Status: COMPLETED | OUTPATIENT
Start: 2024-07-31 | End: 2024-07-31

## 2024-07-31 RX ORDER — TAMSULOSIN HYDROCHLORIDE 0.4 MG/1
0.4 CAPSULE ORAL
Status: DISCONTINUED | OUTPATIENT
Start: 2024-08-01 | End: 2024-08-01 | Stop reason: HOSPADM

## 2024-07-31 RX ORDER — TORSEMIDE 20 MG/1
10 TABLET ORAL DAILY
Status: DISCONTINUED | OUTPATIENT
Start: 2024-08-01 | End: 2024-08-01 | Stop reason: HOSPADM

## 2024-07-31 RX ORDER — DILTIAZEM HYDROCHLORIDE 5 MG/ML
20 INJECTION INTRAVENOUS ONCE
Status: COMPLETED | OUTPATIENT
Start: 2024-07-31 | End: 2024-07-31

## 2024-07-31 RX ORDER — AMOXICILLIN 250 MG
2 CAPSULE ORAL EVERY EVENING
Status: DISCONTINUED | OUTPATIENT
Start: 2024-07-31 | End: 2024-08-01 | Stop reason: HOSPADM

## 2024-07-31 RX ORDER — ACETAMINOPHEN 325 MG/1
650 TABLET ORAL EVERY 6 HOURS PRN
Status: DISCONTINUED | OUTPATIENT
Start: 2024-07-31 | End: 2024-08-01 | Stop reason: HOSPADM

## 2024-07-31 RX ORDER — LISINOPRIL 10 MG/1
10 TABLET ORAL EVERY EVENING
Status: DISCONTINUED | OUTPATIENT
Start: 2024-08-01 | End: 2024-08-01 | Stop reason: HOSPADM

## 2024-07-31 RX ORDER — ETOMIDATE 2 MG/ML
10 INJECTION INTRAVENOUS ONCE
Status: COMPLETED | OUTPATIENT
Start: 2024-07-31 | End: 2024-07-31

## 2024-07-31 RX ORDER — ONDANSETRON 2 MG/ML
4 INJECTION INTRAMUSCULAR; INTRAVENOUS EVERY 4 HOURS PRN
Status: DISCONTINUED | OUTPATIENT
Start: 2024-07-31 | End: 2024-08-01 | Stop reason: HOSPADM

## 2024-07-31 RX ADMIN — METOPROLOL TARTRATE 5 MG: 5 INJECTION INTRAVENOUS at 18:17

## 2024-07-31 RX ADMIN — ETOMIDATE 10 MG: 2 INJECTION, SOLUTION INTRAVENOUS at 21:26

## 2024-07-31 RX ADMIN — METOPROLOL TARTRATE 5 MG: 5 INJECTION INTRAVENOUS at 17:04

## 2024-07-31 RX ADMIN — LORAZEPAM 1 MG: 2 INJECTION INTRAMUSCULAR; INTRAVENOUS at 20:35

## 2024-07-31 RX ADMIN — SODIUM CHLORIDE, POTASSIUM CHLORIDE, SODIUM LACTATE AND CALCIUM CHLORIDE 1000 ML: 600; 310; 30; 20 INJECTION, SOLUTION INTRAVENOUS at 18:16

## 2024-07-31 RX ADMIN — SODIUM CHLORIDE, POTASSIUM CHLORIDE, SODIUM LACTATE AND CALCIUM CHLORIDE 1000 ML: 600; 310; 30; 20 INJECTION, SOLUTION INTRAVENOUS at 20:38

## 2024-07-31 RX ADMIN — METOPROLOL TARTRATE 5 MG: 5 INJECTION INTRAVENOUS at 18:55

## 2024-07-31 RX ADMIN — ONDANSETRON 4 MG: 2 INJECTION INTRAMUSCULAR; INTRAVENOUS at 21:25

## 2024-07-31 RX ADMIN — DILTIAZEM HYDROCHLORIDE 20 MG: 5 INJECTION INTRAVENOUS at 19:28

## 2024-07-31 RX ADMIN — DIAZEPAM 5 MG: 5 TABLET ORAL at 17:18

## 2024-07-31 RX ADMIN — IOHEXOL 60 ML: 350 INJECTION, SOLUTION INTRAVENOUS at 17:52

## 2024-07-31 ASSESSMENT — PAIN DESCRIPTION - PAIN TYPE: TYPE: ACUTE PAIN

## 2024-07-31 ASSESSMENT — CHA2DS2 SCORE
CHF OR LEFT VENTRICULAR DYSFUNCTION: YES
AGE 75 OR GREATER: NO
AGE 65 TO 74: NO
DIABETES: NO
CHA2DS2 VASC SCORE: 2
SEX: MALE
VASCULAR DISEASE: NO
PRIOR STROKE OR TIA OR THROMBOEMBOLISM: NO
HYPERTENSION: YES

## 2024-07-31 ASSESSMENT — FIBROSIS 4 INDEX: FIB4 SCORE: 1.23

## 2024-07-31 NOTE — ED TRIAGE NOTES
Chief Complaint   Patient presents with    Shortness of Breath     Patient reports SOB since being discharged yesterday    Fatigue     Pt in wheelchair to triage for above complaint.      Pt is alert/oriented and follows commands. Pt speaking in full sentences and responds appropriately to questions. No acute distress noted in triage and respirations are even and unlabored.     Pt encouraged to alert staff for any changes in condition.

## 2024-07-31 NOTE — ED NOTES
Bedside report received from off going RN: Olivia, assumed care of patient.  POC discussed with patient. Call light within reach, all needs addressed at this time.       Fall risk interventions in place: Patient's personal possessions are with in their safe reach, Place fall risk sign on patient's door, and Keep floor surfaces clean and dry (all applicable per [place] Fall risk assessment)   Continuous monitoring: Cardiac Leads, Pulse Ox, or Blood Pressure  IVF/IV medications: Not Applicable   Oxygen: Room Air  Bedside sitter: Not Applicable   Isolation: Not Applicable

## 2024-07-31 NOTE — DISCHARGE INSTRUCTIONS
Please return to the ER anytime for recheck.  We are refilling your medications to take as directed please please return to the ER if symptoms worsen.

## 2024-08-01 ENCOUNTER — PHARMACY VISIT (OUTPATIENT)
Dept: PHARMACY | Facility: MEDICAL CENTER | Age: 59
End: 2024-08-01
Payer: COMMERCIAL

## 2024-08-01 VITALS
WEIGHT: 248.46 LBS | HEIGHT: 74 IN | DIASTOLIC BLOOD PRESSURE: 82 MMHG | HEART RATE: 74 BPM | RESPIRATION RATE: 16 BRPM | BODY MASS INDEX: 31.89 KG/M2 | TEMPERATURE: 96.8 F | SYSTOLIC BLOOD PRESSURE: 113 MMHG | OXYGEN SATURATION: 94 %

## 2024-08-01 LAB
ALBUMIN SERPL BCP-MCNC: 4 G/DL (ref 3.2–4.9)
ALBUMIN/GLOB SERPL: 1.4 G/DL
ALP SERPL-CCNC: 61 U/L (ref 30–99)
ALT SERPL-CCNC: 23 U/L (ref 2–50)
AMPHET UR QL SCN: POSITIVE
ANION GAP SERPL CALC-SCNC: 11 MMOL/L (ref 7–16)
AST SERPL-CCNC: 16 U/L (ref 12–45)
BARBITURATES UR QL SCN: NEGATIVE
BENZODIAZ UR QL SCN: NEGATIVE
BILIRUB SERPL-MCNC: 0.3 MG/DL (ref 0.1–1.5)
BUN SERPL-MCNC: 23 MG/DL (ref 8–22)
BZE UR QL SCN: NEGATIVE
CALCIUM ALBUM COR SERPL-MCNC: 9.2 MG/DL (ref 8.5–10.5)
CALCIUM SERPL-MCNC: 9.2 MG/DL (ref 8.5–10.5)
CANNABINOIDS UR QL SCN: POSITIVE
CHLORIDE SERPL-SCNC: 101 MMOL/L (ref 96–112)
CO2 SERPL-SCNC: 24 MMOL/L (ref 20–33)
CREAT SERPL-MCNC: 0.93 MG/DL (ref 0.5–1.4)
ERYTHROCYTE [DISTWIDTH] IN BLOOD BY AUTOMATED COUNT: 49.6 FL (ref 35.9–50)
FENTANYL UR QL: NEGATIVE
GFR SERPLBLD CREATININE-BSD FMLA CKD-EPI: 95 ML/MIN/1.73 M 2
GLOBULIN SER CALC-MCNC: 2.9 G/DL (ref 1.9–3.5)
GLUCOSE SERPL-MCNC: 96 MG/DL (ref 65–99)
HCT VFR BLD AUTO: 41.8 % (ref 42–52)
HGB BLD-MCNC: 14.2 G/DL (ref 14–18)
MAGNESIUM SERPL-MCNC: 2.2 MG/DL (ref 1.5–2.5)
MCH RBC QN AUTO: 34.1 PG (ref 27–33)
MCHC RBC AUTO-ENTMCNC: 34 G/DL (ref 32.3–36.5)
MCV RBC AUTO: 100.2 FL (ref 81.4–97.8)
METHADONE UR QL SCN: NEGATIVE
OPIATES UR QL SCN: NEGATIVE
OXYCODONE UR QL SCN: NEGATIVE
PCP UR QL SCN: NEGATIVE
PHOSPHATE SERPL-MCNC: 4.3 MG/DL (ref 2.5–4.5)
PLATELET # BLD AUTO: 175 K/UL (ref 164–446)
PMV BLD AUTO: 9.3 FL (ref 9–12.9)
POTASSIUM SERPL-SCNC: 4.8 MMOL/L (ref 3.6–5.5)
PROPOXYPH UR QL SCN: NEGATIVE
PROT SERPL-MCNC: 6.9 G/DL (ref 6–8.2)
RBC # BLD AUTO: 4.17 M/UL (ref 4.7–6.1)
SODIUM SERPL-SCNC: 136 MMOL/L (ref 135–145)
WBC # BLD AUTO: 6.1 K/UL (ref 4.8–10.8)

## 2024-08-01 PROCEDURE — 96365 THER/PROPH/DIAG IV INF INIT: CPT

## 2024-08-01 PROCEDURE — 36415 COLL VENOUS BLD VENIPUNCTURE: CPT

## 2024-08-01 PROCEDURE — 96366 THER/PROPH/DIAG IV INF ADDON: CPT

## 2024-08-01 PROCEDURE — 84100 ASSAY OF PHOSPHORUS: CPT

## 2024-08-01 PROCEDURE — A9270 NON-COVERED ITEM OR SERVICE: HCPCS

## 2024-08-01 PROCEDURE — 80053 COMPREHEN METABOLIC PANEL: CPT

## 2024-08-01 PROCEDURE — 85027 COMPLETE CBC AUTOMATED: CPT

## 2024-08-01 PROCEDURE — 700102 HCHG RX REV CODE 250 W/ 637 OVERRIDE(OP)

## 2024-08-01 PROCEDURE — 83735 ASSAY OF MAGNESIUM: CPT

## 2024-08-01 PROCEDURE — 99239 HOSP IP/OBS DSCHRG MGMT >30: CPT | Performed by: STUDENT IN AN ORGANIZED HEALTH CARE EDUCATION/TRAINING PROGRAM

## 2024-08-01 PROCEDURE — G0378 HOSPITAL OBSERVATION PER HR: HCPCS

## 2024-08-01 PROCEDURE — 700111 HCHG RX REV CODE 636 W/ 250 OVERRIDE (IP)

## 2024-08-01 RX ADMIN — MAGNESIUM SULFATE HEPTAHYDRATE 2 G: 2 INJECTION, SOLUTION INTRAVENOUS at 00:12

## 2024-08-01 RX ADMIN — TAMSULOSIN HYDROCHLORIDE 0.4 MG: 0.4 CAPSULE ORAL at 08:37

## 2024-08-01 RX ADMIN — METOPROLOL SUCCINATE 50 MG: 50 TABLET, EXTENDED RELEASE ORAL at 05:37

## 2024-08-01 RX ADMIN — SPIRONOLACTONE 25 MG: 25 TABLET ORAL at 05:37

## 2024-08-01 RX ADMIN — TORSEMIDE 10 MG: 20 TABLET ORAL at 05:37

## 2024-08-01 ASSESSMENT — FIBROSIS 4 INDEX: FIB4 SCORE: 1.12

## 2024-08-01 NOTE — ED NOTES
Given turkey sandwich- ok per MD. Resting calmly in bed. Remains tachycardic. No longer as diaphoretic.

## 2024-08-01 NOTE — PROGRESS NOTES
4 Eyes Skin Assessment Completed by YAEL Goldstein and YAEL Danielle.    Head WDL  Ears WDL  Nose WDL  Mouth WDL  Neck WDL  Breast/Chest WDL  Shoulder Blades WDL  Spine Redness and Blanching  (R) Arm/Elbow/Hand WDL  (L) Arm/Elbow/Hand Redness and Blanching  Abdomen WDL  Groin WDL  Scrotum/Coccyx/Buttocks WDL  (R) Leg WDL  (L) Leg WDL  (R) Heel/Foot/Toe Possible DTI on 2nd toe  (L) Heel/Foot/Toe WDL          Devices In Places Tele Box, Blood Pressure Cuff, and Pulse Ox      Interventions In Place NC W/Ear Foams    Possible Skin Injury Yes    Pictures Uploaded Into Epic Yes  Wound Consult Placed Yes  RN Wound Prevention Protocol Ordered Yes

## 2024-08-01 NOTE — ED PROVIDER NOTES
ED Provider Note    CHIEF COMPLAINT  Chief Complaint   Patient presents with    Shortness of Breath     Patient reports SOB since being discharged yesterday    Fatigue       EXTERNAL RECORDS REVIEWED  Inpatient Notes Recently seen 7/30 for tachycardia but left AMA to take care of his dog. Admitted on 4/5/2024 for alcohol withdrawal and A-fib with RVR.    HPI/ROS  LIMITATION TO HISTORY   Select: : None  OUTSIDE HISTORIAN(S):  none    Chace Gong is a 59 y.o. male with hx of tobacco abuse, alcohol abuse, a-fib/flutter, dyslipidemia, HTN who presents with syncope. Patient states he stood up and walked about 6 steps today and subsequently felt lightheaded and dizzy resulting in him passing out. He states that he has passed out at least 3 times prior and was seen in the ER yesterday for similar symptoms. He left AMA due to needing to take care of his dog. He reports associated shortness of breath. He denies any fevers, chills, chest pain, palpitations, numbness, or tingling.       PAST MEDICAL HISTORY   has a past medical history of Achilles tendinitis (6/24/2009), Arthralgia (6/24/2009), Bronchitis (6/24/2009), Dyslipidemia (6/24/2009), GERD (gastroesophageal reflux disease) (6/24/2009), Hepatitis C, HTN (hypertension) (6/24/2009), Hypertension, Low back pain, and Psychiatric disorder.    SURGICAL HISTORY   has a past surgical history that includes neurolysis (1/21/2009); other orthopedic surgery (2001); carpal tunnel release (1/21/2009); hardware removal ortho (1/21/2009); synovectomy (1/21/2009); arthrotomy (2011); arthroscopy, knee (2013); elbow arthrotomy (5/17/2013); and loose body removal (5/17/2013).    FAMILY HISTORY  Family History   Problem Relation Age of Onset    Cancer Mother         lung / throat / breast    Hypertension Mother     Hypertension Father     Hyperlipidemia Father     Stroke Father     Alcohol/Drug Father     Diabetes Sister     Hypertension Sister     Alcohol/Drug Sister     Cancer  "Brother     Hypertension Brother     Alcohol/Drug Brother     Stroke Brother     Alcohol/Drug Brother     Cancer Sister         lung    Heart Disease Neg Hx        SOCIAL HISTORY  Social History     Tobacco Use    Smoking status: Every Day     Current packs/day: 0.50     Average packs/day: 0.5 packs/day for 47.0 years (23.5 ttl pk-yrs)     Types: Cigarettes    Smokeless tobacco: Never   Vaping Use    Vaping status: Never Used   Substance and Sexual Activity    Alcohol use: Not Currently     Comment: vodka /beer    Drug use: Yes     Types: Inhaled, Marijuana     Comment: has med marijuana card; smokes 1 joint every morning, speed    Sexual activity: Yes     Partners: Male       CURRENT MEDICATIONS  Home Medications       Reviewed by Maria A Park (Pharmacy Tech) on 07/31/24 at 1915  Med List Status: Complete     Medication Last Dose Status   lisinopril (PRINIVIL) 10 MG Tab 7/29/2024 Active   metoprolol SR (TOPROL XL) 50 MG TABLET SR 24 HR 7/31/2024 Active   rivaroxaban (XARELTO) 20 MG Tab tablet 7/29/2024 Active   spironolactone (ALDACTONE) 25 MG Tab 7/31/2024 Active   tamsulosin (FLOMAX) 0.4 MG capsule 2 weeks Active   torsemide (DEMADEX) 10 MG tablet 7/31/2024 Active                  Audit from Redirected Encounters    **Home medications have not yet been reviewed for this encounter**         ALLERGIES  Allergies   Allergen Reactions    Sulfa Drugs Unspecified     Patient unsure of reaction       PHYSICAL EXAM  VITAL SIGNS: /71   Pulse (!) 145   Temp 36.1 °C (96.9 °F) (Oral)   Resp (!) 25   Ht 1.88 m (6' 2\")   Wt 118 kg (260 lb)   SpO2 89%   BMI 33.38 kg/m²    Pulse ox interpretation: I interpret this pulse ox as normal.  Constitutional: Alert and oriented x 3, diaphoretic   HEENT: Atraumatic normocephalic, pupils are equal round reactive to light extraocular movements are intact. The nares is clear, external ears are normal, mouth shows moist mucous membranes  Neck: Supple, no JVD no tracheal " deviation  Cardiovascular: irregular rhythm, tachycardic, no murmur rub or gallop 2+ pulses peripherally x4  Thorax & Lungs: No respiratory distress, no wheezes rales or rhonchi, No chest tenderness.   GI: Soft nontender nondistended positive bowel sounds, no peritoneal signs  Skin: Warm dry no acute rash or lesion  Musculoskeletal: Moving all extremities with full range and 5 of 5 strength, no acute  deformity  Neurologic: Cranial nerves III through XII are grossly intact, no sensory deficit, no cerebellar dysfunction   Psychiatric: Appropriate affect for situation at this time     EKG/LABS  Atrial flutter with rate of 157 with moderate T wave inversion inferiorly  I have independently interpreted this EKG  Results for orders placed or performed during the hospital encounter of 07/31/24   CBC with Differential   Result Value Ref Range    WBC 8.5 4.8 - 10.8 K/uL    RBC 4.44 (L) 4.70 - 6.10 M/uL    Hemoglobin 14.7 14.0 - 18.0 g/dL    Hematocrit 44.0 42.0 - 52.0 %    MCV 99.1 (H) 81.4 - 97.8 fL    MCH 33.1 (H) 27.0 - 33.0 pg    MCHC 33.4 32.3 - 36.5 g/dL    RDW 48.2 35.9 - 50.0 fL    Platelet Count 209 164 - 446 K/uL    MPV 9.4 9.0 - 12.9 fL    Neutrophils-Polys 51.60 44.00 - 72.00 %    Lymphocytes 36.50 22.00 - 41.00 %    Monocytes 6.50 0.00 - 13.40 %    Eosinophils 4.50 0.00 - 6.90 %    Basophils 0.50 0.00 - 1.80 %    Immature Granulocytes 0.40 0.00 - 0.90 %    Nucleated RBC 0.00 0.00 - 0.20 /100 WBC    Neutrophils (Absolute) 4.41 1.82 - 7.42 K/uL    Lymphs (Absolute) 3.11 1.00 - 4.80 K/uL    Monos (Absolute) 0.55 0.00 - 0.85 K/uL    Eos (Absolute) 0.38 0.00 - 0.51 K/uL    Baso (Absolute) 0.04 0.00 - 0.12 K/uL    Immature Granulocytes (abs) 0.03 0.00 - 0.11 K/uL    NRBC (Absolute) 0.00 K/uL   Comp Metabolic Panel   Result Value Ref Range    Sodium 138 135 - 145 mmol/L    Potassium 4.3 3.6 - 5.5 mmol/L    Chloride 100 96 - 112 mmol/L    Co2 21 20 - 33 mmol/L    Anion Gap 17.0 (H) 7.0 - 16.0    Glucose 148 (H) 65 - 99  mg/dL    Bun 23 (H) 8 - 22 mg/dL    Creatinine 1.25 0.50 - 1.40 mg/dL    Calcium 9.9 8.5 - 10.5 mg/dL    Correct Calcium 9.8 8.5 - 10.5 mg/dL    AST(SGOT) 21 12 - 45 U/L    ALT(SGPT) 27 2 - 50 U/L    Alkaline Phosphatase 70 30 - 99 U/L    Total Bilirubin 0.2 0.1 - 1.5 mg/dL    Albumin 4.1 3.2 - 4.9 g/dL    Total Protein 7.3 6.0 - 8.2 g/dL    Globulin 3.2 1.9 - 3.5 g/dL    A-G Ratio 1.3 g/dL   Troponins in two (2) hours   Result Value Ref Range    Troponin T 14 6 - 19 ng/L   PTT   Result Value Ref Range    APTT 28.2 24.7 - 36.0 sec   PT/INR   Result Value Ref Range    PT 13.4 12.0 - 14.6 sec    INR 1.01 0.87 - 1.13   TROPONIN   Result Value Ref Range    Troponin T 16 6 - 19 ng/L   ESTIMATED GFR   Result Value Ref Range    GFR (CKD-EPI) 67 >60 mL/min/1.73 m 2   EKG   Result Value Ref Range    Report       [facility] Emergency Dept[facility]    Test Date:  2024  [facility]                Department[facility]  MRN:        [id number]                      Room:  Gender:     Male                         Technician: [id number]  :        1965                   Requested By:ER TRIAGE PROTOCOL  Order #:    [id number]                    Reading MD: KALEB TELLES MD    Measurements  Intervals                                Axis  Rate:       157                          P:          0  NH:         72                           QRS:        38  QRSD:       114                          T:          224  QT:         307  QTc:        497    Interpretive Statements  atrial flutter at a rate of 157 No obvious ST elevation or depression however  there is moderate T wave inversion inferiorly  Electronically Signed On 2024 17:04:05 PDT by KALEB TELLES MD     EKG   Result Value Ref Range    Report       [facility] Emergency Dept.    Test Date:  2024  Pt Name:    SIMON MITCHELL                Department: ER  MRN:        [id number]                      Room:        04  Gender:      Male                         Technician: [id number]  :        1965                   Requested By:ER TRIAGE PROTOCOL  Order #:    [id number]                    Reading MD: KALEB TELLES MD    Measurements  Intervals                                Axis  Rate:       144                          P:          0  TN:         0                            QRS:        45  QRSD:       92                           T:          13  QT:         291  QTc:        451    Interpretive Statements  Atrial flutter with 2:1 AV block  Repol abnrm suggests ischemia, diffuse leads  Minimal ST elevation, lateral leads  Baseline wander in lead(s) V4  Compared to ECG 2024 16:35:57  2:1 AV block now present  Early repolarization now present  Possible ischemia now present  T-wave abnormality no longer  present  ST (T wave) deviation still present  Electronically Signed On 2024 22:43:29 PDT by KALEB TELLES MD     EKG   Result Value Ref Range    Report       [facility] Emergency Dept.    Test Date:  2024  Pt Name:    SIMON MITCHELL                Department: ER  MRN:        [id number]                      Room:       [facility]  Gender:     Male                         Technician: [id number]  :        1965                   Requested By:KALEB TELLES  Order #:    [id number]                    Reading MD: KALEB TELLES MD    Measurements  Intervals                                Axis  Rate:       78                           P:          55  TN:         139                          QRS:        50  QRSD:       104                          T:          37  QT:         389  QTc:        444    Interpretive Statements  Sinus rhythm  Consider left atrial enlargement  Abnormal R-wave progression, early transition  Compared to ECG 2024 20:57:09  Atrial flutter no longer present  2:1 AV block no longer present  Early repolarization no longer present  Possible ischemia no longer present  ST (T  wave) deviation no  longer present  Electronically Signed On 07- 22:43:01 PDT by KALEB TELLES MD         RADIOLOGY/PROCEDURES   I have independently interpreted the diagnostic imaging associated with this visit and am waiting the final reading from the radiologist.   My preliminary interpretation is as follows: No acute cardiopulmonary abnormalities       Point of Care Ultrasound    ED POINT OF CARE ULTRASOUND: LIMITED CARDIAC    Indication for exam: Cardioversion  LVEF: Diminished  Pericardial Effusion:not present  RV Strain:not present  Pulmonary B Lines:not present    Image retained through [facility] as seen below:       Additional interpretation: No evidence of obvious thrombus on limited transthoracic echo, patient does have systolic dysfunction with ejection fraction visually estimated to be approximately 40%.    This study is a limited ultrasound examination performed and interpreted to evaluate for limited conditions as outlined above. There may be other clinically important information contained in the images that is outside this scope. When clinically warranted, a comprehensive ultrasound through the appropriate department is considered.  Radiologist interpretation:  CT-CTA CHEST PULMONARY ARTERY W/ RECONS   Final Result      1.  Limited evaluation of the lung bases secondary to respiratory motion. No pulmonary embolus is identified            DX-CHEST-PORTABLE (1 VIEW)   Final Result         No acute cardiac or pulmonary abnormality is identified.          COURSE & MEDICAL DECISION MAKING    ASSESSMENT, COURSE AND PLAN  Care Narrative: Chace Gong is a 59 y.o. male with hx of tobacco abuse, alcohol abuse, a-fib/flutter, dyslipidemia, HTN who is presenting with syncope, tachycardia, and shortness of breath. On presentation, patient is diaphoretic with HR in 150s. EKG shows A-flutter with rate of 157. IV metoprolol given. Valium also ordered as patient is extremely anxious. Given clinical  presentation, CTA ordered for concerns of PE. Labs have been ordered as well.     CBC and CMP unremarkable. Troponin 16. CXR negative for acute abnormalities. CTA negative for PE.     Troponin trended to 14. Patient received 5 mg IV metoprolol x3 and 20 mg diltiazem with minimal improvement of his HR. Given persistent RVR despite medication management, will perform cardioversion. Patient sedated with 10 mg etomidate and cardioverted with 200 joules. He has subsequently returned to sinus rhythm with rate in 80s. Patient will need admission for continued monitoring. Spoke with hospitalist team who agree for admission.         Conscious Sedation Procedure Note    Indication: cardioversion    Consent: I have discussed with the patient and/or the patient representative the indication, alternatives, and the possible risks and/or complications of the planned procedure and the anesthesia methods. The patient and/or patient representative appear to understand and agree to proceed.    Physician Involvement: The attending physician was present and supervising this procedure.    Pre-Sedation Documentation and Exam: I have personally completed a history, physical exam & review of systems for this patient (see notes).  Airway Assessment: dentition not prohibitive  f3  Prior History of Anesthesia Complications: none    ASA Classification: Class 2 - A normal healthy patient with mild systemic disease    Sedation/ Anesthesia Plan: intravenous sedation    Medications Used: etomidate intravenously    Monitoring and Safety: The patient was placed on a cardiac monitor and vital signs, pulse oximetry and level of consciousness were continuously evaluated throughout the procedure. The patient was closely monitored until recovery from the medications was complete and the patient had returned to baseline status. Respiratory therapy was on standby at all times during the procedure.      (The following sections must be  completed)  Post-Sedation Vital Signs: Vital signs were reviewed and were stable after the procedure (see flow sheet for vitals)            Intraservice Time: Greater than 10 minutes    Post-Sedation Exam: Lungs: clear           Complications: none    I provided both the sedation and procedure, a nurse was present at the bedside for the entire procedure.   Cardioversion Procedure Note    Indication: atrial flutter    Consent: The patient was counseled regarding the procedure, its indications, risks, potential complications and alternatives, and any questions were answered. Consent was obtained to proceed.    Pre-Medication: Etomidate, see conscious sedation note    Procedure: The patient was placed in the supine position and the chest area was exposed. The cardioversion pads were applied in the standard manner and configuration.    Attempt #1: The defibrillator was set on the synchronous mode and charged to 200 joules.  A charge was then delivered which resulted in conversion to normal sinus rhythm.    Attempt #2: Not necessary    Attempt #3: Not necessary    The patient tolerated the procedure well.    Complications: None      Patient seen and evaluated in conjunction with resident physician.  Agree with documentation as above.  In short patient has history of A-fib/a flutter presented with tachycardia today however regular.  Initial attempts were given fluids he was given several doses of metoprolol as he takes this at home he was given dose of diltiazem without conversion and maintained heart rate in the 150s diaphoretic very uncomfortable.  Patient takes Xarelto chronically and reports he has been compliant with his medication therefore we discussed pros and cons of cardioversion patient elected to proceed with cardioversion.  Bedside echocardiogram did not demonstrate any concerns of thrombus.  Cardioverted on eventfully.  Patient be admitted to the hospitalist for ongoing management.    .  CRITICAL CARE  The  very real possibilty of a deterioration of this patient's condition required the highest level of my preparedness for sudden, emergent intervention.  I provided critical care services, which included medication orders, frequent reevaluations of the patient's condition and response to treatment, ordering and reviewing test results, and discussing the case with various consultants.  The critical care time associated with the care of the patient was 35 minutes. Review chart for interventions. This time is exclusive of any other billable procedures.       Hydration: Based on the patient's presentation of Dehydration and Tachycardia the patient was given IV fluids. IV Hydration was used because oral hydration was not adequate alone. Upon recheck following hydration, the patient was improved.        ADDITIONAL PROBLEMS MANAGED  Atrial flutter with RVR  Dehydration  Anxiety  Alcohol abuse    DISPOSITION AND DISCUSSIONS  I have discussed management of the patient with the following physicians and SANNA's:  hospitalist team    Discussion of management with other QHP or appropriate source(s): None     Escalation of care considered, and ultimately not performed: n/a    Barriers to care at this time, including but not limited to:  none .     Decision tools and prescription drugs considered including, but not limited to:  none .    FINAL DIAGNOSIS  1. Atrial flutter, unspecified type (HCC)    2.  Dehydration  3.  Conscious sedation by ERP  4.  Cardioversion by ERP  5.  Bedside POCUS echo by ERP  6.  Critical care 35 minutes    Electronically signed by: Uriel Persaud M.D.

## 2024-08-01 NOTE — ED NOTES
"PT extremely diaphoretic. Denies chest pain but states he \"doesn't feel right\" in his chest. Pt explains he was going to  his prescription today when he felt \"off\" and friend noticed he was pale- he then checked in to ER. Pt states he had a syncopal event today at bottom of stairs and lowered himself to ground. Speaking in full sentences.   "

## 2024-08-01 NOTE — ASSESSMENT & PLAN NOTE
- Presented with aflutter with RVR in 150s, received 5 mg IV metoprolol x3 and 20 mg diltiazem with minimal improvement, cardioversion performed in the ED  - Back to sinus rhythm    - Telemetry  - Restart on Metoprolol 50.  - Should re-inquire about compliance when patient's sedation wears off. If he reports he has been compliant, consider increase metoprolol dose  - Continue home Xarelto  - Check TSH, Mg

## 2024-08-01 NOTE — ED NOTES
Medication history reviewed with patient at bedside.   Med rec is partially complete  Allergies reviewed.     Patient was unable to specify names/ strengths of medications he is currently taking.  New rx's were prescribed per patient on 7/30/24, Patient was going to  rx's today 7/31/24 but came here instead.    Patient states that wife Judy ([phone]) would be better informant for  medications, Called Judy, no answer, Mailbox full. Unable to leave message.    Aj Ann, PhT

## 2024-08-01 NOTE — DISCHARGE SUMMARY
Discharge Summary    CHIEF COMPLAINT ON ADMISSION  Chief Complaint   Patient presents with    Shortness of Breath     Patient reports SOB since being discharged yesterday    Fatigue       Reason for Admission  Left arm pain     Admission Date  7/31/2024    CODE STATUS  Full Code    HPI & HOSPITAL COURSE    Chace Gong is a 59-year-old male with PMHx polysubstance abuse, alcohol dependence in remission, atrial fibrillation s/p cardioversion with EMILIA.  Admitted 7/31 for atrial fibrillation with RVR.    Prior history patient has been feeling lightheaded and dizzy over the last few days.  EKG in the emergency room showing atrial flutter with a rate of 157.  He received IV metoprolol and diltiazem with minimal improvement in his heart rate.  He was cardioverted back into sinus rhythm.  Xarelto and metoprolol were restarted.    Patient was monitored with telemetry overnight. He did not convert back into atrial fibrillation. Patient states that he ran out of his medication 2 days prior to admission. He was going to pick his medication up from the pharmacy but came to the ED instead. Given that information,  I did not increase his home dose of metoprolol. Patient states he has refills and medications at the pharmacy.     I have placed a referral for patient to establish with [facility] in [place].     Therefore, he is discharged in good and stable condition to home with close outpatient follow-up.    The patient recovered much more quickly than anticipated on admission.    Discharge Date  8/1/2024    FOLLOW UP ITEMS POST DISCHARGE  Follow up with PCP 3-5 days.     DISCHARGE DIAGNOSES  Principal Problem:    Atrial flutter with rapid ventricular response (HCC) (POA: Yes)  Active Problems:    BPH (benign prostatic hypertrophy) (POA: Yes)      Overview: ICD-10 transition      IMO Update    HFrEF (heart failure with reduced ejection fraction) (HCC) (POA: Yes)    Anxiety (POA: Unknown)  Resolved Problems:    * No resolved hospital  problems. *      FOLLOW UP  No future appointments.  [facility]  [address]  [place] [address]  [phone]  Follow up in 3 day(s)        MEDICATIONS ON DISCHARGE     Medication List        CONTINUE taking these medications        Instructions   lisinopril 10 MG Tabs  Commonly known as: Prinivil   Take 1 Tablet by mouth every evening.  Dose: 10 mg     metoprolol SR 50 MG Tb24  Commonly known as: Toprol XL   Take 1 Tablet by mouth every day.  Dose: 50 mg     rivaroxaban 20 MG Tabs tablet  Commonly known as: Xarelto   Take 1 Tablet by mouth with dinner.  Dose: 20 mg     spironolactone 25 MG Tabs  Commonly known as: Aldactone   Take 1 Tablet by mouth every day.  Dose: 25 mg     tamsulosin 0.4 MG capsule  Commonly known as: Flomax   Take 1 Capsule by mouth 1/2 hour after breakfast.  Dose: 0.4 mg     torsemide 10 MG tablet  Commonly known as: Demadex   Take 1 Tablet by mouth every day.  Dose: 10 mg              Allergies  Allergies   Allergen Reactions    Sulfa Drugs Unspecified     Patient unsure of reaction       DIET  Orders Placed This Encounter   Procedures    Diet Order Diet: Cardiac     Standing Status:   Standing     Number of Occurrences:   1     Order Specific Question:   Diet:     Answer:   Cardiac [6]       ACTIVITY  As tolerated.  Weight bearing as tolerated    CONSULTATIONS  None     PROCEDURES  Cardioversion in ED on 7/31    LABORATORY  Lab Results   Component Value Date    SODIUM 136 08/01/2024    POTASSIUM 4.8 08/01/2024    CHLORIDE 101 08/01/2024    CO2 24 08/01/2024    GLUCOSE 96 08/01/2024    BUN 23 (H) 08/01/2024    CREATININE 0.93 08/01/2024    CREATININE 1.0 01/21/2009        Lab Results   Component Value Date    WBC 6.1 08/01/2024    HEMOGLOBIN 14.2 08/01/2024    HEMATOCRIT 41.8 (L) 08/01/2024    PLATELETCT 175 08/01/2024        Total time of the discharge process exceeds 50 minutes.

## 2024-08-01 NOTE — ASSESSMENT & PLAN NOTE
- As per ED report patient was significantly anxious on admission, and required sedation with Ativan  - Day prior, patient stated he was clean, had prior history of alcohol use and amphetamine use as of 3/2024    - CTM post-sedation from etomidate given for cardioversion  - UDS  - Alcohol level

## 2024-08-01 NOTE — ED NOTES
Patients wife called (Judy [phone]).    Medications verified via phone.    Patient has not had any ABX last 30 days.    Anticoagulants: Xarelto 20mg- last dose 7/29/24 hs.

## 2024-08-01 NOTE — PROGRESS NOTES
Pt discharged from unit. All belongings with pt. PIV removed. Discharge paperwork reviewed with patient, all questions answered, and paperwork signed. One copy with patient, and one copy kept to be scanned into patient's chart.   M2B delivered and reviewed with patient.

## 2024-08-01 NOTE — CARE PLAN
The patient is Stable - Low risk of patient condition declining or worsening    Shift Goals  Clinical Goals: monitor HR and rhythm  Patient Goals: rest  Family Goals: yahaira      Problem: Knowledge Deficit - Standard  Goal: Patient and family/care givers will demonstrate understanding of plan of care, disease process/condition, diagnostic tests and medications  Outcome: Progressing     Problem: Fall Risk  Goal: Patient will remain free from falls  Outcome: Progressing

## 2024-08-01 NOTE — ASSESSMENT & PLAN NOTE
- EF 35% on 4/2024. Does not appear to be in acute exacerbation on exam and CXR  - Continue home Lisinopril, Spironolactone, Torsemide  - Metoprolol as above

## 2024-08-01 NOTE — HOSPITAL COURSE
Chace Gong is a 59-year-old male with PMHx polysubstance abuse, alcohol dependence in remission, atrial fibrillation s/p cardioversion with EMILIA.  Admitted 7/31 for atrial fibrillation with RVR.    Prior history patient has been feeling lightheaded and dizzy over the last few days.  EKG in the emergency room showing atrial flutter with a rate of 157.  He received IV metoprolol and diltiazem with minimal improvement in his heart rate.  He was cardioverted back into sinus rhythm.  Xarelto and metoprolol were restarted.    Patient was monitored with telemetry overnight. He did not convert back into atrial fibrillation. Patient states that he ran out of his medication 2 days prior to admission. He was going to pick his medication up from the pharmacy but came to the ED instead. Given that information,  I did not increase his home dose of metoprolol. Patient states he has refills and medications at the pharmacy.     I have placed a referral for patient to establish with [facility] in [place].   
84

## 2024-08-01 NOTE — ED NOTES
MD Persaud at bedside for procedural sedation with two RNS and RT.    Procedure: syncronized cardioversion    2126- medicated with 10mg etomidate  2128- cardioverted with 200J syncronized energy    Pt tolerated well

## 2024-08-01 NOTE — DISCHARGE PLANNING
Patient rolled back to OBS status per attending MD determination Quyen Craig MD and UR committee MD secondary review Dr. Desmond Jacobs. Condition code 44 completed.

## 2024-08-01 NOTE — PROGRESS NOTES
Pt was taken to discharge lounge. Pt states having all belongings. Pt did not have any questions regarding discharge

## 2024-08-01 NOTE — H&P
[facility] Internal Medicine History & Physical Note    Date of Service  7/31/2024    R Team: JENNIFFER   Attending: Kraig Bae M.d.  Senior Resident: Dr. Bean  Intern: N/A  Contact Number: [phone]    Primary Care Physician  Mohsen Tamasaby, M.D.    Consultants  N/A    Code Status  Full Code    Chief Complaint  Chief Complaint   Patient presents with    Shortness of Breath     Patient reports SOB since being discharged yesterday    Fatigue       History of Presenting Illness (HPI):  Chace Gong is a 59 y.o. male who presented 7/31/2024 with atrial flutter with RVR.    Patient was previously admitted 3/2024 with supraventricular tachycardia, converted to flutter/A-fib after adenosine given, underwent cardioversion after no thrombus on EMILIA, discharged on Toprol. At that time UDS positive for amphetamine, and had hx of heavy alcohol use. On 7/30/24 patient came to the ED for 3 episodes of syncope with associated SOB. Reported he has been clean. His EKG that time demonstrated rate 153, with possible atrial tachycardia vs afib vs aflutter. However patient left AMA to take care of his dog.    Today 7/31/24 patient came back for similar symptoms, where after standing up taking around 6 steps he started to feel lightheaded and dizzy, leading to him passing out. In the ED, EKG was obtained demonstrated aflutter with rate of 157. Patient received 5 mg IV metoprolol x3 and 20 mg diltiazem with minimal improvement of his HR. Sedated with 10 mg etomidate and cardioverted with 200 joules. Subsequently back to sinus rhythm in rate in 80s.    Review of Systems  Review of Systems   Unable to perform ROS: Mental status change   Secondary to Etomidate given prior to cardioversion    Past Medical History   has a past medical history of Achilles tendinitis (6/24/2009), Arthralgia (6/24/2009), Bronchitis (6/24/2009), Dyslipidemia (6/24/2009), GERD (gastroesophageal reflux disease) (6/24/2009), Hepatitis C, HTN (hypertension)  (6/24/2009), Hypertension, Low back pain, and Psychiatric disorder.    Surgical History   has a past surgical history that includes neurolysis (1/21/2009); other orthopedic surgery (2001); carpal tunnel release (1/21/2009); hardware removal ortho (1/21/2009); synovectomy (1/21/2009); arthrotomy (2011); arthroscopy, knee (2013); elbow arthrotomy (5/17/2013); and loose body removal (5/17/2013).     Family History  family history includes Alcohol/Drug in his brother, brother, father, and sister; Cancer in his brother, mother, and sister; Diabetes in his sister; Hyperlipidemia in his father; Hypertension in his brother, father, mother, and sister; Stroke in his brother and father.   Family history reviewed with patient.     Social History  Could not be obtained due to current sedation. Patient yesterday stated he was clean, had prior amphetamine use and alcohol abuse noted during prior 3/2024 admission    Allergies  Allergies   Allergen Reactions    Sulfa Drugs Unspecified     Patient unsure of reaction       Medications  Prior to Admission Medications   Prescriptions Last Dose Informant Patient Reported? Taking?   lisinopril (PRINIVIL) 10 MG Tab 7/29/2024 at hs Significant Other No No   Sig: Take 1 Tablet by mouth every evening.   metoprolol SR (TOPROL XL) 50 MG TABLET SR 24 HR 7/31/2024 at am Significant Other No Yes   Sig: Take 1 Tablet by mouth every day.   rivaroxaban (XARELTO) 20 MG Tab tablet 7/29/2024 at hs Significant Other No No   Sig: Take 1 Tablet by mouth with dinner.   spironolactone (ALDACTONE) 25 MG Tab 7/31/2024 at am Significant Other No Yes   Sig: Take 1 Tablet by mouth every day.   tamsulosin (FLOMAX) 0.4 MG capsule 2 weeks at ran out Significant Other No No   Sig: Take 1 Capsule by mouth 1/2 hour after breakfast.   torsemide (DEMADEX) 10 MG tablet 7/31/2024 at am Significant Other No Yes   Sig: Take 1 Tablet by mouth every day.      Facility-Administered Medications: None       Physical Exam  Temp:   [36.1 °C (96.9 °F)] 36.1 °C (96.9 °F)  Pulse:  [] 79  Resp:  [16-26] 20  BP: (102-152)/() 124/83  SpO2:  [89 %-97 %] 95 %  Blood Pressure: (!) 139/91   Temperature: 36.1 °C (96.9 °F)   Pulse: 77   Respiration: 20   Pulse Oximetry: 93 %       Physical Exam  Constitutional:       General: He is not in acute distress.     Appearance: Normal appearance. He is not diaphoretic.      Comments: Sleeping   HENT:      Head: Normocephalic and atraumatic.      Right Ear: External ear normal.      Left Ear: External ear normal.      Nose: Nose normal. No rhinorrhea.      Mouth/Throat:      Mouth: Mucous membranes are moist.   Eyes:      General: No scleral icterus.        Right eye: No discharge.         Left eye: No discharge.      Extraocular Movements: Extraocular movements intact.   Cardiovascular:      Rate and Rhythm: Normal rate and regular rhythm.      Heart sounds: No murmur heard.     No gallop.   Pulmonary:      Effort: Pulmonary effort is normal. No respiratory distress.      Breath sounds: Wheezing present. No rales.   Abdominal:      General: There is no distension.      Palpations: Abdomen is soft.      Tenderness: There is no abdominal tenderness. There is no guarding or rebound.   Musculoskeletal:         General: No swelling or tenderness.      Right lower leg: No edema.      Left lower leg: No edema.   Skin:     General: Skin is warm.      Coloration: Skin is not jaundiced.      Findings: No rash.   Neurological:      Comments: Cannot assess due to sedation   Psychiatric:      Comments: Cannot assess due to sedation         Laboratory:  Recent Labs     07/30/24  1448 07/31/24  1701   WBC 7.0 8.5   RBC 4.37* 4.44*   HEMOGLOBIN 14.4 14.7   HEMATOCRIT 43.1 44.0   MCV 98.6* 99.1*   MCH 33.0 33.1*   MCHC 33.4 33.4   RDW 48.1 48.2   PLATELETCT 202 209   MPV 9.3 9.4     Recent Labs     07/30/24  1448 07/31/24  1701   SODIUM 142 138   POTASSIUM 4.3 4.3   CHLORIDE 104 100   CO2 22 21   GLUCOSE 102* 148*  "  BUN 14 23*   CREATININE 1.15 1.25   CALCIUM 9.6 9.9     Recent Labs     07/30/24  1448 07/31/24  1701   ALTSGPT 24 27   ASTSGOT 21 21   ALKPHOSPHAT 63 70   TBILIRUBIN 0.2 0.2   GLUCOSE 102* 148*     Recent Labs     07/31/24  1701   APTT 28.2   INR 1.01     No results for input(s): \"NTPROBNP\" in the last 72 hours.      Recent Labs     07/30/24  1541 07/31/24  1701 07/31/24  1901   TROPONINT 15 16 14       Imaging:  CT-CTA CHEST PULMONARY ARTERY W/ RECONS   Final Result      1.  Limited evaluation of the lung bases secondary to respiratory motion. No pulmonary embolus is identified            DX-CHEST-PORTABLE (1 VIEW)   Final Result         No acute cardiac or pulmonary abnormality is identified.          EKG:  I have personally reviewed the images and compared with prior images.    Assessment/Plan:  Problem Representation:   I anticipate this patient will require at least two midnights for appropriate medical management, necessitating inpatient admission because of continued monitoring after cardioversion for aflutter with RVR.    Patient will need a Telemetry bed on MEDICAL service .  The need is secondary to telemetry monitoring due to prior afib/aflutter.    * Atrial flutter with rapid ventricular response (HCC)- (present on admission)  Assessment & Plan  - Presented with aflutter with RVR in 150s, received 5 mg IV metoprolol x3 and 20 mg diltiazem with minimal improvement, cardioversion performed in the ED  - Back to sinus rhythm    - Telemetry  - Restart on Metoprolol 50.  - Should re-inquire about compliance when patient's sedation wears off. If he reports he has been compliant, consider increase metoprolol dose  - Continue home Xarelto  - Check TSH, Mg    Anxiety  Assessment & Plan  - As per ED report patient was significantly anxious on admission, and required sedation with Ativan  - Day prior, patient stated he was clean, had prior history of alcohol use and amphetamine use as of 3/2024    - CTM " post-sedation from etomidate given for cardioversion  - UDS  - Alcohol level    HFrEF (heart failure with reduced ejection fraction) ([facility])- (present on admission)  Assessment & Plan  - EF 35% on 4/2024. Does not appear to be in acute exacerbation on exam and CXR  - Continue home Lisinopril, Spironolactone, Torsemide  - Metoprolol as above    BPH (benign prostatic hypertrophy)- (present on admission)  Assessment & Plan  Continue home Tamsulosin        VTE prophylaxis: therapeutic anticoagulation with Xarelto

## 2024-08-08 ENCOUNTER — PHARMACY VISIT (OUTPATIENT)
Dept: PHARMACY | Facility: MEDICAL CENTER | Age: 59
End: 2024-08-08
Payer: COMMERCIAL

## 2024-08-08 ENCOUNTER — HOSPITAL ENCOUNTER (EMERGENCY)
Facility: MEDICAL CENTER | Age: 59
End: 2024-08-08
Attending: EMERGENCY MEDICINE
Payer: MEDICARE

## 2024-08-08 VITALS
TEMPERATURE: 97.5 F | OXYGEN SATURATION: 95 % | WEIGHT: 260 LBS | BODY MASS INDEX: 33.37 KG/M2 | SYSTOLIC BLOOD PRESSURE: 123 MMHG | RESPIRATION RATE: 17 BRPM | HEIGHT: 74 IN | DIASTOLIC BLOOD PRESSURE: 78 MMHG | HEART RATE: 82 BPM

## 2024-08-08 DIAGNOSIS — F17.200 SMOKER: ICD-10-CM

## 2024-08-08 DIAGNOSIS — S61.214A LACERATION OF RIGHT RING FINGER WITHOUT DAMAGE TO NAIL, FOREIGN BODY PRESENCE UNSPECIFIED, INITIAL ENCOUNTER: ICD-10-CM

## 2024-08-08 PROCEDURE — RXMED WILLOW AMBULATORY MEDICATION CHARGE: Performed by: EMERGENCY MEDICINE

## 2024-08-08 PROCEDURE — 99406 BEHAV CHNG SMOKING 3-10 MIN: CPT

## 2024-08-08 PROCEDURE — 99283 EMERGENCY DEPT VISIT LOW MDM: CPT

## 2024-08-08 PROCEDURE — 700111 HCHG RX REV CODE 636 W/ 250 OVERRIDE (IP): Mod: JZ | Performed by: EMERGENCY MEDICINE

## 2024-08-08 PROCEDURE — 304999 HCHG REPAIR-SIMPLE/INTERMED LEVEL 1

## 2024-08-08 PROCEDURE — A9270 NON-COVERED ITEM OR SERVICE: HCPCS | Performed by: EMERGENCY MEDICINE

## 2024-08-08 PROCEDURE — 700102 HCHG RX REV CODE 250 W/ 637 OVERRIDE(OP): Performed by: EMERGENCY MEDICINE

## 2024-08-08 PROCEDURE — 64450 NJX AA&/STRD OTHER PN/BRANCH: CPT

## 2024-08-08 PROCEDURE — 304217 HCHG IRRIGATION SYSTEM

## 2024-08-08 PROCEDURE — 303747 HCHG EXTRA SUTURE

## 2024-08-08 RX ORDER — HYDROCODONE BITARTRATE AND ACETAMINOPHEN 5; 325 MG/1; MG/1
1 TABLET ORAL ONCE
Status: COMPLETED | OUTPATIENT
Start: 2024-08-08 | End: 2024-08-08

## 2024-08-08 RX ORDER — HYDROCODONE BITARTRATE AND ACETAMINOPHEN 5; 325 MG/1; MG/1
1 TABLET ORAL EVERY 4 HOURS PRN
Qty: 9 TABLET | Refills: 0 | Status: SHIPPED | OUTPATIENT
Start: 2024-08-08 | End: 2024-08-11

## 2024-08-08 RX ADMIN — LIDOCAINE HYDROCHLORIDE 20 ML: 10 INJECTION, SOLUTION EPIDURAL; INFILTRATION; INTRACAUDAL; PERINEURAL at 15:00

## 2024-08-08 RX ADMIN — HYDROCODONE BITARTRATE AND ACETAMINOPHEN 1 TABLET: 5; 325 TABLET ORAL at 15:47

## 2024-08-08 ASSESSMENT — FIBROSIS 4 INDEX: FIB4 SCORE: 1.11

## 2024-08-08 NOTE — ED NOTES
Wound dressed per ERP direction. Pt educated on care and given supplies for future dressing changes.

## 2024-08-08 NOTE — DISCHARGE INSTRUCTIONS
As we discussed, please change the bandage once daily.  You may use the finger splint to help protect the area.  Please follow-up with the orthopedic clinic listed above, call tomorrow morning to schedule a follow-up appointment for recheck in 3 to 5 days.  Return to the emergency department if you develop any new or worsening symptoms including worsening pain, swelling, or any further concerns.  You are encouraged to follow-up with orthopedics, primary care, or return to this emergency department at any time for x-ray to make sure there is no underlying fracture.

## 2024-08-08 NOTE — ED PROVIDER NOTES
Emergency Physician Note    Chief Concern:  Chief Complaint   Patient presents with    Hand Laceration     X 1 hour ago R ring finger     HPI/ROS     HPI:  Chace Gong is a 59 y.o. male who presents to the emergency department today for evaluation of a laceration to his right ring finger that occurred shortly prior to arrival. He slammed a cabinet door catching the tip of the digit and sustained a laceration to the palmar surface. He has some difficulty ranging the digit at the DIP. Reports no other injuries at this time.     Most recent tetanus vaccination was 3 years ago.    PAST MEDICAL HISTORY  Past Medical History:   Diagnosis Date    Achilles tendinitis 6/24/2009    Arthralgia 6/24/2009    Bronchitis 6/24/2009    Dyslipidemia 6/24/2009    GERD (gastroesophageal reflux disease) 6/24/2009    Hepatitis C     HTN (hypertension) 6/24/2009    Hypertension     Low back pain     Psychiatric disorder     anxiety       SURGICAL HISTORY  Past Surgical History:   Procedure Laterality Date    ELBOW ARTHROTOMY  5/17/2013    Performed by Jonathan Michael M.D. at [facility]    LOOSE BODY REMOVAL  5/17/2013    Performed by Jonathan Michael M.D. at [facility]    ARTHROSCOPY, KNEE  2013    right     ARTHROTOMY  2011    right shoulder    NEUROLYSIS  1/21/2009    Performed by JONATHAN MICHAEL at [facility]    CARPAL TUNNEL RELEASE  1/21/2009    Performed by JONATHAN MICHAEL at [facility]    HARDWARE REMOVAL ORTHO  1/21/2009    Performed by JONATHAN MICHAEL at [facility]    SYNOVECTOMY  1/21/2009    Performed by JONATHAN MICHAEL at [facility]    OTHER ORTHOPEDIC SURGERY  2001    lt wrist surg       FAMILY HISTORY  Family History   Problem Relation Age of Onset    Cancer Mother         lung / throat / breast    Hypertension Mother     Hypertension Father     Hyperlipidemia Father     Stroke Father     Alcohol/Drug Father      "Diabetes Sister     Hypertension Sister     Alcohol/Drug Sister     Cancer Brother     Hypertension Brother     Alcohol/Drug Brother     Stroke Brother     Alcohol/Drug Brother     Cancer Sister         lung    Heart Disease Neg Hx        SOCIAL HISTORY   reports that he has been smoking cigarettes. He has a 23.5 pack-year smoking history. He has never used smokeless tobacco. He reports that he does not currently use alcohol. He reports current drug use. Drugs: Inhaled and Marijuana.    CURRENT MEDICATIONS  Discharge Medication List as of 8/8/2024  3:44 PM        CONTINUE these medications which have NOT CHANGED    Details   tamsulosin (FLOMAX) 0.4 MG capsule Take 1 Capsule by mouth 1/2 hour after breakfast., Disp-30 Capsule, R-1, Normal      rivaroxaban (XARELTO) 20 MG Tab tablet Take 1 Tablet by mouth with dinner., Disp-30 Tablet, R-0, Normal      metoprolol SR (TOPROL XL) 50 MG TABLET SR 24 HR Take 1 Tablet by mouth every day., Disp-30 Tablet, R-1, Normal      lisinopril (PRINIVIL) 10 MG Tab Take 1 Tablet by mouth every evening., Disp-30 Tablet, R-1, Normal      spironolactone (ALDACTONE) 25 MG Tab Take 1 Tablet by mouth every day., Disp-30 Tablet, R-3, Normal      torsemide (DEMADEX) 10 MG tablet Take 1 Tablet by mouth every day., Disp-30 Tablet, R-3, Normal             ALLERGIES  Sulfa drugs    PHYSICAL EXAM  Vital Signs: /78   Pulse 82   Temp 36.4 °C (97.5 °F)   Resp 17   Ht 1.88 m (6' 2\")   Wt 118 kg (260 lb)   SpO2 95%   BMI 33.38 kg/m²   Constitutional: Alert, no acute distress  HENT: Normocephalic, atraumatic.  Cardiovascular: No tachycardia  Pulmonary: No respiratory distress, normal work of breathing  Abdomen: Soft, non tender, no peritoneal signs.  Skin: Linear laceration to distal aspect of the right ring finger, abrasion adjacent to the nailbed, nail is not involved, laceration involves epidermal, dermal layer and subcutaneous tissue, tendon is not visualized.   Musculoskeletal: Decreased " range of motion of right ring finger DIP joint.  Neurologic: Sensory function intact along radial and ulnar aspect of the digit.    Diagnostic Studies & Procedures    Labs:  All labs reviewed by me as noted below.    Radiology:  Radiologic imaging declined by the patient      Course and Medical Decision Making    Initial Assessment and Plan:  Mr. Gong presents to the emergency department for evaluation of a laceration to the right index finger. Wound is hemostatic and bandaged on arrival to the exam room. Laceration as documented above, plain film imaging was recommended, but declined by the patient. No visualized tendon injury. Laceration was closed according to the below procedure note.He tolerated the procedure well. Plan is to use finger splint to protect the finger while healing.  Return precautions were discussed with the patient, and provided in written form with the patient's discharge instructions. Return precautions were discussed with the patient, and provided in written form with the patient's discharge instructions. He will follow up for suture removal in 7-9 days.     Laceration Repair Procedure Note    Indication: Laceration    Procedure: The patient was placed in the appropriate position and anesthesia around the laceration was obtained with a full digital block of the right ring finger using 1% Lidocaine without epinephrine. The wound was minimally contaminated .The area was then irrigated with normal saline. The laceration was closed with 5-0 Ethilon using interrupted sutures. There were no additional lacerations requiring repair. The wound area was then dressed with a sterile dressing.    Total repaired wound length: 1.5 cm.   Other Items: Suture count: 5  The patient tolerated the procedure well.  Complications: None;    Additional Problems and Disposition    Smoking Cessation:  Fina Cook M.D. spent 5 minutes with the patient explaining the importance of smoking cessation, specifically  with regard to wound healing. Nicotine replacement gum prescription accepted by the patient.     Escalation of care considered, and ultimately not performed:  Imaging: X-ray imaging was strongly recommended, this was declined buy the patient.    Prescription Management:  Hydrocodone prescription provided     Disposition:  Discharge in stable condition    FINAL IMPRESSION   1. Smoker    2. Laceration of right ring finger without damage to nail, foreign body presence unspecified, initial encounter        FOLLOW UP:  Zac Melton M.D.  [address] [place] [id number] [phone]    Schedule an appointment as soon as possible for a visit in 3 days  For wound re-check    [facility], Emergency Dept  [address] [id number] [phone]  Go to   If symptoms worsen      OUTPATIENT MEDICATIONS:  Discharge Medication List as of 8/8/2024  3:44 PM        START taking these medications    Details   nicotine polacrilex (NICORETTE) 4 MG gum Take 4 mg by mouth 4 times a day as needed (cigarette cravings)., Disp-100 Each, R-0, Normal      HYDROcodone-acetaminophen (NORCO) 5-325 MG Tab per tablet Take 1 Tablet by mouth every four hours as needed (moderate to severe pain) for up to 3 days., Disp-9 Tablet, R-0, Normal           In prescribing controlled substances to this patient, I certify that I have obtained and reviewed the medical history of Chace Gong. I have also made a good colette effort to obtain applicable records from other providers who have treated the patient and records did not demonstrate any increased risk of substance abuse that would prevent me from prescribing controlled substances.     I have conducted a physical exam and documented it. I have reviewed Mr. Gong’s prescription history as maintained by the [place] Prescription Monitoring Program.     I have assessed the patient’s risk for abuse, dependency, and addiction using the validated Opioid Risk  Tool available at https://www.[employer].com/tjptbi-tmje-aizp-ort-narcotic-abuse.     Given the above, I believe the benefits of controlled substance therapy outweigh the risks. The reasons for prescribing controlled substances include non-narcotic, oral analgesic alternatives have been inadequate for pain control. Accordingly, I have discussed the risk and benefits, treatment plan, and alternative therapies with the patient.

## 2024-08-08 NOTE — ED TRIAGE NOTES
"Chief Complaint   Patient presents with    Hand Laceration     X 1 hour ago R ring finger     Pt states that he slammed his R ring finger in a door, bleeding is controlled, fingertip is still intact patient has it wrapped. Pt ax0x4, ambulatory to triage, patient educated on wait times and triage process.     /81   Pulse 88   Temp 36.4 °C (97.5 °F) (Temporal)   Resp 18   Ht 1.88 m (6' 2\")   Wt 118 kg (260 lb)   SpO2 95%   BMI 33.38 kg/m²     "

## 2025-01-01 ENCOUNTER — APPOINTMENT (OUTPATIENT)
Dept: RADIOLOGY | Facility: MEDICAL CENTER | Age: 60
End: 2025-01-01

## 2025-01-01 ENCOUNTER — HOSPITAL ENCOUNTER (EMERGENCY)
Facility: MEDICAL CENTER | Age: 60
End: 2025-04-09
Attending: EMERGENCY MEDICINE
Payer: MEDICARE

## 2025-01-01 ENCOUNTER — APPOINTMENT (OUTPATIENT)
Dept: RADIOLOGY | Facility: MEDICAL CENTER | Age: 60
End: 2025-01-01
Attending: SURGERY

## 2025-01-01 VITALS — WEIGHT: 260 LBS | BODY MASS INDEX: 35.21 KG/M2 | HEART RATE: 167 BPM | HEIGHT: 72 IN

## 2025-01-01 DIAGNOSIS — S09.90XA TRAUMATIC INJURY OF HEAD, INITIAL ENCOUNTER: ICD-10-CM

## 2025-01-01 DIAGNOSIS — I46.9 CARDIAC ARREST (HCC): ICD-10-CM

## 2025-01-01 DIAGNOSIS — F19.11 HISTORY OF SUBSTANCE ABUSE (HCC): ICD-10-CM

## 2025-01-01 PROCEDURE — 92950 HEART/LUNG RESUSCITATION CPR: CPT

## 2025-01-01 PROCEDURE — 99291 CRITICAL CARE FIRST HOUR: CPT | Performed by: SURGERY

## 2025-01-01 PROCEDURE — 94799 UNLISTED PULMONARY SVC/PX: CPT

## 2025-01-01 PROCEDURE — 99285 EMERGENCY DEPT VISIT HI MDM: CPT

## 2025-01-01 PROCEDURE — 305949 HCHG RED TRAUMA ACT PRE-NOTIFY NO CC

## 2025-01-01 PROCEDURE — 76705 ECHO EXAM OF ABDOMEN: CPT

## 2025-04-09 PROBLEM — I46.8 TRAUMATIC CARDIAC ARREST (HCC): Status: ACTIVE | Noted: 2025-01-01
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
